# Patient Record
Sex: FEMALE | Race: WHITE | Employment: OTHER | URBAN - METROPOLITAN AREA
[De-identification: names, ages, dates, MRNs, and addresses within clinical notes are randomized per-mention and may not be internally consistent; named-entity substitution may affect disease eponyms.]

---

## 2017-01-18 PROBLEM — E78.2 MIXED HYPERLIPIDEMIA: Status: ACTIVE | Noted: 2017-01-18

## 2017-01-18 PROBLEM — M15.0 PRIMARY OSTEOARTHRITIS INVOLVING MULTIPLE JOINTS: Status: ACTIVE | Noted: 2017-01-18

## 2017-01-18 PROBLEM — M15.9 PRIMARY OSTEOARTHRITIS INVOLVING MULTIPLE JOINTS: Status: ACTIVE | Noted: 2017-01-18

## 2017-01-18 PROBLEM — S40.012A CONTUSION OF LEFT SHOULDER: Status: ACTIVE | Noted: 2017-01-18

## 2017-01-18 PROBLEM — S16.1XXA CERVICAL STRAIN: Status: ACTIVE | Noted: 2017-01-18

## 2018-03-14 ENCOUNTER — OFFICE VISIT (OUTPATIENT)
Dept: FAMILY MEDICINE CLINIC | Age: 73
End: 2018-03-14
Payer: MEDICARE

## 2018-03-14 VITALS
HEART RATE: 88 BPM | BODY MASS INDEX: 30 KG/M2 | TEMPERATURE: 98.1 F | OXYGEN SATURATION: 97 % | HEIGHT: 62 IN | WEIGHT: 163 LBS | SYSTOLIC BLOOD PRESSURE: 130 MMHG | DIASTOLIC BLOOD PRESSURE: 86 MMHG | RESPIRATION RATE: 16 BRPM

## 2018-03-14 DIAGNOSIS — J01.80 ACUTE NON-RECURRENT SINUSITIS OF OTHER SINUS: Primary | ICD-10-CM

## 2018-03-14 PROCEDURE — 99213 OFFICE O/P EST LOW 20 MIN: CPT | Performed by: PHYSICIAN ASSISTANT

## 2018-03-14 RX ORDER — TRIAMCINOLONE ACETONIDE 40 MG/ML
40 INJECTION, SUSPENSION INTRA-ARTICULAR; INTRAMUSCULAR ONCE
Status: COMPLETED | OUTPATIENT
Start: 2018-03-14 | End: 2018-03-14

## 2018-03-14 RX ADMIN — TRIAMCINOLONE ACETONIDE 40 MG: 40 INJECTION, SUSPENSION INTRA-ARTICULAR; INTRAMUSCULAR at 11:08

## 2018-03-14 NOTE — PROGRESS NOTES
Tonsillectomy; Percutaneous Transluminal Coronary Angio (2007); Dilation and curettage of uterus; cyst removal; Diagnostic Cardiac Cath Lab Procedure (2007); Coronary angioplasty (07); and  section. Social History:  reports that she quit smoking about 7 years ago. She quit after 35.00 years of use. She has never used smokeless tobacco. She reports that she does not drink alcohol or use drugs. Family History: family history includes Down Syndrome in her son; Emphysema in her mother; Heart Attack in her father; Heart Failure in her mother. The patients home medications have been reviewed. Allergies: Sulfa antibiotics; Amoxicillin; Clavulanic acid; Omeprazole; Other; Pneumococcal vaccines; and Amoxicillin-pot clavulanate    ------------------------- NURSING NOTES AND VITALS REVIEWED ---------------------------   The nursing notes within the ED encounter and vital signs as below have been reviewed by myself. /86   Pulse 88   Temp 98.1 °F (36.7 °C) (Oral)   Resp 16   Ht 5' 2\" (1.575 m)   Wt 163 lb (73.9 kg)   SpO2 97%   BMI 29.81 kg/m²   Oxygen Saturation Interpretation: Normal    The patients available past medical records and past encounters were reviewed. Physical exam:  Constitutional: Vital signs are reviewed the patient is comfortable. The patient is alert and oriented and conversant. Head: The head is atraumatic and normocephalic. Eyes: No discharge is present from the eyes. The sclera are normal.  ENT: The oropharynx demonstrates a small amount of erythema bilaterally. No postnasal drip in posterior pharynx. No uvular deviation or edema. No tonsillary asymmetry.  Floor of the mouth soft, no trismus, handling secretions. TMs bilaterally demonstrate no evidence of infection. Neck: Normal range of motion is achieved in the neck. Respiratory/chest: The chest is nontender.  Breath sounds are normal. There is no respiratory distress.   Cardiovascular:

## 2018-03-16 ENCOUNTER — TELEPHONE (OUTPATIENT)
Dept: PRIMARY CARE CLINIC | Age: 73
End: 2018-03-16

## 2018-03-16 NOTE — TELEPHONE ENCOUNTER
Spoke with pt, she relates having allergy Sx of rhinorrhea, post nasal drip, and \"I am just sick of it\". She just started back on claritin, is not taking the singulair, didn't try, and is not taking her flonase. Denies any f/c/n/v, other URI like Sx. We discussed going on the singulair, and being consistent with this and flonase she will do so, and will alert me in 5-7 days if no better. If she gets worse needs to FU.

## 2018-03-20 ENCOUNTER — OFFICE VISIT (OUTPATIENT)
Dept: PRIMARY CARE CLINIC | Age: 73
End: 2018-03-20
Payer: MEDICARE

## 2018-03-20 VITALS
SYSTOLIC BLOOD PRESSURE: 120 MMHG | DIASTOLIC BLOOD PRESSURE: 70 MMHG | OXYGEN SATURATION: 91 % | TEMPERATURE: 97.5 F | HEIGHT: 62 IN | HEART RATE: 89 BPM

## 2018-03-20 DIAGNOSIS — J32.9 CHRONIC SINUSITIS, UNSPECIFIED LOCATION: Primary | ICD-10-CM

## 2018-03-20 DIAGNOSIS — J06.9 UPPER RESPIRATORY TRACT INFECTION, UNSPECIFIED TYPE: ICD-10-CM

## 2018-03-20 LAB
INFLUENZA A ANTIGEN, POC: NEGATIVE
INFLUENZA B ANTIGEN, POC: NEGATIVE

## 2018-03-20 PROCEDURE — 1036F TOBACCO NON-USER: CPT | Performed by: PHYSICIAN ASSISTANT

## 2018-03-20 PROCEDURE — G8427 DOCREV CUR MEDS BY ELIG CLIN: HCPCS | Performed by: PHYSICIAN ASSISTANT

## 2018-03-20 PROCEDURE — 99213 OFFICE O/P EST LOW 20 MIN: CPT | Performed by: PHYSICIAN ASSISTANT

## 2018-03-20 PROCEDURE — G8417 CALC BMI ABV UP PARAM F/U: HCPCS | Performed by: PHYSICIAN ASSISTANT

## 2018-03-20 PROCEDURE — G8484 FLU IMMUNIZE NO ADMIN: HCPCS | Performed by: PHYSICIAN ASSISTANT

## 2018-03-20 PROCEDURE — G8400 PT W/DXA NO RESULTS DOC: HCPCS | Performed by: PHYSICIAN ASSISTANT

## 2018-03-20 PROCEDURE — 4040F PNEUMOC VAC/ADMIN/RCVD: CPT | Performed by: PHYSICIAN ASSISTANT

## 2018-03-20 PROCEDURE — G8598 ASA/ANTIPLAT THER USED: HCPCS | Performed by: PHYSICIAN ASSISTANT

## 2018-03-20 PROCEDURE — 87804 INFLUENZA ASSAY W/OPTIC: CPT | Performed by: PHYSICIAN ASSISTANT

## 2018-03-20 PROCEDURE — 1090F PRES/ABSN URINE INCON ASSESS: CPT | Performed by: PHYSICIAN ASSISTANT

## 2018-03-20 PROCEDURE — 3017F COLORECTAL CA SCREEN DOC REV: CPT | Performed by: PHYSICIAN ASSISTANT

## 2018-03-20 PROCEDURE — 1123F ACP DISCUSS/DSCN MKR DOCD: CPT | Performed by: PHYSICIAN ASSISTANT

## 2018-03-20 PROCEDURE — 3014F SCREEN MAMMO DOC REV: CPT | Performed by: PHYSICIAN ASSISTANT

## 2018-03-20 RX ORDER — METHYLPREDNISOLONE 4 MG/1
TABLET ORAL
Qty: 1 KIT | Refills: 0 | Status: SHIPPED | OUTPATIENT
Start: 2018-03-20 | End: 2018-03-26

## 2018-03-20 RX ORDER — DOXYCYCLINE HYCLATE 100 MG
100 TABLET ORAL 2 TIMES DAILY
Qty: 20 TABLET | Refills: 0 | Status: SHIPPED | OUTPATIENT
Start: 2018-03-20 | End: 2018-03-30

## 2018-03-20 NOTE — PROGRESS NOTES
myself)  Labs:  Results for orders placed or performed in visit on 03/20/18   POCT Influenza A/B Antigen (BD Veritor)   Result Value Ref Range    Inflenza A Ag negative     Influenza B Ag negative          Assessment / Plan     Impression(s):  1. Chronic sinusitis, unspecified location    2. Upper respiratory tract infection, unspecified type      Disposition:    Rx's were discussed with patient including indications and adverse effects, pt agrees and understands use. Recommended fluids and rest. OTC Tylenol if needed for pain/fever. Nasal saline spray. OTC Robitussin recommended. FU if Sx persist or worsen, if severe or worrisome-go to ER. Otherwise, FU for routine care. If this doesn't help or sinusitis recurs, will refer to ENT, or perhaps CT of sinuses. She understands.

## 2018-04-06 ENCOUNTER — OFFICE VISIT (OUTPATIENT)
Dept: FAMILY MEDICINE CLINIC | Age: 73
End: 2018-04-06
Payer: MEDICARE

## 2018-04-06 VITALS
HEIGHT: 62 IN | TEMPERATURE: 98 F | HEART RATE: 74 BPM | WEIGHT: 163 LBS | DIASTOLIC BLOOD PRESSURE: 96 MMHG | BODY MASS INDEX: 30 KG/M2 | RESPIRATION RATE: 18 BRPM | OXYGEN SATURATION: 95 % | SYSTOLIC BLOOD PRESSURE: 148 MMHG

## 2018-04-06 DIAGNOSIS — B37.0 ORAL THRUSH: Primary | ICD-10-CM

## 2018-04-06 DIAGNOSIS — N89.8 VAGINAL ITCHING: ICD-10-CM

## 2018-04-06 PROCEDURE — 3017F COLORECTAL CA SCREEN DOC REV: CPT | Performed by: PHYSICIAN ASSISTANT

## 2018-04-06 PROCEDURE — 99213 OFFICE O/P EST LOW 20 MIN: CPT | Performed by: PHYSICIAN ASSISTANT

## 2018-04-06 PROCEDURE — G8598 ASA/ANTIPLAT THER USED: HCPCS | Performed by: PHYSICIAN ASSISTANT

## 2018-04-06 PROCEDURE — 4040F PNEUMOC VAC/ADMIN/RCVD: CPT | Performed by: PHYSICIAN ASSISTANT

## 2018-04-06 PROCEDURE — 1123F ACP DISCUSS/DSCN MKR DOCD: CPT | Performed by: PHYSICIAN ASSISTANT

## 2018-04-06 PROCEDURE — G8400 PT W/DXA NO RESULTS DOC: HCPCS | Performed by: PHYSICIAN ASSISTANT

## 2018-04-06 PROCEDURE — G8417 CALC BMI ABV UP PARAM F/U: HCPCS | Performed by: PHYSICIAN ASSISTANT

## 2018-04-06 PROCEDURE — 1090F PRES/ABSN URINE INCON ASSESS: CPT | Performed by: PHYSICIAN ASSISTANT

## 2018-04-06 PROCEDURE — G8427 DOCREV CUR MEDS BY ELIG CLIN: HCPCS | Performed by: PHYSICIAN ASSISTANT

## 2018-04-06 PROCEDURE — 3014F SCREEN MAMMO DOC REV: CPT | Performed by: PHYSICIAN ASSISTANT

## 2018-04-06 PROCEDURE — 1036F TOBACCO NON-USER: CPT | Performed by: PHYSICIAN ASSISTANT

## 2018-04-12 ENCOUNTER — OFFICE VISIT (OUTPATIENT)
Dept: FAMILY MEDICINE CLINIC | Age: 73
End: 2018-04-12
Payer: MEDICARE

## 2018-04-12 VITALS
DIASTOLIC BLOOD PRESSURE: 79 MMHG | WEIGHT: 178 LBS | RESPIRATION RATE: 12 BRPM | HEIGHT: 62 IN | SYSTOLIC BLOOD PRESSURE: 116 MMHG | HEART RATE: 88 BPM | BODY MASS INDEX: 32.76 KG/M2 | TEMPERATURE: 98 F | OXYGEN SATURATION: 96 %

## 2018-04-12 DIAGNOSIS — J30.2 ACUTE SEASONAL ALLERGIC RHINITIS, UNSPECIFIED TRIGGER: Primary | ICD-10-CM

## 2018-04-12 PROCEDURE — 99213 OFFICE O/P EST LOW 20 MIN: CPT | Performed by: PHYSICIAN ASSISTANT

## 2018-04-12 PROCEDURE — G8427 DOCREV CUR MEDS BY ELIG CLIN: HCPCS | Performed by: PHYSICIAN ASSISTANT

## 2018-04-12 PROCEDURE — 3014F SCREEN MAMMO DOC REV: CPT | Performed by: PHYSICIAN ASSISTANT

## 2018-04-12 PROCEDURE — 4040F PNEUMOC VAC/ADMIN/RCVD: CPT | Performed by: PHYSICIAN ASSISTANT

## 2018-04-12 PROCEDURE — 1090F PRES/ABSN URINE INCON ASSESS: CPT | Performed by: PHYSICIAN ASSISTANT

## 2018-04-12 PROCEDURE — G8400 PT W/DXA NO RESULTS DOC: HCPCS | Performed by: PHYSICIAN ASSISTANT

## 2018-04-12 PROCEDURE — G8598 ASA/ANTIPLAT THER USED: HCPCS | Performed by: PHYSICIAN ASSISTANT

## 2018-04-12 PROCEDURE — G8417 CALC BMI ABV UP PARAM F/U: HCPCS | Performed by: PHYSICIAN ASSISTANT

## 2018-04-12 PROCEDURE — 1123F ACP DISCUSS/DSCN MKR DOCD: CPT | Performed by: PHYSICIAN ASSISTANT

## 2018-04-12 PROCEDURE — 1036F TOBACCO NON-USER: CPT | Performed by: PHYSICIAN ASSISTANT

## 2018-04-12 PROCEDURE — 96372 THER/PROPH/DIAG INJ SC/IM: CPT | Performed by: PHYSICIAN ASSISTANT

## 2018-04-12 PROCEDURE — 3017F COLORECTAL CA SCREEN DOC REV: CPT | Performed by: PHYSICIAN ASSISTANT

## 2018-04-12 RX ORDER — TRIAMCINOLONE ACETONIDE 40 MG/ML
40 INJECTION, SUSPENSION INTRA-ARTICULAR; INTRAMUSCULAR ONCE
Status: COMPLETED | OUTPATIENT
Start: 2018-04-12 | End: 2018-04-12

## 2018-04-12 RX ADMIN — TRIAMCINOLONE ACETONIDE 40 MG: 40 INJECTION, SUSPENSION INTRA-ARTICULAR; INTRAMUSCULAR at 13:25

## 2018-04-23 DIAGNOSIS — F41.9 ANXIETY: ICD-10-CM

## 2018-04-23 RX ORDER — ALPRAZOLAM 2 MG/1
TABLET ORAL
Qty: 180 TABLET | Refills: 1 | Status: SHIPPED | OUTPATIENT
Start: 2018-04-23 | End: 2018-05-23

## 2018-05-14 DIAGNOSIS — K21.9 GASTROESOPHAGEAL REFLUX DISEASE WITHOUT ESOPHAGITIS: ICD-10-CM

## 2018-05-14 RX ORDER — PANTOPRAZOLE SODIUM 40 MG/1
40 TABLET, DELAYED RELEASE ORAL DAILY
Qty: 90 TABLET | Refills: 1 | Status: SHIPPED | OUTPATIENT
Start: 2018-05-14 | End: 2018-11-07 | Stop reason: SDUPTHER

## 2018-05-25 ENCOUNTER — OFFICE VISIT (OUTPATIENT)
Dept: PRIMARY CARE CLINIC | Age: 73
End: 2018-05-25
Payer: MEDICARE

## 2018-05-25 VITALS
DIASTOLIC BLOOD PRESSURE: 84 MMHG | HEART RATE: 85 BPM | WEIGHT: 155 LBS | TEMPERATURE: 97 F | SYSTOLIC BLOOD PRESSURE: 134 MMHG | BODY MASS INDEX: 28.52 KG/M2 | OXYGEN SATURATION: 96 % | HEIGHT: 62 IN

## 2018-05-25 DIAGNOSIS — J01.91 ACUTE RECURRENT SINUSITIS, UNSPECIFIED LOCATION: Primary | ICD-10-CM

## 2018-05-25 PROCEDURE — G8400 PT W/DXA NO RESULTS DOC: HCPCS | Performed by: PHYSICIAN ASSISTANT

## 2018-05-25 PROCEDURE — 3017F COLORECTAL CA SCREEN DOC REV: CPT | Performed by: PHYSICIAN ASSISTANT

## 2018-05-25 PROCEDURE — 96372 THER/PROPH/DIAG INJ SC/IM: CPT | Performed by: PHYSICIAN ASSISTANT

## 2018-05-25 PROCEDURE — G8427 DOCREV CUR MEDS BY ELIG CLIN: HCPCS | Performed by: PHYSICIAN ASSISTANT

## 2018-05-25 PROCEDURE — G8417 CALC BMI ABV UP PARAM F/U: HCPCS | Performed by: PHYSICIAN ASSISTANT

## 2018-05-25 PROCEDURE — 1123F ACP DISCUSS/DSCN MKR DOCD: CPT | Performed by: PHYSICIAN ASSISTANT

## 2018-05-25 PROCEDURE — 1090F PRES/ABSN URINE INCON ASSESS: CPT | Performed by: PHYSICIAN ASSISTANT

## 2018-05-25 PROCEDURE — 4040F PNEUMOC VAC/ADMIN/RCVD: CPT | Performed by: PHYSICIAN ASSISTANT

## 2018-05-25 PROCEDURE — 99213 OFFICE O/P EST LOW 20 MIN: CPT | Performed by: PHYSICIAN ASSISTANT

## 2018-05-25 PROCEDURE — G8598 ASA/ANTIPLAT THER USED: HCPCS | Performed by: PHYSICIAN ASSISTANT

## 2018-05-25 PROCEDURE — 1036F TOBACCO NON-USER: CPT | Performed by: PHYSICIAN ASSISTANT

## 2018-05-25 RX ORDER — LEVOCETIRIZINE DIHYDROCHLORIDE 5 MG/1
5 TABLET, FILM COATED ORAL NIGHTLY
Qty: 30 TABLET | Refills: 2 | Status: SHIPPED | OUTPATIENT
Start: 2018-05-25 | End: 2018-06-29

## 2018-05-25 RX ORDER — CEFTRIAXONE SODIUM 250 MG/1
250 INJECTION, POWDER, FOR SOLUTION INTRAMUSCULAR; INTRAVENOUS ONCE
Status: COMPLETED | OUTPATIENT
Start: 2018-05-25 | End: 2018-05-25

## 2018-05-25 RX ORDER — ALPRAZOLAM 2 MG/1
TABLET ORAL
COMMUNITY
Start: 2018-05-24 | End: 2018-06-29 | Stop reason: SDUPTHER

## 2018-05-25 RX ADMIN — CEFTRIAXONE SODIUM 250 MG: 250 INJECTION, POWDER, FOR SOLUTION INTRAMUSCULAR; INTRAVENOUS at 10:55

## 2018-06-01 ENCOUNTER — HOSPITAL ENCOUNTER (OUTPATIENT)
Dept: GENERAL RADIOLOGY | Age: 73
Discharge: HOME | End: 2018-06-03
Payer: MEDICARE

## 2018-06-01 ENCOUNTER — HOSPITAL ENCOUNTER (OUTPATIENT)
Age: 73
Discharge: HOME | End: 2018-06-03
Payer: MEDICARE

## 2018-06-01 ENCOUNTER — OFFICE VISIT (OUTPATIENT)
Dept: FAMILY MEDICINE CLINIC | Age: 73
End: 2018-06-01
Payer: MEDICARE

## 2018-06-01 VITALS
OXYGEN SATURATION: 98 % | WEIGHT: 155 LBS | SYSTOLIC BLOOD PRESSURE: 170 MMHG | HEIGHT: 62 IN | TEMPERATURE: 98.5 F | HEART RATE: 71 BPM | DIASTOLIC BLOOD PRESSURE: 100 MMHG | BODY MASS INDEX: 28.52 KG/M2

## 2018-06-01 DIAGNOSIS — M79.675 GREAT TOE PAIN, LEFT: Primary | ICD-10-CM

## 2018-06-01 DIAGNOSIS — M79.675 GREAT TOE PAIN, LEFT: ICD-10-CM

## 2018-06-01 PROCEDURE — 73630 X-RAY EXAM OF FOOT: CPT

## 2018-06-01 PROCEDURE — G8598 ASA/ANTIPLAT THER USED: HCPCS | Performed by: PHYSICIAN ASSISTANT

## 2018-06-01 PROCEDURE — 1123F ACP DISCUSS/DSCN MKR DOCD: CPT | Performed by: PHYSICIAN ASSISTANT

## 2018-06-01 PROCEDURE — 99213 OFFICE O/P EST LOW 20 MIN: CPT | Performed by: PHYSICIAN ASSISTANT

## 2018-06-01 PROCEDURE — 1090F PRES/ABSN URINE INCON ASSESS: CPT | Performed by: PHYSICIAN ASSISTANT

## 2018-06-01 PROCEDURE — G8417 CALC BMI ABV UP PARAM F/U: HCPCS | Performed by: PHYSICIAN ASSISTANT

## 2018-06-01 PROCEDURE — 4040F PNEUMOC VAC/ADMIN/RCVD: CPT | Performed by: PHYSICIAN ASSISTANT

## 2018-06-01 PROCEDURE — G8400 PT W/DXA NO RESULTS DOC: HCPCS | Performed by: PHYSICIAN ASSISTANT

## 2018-06-01 PROCEDURE — G8427 DOCREV CUR MEDS BY ELIG CLIN: HCPCS | Performed by: PHYSICIAN ASSISTANT

## 2018-06-01 PROCEDURE — 96372 THER/PROPH/DIAG INJ SC/IM: CPT | Performed by: PHYSICIAN ASSISTANT

## 2018-06-01 PROCEDURE — 3017F COLORECTAL CA SCREEN DOC REV: CPT | Performed by: PHYSICIAN ASSISTANT

## 2018-06-01 PROCEDURE — 1036F TOBACCO NON-USER: CPT | Performed by: PHYSICIAN ASSISTANT

## 2018-06-01 RX ORDER — METHYLPREDNISOLONE 4 MG/1
TABLET ORAL
Qty: 1 KIT | Refills: 0 | Status: SHIPPED | OUTPATIENT
Start: 2018-06-01 | End: 2018-06-07

## 2018-06-01 RX ORDER — DEXAMETHASONE SODIUM PHOSPHATE 100 MG/10ML
10 INJECTION INTRAMUSCULAR; INTRAVENOUS ONCE
Status: COMPLETED | OUTPATIENT
Start: 2018-06-01 | End: 2018-06-01

## 2018-06-01 RX ADMIN — DEXAMETHASONE SODIUM PHOSPHATE 10 MG: 100 INJECTION INTRAMUSCULAR; INTRAVENOUS at 16:39

## 2018-06-15 ENCOUNTER — OFFICE VISIT (OUTPATIENT)
Dept: PRIMARY CARE CLINIC | Age: 73
End: 2018-06-15
Payer: MEDICARE

## 2018-06-15 DIAGNOSIS — H61.23 BILATERAL IMPACTED CERUMEN: Primary | ICD-10-CM

## 2018-06-15 PROCEDURE — G8400 PT W/DXA NO RESULTS DOC: HCPCS | Performed by: PHYSICIAN ASSISTANT

## 2018-06-15 PROCEDURE — G8417 CALC BMI ABV UP PARAM F/U: HCPCS | Performed by: PHYSICIAN ASSISTANT

## 2018-06-15 PROCEDURE — 99212 OFFICE O/P EST SF 10 MIN: CPT | Performed by: PHYSICIAN ASSISTANT

## 2018-06-15 PROCEDURE — G8598 ASA/ANTIPLAT THER USED: HCPCS | Performed by: PHYSICIAN ASSISTANT

## 2018-06-15 PROCEDURE — 1123F ACP DISCUSS/DSCN MKR DOCD: CPT | Performed by: PHYSICIAN ASSISTANT

## 2018-06-15 PROCEDURE — 3017F COLORECTAL CA SCREEN DOC REV: CPT | Performed by: PHYSICIAN ASSISTANT

## 2018-06-15 PROCEDURE — G8428 CUR MEDS NOT DOCUMENT: HCPCS | Performed by: PHYSICIAN ASSISTANT

## 2018-06-15 PROCEDURE — 4040F PNEUMOC VAC/ADMIN/RCVD: CPT | Performed by: PHYSICIAN ASSISTANT

## 2018-06-15 PROCEDURE — 1036F TOBACCO NON-USER: CPT | Performed by: PHYSICIAN ASSISTANT

## 2018-06-15 PROCEDURE — 69209 REMOVE IMPACTED EAR WAX UNI: CPT | Performed by: PHYSICIAN ASSISTANT

## 2018-06-15 PROCEDURE — 1090F PRES/ABSN URINE INCON ASSESS: CPT | Performed by: PHYSICIAN ASSISTANT

## 2018-06-24 ENCOUNTER — APPOINTMENT (OUTPATIENT)
Dept: GENERAL RADIOLOGY | Age: 73
End: 2018-06-24
Payer: MEDICARE

## 2018-06-24 ENCOUNTER — HOSPITAL ENCOUNTER (EMERGENCY)
Age: 73
Discharge: HOME | End: 2018-06-24
Payer: MEDICARE

## 2018-06-24 VITALS
OXYGEN SATURATION: 93 % | SYSTOLIC BLOOD PRESSURE: 140 MMHG | RESPIRATION RATE: 16 BRPM | DIASTOLIC BLOOD PRESSURE: 87 MMHG | TEMPERATURE: 98.3 F | HEIGHT: 62 IN | BODY MASS INDEX: 28.52 KG/M2 | WEIGHT: 155 LBS | HEART RATE: 75 BPM

## 2018-06-24 DIAGNOSIS — S80.11XA CONTUSION OF RIGHT LOWER LEG, INITIAL ENCOUNTER: Primary | ICD-10-CM

## 2018-06-24 PROCEDURE — 99284 EMERGENCY DEPT VISIT MOD MDM: CPT

## 2018-06-24 PROCEDURE — 73590 X-RAY EXAM OF LOWER LEG: CPT

## 2018-06-27 ENCOUNTER — CARE COORDINATION (OUTPATIENT)
Dept: CARE COORDINATION | Age: 73
End: 2018-06-27

## 2018-06-29 ENCOUNTER — OFFICE VISIT (OUTPATIENT)
Dept: PRIMARY CARE CLINIC | Age: 73
End: 2018-06-29
Payer: MEDICARE

## 2018-06-29 ENCOUNTER — HOSPITAL ENCOUNTER (OUTPATIENT)
Age: 73
Discharge: HOME | End: 2018-07-01
Payer: MEDICARE

## 2018-06-29 VITALS
WEIGHT: 154 LBS | TEMPERATURE: 97.5 F | OXYGEN SATURATION: 96 % | HEART RATE: 76 BPM | SYSTOLIC BLOOD PRESSURE: 120 MMHG | RESPIRATION RATE: 16 BRPM | BODY MASS INDEX: 28.34 KG/M2 | HEIGHT: 62 IN | DIASTOLIC BLOOD PRESSURE: 70 MMHG

## 2018-06-29 DIAGNOSIS — Z12.11 ENCOUNTER FOR FIT (FECAL IMMUNOCHEMICAL TEST) SCREENING: ICD-10-CM

## 2018-06-29 DIAGNOSIS — F41.9 ANXIETY: ICD-10-CM

## 2018-06-29 DIAGNOSIS — J32.9 RECURRENT SINUS INFECTIONS: ICD-10-CM

## 2018-06-29 DIAGNOSIS — I10 ESSENTIAL HYPERTENSION: ICD-10-CM

## 2018-06-29 DIAGNOSIS — E11.9 TYPE 2 DIABETES MELLITUS WITHOUT COMPLICATION, WITHOUT LONG-TERM CURRENT USE OF INSULIN (HCC): ICD-10-CM

## 2018-06-29 DIAGNOSIS — I10 ESSENTIAL HYPERTENSION: Primary | ICD-10-CM

## 2018-06-29 DIAGNOSIS — E78.2 MIXED HYPERLIPIDEMIA: ICD-10-CM

## 2018-06-29 LAB
ALBUMIN SERPL-MCNC: 4.4 G/DL (ref 3.5–5.2)
ALP BLD-CCNC: 45 U/L (ref 35–104)
ALT SERPL-CCNC: 10 U/L (ref 0–32)
ANION GAP SERPL CALCULATED.3IONS-SCNC: 15 MMOL/L (ref 7–16)
AST SERPL-CCNC: 17 U/L (ref 0–31)
BASOPHILS ABSOLUTE: 0.06 E9/L (ref 0–0.2)
BASOPHILS RELATIVE PERCENT: 0.7 % (ref 0–2)
BILIRUB SERPL-MCNC: 0.4 MG/DL (ref 0–1.2)
BUN BLDV-MCNC: 17 MG/DL (ref 8–23)
CALCIUM SERPL-MCNC: 9.4 MG/DL (ref 8.6–10.2)
CHLORIDE BLD-SCNC: 102 MMOL/L (ref 98–107)
CHOLESTEROL, TOTAL: 149 MG/DL (ref 0–199)
CO2: 26 MMOL/L (ref 22–29)
CREAT SERPL-MCNC: 0.9 MG/DL (ref 0.5–1)
CREATININE URINE: 247 MG/DL (ref 29–226)
EOSINOPHILS ABSOLUTE: 0.16 E9/L (ref 0.05–0.5)
EOSINOPHILS RELATIVE PERCENT: 1.8 % (ref 0–6)
GFR AFRICAN AMERICAN: >60
GFR NON-AFRICAN AMERICAN: >60 ML/MIN/1.73
GLUCOSE BLD-MCNC: 83 MG/DL (ref 74–109)
HBA1C MFR BLD: 5.7 %
HBA1C MFR BLD: 5.8 % (ref 4–5.6)
HCT VFR BLD CALC: 43.9 % (ref 34–48)
HDLC SERPL-MCNC: 59 MG/DL
HEMOGLOBIN: 13.5 G/DL (ref 11.5–15.5)
IMMATURE GRANULOCYTES #: 0.06 E9/L
IMMATURE GRANULOCYTES %: 0.7 % (ref 0–5)
LDL CHOLESTEROL CALCULATED: 66 MG/DL (ref 0–99)
LYMPHOCYTES ABSOLUTE: 1.64 E9/L (ref 1.5–4)
LYMPHOCYTES RELATIVE PERCENT: 18.5 % (ref 20–42)
MCH RBC QN AUTO: 29.5 PG (ref 26–35)
MCHC RBC AUTO-ENTMCNC: 30.8 % (ref 32–34.5)
MCV RBC AUTO: 96.1 FL (ref 80–99.9)
MICROALBUMIN UR-MCNC: 99.5 MG/L
MICROALBUMIN/CREAT UR-RTO: 40.3 (ref 0–30)
MONOCYTES ABSOLUTE: 0.99 E9/L (ref 0.1–0.95)
MONOCYTES RELATIVE PERCENT: 11.1 % (ref 2–12)
NEUTROPHILS ABSOLUTE: 5.97 E9/L (ref 1.8–7.3)
NEUTROPHILS RELATIVE PERCENT: 67.2 % (ref 43–80)
PDW BLD-RTO: 12.9 FL (ref 11.5–15)
PLATELET # BLD: 289 E9/L (ref 130–450)
PMV BLD AUTO: 10.7 FL (ref 7–12)
POTASSIUM SERPL-SCNC: 5 MMOL/L (ref 3.5–5)
RBC # BLD: 4.57 E12/L (ref 3.5–5.5)
SODIUM BLD-SCNC: 143 MMOL/L (ref 132–146)
TOTAL PROTEIN: 6.9 G/DL (ref 6.4–8.3)
TRIGL SERPL-MCNC: 121 MG/DL (ref 0–149)
VLDLC SERPL CALC-MCNC: 24 MG/DL
WBC # BLD: 8.9 E9/L (ref 4.5–11.5)

## 2018-06-29 PROCEDURE — 83036 HEMOGLOBIN GLYCOSYLATED A1C: CPT

## 2018-06-29 PROCEDURE — G8427 DOCREV CUR MEDS BY ELIG CLIN: HCPCS | Performed by: PHYSICIAN ASSISTANT

## 2018-06-29 PROCEDURE — 83036 HEMOGLOBIN GLYCOSYLATED A1C: CPT | Performed by: PHYSICIAN ASSISTANT

## 2018-06-29 PROCEDURE — G8598 ASA/ANTIPLAT THER USED: HCPCS | Performed by: PHYSICIAN ASSISTANT

## 2018-06-29 PROCEDURE — 3044F HG A1C LEVEL LT 7.0%: CPT | Performed by: PHYSICIAN ASSISTANT

## 2018-06-29 PROCEDURE — 82570 ASSAY OF URINE CREATININE: CPT

## 2018-06-29 PROCEDURE — 99214 OFFICE O/P EST MOD 30 MIN: CPT | Performed by: PHYSICIAN ASSISTANT

## 2018-06-29 PROCEDURE — 3017F COLORECTAL CA SCREEN DOC REV: CPT | Performed by: PHYSICIAN ASSISTANT

## 2018-06-29 PROCEDURE — 1123F ACP DISCUSS/DSCN MKR DOCD: CPT | Performed by: PHYSICIAN ASSISTANT

## 2018-06-29 PROCEDURE — 80053 COMPREHEN METABOLIC PANEL: CPT

## 2018-06-29 PROCEDURE — 82044 UR ALBUMIN SEMIQUANTITATIVE: CPT

## 2018-06-29 PROCEDURE — 1036F TOBACCO NON-USER: CPT | Performed by: PHYSICIAN ASSISTANT

## 2018-06-29 PROCEDURE — 85025 COMPLETE CBC W/AUTO DIFF WBC: CPT

## 2018-06-29 PROCEDURE — G8400 PT W/DXA NO RESULTS DOC: HCPCS | Performed by: PHYSICIAN ASSISTANT

## 2018-06-29 PROCEDURE — 1090F PRES/ABSN URINE INCON ASSESS: CPT | Performed by: PHYSICIAN ASSISTANT

## 2018-06-29 PROCEDURE — 2022F DILAT RTA XM EVC RTNOPTHY: CPT | Performed by: PHYSICIAN ASSISTANT

## 2018-06-29 PROCEDURE — 80061 LIPID PANEL: CPT

## 2018-06-29 PROCEDURE — G8417 CALC BMI ABV UP PARAM F/U: HCPCS | Performed by: PHYSICIAN ASSISTANT

## 2018-06-29 PROCEDURE — 4040F PNEUMOC VAC/ADMIN/RCVD: CPT | Performed by: PHYSICIAN ASSISTANT

## 2018-06-29 RX ORDER — OFLOXACIN 3 MG/ML
SOLUTION/ DROPS OPHTHALMIC
COMMUNITY
Start: 2018-06-26 | End: 2019-06-12

## 2018-06-29 RX ORDER — LEVOFLOXACIN 500 MG/1
500 TABLET, FILM COATED ORAL DAILY
Qty: 7 TABLET | Refills: 0 | Status: SHIPPED | OUTPATIENT
Start: 2018-06-29 | End: 2018-07-06

## 2018-06-29 RX ORDER — ALPRAZOLAM 2 MG/1
2 TABLET ORAL 3 TIMES DAILY PRN
Qty: 90 TABLET | Refills: 1 | Status: SHIPPED | OUTPATIENT
Start: 2018-06-29 | End: 2018-07-02 | Stop reason: SDUPTHER

## 2018-06-29 RX ORDER — PREDNISOLONE ACETATE 10 MG/ML
SUSPENSION/ DROPS OPHTHALMIC
COMMUNITY
Start: 2018-06-26 | End: 2019-06-12

## 2018-06-29 ASSESSMENT — PATIENT HEALTH QUESTIONNAIRE - PHQ9
SUM OF ALL RESPONSES TO PHQ9 QUESTIONS 1 & 2: 0
SUM OF ALL RESPONSES TO PHQ QUESTIONS 1-9: 0
2. FEELING DOWN, DEPRESSED OR HOPELESS: 0
1. LITTLE INTEREST OR PLEASURE IN DOING THINGS: 0

## 2018-07-02 DIAGNOSIS — F41.9 ANXIETY: ICD-10-CM

## 2018-07-02 RX ORDER — FLUTICASONE PROPIONATE 50 MCG
1 SPRAY, SUSPENSION (ML) NASAL DAILY
Qty: 1 BOTTLE | Refills: 1 | Status: SHIPPED
Start: 2018-07-02 | End: 2020-04-27

## 2018-07-02 RX ORDER — ALPRAZOLAM 2 MG/1
2 TABLET ORAL 3 TIMES DAILY PRN
Qty: 90 TABLET | Refills: 1 | Status: SHIPPED | OUTPATIENT
Start: 2018-07-02 | End: 2018-08-01

## 2018-07-19 ENCOUNTER — OFFICE VISIT (OUTPATIENT)
Dept: PRIMARY CARE CLINIC | Age: 73
End: 2018-07-19
Payer: MEDICARE

## 2018-07-19 VITALS
OXYGEN SATURATION: 96 % | BODY MASS INDEX: 27.98 KG/M2 | DIASTOLIC BLOOD PRESSURE: 80 MMHG | WEIGHT: 153 LBS | SYSTOLIC BLOOD PRESSURE: 136 MMHG | TEMPERATURE: 98.2 F | RESPIRATION RATE: 16 BRPM | HEART RATE: 76 BPM

## 2018-07-19 DIAGNOSIS — J30.9 CHRONIC ALLERGIC RHINITIS, UNSPECIFIED SEASONALITY, UNSPECIFIED TRIGGER: Primary | ICD-10-CM

## 2018-07-19 PROCEDURE — 4040F PNEUMOC VAC/ADMIN/RCVD: CPT | Performed by: PHYSICIAN ASSISTANT

## 2018-07-19 PROCEDURE — G8400 PT W/DXA NO RESULTS DOC: HCPCS | Performed by: PHYSICIAN ASSISTANT

## 2018-07-19 PROCEDURE — G8598 ASA/ANTIPLAT THER USED: HCPCS | Performed by: PHYSICIAN ASSISTANT

## 2018-07-19 PROCEDURE — 99213 OFFICE O/P EST LOW 20 MIN: CPT | Performed by: PHYSICIAN ASSISTANT

## 2018-07-19 PROCEDURE — 1101F PT FALLS ASSESS-DOCD LE1/YR: CPT | Performed by: PHYSICIAN ASSISTANT

## 2018-07-19 PROCEDURE — 1036F TOBACCO NON-USER: CPT | Performed by: PHYSICIAN ASSISTANT

## 2018-07-19 PROCEDURE — G8417 CALC BMI ABV UP PARAM F/U: HCPCS | Performed by: PHYSICIAN ASSISTANT

## 2018-07-19 PROCEDURE — 3017F COLORECTAL CA SCREEN DOC REV: CPT | Performed by: PHYSICIAN ASSISTANT

## 2018-07-19 PROCEDURE — 1090F PRES/ABSN URINE INCON ASSESS: CPT | Performed by: PHYSICIAN ASSISTANT

## 2018-07-19 PROCEDURE — G8427 DOCREV CUR MEDS BY ELIG CLIN: HCPCS | Performed by: PHYSICIAN ASSISTANT

## 2018-07-19 PROCEDURE — 1123F ACP DISCUSS/DSCN MKR DOCD: CPT | Performed by: PHYSICIAN ASSISTANT

## 2018-07-19 NOTE — PROGRESS NOTES
Chief Complaint:   Sinus Problem (check for sinus infection)      History of Present Illness   Source of history provided by:  patient. Jorge Forte is a 72 y.o. old female with a past medical history of:   Past Medical History:   Diagnosis Date    Abnormal echocardiogram     Anxiety     CAD (coronary artery disease)     normal adenisone stress test    Carotid artery narrowing     <40% bilaterally    Depression     Diabetes mellitus (HCC)     type II    Drug intolerance     Lipitor, Crestor, high doses of Simvastatin    GERD (gastroesophageal reflux disease)     Heart attack     Hyperlipidemia     Hypertension     IBS (irritable bowel syndrome)     Obesity     Primary osteoarthritis involving multiple joints 1/18/2017    SOB (shortness of breath) on exertion     UTI (urinary tract infection)       Presents today for a check on his sinuses. She is getting cataract surgery this upcoming Monday and wants to ensure she has no sinus infection. States she continues to have post nasal drip and drainage in the AM, it is clear, she states as she drinks and eats throughout the day it gets better. No f/c/n/v. No increased fatigue/malaise. No cough, no CP, SOB, wheezing,  pleurisy, abdominal pain,  neck stiffness or pain, rash, or lethargy. ROS    Unless otherwise stated in this report or unable to obtain because of the patient's clinical or mental status as evidenced by the medical record, this patients's positive and negative responses for Review of Systems, constitutional, psych, eyes, ENT, cardiovascular, respiratory, gastrointestinal, neurological, genitourinary, musculoskeletal, integument systems and systems related to the presenting problem are either stated in the preceding or were not pertinent or were negative for the symptoms and/or complaints related to the medical problem. Past Surgical History:  has a past surgical history that includes Tonsillectomy;  Percutaneous Transluminal Impression(s):  1. Chronic allergic rhinitis, unspecified seasonality, unspecified trigger      Disposition:    Pt is still having the CT of her sinuses, I truly feel this is from allergies, she has no sigsn of ifnection, and she should be OK for the cataract surgerys, should she worsen before then she needs to alert me immed. She will do so. She very elikley needs ENT referral. Will await the CT, continue the allergy meds. FU in early Jan 2019 for biannual check.

## 2018-08-02 ENCOUNTER — HOSPITAL ENCOUNTER (OUTPATIENT)
Dept: CT IMAGING | Age: 73
Discharge: HOME | End: 2018-08-04
Payer: MEDICARE

## 2018-08-02 DIAGNOSIS — J32.9 RECURRENT SINUS INFECTIONS: ICD-10-CM

## 2018-08-13 ENCOUNTER — TELEPHONE (OUTPATIENT)
Dept: PRIMARY CARE CLINIC | Age: 73
End: 2018-08-13

## 2018-08-13 NOTE — TELEPHONE ENCOUNTER
Pt returned call, is not in a hurry to see ENT. Has upcoming cataract surgery. Will call with name of who she wants to see.

## 2018-08-13 NOTE — TELEPHONE ENCOUNTER
Can we let pt know her CT did show R sided chronic sinusitis. I want her to see ENT for her symptoms and for this. Does she have a preference who she sees?

## 2018-08-24 ENCOUNTER — TELEPHONE (OUTPATIENT)
Dept: ONCOLOGY | Age: 73
End: 2018-08-24

## 2018-10-12 ENCOUNTER — OFFICE VISIT (OUTPATIENT)
Dept: PRIMARY CARE CLINIC | Age: 73
End: 2018-10-12
Payer: MEDICARE

## 2018-10-12 VITALS
HEIGHT: 62 IN | OXYGEN SATURATION: 96 % | WEIGHT: 155 LBS | HEART RATE: 83 BPM | TEMPERATURE: 97.8 F | BODY MASS INDEX: 28.52 KG/M2 | DIASTOLIC BLOOD PRESSURE: 80 MMHG | SYSTOLIC BLOOD PRESSURE: 124 MMHG

## 2018-10-12 DIAGNOSIS — S86.899A SHIN SPLINTS, INITIAL ENCOUNTER: ICD-10-CM

## 2018-10-12 DIAGNOSIS — M89.8X6 BILATERAL TIBIAL PAIN: Primary | ICD-10-CM

## 2018-10-12 DIAGNOSIS — Z12.31 SCREENING MAMMOGRAM, ENCOUNTER FOR: ICD-10-CM

## 2018-10-12 PROCEDURE — 3017F COLORECTAL CA SCREEN DOC REV: CPT | Performed by: PHYSICIAN ASSISTANT

## 2018-10-12 PROCEDURE — 4040F PNEUMOC VAC/ADMIN/RCVD: CPT | Performed by: PHYSICIAN ASSISTANT

## 2018-10-12 PROCEDURE — G8400 PT W/DXA NO RESULTS DOC: HCPCS | Performed by: PHYSICIAN ASSISTANT

## 2018-10-12 PROCEDURE — G8484 FLU IMMUNIZE NO ADMIN: HCPCS | Performed by: PHYSICIAN ASSISTANT

## 2018-10-12 PROCEDURE — 1036F TOBACCO NON-USER: CPT | Performed by: PHYSICIAN ASSISTANT

## 2018-10-12 PROCEDURE — 1101F PT FALLS ASSESS-DOCD LE1/YR: CPT | Performed by: PHYSICIAN ASSISTANT

## 2018-10-12 PROCEDURE — 99213 OFFICE O/P EST LOW 20 MIN: CPT | Performed by: PHYSICIAN ASSISTANT

## 2018-10-12 PROCEDURE — 1123F ACP DISCUSS/DSCN MKR DOCD: CPT | Performed by: PHYSICIAN ASSISTANT

## 2018-10-12 PROCEDURE — G8417 CALC BMI ABV UP PARAM F/U: HCPCS | Performed by: PHYSICIAN ASSISTANT

## 2018-10-12 PROCEDURE — G8598 ASA/ANTIPLAT THER USED: HCPCS | Performed by: PHYSICIAN ASSISTANT

## 2018-10-12 PROCEDURE — G8427 DOCREV CUR MEDS BY ELIG CLIN: HCPCS | Performed by: PHYSICIAN ASSISTANT

## 2018-10-12 PROCEDURE — 1090F PRES/ABSN URINE INCON ASSESS: CPT | Performed by: PHYSICIAN ASSISTANT

## 2018-10-15 DIAGNOSIS — M89.8X6 BILATERAL TIBIAL PAIN: ICD-10-CM

## 2018-10-15 DIAGNOSIS — S86.899A SHIN SPLINTS, INITIAL ENCOUNTER: ICD-10-CM

## 2018-10-15 RX ORDER — PITAVASTATIN CALCIUM 4.18 MG/1
TABLET, FILM COATED ORAL
Qty: 30 TABLET | Refills: 5 | Status: SHIPPED | OUTPATIENT
Start: 2018-10-15 | End: 2019-04-18 | Stop reason: SDUPTHER

## 2018-11-07 DIAGNOSIS — K21.9 GASTROESOPHAGEAL REFLUX DISEASE WITHOUT ESOPHAGITIS: ICD-10-CM

## 2018-11-07 RX ORDER — PANTOPRAZOLE SODIUM 40 MG/1
TABLET, DELAYED RELEASE ORAL
Qty: 90 TABLET | Refills: 1 | Status: SHIPPED | OUTPATIENT
Start: 2018-11-07 | End: 2019-05-08 | Stop reason: SDUPTHER

## 2018-11-20 DIAGNOSIS — F41.9 ANXIETY: ICD-10-CM

## 2018-11-20 RX ORDER — ALPRAZOLAM 2 MG/1
TABLET ORAL
Qty: 90 TABLET | Refills: 1 | Status: SHIPPED | OUTPATIENT
Start: 2018-11-20 | End: 2019-01-20 | Stop reason: SDUPTHER

## 2018-12-28 ENCOUNTER — HOSPITAL ENCOUNTER (EMERGENCY)
Age: 73
Discharge: HOME | End: 2018-12-28
Payer: MEDICARE

## 2018-12-28 VITALS
RESPIRATION RATE: 18 BRPM | HEIGHT: 62 IN | OXYGEN SATURATION: 97 % | HEART RATE: 71 BPM | TEMPERATURE: 97.7 F | BODY MASS INDEX: 28.89 KG/M2 | WEIGHT: 157 LBS | SYSTOLIC BLOOD PRESSURE: 118 MMHG | DIASTOLIC BLOOD PRESSURE: 80 MMHG

## 2018-12-28 DIAGNOSIS — J01.90 ACUTE NON-RECURRENT SINUSITIS, UNSPECIFIED LOCATION: Primary | ICD-10-CM

## 2018-12-28 PROCEDURE — 99283 EMERGENCY DEPT VISIT LOW MDM: CPT

## 2018-12-28 RX ORDER — AZITHROMYCIN 250 MG/1
TABLET, FILM COATED ORAL
Qty: 1 PACKET | Refills: 0 | Status: SHIPPED | OUTPATIENT
Start: 2018-12-28 | End: 2019-01-07

## 2019-01-10 ENCOUNTER — OFFICE VISIT (OUTPATIENT)
Dept: CARDIOLOGY CLINIC | Age: 74
End: 2019-01-10
Payer: MEDICARE

## 2019-01-10 VITALS
RESPIRATION RATE: 18 BRPM | WEIGHT: 156.8 LBS | BODY MASS INDEX: 28.85 KG/M2 | SYSTOLIC BLOOD PRESSURE: 128 MMHG | DIASTOLIC BLOOD PRESSURE: 82 MMHG | HEART RATE: 64 BPM | HEIGHT: 62 IN

## 2019-01-10 DIAGNOSIS — I25.10 CORONARY ARTERY DISEASE INVOLVING NATIVE CORONARY ARTERY OF NATIVE HEART WITHOUT ANGINA PECTORIS: Primary | ICD-10-CM

## 2019-01-10 PROCEDURE — G8427 DOCREV CUR MEDS BY ELIG CLIN: HCPCS | Performed by: INTERNAL MEDICINE

## 2019-01-10 PROCEDURE — G8417 CALC BMI ABV UP PARAM F/U: HCPCS | Performed by: INTERNAL MEDICINE

## 2019-01-10 PROCEDURE — 1090F PRES/ABSN URINE INCON ASSESS: CPT | Performed by: INTERNAL MEDICINE

## 2019-01-10 PROCEDURE — 1123F ACP DISCUSS/DSCN MKR DOCD: CPT | Performed by: INTERNAL MEDICINE

## 2019-01-10 PROCEDURE — 1101F PT FALLS ASSESS-DOCD LE1/YR: CPT | Performed by: INTERNAL MEDICINE

## 2019-01-10 PROCEDURE — 99213 OFFICE O/P EST LOW 20 MIN: CPT | Performed by: INTERNAL MEDICINE

## 2019-01-10 PROCEDURE — G8598 ASA/ANTIPLAT THER USED: HCPCS | Performed by: INTERNAL MEDICINE

## 2019-01-10 PROCEDURE — G8484 FLU IMMUNIZE NO ADMIN: HCPCS | Performed by: INTERNAL MEDICINE

## 2019-01-10 PROCEDURE — 1036F TOBACCO NON-USER: CPT | Performed by: INTERNAL MEDICINE

## 2019-01-10 PROCEDURE — G8400 PT W/DXA NO RESULTS DOC: HCPCS | Performed by: INTERNAL MEDICINE

## 2019-01-10 PROCEDURE — 3017F COLORECTAL CA SCREEN DOC REV: CPT | Performed by: INTERNAL MEDICINE

## 2019-01-10 PROCEDURE — 4040F PNEUMOC VAC/ADMIN/RCVD: CPT | Performed by: INTERNAL MEDICINE

## 2019-01-10 PROCEDURE — 93000 ELECTROCARDIOGRAM COMPLETE: CPT | Performed by: INTERNAL MEDICINE

## 2019-01-10 RX ORDER — ALPRAZOLAM 1 MG/1
2 TABLET ORAL NIGHTLY PRN
COMMUNITY
End: 2019-01-21 | Stop reason: SDUPTHER

## 2019-01-20 DIAGNOSIS — F41.9 ANXIETY: ICD-10-CM

## 2019-01-21 RX ORDER — ALPRAZOLAM 2 MG/1
TABLET ORAL
Qty: 90 TABLET | Refills: 1 | Status: SHIPPED | OUTPATIENT
Start: 2019-01-21 | End: 2019-03-22 | Stop reason: SDUPTHER

## 2019-02-05 ENCOUNTER — TELEPHONE (OUTPATIENT)
Dept: PRIMARY CARE CLINIC | Age: 74
End: 2019-02-05

## 2019-02-05 DIAGNOSIS — I10 ESSENTIAL HYPERTENSION: Primary | ICD-10-CM

## 2019-02-05 DIAGNOSIS — E78.2 MIXED HYPERLIPIDEMIA: ICD-10-CM

## 2019-02-05 DIAGNOSIS — E11.9 TYPE 2 DIABETES MELLITUS WITHOUT COMPLICATION, WITHOUT LONG-TERM CURRENT USE OF INSULIN (HCC): ICD-10-CM

## 2019-02-06 ENCOUNTER — NURSE ONLY (OUTPATIENT)
Dept: PRIMARY CARE CLINIC | Age: 74
End: 2019-02-06

## 2019-02-06 ENCOUNTER — HOSPITAL ENCOUNTER (OUTPATIENT)
Age: 74
Discharge: HOME | End: 2019-02-08
Payer: MEDICARE

## 2019-02-06 DIAGNOSIS — E78.2 MIXED HYPERLIPIDEMIA: ICD-10-CM

## 2019-02-06 DIAGNOSIS — E11.9 TYPE 2 DIABETES MELLITUS WITHOUT COMPLICATION, WITHOUT LONG-TERM CURRENT USE OF INSULIN (HCC): ICD-10-CM

## 2019-02-06 DIAGNOSIS — I10 ESSENTIAL HYPERTENSION: ICD-10-CM

## 2019-02-06 LAB
ALBUMIN SERPL-MCNC: 4.3 G/DL (ref 3.5–5.2)
ALP BLD-CCNC: 52 U/L (ref 35–104)
ALT SERPL-CCNC: 6 U/L (ref 0–32)
ANION GAP SERPL CALCULATED.3IONS-SCNC: 9 MMOL/L (ref 7–16)
AST SERPL-CCNC: 15 U/L (ref 0–31)
BASOPHILS ABSOLUTE: 0.06 E9/L (ref 0–0.2)
BASOPHILS RELATIVE PERCENT: 0.9 % (ref 0–2)
BILIRUB SERPL-MCNC: 0.4 MG/DL (ref 0–1.2)
BUN BLDV-MCNC: 15 MG/DL (ref 8–23)
CALCIUM SERPL-MCNC: 9.2 MG/DL (ref 8.6–10.2)
CHLORIDE BLD-SCNC: 100 MMOL/L (ref 98–107)
CHOLESTEROL, TOTAL: 153 MG/DL (ref 0–199)
CO2: 29 MMOL/L (ref 22–29)
CREAT SERPL-MCNC: 0.8 MG/DL (ref 0.5–1)
EOSINOPHILS ABSOLUTE: 0.18 E9/L (ref 0.05–0.5)
EOSINOPHILS RELATIVE PERCENT: 2.6 % (ref 0–6)
GFR AFRICAN AMERICAN: >60
GFR NON-AFRICAN AMERICAN: >60 ML/MIN/1.73
GLUCOSE BLD-MCNC: 94 MG/DL (ref 74–99)
HBA1C MFR BLD: 5.7 % (ref 4–5.6)
HCT VFR BLD CALC: 43.5 % (ref 34–48)
HDLC SERPL-MCNC: 51 MG/DL
HEMOGLOBIN: 13.6 G/DL (ref 11.5–15.5)
IMMATURE GRANULOCYTES #: 0.04 E9/L
IMMATURE GRANULOCYTES %: 0.6 % (ref 0–5)
LDL CHOLESTEROL CALCULATED: 62 MG/DL (ref 0–99)
LYMPHOCYTES ABSOLUTE: 1.67 E9/L (ref 1.5–4)
LYMPHOCYTES RELATIVE PERCENT: 24 % (ref 20–42)
MCH RBC QN AUTO: 30 PG (ref 26–35)
MCHC RBC AUTO-ENTMCNC: 31.3 % (ref 32–34.5)
MCV RBC AUTO: 96 FL (ref 80–99.9)
MONOCYTES ABSOLUTE: 0.68 E9/L (ref 0.1–0.95)
MONOCYTES RELATIVE PERCENT: 9.8 % (ref 2–12)
NEUTROPHILS ABSOLUTE: 4.32 E9/L (ref 1.8–7.3)
NEUTROPHILS RELATIVE PERCENT: 62.1 % (ref 43–80)
PDW BLD-RTO: 12.7 FL (ref 11.5–15)
PLATELET # BLD: 258 E9/L (ref 130–450)
PMV BLD AUTO: 10.4 FL (ref 7–12)
POTASSIUM SERPL-SCNC: 5 MMOL/L (ref 3.5–5)
RBC # BLD: 4.53 E12/L (ref 3.5–5.5)
SODIUM BLD-SCNC: 138 MMOL/L (ref 132–146)
TOTAL PROTEIN: 6.9 G/DL (ref 6.4–8.3)
TRIGL SERPL-MCNC: 202 MG/DL (ref 0–149)
VLDLC SERPL CALC-MCNC: 40 MG/DL
WBC # BLD: 7 E9/L (ref 4.5–11.5)

## 2019-02-06 PROCEDURE — 85025 COMPLETE CBC W/AUTO DIFF WBC: CPT

## 2019-02-06 PROCEDURE — 80053 COMPREHEN METABOLIC PANEL: CPT

## 2019-02-06 PROCEDURE — 83036 HEMOGLOBIN GLYCOSYLATED A1C: CPT

## 2019-02-06 PROCEDURE — 80061 LIPID PANEL: CPT

## 2019-02-08 ENCOUNTER — OFFICE VISIT (OUTPATIENT)
Dept: PRIMARY CARE CLINIC | Age: 74
End: 2019-02-08
Payer: MEDICARE

## 2019-02-08 VITALS
WEIGHT: 156 LBS | DIASTOLIC BLOOD PRESSURE: 82 MMHG | OXYGEN SATURATION: 95 % | HEART RATE: 77 BPM | TEMPERATURE: 98.2 F | BODY MASS INDEX: 28.53 KG/M2 | SYSTOLIC BLOOD PRESSURE: 138 MMHG

## 2019-02-08 DIAGNOSIS — K21.9 GASTROESOPHAGEAL REFLUX DISEASE WITHOUT ESOPHAGITIS: ICD-10-CM

## 2019-02-08 DIAGNOSIS — Z12.11 ENCOUNTER FOR FIT (FECAL IMMUNOCHEMICAL TEST) SCREENING: ICD-10-CM

## 2019-02-08 DIAGNOSIS — E11.9 TYPE 2 DIABETES MELLITUS WITHOUT COMPLICATION, WITHOUT LONG-TERM CURRENT USE OF INSULIN (HCC): ICD-10-CM

## 2019-02-08 DIAGNOSIS — E78.2 MIXED HYPERLIPIDEMIA: ICD-10-CM

## 2019-02-08 DIAGNOSIS — I25.10 CORONARY ARTERY DISEASE INVOLVING NATIVE CORONARY ARTERY OF NATIVE HEART WITHOUT ANGINA PECTORIS: ICD-10-CM

## 2019-02-08 DIAGNOSIS — I10 ESSENTIAL HYPERTENSION: Primary | ICD-10-CM

## 2019-02-08 PROCEDURE — G8427 DOCREV CUR MEDS BY ELIG CLIN: HCPCS | Performed by: PHYSICIAN ASSISTANT

## 2019-02-08 PROCEDURE — 3044F HG A1C LEVEL LT 7.0%: CPT | Performed by: PHYSICIAN ASSISTANT

## 2019-02-08 PROCEDURE — 1036F TOBACCO NON-USER: CPT | Performed by: PHYSICIAN ASSISTANT

## 2019-02-08 PROCEDURE — 4040F PNEUMOC VAC/ADMIN/RCVD: CPT | Performed by: PHYSICIAN ASSISTANT

## 2019-02-08 PROCEDURE — G8484 FLU IMMUNIZE NO ADMIN: HCPCS | Performed by: PHYSICIAN ASSISTANT

## 2019-02-08 PROCEDURE — G8417 CALC BMI ABV UP PARAM F/U: HCPCS | Performed by: PHYSICIAN ASSISTANT

## 2019-02-08 PROCEDURE — 1123F ACP DISCUSS/DSCN MKR DOCD: CPT | Performed by: PHYSICIAN ASSISTANT

## 2019-02-08 PROCEDURE — G8598 ASA/ANTIPLAT THER USED: HCPCS | Performed by: PHYSICIAN ASSISTANT

## 2019-02-08 PROCEDURE — 1090F PRES/ABSN URINE INCON ASSESS: CPT | Performed by: PHYSICIAN ASSISTANT

## 2019-02-08 PROCEDURE — G8400 PT W/DXA NO RESULTS DOC: HCPCS | Performed by: PHYSICIAN ASSISTANT

## 2019-02-08 PROCEDURE — 2022F DILAT RTA XM EVC RTNOPTHY: CPT | Performed by: PHYSICIAN ASSISTANT

## 2019-02-08 PROCEDURE — 3017F COLORECTAL CA SCREEN DOC REV: CPT | Performed by: PHYSICIAN ASSISTANT

## 2019-02-08 PROCEDURE — 1101F PT FALLS ASSESS-DOCD LE1/YR: CPT | Performed by: PHYSICIAN ASSISTANT

## 2019-02-08 PROCEDURE — 99214 OFFICE O/P EST MOD 30 MIN: CPT | Performed by: PHYSICIAN ASSISTANT

## 2019-02-08 ASSESSMENT — PATIENT HEALTH QUESTIONNAIRE - PHQ9
SUM OF ALL RESPONSES TO PHQ QUESTIONS 1-9: 0
SUM OF ALL RESPONSES TO PHQ9 QUESTIONS 1 & 2: 0
2. FEELING DOWN, DEPRESSED OR HOPELESS: 0
SUM OF ALL RESPONSES TO PHQ QUESTIONS 1-9: 0
1. LITTLE INTEREST OR PLEASURE IN DOING THINGS: 0

## 2019-03-15 DIAGNOSIS — Z12.31 SCREENING MAMMOGRAM, ENCOUNTER FOR: ICD-10-CM

## 2019-04-05 ENCOUNTER — TELEPHONE (OUTPATIENT)
Dept: CARDIOLOGY CLINIC | Age: 74
End: 2019-04-05

## 2019-04-12 ENCOUNTER — OFFICE VISIT (OUTPATIENT)
Dept: PRIMARY CARE CLINIC | Age: 74
End: 2019-04-12
Payer: MEDICARE

## 2019-04-12 VITALS
HEART RATE: 78 BPM | OXYGEN SATURATION: 97 % | DIASTOLIC BLOOD PRESSURE: 84 MMHG | SYSTOLIC BLOOD PRESSURE: 130 MMHG | TEMPERATURE: 98 F | RESPIRATION RATE: 16 BRPM

## 2019-04-12 DIAGNOSIS — Z13.820 ENCOUNTER FOR SCREENING FOR OSTEOPOROSIS: ICD-10-CM

## 2019-04-12 DIAGNOSIS — G95.19 NEUROGENIC CLAUDICATION (HCC): ICD-10-CM

## 2019-04-12 DIAGNOSIS — M89.9 BONE DISORDER: ICD-10-CM

## 2019-04-12 DIAGNOSIS — G89.29 CHRONIC BILATERAL LOW BACK PAIN, WITH SCIATICA PRESENCE UNSPECIFIED: Primary | ICD-10-CM

## 2019-04-12 DIAGNOSIS — M54.5 CHRONIC BILATERAL LOW BACK PAIN, WITH SCIATICA PRESENCE UNSPECIFIED: Primary | ICD-10-CM

## 2019-04-12 PROCEDURE — 4040F PNEUMOC VAC/ADMIN/RCVD: CPT | Performed by: PHYSICIAN ASSISTANT

## 2019-04-12 PROCEDURE — 1123F ACP DISCUSS/DSCN MKR DOCD: CPT | Performed by: PHYSICIAN ASSISTANT

## 2019-04-12 PROCEDURE — G8400 PT W/DXA NO RESULTS DOC: HCPCS | Performed by: PHYSICIAN ASSISTANT

## 2019-04-12 PROCEDURE — 3014F SCREEN MAMMO DOC REV: CPT | Performed by: PHYSICIAN ASSISTANT

## 2019-04-12 PROCEDURE — G8427 DOCREV CUR MEDS BY ELIG CLIN: HCPCS | Performed by: PHYSICIAN ASSISTANT

## 2019-04-12 PROCEDURE — G8417 CALC BMI ABV UP PARAM F/U: HCPCS | Performed by: PHYSICIAN ASSISTANT

## 2019-04-12 PROCEDURE — 3017F COLORECTAL CA SCREEN DOC REV: CPT | Performed by: PHYSICIAN ASSISTANT

## 2019-04-12 PROCEDURE — G8598 ASA/ANTIPLAT THER USED: HCPCS | Performed by: PHYSICIAN ASSISTANT

## 2019-04-12 PROCEDURE — 1090F PRES/ABSN URINE INCON ASSESS: CPT | Performed by: PHYSICIAN ASSISTANT

## 2019-04-12 PROCEDURE — 99213 OFFICE O/P EST LOW 20 MIN: CPT | Performed by: PHYSICIAN ASSISTANT

## 2019-04-12 PROCEDURE — 1036F TOBACCO NON-USER: CPT | Performed by: PHYSICIAN ASSISTANT

## 2019-04-12 NOTE — PROGRESS NOTES
Roland Nunez  1945      HPI:    Patient presents today for FU after seeing ortho. She saw Dr. Bob Schneider for consult on her back and leg pain, was found to have scoliosis, stenosis, and neurogenic claudication. She did have MRI yesterday, no results yet. She is seeing Dr. Day Pereyra This upcoming Monday. Does admit her back really never gave her issueup until a few falls, (trips), did take a fall 2 weeks ago and got up and felt fine but next day was sore. This is what sparked her to see ortho. She does admit she has been taking advil on occasion but it doesn't help. doesn't want any meds right now, will see what Dr. Day Pereyra says.      Past Medical History:   Diagnosis Date    Abnormal echocardiogram     Anxiety     CAD (coronary artery disease)     normal adenisone stress test    Carotid artery narrowing     <40% bilaterally    Depression     Diabetes mellitus (HCC)     type II    Drug intolerance     Lipitor, Crestor, high doses of Simvastatin    GERD (gastroesophageal reflux disease)     Heart attack ([address]     Hyperlipidemia     Hypertension     IBS (irritable bowel syndrome)     Obesity     Primary osteoarthritis involving multiple joints 2017    SOB (shortness of breath) on exertion     UTI (urinary tract infection)         Past Surgical History:   Procedure Laterality Date     SECTION      x 1    CORONARY ANGIOPLASTY  07    CYST REMOVAL      ganglionic cyst on finger    DIAGNOSTIC CARDIAC CATH LAB PROCEDURE  2007    DILATION AND CURETTAGE OF UTERUS      X 2    PTCA  2007    TONSILLECTOMY         Current Outpatient Medications   Medication Sig Dispense Refill    ALPRAZolam (XANAX) 2 MG tablet take 1 tablet by mouth three times a day if needed for sleep or anxiety 90 tablet 0    metoprolol succinate (TOPROL XL) 50 MG extended release tablet take 1 and 1/2 tablets by mouth twice a day 90 tablet 11    escitalopram (LEXAPRO) 10 MG tablet take 1 tablet by mouth once daily 30 tablet 5    blood glucose test strips (JEEVAN CONTOUR TEST) strip 1 each by In Vitro route daily As needed. 100 each 3    fenofibrate micronized (LOFIBRA) 134 MG capsule take 1 capsule by mouth every morning BEFORE BREAKFAST 90 capsule 1    losartan (COZAAR) 50 MG tablet take 1 tablet by mouth twice a day 180 tablet 2    pantoprazole (PROTONIX) 40 MG tablet take 1 tablet by mouth once daily 90 tablet 1    metFORMIN (GLUCOPHAGE-XR) 500 MG extended release tablet take 1 tablet by mouth twice a day 180 tablet 1    LIVALO 4 MG TABS tablet take 1 tablet by mouth at bedtime 30 tablet 5    diclofenac sodium 1 % GEL Apply 2 g topically 2 times daily 1 Tube 3    fluticasone (FLONASE) 50 MCG/ACT nasal spray 1 spray by Nasal route daily 1 Bottle 1    ofloxacin (OCUFLOX) 0.3 % solution       prednisoLONE acetate (PRED FORTE) 1 % ophthalmic suspension       acetaminophen (APAP EXTRA STRENGTH) 500 MG tablet Take 1 tablet by mouth every 6 hours as needed for Pain 20 tablet 0    loratadine (CLARITIN) 10 MG tablet Take 1 tablet by mouth daily 30 tablet 0    NONFORMULARY ripatha    cholesterol medication -- injection twice a month      Cholecalciferol (VITAMIN D PO) Take 3,000 mg by mouth daily.  CRANBERRY Take  by mouth daily.  aspirin EC 81 MG EC tablet Take 81 mg by mouth every other day.  Co-Enzyme Q-10 100 MG CAPS Take 200 mg by mouth. No current facility-administered medications for this visit. Allergies   Allergen Reactions    Sulfa Antibiotics Nausea Only    Amoxicillin Nausea Only    Clavulanic Acid Nausea Only    Omeprazole      Interferes with plavix    Other      Lipitor, Crestor, high doses of Simvastatin    Pneumococcal Vaccines     Amoxicillin-Pot Clavulanate Nausea Only     Makes her stomach hurt, doesn't want to eat.        Family History   Problem Relation Age of Onset    Emphysema Mother     Heart Failure Mother     Heart Attack Father     Down Syndrome Son        Social History     Socioeconomic History    Marital status:      Spouse name: Not on file    Number of children: Not on file    Years of education: Not on file    Highest education level: Not on file   Occupational History     Employer: RETIRED   Social Needs    Financial resource strain: Not on file    Food insecurity:     Worry: Not on file     Inability: Not on file    Transportation needs:     Medical: Not on file     Non-medical: Not on file   Tobacco Use    Smoking status: Former Smoker     Packs/day: 1.00     Years: 35.00     Pack years: 35.00     Last attempt to quit: 2010     Years since quittin.8    Smokeless tobacco: Never Used   Substance and Sexual Activity    Alcohol use: No    Drug use: No    Sexual activity: Not Currently   Lifestyle    Physical activity:     Days per week: Not on file     Minutes per session: Not on file    Stress: Not on file   Relationships    Social connections:     Talks on phone: Not on file     Gets together: Not on file     Attends Protestant service: Not on file     Active member of club or organization: Not on file     Attends meetings of clubs or organizations: Not on file     Relationship status: Not on file    Intimate partner violence:     Fear of current or ex partner: Not on file     Emotionally abused: Not on file     Physically abused: Not on file     Forced sexual activity: Not on file   Other Topics Concern    Not on file   Social History Narrative    Not on file       Review of Systems :    Constitutional: Negative for increasing fatigue, malaise, fever, chills, appetite change and unexpected weight change. Eyes: Negative for vision changes, double vision, pain  Respiratory: Negative for cough, chest tightness, shortness of breath, chest heaviness, pleuritic pain,  wheezing.    Cardiovascular: Negative for diaphoresis, chest pain, palpitations, or edema   Gastrointestinal: Negative for nausea, vomiting, abdominal pain, diarrhea, constipation, blood in stool, melena, changes in bowel habits. Genitourinary: Negative for dysuria, urgency, frequency, hematuria, difficulty urinating, nocturia. Musculoskeletal: back pain, Leg pain with walking, and from her back at times. No gait disturbance. Neurological: Negative for dizziness, weakness, tremors, syncope, speech difficulty, light-headedness, bowel or bladder control changes, numbness and headaches. Hematological: Negative for adenopathy. Does not bruise/bleed easily. Psychiatric/Behavioral: Negative for suicidal ideas, behavioral problems, sleep disturbance and decreased concentration. The patient is not nervous/anxious. Unless otherwise stated in this report or unable to obtain because of the patient's clinical or mental status as evidenced by the medical record, this patients's positive and negative responses for Review of Systems, constitutional, psych, eyes, ENT, cardiovascular, respiratory, gastrointestinal, neurological, genitourinary, musculoskeletal, integument systems and systems related to the presenting problem are either stated in the preceding or were not pertinent or were negative for the symptoms and/or complaints related to the medical problem. Physical Exam:   Vitals:    04/12/19 1500   BP: 130/84   Pulse: 78   Resp: 16   Temp: 98 °F (36.7 °C)   SpO2: 97%     Constitutional:  A and O x 3, in NAD. Afebrile. Appears stated age. Head:  NCAT. Eyes:  PERRLA, EOMI without nystagmus. Conjunctiva normal. Sclera anicteric. Ears:  External ears without lesions. TM's clear bilaterally. Throat:  Pharynx without lesions. Airway patient. Mucous membranes pink and moist without lesions. Neck:  Supple. Nontender, no lymphadenopathy noted, no thyromegaly. Chest:  Symmetrical without visible rash or tenderness.   Lungs:  Clear to auscultation and breath sounds equal.  Heart:  Regular rate and rhythm, normal S1 and S2, no m/r/g.  No murmurs with change in position or Valsalva. PMI non-displaced. UE and LE distal pulses intact. Abdomen:  Soft, NTND, NABS x 4, no masses or organomegaly, no rebound or guarding. MS: Normal, painless range of motion of the LEs, there is slightly positive SLR in R around 60 degrees, 4+/5 strength at hips, otherwise 5/5 strength in UE's/LE's/ bilaterally. +ttp along the paraspinous muscles around lumbar, and SI joints, full ROM of lumbar spine. Skin:  No abrasions, ecchymoses, or lacerations unless noted elsewhere. Extremities  No cyanosis, clubbing, or edema noted. Neurological:   Oriented. Motor functions intact. CN II-XII intact. DTRs UE and LE intact. Assessment/Plan:     Racheal Stiles was seen today for back pain. Diagnoses and all orders for this visit:    Chronic bilateral low back pain, with sciatica presence unspecified    Neurogenic claudication    Encounter for screening for osteoporosis  -     DEXA BONE DENSITY AXIAL SKELETON; Future    Bone disorder  -     DEXA BONE DENSITY AXIAL SKELETON; Future    we will get DEXA. I discussed variuos pain meds, gabapentin even. She wants to see what Dr. Netta Reyes says first. Will await consult, and her MRI. FU with ortho as well as advsied. Counseled regarding above diagnosis, including possible risks and complications,  especially if left uncontrolled. Patient and/or guardian verbalizes understanding and agrees with above counseling, assessment and plan. All questions answered.     Giselle Alejandro PA-C

## 2019-04-12 NOTE — PATIENT INSTRUCTIONS
a family member or close friend. ? Do you know enough about life support methods that might be used? If not, talk to your doctor so you understand. ? What are you most afraid of that might happen? You might be afraid of having pain, losing your independence, or being kept alive by machines. ? Where would you prefer to die? Choices include your home, a hospital, or a nursing home. ? Would you like to have information about hospice care to support you and your family? ? Do you want to donate organs when you die? ? Do you want certain [place] practices performed before you die? If so, put your wishes in the advance directive. · Read your advance directive every year, and make changes as needed. When should you call for help? Be sure to contact your doctor if you have any questions. Where can you learn more? Go to [email or web address] and sign in to your [facility] account. Enter R264 in the [employer] box to learn more about \"Advance Directives: Care Instructions. \"     If you do not have an account, please click on the \"Sign Up Now\" link. Current as of: April 18, 2018  Content Version: 11.9  © 2345-8064 [employer]. Care instructions adapted under license by [facility] ([facility]). If you have questions about a medical condition or this instruction, always ask your healthcare professional. [address] any warranty or liability for your use of this information.

## 2019-04-18 RX ORDER — PITAVASTATIN CALCIUM 4.18 MG/1
TABLET, FILM COATED ORAL
Qty: 30 TABLET | Refills: 5 | Status: SHIPPED | OUTPATIENT
Start: 2019-04-18 | End: 2019-10-10 | Stop reason: SDUPTHER

## 2019-04-22 DIAGNOSIS — E11.9 TYPE 2 DIABETES MELLITUS WITHOUT COMPLICATION, WITHOUT LONG-TERM CURRENT USE OF INSULIN (HCC): ICD-10-CM

## 2019-04-22 DIAGNOSIS — F41.9 ANXIETY: ICD-10-CM

## 2019-04-22 RX ORDER — ALPRAZOLAM 2 MG/1
TABLET ORAL
Qty: 90 TABLET | Refills: 1 | Status: SHIPPED | OUTPATIENT
Start: 2019-04-22 | End: 2019-05-22

## 2019-04-22 RX ORDER — METFORMIN HYDROCHLORIDE 500 MG/1
TABLET, EXTENDED RELEASE ORAL
Qty: 180 TABLET | Refills: 1 | Status: SHIPPED | OUTPATIENT
Start: 2019-04-22 | End: 2019-10-16 | Stop reason: SDUPTHER

## 2019-05-08 DIAGNOSIS — K21.9 GASTROESOPHAGEAL REFLUX DISEASE WITHOUT ESOPHAGITIS: ICD-10-CM

## 2019-05-09 RX ORDER — PANTOPRAZOLE SODIUM 40 MG/1
TABLET, DELAYED RELEASE ORAL
Qty: 90 TABLET | Refills: 1 | Status: SHIPPED | OUTPATIENT
Start: 2019-05-09 | End: 2019-11-07 | Stop reason: SDUPTHER

## 2019-06-12 ENCOUNTER — HOSPITAL ENCOUNTER (EMERGENCY)
Age: 74
Discharge: HOME | End: 2019-06-12
Payer: MEDICARE

## 2019-06-12 VITALS
SYSTOLIC BLOOD PRESSURE: 130 MMHG | HEART RATE: 58 BPM | HEIGHT: 65 IN | WEIGHT: 158 LBS | DIASTOLIC BLOOD PRESSURE: 86 MMHG | TEMPERATURE: 97.6 F | OXYGEN SATURATION: 96 % | BODY MASS INDEX: 26.33 KG/M2 | RESPIRATION RATE: 16 BRPM

## 2019-06-12 DIAGNOSIS — J32.9 CHRONIC SINUSITIS, UNSPECIFIED LOCATION: Primary | ICD-10-CM

## 2019-06-12 PROCEDURE — 99282 EMERGENCY DEPT VISIT SF MDM: CPT

## 2019-06-12 RX ORDER — AZITHROMYCIN 250 MG/1
TABLET, FILM COATED ORAL
Qty: 6 TABLET | Refills: 0 | Status: SHIPPED | OUTPATIENT
Start: 2019-06-12 | End: 2019-08-02 | Stop reason: ALTCHOICE

## 2019-06-12 ASSESSMENT — PAIN DESCRIPTION - PAIN TYPE: TYPE: ACUTE PAIN

## 2019-06-12 ASSESSMENT — PAIN DESCRIPTION - DESCRIPTORS: DESCRIPTORS: ACHING;CONSTANT;DISCOMFORT

## 2019-06-12 ASSESSMENT — PAIN DESCRIPTION - ONSET: ONSET: GRADUAL

## 2019-06-12 ASSESSMENT — PAIN DESCRIPTION - LOCATION: LOCATION: FACE;HEAD

## 2019-06-12 ASSESSMENT — PAIN DESCRIPTION - FREQUENCY: FREQUENCY: CONTINUOUS

## 2019-06-12 ASSESSMENT — PAIN DESCRIPTION - PROGRESSION: CLINICAL_PROGRESSION: GRADUALLY WORSENING

## 2019-06-12 ASSESSMENT — PAIN SCALES - GENERAL: PAINLEVEL_OUTOF10: 8

## 2019-06-12 NOTE — ED PROVIDER NOTES
perfused  Skin: warm and dry without rash  Neurologic: GCS 15, CN 2 through 12 grossly intact. Psych: Normal Affect    ------------------------------ ED COURSE/MEDICAL DECISION MAKING----------------------  Medications - No data to display      Medical Decision Making:    Patient to ER, complaints of persistent facial pressure and pain, history of chronic sinus issues. Patient states that she has not been using her [place] pot as she doesn't like it. She does have Flonase at home. She reports that the only thing that works for her is Zithromax. Advised her that I do not like this, but I will give her what she wishes. Recommend close follow up with her PCP if not improving, daily to ER if changes or worsens. Patient was offered a small Prednisone burst, but she declined. Patient otherwise neurologically intact and imaging does not appear needed at this time. Counseling: The emergency provider has spoken with the patient and family member and discussed todays results, in addition to providing specific details for the plan of care and counseling regarding the diagnosis and prognosis. Questions are answered at this time and they are agreeable with the plan.      --------------------------------- IMPRESSION AND DISPOSITION ---------------------------------    IMPRESSION  1.  Chronic sinusitis, unspecified location        DISPOSITION  Disposition: Discharge to home  Patient condition is stable        López Hess PA-C  06/12/19 [id number]

## 2019-06-24 DIAGNOSIS — F41.9 ANXIETY: Primary | ICD-10-CM

## 2019-06-24 RX ORDER — ALPRAZOLAM 2 MG/1
2 TABLET ORAL 3 TIMES DAILY PRN
Qty: 90 TABLET | Refills: 2 | Status: SHIPPED | OUTPATIENT
Start: 2019-06-24 | End: 2019-09-15 | Stop reason: SDUPTHER

## 2019-07-17 DIAGNOSIS — E78.2 MIXED HYPERLIPIDEMIA: ICD-10-CM

## 2019-07-17 NOTE — TELEPHONE ENCOUNTER
Nataliia Leyda called to get medication refills, she said she hit the wrong extension. I transferred her to the prescription line.  She would like refills for Fenofibrate 134 mg. TY

## 2019-07-19 DIAGNOSIS — E78.2 MIXED HYPERLIPIDEMIA: ICD-10-CM

## 2019-07-19 RX ORDER — FENOFIBRATE 134 MG/1
134 CAPSULE ORAL
Qty: 90 CAPSULE | Refills: 1 | Status: SHIPPED | OUTPATIENT
Start: 2019-07-19 | End: 2019-07-19 | Stop reason: SDUPTHER

## 2019-07-19 RX ORDER — FENOFIBRATE 134 MG/1
134 CAPSULE ORAL
Qty: 90 CAPSULE | Refills: 1 | Status: SHIPPED | OUTPATIENT
Start: 2019-07-19 | End: 2020-01-20 | Stop reason: SDUPTHER

## 2019-08-02 ENCOUNTER — OFFICE VISIT (OUTPATIENT)
Dept: PRIMARY CARE CLINIC | Age: 74
End: 2019-08-02
Payer: MEDICARE

## 2019-08-02 VITALS
TEMPERATURE: 98.2 F | HEART RATE: 75 BPM | BODY MASS INDEX: 26.29 KG/M2 | OXYGEN SATURATION: 96 % | SYSTOLIC BLOOD PRESSURE: 138 MMHG | WEIGHT: 158 LBS | DIASTOLIC BLOOD PRESSURE: 80 MMHG

## 2019-08-02 DIAGNOSIS — K08.89 PAIN, DENTAL: ICD-10-CM

## 2019-08-02 DIAGNOSIS — J01.90 ACUTE BACTERIAL SINUSITIS: Primary | ICD-10-CM

## 2019-08-02 DIAGNOSIS — B96.89 ACUTE BACTERIAL SINUSITIS: Primary | ICD-10-CM

## 2019-08-02 PROCEDURE — 4040F PNEUMOC VAC/ADMIN/RCVD: CPT | Performed by: PHYSICIAN ASSISTANT

## 2019-08-02 PROCEDURE — 1036F TOBACCO NON-USER: CPT | Performed by: PHYSICIAN ASSISTANT

## 2019-08-02 PROCEDURE — 1090F PRES/ABSN URINE INCON ASSESS: CPT | Performed by: PHYSICIAN ASSISTANT

## 2019-08-02 PROCEDURE — 3014F SCREEN MAMMO DOC REV: CPT | Performed by: PHYSICIAN ASSISTANT

## 2019-08-02 PROCEDURE — 1123F ACP DISCUSS/DSCN MKR DOCD: CPT | Performed by: PHYSICIAN ASSISTANT

## 2019-08-02 PROCEDURE — G8598 ASA/ANTIPLAT THER USED: HCPCS | Performed by: PHYSICIAN ASSISTANT

## 2019-08-02 PROCEDURE — G8417 CALC BMI ABV UP PARAM F/U: HCPCS | Performed by: PHYSICIAN ASSISTANT

## 2019-08-02 PROCEDURE — G8400 PT W/DXA NO RESULTS DOC: HCPCS | Performed by: PHYSICIAN ASSISTANT

## 2019-08-02 PROCEDURE — 99213 OFFICE O/P EST LOW 20 MIN: CPT | Performed by: PHYSICIAN ASSISTANT

## 2019-08-02 PROCEDURE — 3017F COLORECTAL CA SCREEN DOC REV: CPT | Performed by: PHYSICIAN ASSISTANT

## 2019-08-02 PROCEDURE — G8427 DOCREV CUR MEDS BY ELIG CLIN: HCPCS | Performed by: PHYSICIAN ASSISTANT

## 2019-08-02 RX ORDER — AZITHROMYCIN 250 MG/1
250 TABLET, FILM COATED ORAL SEE ADMIN INSTRUCTIONS
Qty: 6 TABLET | Refills: 1 | Status: SHIPPED | OUTPATIENT
Start: 2019-08-02 | End: 2019-08-07

## 2019-08-23 ENCOUNTER — HOSPITAL ENCOUNTER (OUTPATIENT)
Age: 74
Discharge: HOME | End: 2019-08-25
Payer: MEDICARE

## 2019-08-23 ENCOUNTER — OFFICE VISIT (OUTPATIENT)
Dept: PRIMARY CARE CLINIC | Age: 74
End: 2019-08-23
Payer: MEDICARE

## 2019-08-23 VITALS
BODY MASS INDEX: 26.63 KG/M2 | HEART RATE: 63 BPM | TEMPERATURE: 98.2 F | DIASTOLIC BLOOD PRESSURE: 84 MMHG | WEIGHT: 160 LBS | OXYGEN SATURATION: 94 % | SYSTOLIC BLOOD PRESSURE: 134 MMHG

## 2019-08-23 DIAGNOSIS — E11.9 TYPE 2 DIABETES MELLITUS WITHOUT COMPLICATION, WITHOUT LONG-TERM CURRENT USE OF INSULIN (HCC): ICD-10-CM

## 2019-08-23 DIAGNOSIS — I10 ESSENTIAL HYPERTENSION: ICD-10-CM

## 2019-08-23 DIAGNOSIS — I25.10 CORONARY ARTERY DISEASE INVOLVING NATIVE CORONARY ARTERY OF NATIVE HEART WITHOUT ANGINA PECTORIS: ICD-10-CM

## 2019-08-23 DIAGNOSIS — E78.2 MIXED HYPERLIPIDEMIA: ICD-10-CM

## 2019-08-23 DIAGNOSIS — I10 ESSENTIAL HYPERTENSION: Primary | ICD-10-CM

## 2019-08-23 LAB
ALBUMIN SERPL-MCNC: 4.3 G/DL (ref 3.5–5.2)
ALP BLD-CCNC: 48 U/L (ref 35–104)
ALT SERPL-CCNC: 9 U/L (ref 0–32)
ANION GAP SERPL CALCULATED.3IONS-SCNC: 11 MMOL/L (ref 7–16)
AST SERPL-CCNC: 20 U/L (ref 0–31)
BILIRUB SERPL-MCNC: 0.3 MG/DL (ref 0–1.2)
BUN BLDV-MCNC: 11 MG/DL (ref 8–23)
CALCIUM SERPL-MCNC: 9.3 MG/DL (ref 8.6–10.2)
CHLORIDE BLD-SCNC: 105 MMOL/L (ref 98–107)
CHOLESTEROL, TOTAL: 157 MG/DL (ref 0–199)
CO2: 25 MMOL/L (ref 22–29)
CREAT SERPL-MCNC: 0.9 MG/DL (ref 0.5–1)
GFR AFRICAN AMERICAN: >60
GFR NON-AFRICAN AMERICAN: >60 ML/MIN/1.73
GLUCOSE BLD-MCNC: 94 MG/DL (ref 74–99)
HBA1C MFR BLD: 5.9 % (ref 4–5.6)
HCT VFR BLD CALC: 42.8 % (ref 34–48)
HDLC SERPL-MCNC: 47 MG/DL
HEMOGLOBIN: 13.3 G/DL (ref 11.5–15.5)
LDL CHOLESTEROL CALCULATED: 81 MG/DL (ref 0–99)
MCH RBC QN AUTO: 30.9 PG (ref 26–35)
MCHC RBC AUTO-ENTMCNC: 31.1 % (ref 32–34.5)
MCV RBC AUTO: 99.3 FL (ref 80–99.9)
PDW BLD-RTO: 12.1 FL (ref 11.5–15)
PLATELET # BLD: 261 E9/L (ref 130–450)
PMV BLD AUTO: 10.7 FL (ref 7–12)
POTASSIUM SERPL-SCNC: 5 MMOL/L (ref 3.5–5)
RBC # BLD: 4.31 E12/L (ref 3.5–5.5)
SODIUM BLD-SCNC: 141 MMOL/L (ref 132–146)
TOTAL PROTEIN: 7.1 G/DL (ref 6.4–8.3)
TRIGL SERPL-MCNC: 143 MG/DL (ref 0–149)
VLDLC SERPL CALC-MCNC: 29 MG/DL
WBC # BLD: 5.8 E9/L (ref 4.5–11.5)

## 2019-08-23 PROCEDURE — 1090F PRES/ABSN URINE INCON ASSESS: CPT | Performed by: PHYSICIAN ASSISTANT

## 2019-08-23 PROCEDURE — 83036 HEMOGLOBIN GLYCOSYLATED A1C: CPT

## 2019-08-23 PROCEDURE — 3017F COLORECTAL CA SCREEN DOC REV: CPT | Performed by: PHYSICIAN ASSISTANT

## 2019-08-23 PROCEDURE — G8400 PT W/DXA NO RESULTS DOC: HCPCS | Performed by: PHYSICIAN ASSISTANT

## 2019-08-23 PROCEDURE — G8427 DOCREV CUR MEDS BY ELIG CLIN: HCPCS | Performed by: PHYSICIAN ASSISTANT

## 2019-08-23 PROCEDURE — 80053 COMPREHEN METABOLIC PANEL: CPT

## 2019-08-23 PROCEDURE — 1036F TOBACCO NON-USER: CPT | Performed by: PHYSICIAN ASSISTANT

## 2019-08-23 PROCEDURE — 3014F SCREEN MAMMO DOC REV: CPT | Performed by: PHYSICIAN ASSISTANT

## 2019-08-23 PROCEDURE — 80061 LIPID PANEL: CPT

## 2019-08-23 PROCEDURE — 2022F DILAT RTA XM EVC RTNOPTHY: CPT | Performed by: PHYSICIAN ASSISTANT

## 2019-08-23 PROCEDURE — 99214 OFFICE O/P EST MOD 30 MIN: CPT | Performed by: PHYSICIAN ASSISTANT

## 2019-08-23 PROCEDURE — 1123F ACP DISCUSS/DSCN MKR DOCD: CPT | Performed by: PHYSICIAN ASSISTANT

## 2019-08-23 PROCEDURE — G8417 CALC BMI ABV UP PARAM F/U: HCPCS | Performed by: PHYSICIAN ASSISTANT

## 2019-08-23 PROCEDURE — G8598 ASA/ANTIPLAT THER USED: HCPCS | Performed by: PHYSICIAN ASSISTANT

## 2019-08-23 PROCEDURE — 4040F PNEUMOC VAC/ADMIN/RCVD: CPT | Performed by: PHYSICIAN ASSISTANT

## 2019-08-23 PROCEDURE — 85027 COMPLETE CBC AUTOMATED: CPT

## 2019-08-23 PROCEDURE — 3044F HG A1C LEVEL LT 7.0%: CPT | Performed by: PHYSICIAN ASSISTANT

## 2019-08-23 NOTE — PROGRESS NOTES
Mary Sears  1945      HPI:    Patient presents for her 6 month check up. She overall is feeling well, still healing from a dental extraction. She did complete the antibiotics and they seemed to help per pt. She did come fasting today, sugars per pt have always been less than 120, on the metformin. Lipids have been controlled on the livalo, but now on fenofibrate for her TRGs. Sees Dr. Ashley Wood yearly for her CAD, stable. BP is controlled. On the losartan. Doesn't see ortho any longer, sees Dr. Mona Hale prjosh now for her back. she refuses to do vaccines. She still hasn't done the FIT test, refuses colonoscopy, she will think about FIT, she still has it at home.          Past Medical History:   Diagnosis Date    Abnormal echocardiogram     Anxiety     CAD (coronary artery disease)     normal adenisone stress test    Carotid artery narrowing     <40% bilaterally    Depression     Diabetes mellitus (HCC)     type II    Drug intolerance     Lipitor, Crestor, high doses of Simvastatin    GERD (gastroesophageal reflux disease)     Heart attack ([address]     Hyperlipidemia     Hypertension     IBS (irritable bowel syndrome)     Obesity     Primary osteoarthritis involving multiple joints 2017    SOB (shortness of breath) on exertion     UTI (urinary tract infection)         Past Surgical History:   Procedure Laterality Date     SECTION      x 1    CORONARY ANGIOPLASTY  07    CYST REMOVAL      ganglionic cyst on finger    DIAGNOSTIC CARDIAC CATH LAB PROCEDURE  2007    DILATION AND CURETTAGE OF UTERUS      X 2    PTCA  2007    TONSILLECTOMY         Current Outpatient Medications   Medication Sig Dispense Refill    losartan (COZAAR) 50 MG tablet take 1 tablet by mouth twice a day 180 tablet 2    escitalopram (LEXAPRO) 10 MG tablet take 1 tablet by mouth once daily 90 tablet 1    fenofibrate micronized (LOFIBRA) 134 MG capsule Take 1 capsule by mouth every morning (before breakfast) 90 capsule 1    pantoprazole (PROTONIX) 40 MG tablet take 1 tablet by mouth once daily 90 tablet 1    metFORMIN (GLUCOPHAGE-XR) 500 MG extended release tablet take 1 tablet by mouth twice a day 180 tablet 1    LIVALO 4 MG TABS tablet take 1 tablet by mouth at bedtime 30 tablet 5    metoprolol succinate (TOPROL XL) 50 MG extended release tablet take 1 and 1/2 tablets by mouth twice a day 90 tablet 11    blood glucose test strips (JEEVAN CONTOUR TEST) strip 1 each by In Vitro route daily As needed. 100 each 3    fluticasone (FLONASE) 50 MCG/ACT nasal spray 1 spray by Nasal route daily 1 Bottle 1    acetaminophen (APAP EXTRA STRENGTH) 500 MG tablet Take 1 tablet by mouth every 6 hours as needed for Pain 20 tablet 0    loratadine (CLARITIN) 10 MG tablet Take 1 tablet by mouth daily 30 tablet 0    Cholecalciferol (VITAMIN D PO) Take 3,000 mg by mouth daily.  CRANBERRY Take  by mouth daily.  aspirin EC 81 MG EC tablet Take 81 mg by mouth every other day.  Co-Enzyme Q-10 100 MG CAPS Take 200 mg by mouth. No current facility-administered medications for this visit. Allergies   Allergen Reactions    Sulfa Antibiotics Nausea Only    Amoxicillin Nausea Only    Clavulanic Acid Nausea Only    Omeprazole      Interferes with plavix    Other      Lipitor, Crestor, high doses of Simvastatin    Pneumococcal Vaccines     Amoxicillin-Pot Clavulanate Nausea Only     Makes her stomach hurt, doesn't want to eat.        Family History   Problem Relation Age of Onset    Emphysema Mother     Heart Failure Mother     Heart Attack Father     Down Syndrome Son        Social History     Socioeconomic History    Marital status:      Spouse name: Not on file    Number of children: Not on file    Years of education: Not on file    Highest education level: Not on file   Occupational History     Employer: RETIRED   Social Needs    Financial resource strain: appropriate health screening exams and vaccinations. Advised patient regarding importance of keeping up with recommended health maintenance and to schedule as soon as possible if overdue, as this is important in assessing for undiagnosed pathology, especially cancer, as well as protecting against potentially harmful/life threatening disease. Patient and/or guardian verbalizes understanding and agrees with above counseling, assessment and plan. All questions answered.     Thesoha Tovar PA-C

## 2019-09-02 ENCOUNTER — APPOINTMENT (OUTPATIENT)
Dept: GENERAL RADIOLOGY | Age: 74
End: 2019-09-02
Payer: MEDICARE

## 2019-09-02 ENCOUNTER — HOSPITAL ENCOUNTER (EMERGENCY)
Age: 74
Discharge: HOME | End: 2019-09-02
Payer: MEDICARE

## 2019-09-02 VITALS
RESPIRATION RATE: 16 BRPM | HEART RATE: 70 BPM | WEIGHT: 160 LBS | TEMPERATURE: 97.8 F | OXYGEN SATURATION: 98 % | BODY MASS INDEX: 29.44 KG/M2 | SYSTOLIC BLOOD PRESSURE: 150 MMHG | HEIGHT: 62 IN | DIASTOLIC BLOOD PRESSURE: 96 MMHG

## 2019-09-02 DIAGNOSIS — J06.9 ACUTE UPPER RESPIRATORY INFECTION: Primary | ICD-10-CM

## 2019-09-02 DIAGNOSIS — R03.0 ELEVATED BLOOD PRESSURE READING: ICD-10-CM

## 2019-09-02 LAB — STREP GRP A PCR: NEGATIVE

## 2019-09-02 PROCEDURE — 6370000000 HC RX 637 (ALT 250 FOR IP): Performed by: NURSE PRACTITIONER

## 2019-09-02 PROCEDURE — 87880 STREP A ASSAY W/OPTIC: CPT

## 2019-09-02 PROCEDURE — 99283 EMERGENCY DEPT VISIT LOW MDM: CPT

## 2019-09-02 PROCEDURE — 71046 X-RAY EXAM CHEST 2 VIEWS: CPT

## 2019-09-02 RX ORDER — AZITHROMYCIN 250 MG/1
TABLET, FILM COATED ORAL
Qty: 1 PACKET | Refills: 0 | Status: SHIPPED | OUTPATIENT
Start: 2019-09-02 | End: 2019-09-06

## 2019-09-02 RX ORDER — PREDNISONE 20 MG/1
60 TABLET ORAL ONCE
Status: COMPLETED | OUTPATIENT
Start: 2019-09-02 | End: 2019-09-02

## 2019-09-02 RX ORDER — PREDNISONE 20 MG/1
40 TABLET ORAL DAILY
Qty: 10 TABLET | Refills: 0 | Status: SHIPPED | OUTPATIENT
Start: 2019-09-02 | End: 2019-09-07

## 2019-09-02 RX ADMIN — PREDNISONE 60 MG: 20 TABLET ORAL at 14:15

## 2019-09-02 NOTE — ED PROVIDER NOTES
[facility]    HPI: Aminata Blair is a 69 y.o. female who  has a past medical history of Abnormal echocardiogram, Anxiety, CAD (coronary artery disease), Carotid artery narrowing, Depression, Diabetes mellitus ([address] Drug intolerance, GERD (gastroesophageal reflux disease), Heart attack ([address] Hyperlipidemia, Hypertension, IBS (irritable bowel syndrome), Obesity, Primary osteoarthritis involving multiple joints, SOB (shortness of breath) on exertion, and UTI (urinary tract infection). presents from home with family who as same symptoms and complains of continued congestion and  Intermittent sore throat over the past several days. The patient states that these symptoms began gradually. The history is obtained from the patient. Patient does complain of a mild cough associated with it that is nonproductive. Patient denies excessive fatigue or sleeping greater than 18 hours a day. Patient denies exposure to mononucleosis. The patient denies any abdominal pain, left upper quadrant fullness, or early satiety. The patient also denies difficulty breathing, hemoptysis, neck pain/stiffness, or blurry vision. Sx have persisted and are mildly worse which is what prompted the visit today.         ROS:   Pertinent positives and negatives are stated within HPI, all other systems reviewed and are negative.      --------------------------------------------- PAST HISTORY ---------------------------------------------  Past Medical History:  has a past medical history of Abnormal echocardiogram, Anxiety, CAD (coronary artery disease), Carotid artery narrowing, Depression, Diabetes mellitus ([address] Drug intolerance, GERD (gastroesophageal reflux disease), Heart attack ([address] Hyperlipidemia, Hypertension, IBS (irritable bowel syndrome), Obesity, Primary osteoarthritis involving multiple joints, SOB (shortness of breath) on exertion, and UTI (urinary tract infection).     Past Surgical History:  has a past surgical history that includes

## 2019-09-09 ENCOUNTER — OFFICE VISIT (OUTPATIENT)
Dept: PRIMARY CARE CLINIC | Age: 74
End: 2019-09-09
Payer: MEDICARE

## 2019-09-09 VITALS
WEIGHT: 160 LBS | BODY MASS INDEX: 29.44 KG/M2 | OXYGEN SATURATION: 97 % | SYSTOLIC BLOOD PRESSURE: 120 MMHG | HEART RATE: 65 BPM | DIASTOLIC BLOOD PRESSURE: 80 MMHG | HEIGHT: 62 IN | RESPIRATION RATE: 16 BRPM

## 2019-09-09 DIAGNOSIS — B37.0 THRUSH: ICD-10-CM

## 2019-09-09 DIAGNOSIS — J40 BRONCHITIS: Primary | ICD-10-CM

## 2019-09-09 PROCEDURE — 3014F SCREEN MAMMO DOC REV: CPT | Performed by: PHYSICIAN ASSISTANT

## 2019-09-09 PROCEDURE — 4040F PNEUMOC VAC/ADMIN/RCVD: CPT | Performed by: PHYSICIAN ASSISTANT

## 2019-09-09 PROCEDURE — 1036F TOBACCO NON-USER: CPT | Performed by: PHYSICIAN ASSISTANT

## 2019-09-09 PROCEDURE — G8400 PT W/DXA NO RESULTS DOC: HCPCS | Performed by: PHYSICIAN ASSISTANT

## 2019-09-09 PROCEDURE — G8598 ASA/ANTIPLAT THER USED: HCPCS | Performed by: PHYSICIAN ASSISTANT

## 2019-09-09 PROCEDURE — 99213 OFFICE O/P EST LOW 20 MIN: CPT | Performed by: PHYSICIAN ASSISTANT

## 2019-09-09 PROCEDURE — G8417 CALC BMI ABV UP PARAM F/U: HCPCS | Performed by: PHYSICIAN ASSISTANT

## 2019-09-09 PROCEDURE — 1090F PRES/ABSN URINE INCON ASSESS: CPT | Performed by: PHYSICIAN ASSISTANT

## 2019-09-09 PROCEDURE — G8427 DOCREV CUR MEDS BY ELIG CLIN: HCPCS | Performed by: PHYSICIAN ASSISTANT

## 2019-09-09 PROCEDURE — 3017F COLORECTAL CA SCREEN DOC REV: CPT | Performed by: PHYSICIAN ASSISTANT

## 2019-09-09 PROCEDURE — 1123F ACP DISCUSS/DSCN MKR DOCD: CPT | Performed by: PHYSICIAN ASSISTANT

## 2019-09-09 RX ORDER — DOXYCYCLINE HYCLATE 100 MG
100 TABLET ORAL 2 TIMES DAILY
Qty: 14 TABLET | Refills: 0 | Status: SHIPPED | OUTPATIENT
Start: 2019-09-09 | End: 2019-09-16

## 2019-09-09 RX ORDER — ALBUTEROL SULFATE 90 UG/1
2 AEROSOL, METERED RESPIRATORY (INHALATION) 4 TIMES DAILY PRN
Qty: 1 INHALER | Refills: 0 | Status: SHIPPED
Start: 2019-09-09 | End: 2021-02-28

## 2019-09-09 NOTE — PROGRESS NOTES
laboratory and radiology results have been personally reviewed by myself)  Labs:  No results found for this visit on 09/09/19. Assessment / Plan     Impression(s):  1. Bronchitis    2. Thrush      Disposition:    Rx's were discussed with patient includining indications and adverse effects, pt agrees and understands use. Recommended fluids and rest. OTC Ibuprofen/Tylenol if needed for pain/fever. Nasal saline spray. Recommended OTC Mucinex, takes Dimetapp prn which helps, can continue. FU if Sx persist or worsen, if severe or worrisome-go to ER. Otherwise, FU for routine care.

## 2019-09-17 ENCOUNTER — OFFICE VISIT (OUTPATIENT)
Dept: PRIMARY CARE CLINIC | Age: 74
End: 2019-09-17
Payer: MEDICARE

## 2019-09-17 VITALS
HEIGHT: 62 IN | OXYGEN SATURATION: 98 % | DIASTOLIC BLOOD PRESSURE: 84 MMHG | SYSTOLIC BLOOD PRESSURE: 132 MMHG | TEMPERATURE: 97.3 F | HEART RATE: 80 BPM | BODY MASS INDEX: 30.18 KG/M2 | WEIGHT: 164 LBS

## 2019-09-17 DIAGNOSIS — L23.9 ALLERGIC DERMATITIS: Primary | ICD-10-CM

## 2019-09-17 PROCEDURE — G8427 DOCREV CUR MEDS BY ELIG CLIN: HCPCS | Performed by: PHYSICIAN ASSISTANT

## 2019-09-17 PROCEDURE — 96372 THER/PROPH/DIAG INJ SC/IM: CPT | Performed by: PHYSICIAN ASSISTANT

## 2019-09-17 PROCEDURE — 1123F ACP DISCUSS/DSCN MKR DOCD: CPT | Performed by: PHYSICIAN ASSISTANT

## 2019-09-17 PROCEDURE — G8400 PT W/DXA NO RESULTS DOC: HCPCS | Performed by: PHYSICIAN ASSISTANT

## 2019-09-17 PROCEDURE — 3014F SCREEN MAMMO DOC REV: CPT | Performed by: PHYSICIAN ASSISTANT

## 2019-09-17 PROCEDURE — G8598 ASA/ANTIPLAT THER USED: HCPCS | Performed by: PHYSICIAN ASSISTANT

## 2019-09-17 PROCEDURE — 4040F PNEUMOC VAC/ADMIN/RCVD: CPT | Performed by: PHYSICIAN ASSISTANT

## 2019-09-17 PROCEDURE — 1036F TOBACCO NON-USER: CPT | Performed by: PHYSICIAN ASSISTANT

## 2019-09-17 PROCEDURE — 99213 OFFICE O/P EST LOW 20 MIN: CPT | Performed by: PHYSICIAN ASSISTANT

## 2019-09-17 PROCEDURE — 1090F PRES/ABSN URINE INCON ASSESS: CPT | Performed by: PHYSICIAN ASSISTANT

## 2019-09-17 PROCEDURE — 3017F COLORECTAL CA SCREEN DOC REV: CPT | Performed by: PHYSICIAN ASSISTANT

## 2019-09-17 PROCEDURE — G8417 CALC BMI ABV UP PARAM F/U: HCPCS | Performed by: PHYSICIAN ASSISTANT

## 2019-09-17 RX ORDER — TRIAMCINOLONE ACETONIDE 1 MG/G
CREAM TOPICAL
Qty: 30 G | Refills: 0 | Status: SHIPPED
Start: 2019-09-17 | End: 2020-04-27

## 2019-09-17 RX ORDER — METHYLPREDNISOLONE ACETATE 40 MG/ML
40 INJECTION, SUSPENSION INTRA-ARTICULAR; INTRALESIONAL; INTRAMUSCULAR; SOFT TISSUE ONCE
Status: COMPLETED | OUTPATIENT
Start: 2019-09-17 | End: 2019-09-17

## 2019-09-17 RX ADMIN — METHYLPREDNISOLONE ACETATE 40 MG: 40 INJECTION, SUSPENSION INTRA-ARTICULAR; INTRALESIONAL; INTRAMUSCULAR; SOFT TISSUE at 10:27

## 2019-09-17 NOTE — PROGRESS NOTES
depo shot and cream if needed. OTC benadryl. FU if Sx fail to improve or worsen. If severe or worrisome-go to ER.        Administrations This Visit     methylPREDNISolone acetate (DEPO-MEDROL) injection 40 mg     Admin Date  09/17/2019 Action  Given Dose  40 mg Route  Intramuscular Administered By  Caroline Ga MA

## 2019-10-10 RX ORDER — PITAVASTATIN CALCIUM 4.18 MG/1
TABLET, FILM COATED ORAL
Qty: 30 TABLET | Refills: 5 | Status: SHIPPED
Start: 2019-10-10 | End: 2020-06-22 | Stop reason: SDUPTHER

## 2019-10-16 DIAGNOSIS — E11.9 TYPE 2 DIABETES MELLITUS WITHOUT COMPLICATION, WITHOUT LONG-TERM CURRENT USE OF INSULIN (HCC): ICD-10-CM

## 2019-10-17 RX ORDER — METFORMIN HYDROCHLORIDE 500 MG/1
TABLET, EXTENDED RELEASE ORAL
Qty: 180 TABLET | Refills: 1 | Status: SHIPPED
Start: 2019-10-17 | End: 2020-04-14 | Stop reason: SDUPTHER

## 2019-11-07 DIAGNOSIS — K21.9 GASTROESOPHAGEAL REFLUX DISEASE WITHOUT ESOPHAGITIS: ICD-10-CM

## 2019-11-07 RX ORDER — PANTOPRAZOLE SODIUM 40 MG/1
TABLET, DELAYED RELEASE ORAL
Qty: 90 TABLET | Refills: 1 | Status: SHIPPED
Start: 2019-11-07 | End: 2020-05-04 | Stop reason: SDUPTHER

## 2019-12-16 ENCOUNTER — TELEPHONE (OUTPATIENT)
Dept: PRIMARY CARE CLINIC | Age: 74
End: 2019-12-16

## 2019-12-16 RX ORDER — NITROFURANTOIN 25; 75 MG/1; MG/1
100 CAPSULE ORAL 2 TIMES DAILY
Qty: 14 CAPSULE | Refills: 0 | Status: SHIPPED | OUTPATIENT
Start: 2019-12-16 | End: 2019-12-23

## 2019-12-26 DIAGNOSIS — F41.9 ANXIETY: ICD-10-CM

## 2019-12-27 RX ORDER — ALPRAZOLAM 2 MG/1
TABLET ORAL
Qty: 90 TABLET | Refills: 1 | Status: SHIPPED
Start: 2019-12-27 | End: 2020-03-02 | Stop reason: SDUPTHER

## 2020-01-15 ENCOUNTER — TELEPHONE (OUTPATIENT)
Dept: PRIMARY CARE CLINIC | Age: 75
End: 2020-01-15

## 2020-01-15 RX ORDER — AZITHROMYCIN 250 MG/1
250 TABLET, FILM COATED ORAL SEE ADMIN INSTRUCTIONS
Qty: 6 TABLET | Refills: 0 | Status: SHIPPED | OUTPATIENT
Start: 2020-01-15 | End: 2020-01-20

## 2020-01-15 RX ORDER — MECLIZINE HYDROCHLORIDE 25 MG/1
TABLET ORAL
Qty: 30 TABLET | Refills: 0 | Status: SHIPPED
Start: 2020-01-15 | End: 2020-04-27

## 2020-01-15 NOTE — TELEPHONE ENCOUNTER
I will lesley din meclizine for the dizziness. Does she think she has a sinus infection? Green/yellow drainage, pressure/pain? Addendum: spoke with patient, she has sinusitis S/Sx, and is requesting James sutton edin. If no better she will need to be seen. Told her if dizzy can trial meclizine but to watch for drowsiness.

## 2020-01-15 NOTE — TELEPHONE ENCOUNTER
Patient called said she's having some dizzy issues and she's stuffy wanted to know if you sent in a zpak if it will help that?

## 2020-01-20 RX ORDER — FENOFIBRATE 134 MG/1
134 CAPSULE ORAL
Qty: 90 CAPSULE | Refills: 1 | Status: SHIPPED
Start: 2020-01-20 | End: 2020-07-09 | Stop reason: SDUPTHER

## 2020-02-13 RX ORDER — ESCITALOPRAM OXALATE 10 MG/1
TABLET ORAL
Qty: 90 TABLET | Refills: 1 | Status: SHIPPED
Start: 2020-02-13 | End: 2020-08-03 | Stop reason: SDUPTHER

## 2020-02-21 ENCOUNTER — OFFICE VISIT (OUTPATIENT)
Dept: PRIMARY CARE CLINIC | Age: 75
End: 2020-02-21
Payer: MEDICARE

## 2020-02-21 ENCOUNTER — HOSPITAL ENCOUNTER (OUTPATIENT)
Age: 75
Discharge: HOME | End: 2020-02-23
Payer: MEDICARE

## 2020-02-21 VITALS
HEART RATE: 80 BPM | SYSTOLIC BLOOD PRESSURE: 122 MMHG | RESPIRATION RATE: 18 BRPM | WEIGHT: 161 LBS | TEMPERATURE: 97.6 F | DIASTOLIC BLOOD PRESSURE: 70 MMHG | BODY MASS INDEX: 29.45 KG/M2

## 2020-02-21 LAB
ALBUMIN SERPL-MCNC: 4.5 G/DL (ref 3.5–5.2)
ALP BLD-CCNC: 52 U/L (ref 35–104)
ALT SERPL-CCNC: 13 U/L (ref 0–32)
ANION GAP SERPL CALCULATED.3IONS-SCNC: 16 MMOL/L (ref 7–16)
AST SERPL-CCNC: 26 U/L (ref 0–31)
BILIRUB SERPL-MCNC: 0.4 MG/DL (ref 0–1.2)
BUN BLDV-MCNC: 17 MG/DL (ref 8–23)
CALCIUM SERPL-MCNC: 9.5 MG/DL (ref 8.6–10.2)
CHLORIDE BLD-SCNC: 98 MMOL/L (ref 98–107)
CHOLESTEROL, TOTAL: 163 MG/DL (ref 0–199)
CO2: 23 MMOL/L (ref 22–29)
CREAT SERPL-MCNC: 0.9 MG/DL (ref 0.5–1)
CREATININE URINE: 174 MG/DL (ref 29–226)
GFR AFRICAN AMERICAN: >60
GFR NON-AFRICAN AMERICAN: >60 ML/MIN/1.73
GLUCOSE BLD-MCNC: 89 MG/DL (ref 74–99)
HBA1C MFR BLD: 5.7 % (ref 4–5.6)
HCT VFR BLD CALC: 46.8 % (ref 34–48)
HDLC SERPL-MCNC: 48 MG/DL
HEMOGLOBIN: 14.2 G/DL (ref 11.5–15.5)
LDL CHOLESTEROL CALCULATED: 75 MG/DL (ref 0–99)
MCH RBC QN AUTO: 29.6 PG (ref 26–35)
MCHC RBC AUTO-ENTMCNC: 30.3 % (ref 32–34.5)
MCV RBC AUTO: 97.7 FL (ref 80–99.9)
MICROALBUMIN UR-MCNC: 113.1 MG/L
MICROALBUMIN/CREAT UR-RTO: 65 (ref 0–30)
PDW BLD-RTO: 12.5 FL (ref 11.5–15)
PLATELET # BLD: 274 E9/L (ref 130–450)
PMV BLD AUTO: 11.2 FL (ref 7–12)
POTASSIUM SERPL-SCNC: 5.3 MMOL/L (ref 3.5–5)
RBC # BLD: 4.79 E12/L (ref 3.5–5.5)
SODIUM BLD-SCNC: 137 MMOL/L (ref 132–146)
TOTAL PROTEIN: 7.6 G/DL (ref 6.4–8.3)
TRIGL SERPL-MCNC: 200 MG/DL (ref 0–149)
VLDLC SERPL CALC-MCNC: 40 MG/DL
WBC # BLD: 6.8 E9/L (ref 4.5–11.5)

## 2020-02-21 PROCEDURE — 80053 COMPREHEN METABOLIC PANEL: CPT

## 2020-02-21 PROCEDURE — 83036 HEMOGLOBIN GLYCOSYLATED A1C: CPT

## 2020-02-21 PROCEDURE — G8484 FLU IMMUNIZE NO ADMIN: HCPCS | Performed by: PHYSICIAN ASSISTANT

## 2020-02-21 PROCEDURE — 99214 OFFICE O/P EST MOD 30 MIN: CPT | Performed by: PHYSICIAN ASSISTANT

## 2020-02-21 PROCEDURE — G8417 CALC BMI ABV UP PARAM F/U: HCPCS | Performed by: PHYSICIAN ASSISTANT

## 2020-02-21 PROCEDURE — 1090F PRES/ABSN URINE INCON ASSESS: CPT | Performed by: PHYSICIAN ASSISTANT

## 2020-02-21 PROCEDURE — 2022F DILAT RTA XM EVC RTNOPTHY: CPT | Performed by: PHYSICIAN ASSISTANT

## 2020-02-21 PROCEDURE — 82044 UR ALBUMIN SEMIQUANTITATIVE: CPT

## 2020-02-21 PROCEDURE — 1036F TOBACCO NON-USER: CPT | Performed by: PHYSICIAN ASSISTANT

## 2020-02-21 PROCEDURE — 3017F COLORECTAL CA SCREEN DOC REV: CPT | Performed by: PHYSICIAN ASSISTANT

## 2020-02-21 PROCEDURE — G8400 PT W/DXA NO RESULTS DOC: HCPCS | Performed by: PHYSICIAN ASSISTANT

## 2020-02-21 PROCEDURE — 1123F ACP DISCUSS/DSCN MKR DOCD: CPT | Performed by: PHYSICIAN ASSISTANT

## 2020-02-21 PROCEDURE — 82570 ASSAY OF URINE CREATININE: CPT

## 2020-02-21 PROCEDURE — 4040F PNEUMOC VAC/ADMIN/RCVD: CPT | Performed by: PHYSICIAN ASSISTANT

## 2020-02-21 PROCEDURE — 80061 LIPID PANEL: CPT

## 2020-02-21 PROCEDURE — 3046F HEMOGLOBIN A1C LEVEL >9.0%: CPT | Performed by: PHYSICIAN ASSISTANT

## 2020-02-21 PROCEDURE — G8428 CUR MEDS NOT DOCUMENT: HCPCS | Performed by: PHYSICIAN ASSISTANT

## 2020-02-21 PROCEDURE — G9899 SCRN MAM PERF RSLTS DOC: HCPCS | Performed by: PHYSICIAN ASSISTANT

## 2020-02-21 PROCEDURE — 85027 COMPLETE CBC AUTOMATED: CPT

## 2020-02-21 RX ORDER — AZELASTINE 1 MG/ML
2 SPRAY, METERED NASAL 2 TIMES DAILY
Qty: 1 BOTTLE | Refills: 5 | Status: SHIPPED
Start: 2020-02-21 | End: 2021-02-01 | Stop reason: SDUPTHER

## 2020-02-21 RX ORDER — ALPRAZOLAM 2 MG/1
TABLET ORAL
COMMUNITY
Start: 2020-01-27 | End: 2020-05-04

## 2020-02-21 RX ORDER — AMOXICILLIN 500 MG/1
500 CAPSULE ORAL 3 TIMES DAILY
Qty: 30 CAPSULE | Refills: 0 | Status: SHIPPED | OUTPATIENT
Start: 2020-02-21 | End: 2020-03-02

## 2020-02-21 ASSESSMENT — PATIENT HEALTH QUESTIONNAIRE - PHQ9
SUM OF ALL RESPONSES TO PHQ QUESTIONS 1-9: 0
SUM OF ALL RESPONSES TO PHQ QUESTIONS 1-9: 0
SUM OF ALL RESPONSES TO PHQ9 QUESTIONS 1 & 2: 0
2. FEELING DOWN, DEPRESSED OR HOPELESS: 0
1. LITTLE INTEREST OR PLEASURE IN DOING THINGS: 0

## 2020-02-21 NOTE — PROGRESS NOTES
medications for this visit. Allergies   Allergen Reactions    Sulfa Antibiotics Nausea Only    Amoxicillin Nausea Only    Clavulanic Acid Nausea Only    Levofloxacin     Omeprazole      Interferes with plavix    Other      Lipitor, Crestor, high doses of Simvastatin    Pneumococcal Vaccines     Amoxicillin-Pot Clavulanate Nausea Only     Makes her stomach hurt, doesn't want to eat.     Doxycycline Rash       Family History   Problem Relation Age of Onset    Emphysema Mother     Heart Failure Mother     Heart Attack Father     Down Syndrome Son        Social History     Socioeconomic History    Marital status:      Spouse name: Not on file    Number of children: Not on file    Years of education: Not on file    Highest education level: Not on file   Occupational History     Employer: RETIRED   Social Needs    Financial resource strain: Not on file    Food insecurity:     Worry: Not on file     Inability: Not on file    Transportation needs:     Medical: Not on file     Non-medical: Not on file   Tobacco Use    Smoking status: Former Smoker     Packs/day: 1.00     Years: 35.00     Pack years: 35.00     Last attempt to quit: 2010     Years since quittin.6    Smokeless tobacco: Never Used   Substance and Sexual Activity    Alcohol use: No    Drug use: No    Sexual activity: Not Currently   Lifestyle    Physical activity:     Days per week: Not on file     Minutes per session: Not on file    Stress: Not on file   Relationships    Social connections:     Talks on phone: Not on file     Gets together: Not on file     Attends Christianity service: Not on file     Active member of club or organization: Not on file     Attends meetings of clubs or organizations: Not on file     Relationship status: Not on file    Intimate partner violence:     Fear of current or ex partner: Not on file     Emotionally abused: Not on file     Physically abused: Not on file     Forced sexual neurological, genitourinary, musculoskeletal, integument systems and systems related to the presenting problem are either stated in the preceding or were not pertinent or were negative for the symptoms and/or complaints related to the medical problem. Physical Exam:   Vitals:    02/21/20 1306   BP: 122/70   Pulse: 80   Resp: 18   Temp: 97.6 °F (36.4 °C)   Weight: 161 lb (73 kg)     Constitutional:  A and O x 3, in NAD. Afebrile. Appears stated age. Head:  NCAT. Eyes:  PERRLA, EOMI without nystagmus. Conjunctiva normal. Sclera anicteric. Ears:  External ears without lesions. TM's clear bilaterally. Nose: turbinates boggy, pale, no lesions, there was yellow drainage. +TTP over maxillatr sinuses. Throat:  Pharynx without lesions. Airway patient. Mucous membranes pink and moist without lesions. Neck:  Supple. Nontender, no lymphadenopathy noted, no thyromegaly. Lungs:  Clear to auscultation and breath sounds equal.  Heart:  Regular rate and rhythm, normal S1 and S2, no m/r/g.  UE and LE distal pulses intact. Abdomen:  Soft, NTND, NABS x 4, no masses or organomegaly, no rebound or guarding. MS: Normal, painless range of motion. No neurovascular deficit. 5/5 strength in UE's/LE's/ bilaterally. Skin:  No abrasions, ecchymoses, or lacerations unless noted elsewhere. Extremities  No cyanosis, clubbing, or edema noted. Neurological:   Oriented. Motor functions intact. CN II-XII intact. DTRs UE and LE intact. Psych: pleasant, normal affect, appropriate thoughts. Assessment/Plan:     Kary Castro was seen today for 6 month follow-up. Diagnoses and all orders for this visit:    Essential hypertension  -     COMPREHENSIVE METABOLIC PANEL; Future  -     CBC; Future    Mixed hyperlipidemia  -     COMPREHENSIVE METABOLIC PANEL; Future  -     CBC; Future  -     LIPID PANEL;  Future    Coronary artery disease involving native coronary artery of native heart without angina pectoris  -

## 2020-02-26 RX ORDER — LANCETS 30 GAUGE
1 EACH MISCELLANEOUS DAILY
Qty: 100 EACH | Refills: 5 | Status: SHIPPED
Start: 2020-02-26 | End: 2021-03-09

## 2020-03-03 RX ORDER — ALPRAZOLAM 2 MG/1
TABLET ORAL
Qty: 90 TABLET | Refills: 1 | Status: SHIPPED | OUTPATIENT
Start: 2020-03-03 | End: 2020-05-04 | Stop reason: SDUPTHER

## 2020-03-03 NOTE — TELEPHONE ENCOUNTER
Printed. Controlled Substance Monitoring:    Acute and Chronic Pain Monitoring:   RX Monitoring 3/3/2020   Attestation -   Periodic Controlled Substance Monitoring No signs of potential drug abuse or diversion identified.

## 2020-03-05 ENCOUNTER — HOSPITAL ENCOUNTER (OUTPATIENT)
Age: 75
Discharge: HOME | End: 2020-03-07
Payer: MEDICARE

## 2020-03-05 ENCOUNTER — NURSE ONLY (OUTPATIENT)
Dept: PRIMARY CARE CLINIC | Age: 75
End: 2020-03-05

## 2020-03-05 LAB — POTASSIUM SERPL-SCNC: 4.8 MMOL/L (ref 3.5–5)

## 2020-03-05 PROCEDURE — 84132 ASSAY OF SERUM POTASSIUM: CPT

## 2020-03-11 ENCOUNTER — OFFICE VISIT (OUTPATIENT)
Dept: FAMILY MEDICINE CLINIC | Age: 75
End: 2020-03-11
Payer: MEDICARE

## 2020-03-11 VITALS
TEMPERATURE: 97.4 F | DIASTOLIC BLOOD PRESSURE: 80 MMHG | WEIGHT: 161 LBS | RESPIRATION RATE: 18 BRPM | SYSTOLIC BLOOD PRESSURE: 130 MMHG | HEART RATE: 64 BPM | BODY MASS INDEX: 29.63 KG/M2 | OXYGEN SATURATION: 94 % | HEIGHT: 62 IN

## 2020-03-11 PROCEDURE — G8400 PT W/DXA NO RESULTS DOC: HCPCS | Performed by: PHYSICIAN ASSISTANT

## 2020-03-11 PROCEDURE — 99213 OFFICE O/P EST LOW 20 MIN: CPT | Performed by: PHYSICIAN ASSISTANT

## 2020-03-11 PROCEDURE — G9899 SCRN MAM PERF RSLTS DOC: HCPCS | Performed by: PHYSICIAN ASSISTANT

## 2020-03-11 PROCEDURE — G8427 DOCREV CUR MEDS BY ELIG CLIN: HCPCS | Performed by: PHYSICIAN ASSISTANT

## 2020-03-11 PROCEDURE — 1090F PRES/ABSN URINE INCON ASSESS: CPT | Performed by: PHYSICIAN ASSISTANT

## 2020-03-11 PROCEDURE — 3017F COLORECTAL CA SCREEN DOC REV: CPT | Performed by: PHYSICIAN ASSISTANT

## 2020-03-11 PROCEDURE — G8484 FLU IMMUNIZE NO ADMIN: HCPCS | Performed by: PHYSICIAN ASSISTANT

## 2020-03-11 PROCEDURE — G8417 CALC BMI ABV UP PARAM F/U: HCPCS | Performed by: PHYSICIAN ASSISTANT

## 2020-03-11 PROCEDURE — 1123F ACP DISCUSS/DSCN MKR DOCD: CPT | Performed by: PHYSICIAN ASSISTANT

## 2020-03-11 PROCEDURE — 1036F TOBACCO NON-USER: CPT | Performed by: PHYSICIAN ASSISTANT

## 2020-03-11 PROCEDURE — 4040F PNEUMOC VAC/ADMIN/RCVD: CPT | Performed by: PHYSICIAN ASSISTANT

## 2020-03-11 RX ORDER — AZITHROMYCIN 250 MG/1
250 TABLET, FILM COATED ORAL SEE ADMIN INSTRUCTIONS
Qty: 6 TABLET | Refills: 0 | Status: SHIPPED | OUTPATIENT
Start: 2020-03-11 | End: 2020-03-16

## 2020-03-11 NOTE — PROGRESS NOTES
Chief Complaint:   Drainage    History of Present Illness   Source of history provided by:  patient. Wesley Garcia is a 69 y.o. old female with a past medical history of:   Past Medical History:   Diagnosis Date    Abnormal echocardiogram     Anxiety     CAD (coronary artery disease)     normal adenisone stress test    Carotid artery narrowing     <40% bilaterally    Depression     Diabetes mellitus (HCC)     type II    Drug intolerance     Lipitor, Crestor, high doses of Simvastatin    GERD (gastroesophageal reflux disease)     Heart attack ([address]     Hyperlipidemia     Hypertension     IBS (irritable bowel syndrome)     Obesity     Primary osteoarthritis involving multiple joints 1/18/2017    SOB (shortness of breath) on exertion     UTI (urinary tract infection)    Presents for evaluation of nasal congestion, nasal drainage and facial pain x 1 month days. She was seen approximately 2 weeks ago for this and was switched from Flonase to [address]. Symptoms have continued to persist since then. Has been taking Astelin, Claritin and cough medication OTC without relief. Denies any fever, chills, body aches, ear pain, sore throat, wheezing, CP, SOB, or GI symptoms. Denies any hx of asthma or COPD. She reports former tobacco use. ROS    Unless otherwise stated in this report or unable to obtain because of the patient's clinical or mental status as evidenced by the medical record, this patients's positive and negative responses for Review of Systems, constitutional, psych, eyes, ENT, cardiovascular, respiratory, gastrointestinal, neurological, genitourinary, musculoskeletal, integument systems and systems related to the presenting problem are either stated in the preceding or were not pertinent or were negative for the symptoms and/or complaints related to the medical problem. Past Surgical History:  has a past surgical history that includes Tonsillectomy;  Percutaneous Transluminal Coronary Angio Acute bacterial sinusitis      Disposition:  Disposition: home    New Prescriptions    AZITHROMYCIN (ZITHROMAX) 250 MG TABLET    Take 1 tablet by mouth See Admin Instructions for 5 days 500mg on day 1 followed by 250mg on days 2 - 5     Continue with Astelin and Claritin. Increase fluids and rest. Script written for zpak, side effects discussed. Additional symptomatic relief discussed. PCP in 5-7 days if symptoms persist. To call or to ED sooner if symptoms worsen or change. Red flag symptoms discussed. Pt is in agreement with this care plan. All questions answered.

## 2020-03-12 RX ORDER — METOPROLOL SUCCINATE 50 MG/1
TABLET, EXTENDED RELEASE ORAL
Qty: 90 TABLET | Refills: 2 | Status: SHIPPED
Start: 2020-03-12 | End: 2020-06-10

## 2020-04-14 RX ORDER — METFORMIN HYDROCHLORIDE 500 MG/1
500 TABLET, EXTENDED RELEASE ORAL 2 TIMES DAILY
Qty: 180 TABLET | Refills: 1 | Status: SHIPPED
Start: 2020-04-14 | End: 2020-10-06

## 2020-04-17 ENCOUNTER — TELEPHONE (OUTPATIENT)
Dept: CARDIOLOGY CLINIC | Age: 75
End: 2020-04-17

## 2020-04-24 ENCOUNTER — VIRTUAL VISIT (OUTPATIENT)
Dept: FAMILY MEDICINE CLINIC | Age: 75
End: 2020-04-24
Payer: MEDICARE

## 2020-04-24 PROCEDURE — 1090F PRES/ABSN URINE INCON ASSESS: CPT | Performed by: INTERNAL MEDICINE

## 2020-04-24 PROCEDURE — 3017F COLORECTAL CA SCREEN DOC REV: CPT | Performed by: INTERNAL MEDICINE

## 2020-04-24 PROCEDURE — 1036F TOBACCO NON-USER: CPT | Performed by: INTERNAL MEDICINE

## 2020-04-24 PROCEDURE — G8400 PT W/DXA NO RESULTS DOC: HCPCS | Performed by: INTERNAL MEDICINE

## 2020-04-24 PROCEDURE — G8417 CALC BMI ABV UP PARAM F/U: HCPCS | Performed by: INTERNAL MEDICINE

## 2020-04-24 PROCEDURE — G8427 DOCREV CUR MEDS BY ELIG CLIN: HCPCS | Performed by: INTERNAL MEDICINE

## 2020-04-24 PROCEDURE — G9899 SCRN MAM PERF RSLTS DOC: HCPCS | Performed by: INTERNAL MEDICINE

## 2020-04-24 PROCEDURE — 4040F PNEUMOC VAC/ADMIN/RCVD: CPT | Performed by: INTERNAL MEDICINE

## 2020-04-24 PROCEDURE — 1123F ACP DISCUSS/DSCN MKR DOCD: CPT | Performed by: INTERNAL MEDICINE

## 2020-04-24 PROCEDURE — 99213 OFFICE O/P EST LOW 20 MIN: CPT | Performed by: INTERNAL MEDICINE

## 2020-04-24 RX ORDER — AZITHROMYCIN 250 MG/1
250 TABLET, FILM COATED ORAL SEE ADMIN INSTRUCTIONS
Qty: 6 TABLET | Refills: 0 | Status: SHIPPED | OUTPATIENT
Start: 2020-04-24 | End: 2020-04-29

## 2020-04-24 RX ORDER — ALPRAZOLAM 2 MG/1
TABLET ORAL
Qty: 90 TABLET | Refills: 1 | OUTPATIENT
Start: 2020-04-24

## 2020-04-24 RX ORDER — FEXOFENADINE HCL 180 MG/1
180 TABLET ORAL DAILY
Qty: 30 TABLET | Refills: 0 | Status: SHIPPED
Start: 2020-04-24 | End: 2021-02-08 | Stop reason: ALTCHOICE

## 2020-04-24 NOTE — PROGRESS NOTES
TABLET BY MOUTH THREE TIMES DAILY AS NEEDED FOR ANXIETY      escitalopram (LEXAPRO) 10 MG tablet take 1 tablet by mouth once daily 90 tablet 1    fenofibrate micronized (LOFIBRA) 134 MG capsule Take 1 capsule by mouth every morning (before breakfast) 90 capsule 1    pantoprazole (PROTONIX) 40 MG tablet take 1 tablet by mouth once daily 90 tablet 1    LIVALO 4 MG TABS tablet take 1 tablet by mouth at bedtime 30 tablet 5    albuterol sulfate  (90 Base) MCG/ACT inhaler Inhale 2 puffs into the lungs 4 times daily as needed for Wheezing 1 Inhaler 0    losartan (COZAAR) 50 MG tablet take 1 tablet by mouth twice a day 180 tablet 2    blood glucose test strips (JEEVAN CONTOUR TEST) strip 1 each by In Vitro route daily As needed. 100 each 3    acetaminophen (APAP EXTRA STRENGTH) 500 MG tablet Take 1 tablet by mouth every 6 hours as needed for Pain 20 tablet 0    loratadine (CLARITIN) 10 MG tablet Take 1 tablet by mouth daily 30 tablet 0    Cholecalciferol (VITAMIN D PO) Take 3,000 mg by mouth daily.  CRANBERRY Take  by mouth daily.  aspirin EC 81 MG EC tablet Take 81 mg by mouth every other day.  Co-Enzyme Q-10 100 MG CAPS Take 200 mg by mouth.  azelastine (ASTELIN) 0.1 % nasal spray 2 sprays by Nasal route 2 times daily Use in each nostril as directed (Patient not taking: Reported on 4/24/2020) 1 Bottle 5    meclizine (ANTIVERT) 25 MG tablet Take 1/2 to 1 full tab every 8 hours prn dizziness (watch for fatigue side effects) (Patient not taking: Reported on 4/24/2020) 30 tablet 0    triamcinolone (KENALOG) 0.1 % cream Apply topically 2 times daily. (Patient not taking: Reported on 4/24/2020) 30 g 0    fluticasone (FLONASE) 50 MCG/ACT nasal spray 1 spray by Nasal route daily (Patient not taking: Reported on 4/24/2020) 1 Bottle 1     No current facility-administered medications on file prior to visit.       Review of Systems  Constitutional:Negative for activity change, appetite change, chills, fatigue and fever. Respiratory: Negative for choking, chest tightness, shortness of breath and wheezing. Cardiovascular: Negative for chest pain, palpitations and leg swelling. Gastrointestinal: Negative for abdominal distention, constipation, diarrhea, nausea and vomiting. Musculoskeletal: Negative for arthralgias, back pain, gait problem and joint swelling. Neurological: Negative for dizziness, weakness,numbness and headaches. There were no vitals taken for this visit. Physical Exam   Constitutional:  Oriented to person, place, and time. Appears well-developed and well-nourished. No acute distress. Neurological:Alert and oriented to person, place, and time. Skin: No diaphoresis. Psychiatric: Normal mood and affect. Behavior is Normal.     ASSESSMENT AND PLAN:    Barry Wheeler was seen today for sinus problem and allergies. Diagnoses and all orders for this visit:    Acute sinusitis, recurrence not specified, unspecified location  -     azithromycin (ZITHROMAX) 250 MG tablet; Take 1 tablet by mouth See Admin Instructions for 5 days 500mg on day 1 followed by 250mg on days 2 - 5  -     fexofenadine (ALLEGRA) 180 MG tablet; Take 1 tablet by mouth daily          TeleMedicine Patient Consent    This visit was performed as a virtual video visit using a synchronous, two-way, audio-video telehealth technology platform. Patient identification was verified at the start of the visit, including the patient's telephone number and physical location. I discussed with the patient the nature of our telehealth visits, that:     1. Due to the nature of an audio- video modality, the only components of a physical exam that could be done are the elements supported by direct observation. 2. I would evaluate the patient and recommend diagnostics and treatments based on my assessment.   3. If it was felt that the patient should be evaluated in clinic or an emergency room setting, then they would be directed

## 2020-04-24 NOTE — PROGRESS NOTES
[place] was read the following message We want to confirm that, for purposes of billing, this is a virtual visit with your provider for which we will submit a claim for reimbursement with your insurance company. You will be responsible for any copays, coinsurance amounts or other amounts not covered by your insurance company. If you do not accept this, unfortunately we will not be able to schedule a virtual visit with the provider. Do you accept?  Beti Abad responded YES

## 2020-04-27 ENCOUNTER — VIRTUAL VISIT (OUTPATIENT)
Dept: CARDIOLOGY CLINIC | Age: 75
End: 2020-04-27
Payer: MEDICARE

## 2020-04-27 VITALS — HEIGHT: 62 IN | BODY MASS INDEX: 29.53 KG/M2 | WEIGHT: 160.5 LBS

## 2020-04-27 PROCEDURE — 99442 PR PHYS/QHP TELEPHONE EVALUATION 11-20 MIN: CPT | Performed by: INTERNAL MEDICINE

## 2020-04-27 NOTE — PROGRESS NOTES
4 MG TABS tablet take 1 tablet by mouth at bedtime 30 tablet 5    albuterol sulfate  (90 Base) MCG/ACT inhaler Inhale 2 puffs into the lungs 4 times daily as needed for Wheezing 1 Inhaler 0    losartan (COZAAR) 50 MG tablet take 1 tablet by mouth twice a day 180 tablet 2    blood glucose test strips (JEEVAN CONTOUR TEST) strip 1 each by In Vitro route daily As needed. 100 each 3    acetaminophen (APAP EXTRA STRENGTH) 500 MG tablet Take 1 tablet by mouth every 6 hours as needed for Pain 20 tablet 0    loratadine (CLARITIN) 10 MG tablet Take 1 tablet by mouth daily 30 tablet 0    Cholecalciferol (VITAMIN D PO) Take 2,000 mg by mouth daily       aspirin EC 81 MG EC tablet Take 81 mg by mouth every other day.  Co-Enzyme Q-10 100 MG CAPS Take 200 mg by mouth.  meclizine (ANTIVERT) 25 MG tablet Take 1/2 to 1 full tab every 8 hours prn dizziness (watch for fatigue side effects) (Patient not taking: Reported on 4/24/2020) 30 tablet 0    triamcinolone (KENALOG) 0.1 % cream Apply topically 2 times daily. (Patient not taking: Reported on 4/24/2020) 30 g 0    fluticasone (FLONASE) 50 MCG/ACT nasal spray 1 spray by Nasal route daily (Patient not taking: Reported on 4/24/2020) 1 Bottle 1    CRANBERRY Take  by mouth daily. No current facility-administered medications for this visit. Allergies   Allergen Reactions    Sulfa Antibiotics Nausea Only    Amoxicillin Nausea Only    Clavulanic Acid Nausea Only    Levofloxacin     Omeprazole      Interferes with plavix    Other      Lipitor, Crestor, high doses of Simvastatin    Pneumococcal Vaccines     Amoxicillin-Pot Clavulanate Nausea Only     Makes her stomach hurt, doesn't want to eat.  Doxycycline Rash       Chief Complaint:  Daija Schultz is here today for follow up and management/recomendations for CAD, HTN, hypercholesterolemia.      History of Present Illness: Daija Schultz states that She does house work, goes up the stairs & or symptoms of recurring ischemia at this time. 2. Cardiomyopathy as outlined above. Clinically euvolemic and no decompensation at this time. EF improved to normal.  3. Hypertension. She states that her blood pressure was 122/70 at her last visit with her PCP in February. 4. Hypercholesterolemia        Recommendations:  1. She is following the cholesterol with Dr. Anni Juarez. 2. Continue current cardiac medications      Thank you for allowing me to participate in your patient's care.       [id number], [address] [facility]  Interventional Cardiology

## 2020-04-29 RX ORDER — ALPRAZOLAM 2 MG/1
TABLET ORAL
Qty: 90 TABLET | Refills: 1 | OUTPATIENT
Start: 2020-04-29

## 2020-05-04 ENCOUNTER — TELEPHONE (OUTPATIENT)
Dept: PRIMARY CARE CLINIC | Age: 75
End: 2020-05-04

## 2020-05-04 RX ORDER — ALPRAZOLAM 2 MG/1
TABLET ORAL
Qty: 90 TABLET | Refills: 0 | Status: SHIPPED
Start: 2020-05-04 | End: 2020-06-02 | Stop reason: SDUPTHER

## 2020-05-04 RX ORDER — PANTOPRAZOLE SODIUM 40 MG/1
TABLET, DELAYED RELEASE ORAL
Qty: 90 TABLET | Refills: 1 | Status: SHIPPED
Start: 2020-05-04 | End: 2020-10-26

## 2020-05-12 RX ORDER — LOSARTAN POTASSIUM 50 MG/1
TABLET ORAL
Qty: 180 TABLET | Refills: 3 | Status: ON HOLD
Start: 2020-05-12 | End: 2021-03-16 | Stop reason: HOSPADM

## 2020-06-02 RX ORDER — ALPRAZOLAM 2 MG/1
TABLET ORAL
Qty: 90 TABLET | Refills: 0 | Status: SHIPPED
Start: 2020-06-02 | End: 2020-07-06

## 2020-06-11 RX ORDER — METOPROLOL SUCCINATE 50 MG/1
TABLET, EXTENDED RELEASE ORAL
Qty: 90 TABLET | Refills: 11 | Status: ON HOLD
Start: 2020-06-11 | End: 2021-03-20 | Stop reason: HOSPADM

## 2020-06-22 RX ORDER — PITAVASTATIN CALCIUM 4.18 MG/1
TABLET, FILM COATED ORAL
Qty: 30 TABLET | Refills: 5 | Status: ON HOLD
Start: 2020-06-22 | End: 2021-02-28 | Stop reason: ALTCHOICE

## 2020-07-06 RX ORDER — ALPRAZOLAM 2 MG/1
TABLET ORAL
Qty: 90 TABLET | Refills: 0 | Status: SHIPPED
Start: 2020-07-06 | End: 2020-08-04 | Stop reason: SDUPTHER

## 2020-07-09 RX ORDER — FENOFIBRATE 134 MG/1
134 CAPSULE ORAL
Qty: 90 CAPSULE | Refills: 1 | Status: SHIPPED
Start: 2020-07-09 | End: 2021-01-12 | Stop reason: SDUPTHER

## 2020-07-13 ENCOUNTER — TELEPHONE (OUTPATIENT)
Dept: ADMINISTRATIVE | Age: 75
End: 2020-07-13

## 2020-07-13 ENCOUNTER — OFFICE VISIT (OUTPATIENT)
Dept: PRIMARY CARE CLINIC | Age: 75
End: 2020-07-13
Payer: MEDICARE

## 2020-07-13 VITALS
SYSTOLIC BLOOD PRESSURE: 136 MMHG | DIASTOLIC BLOOD PRESSURE: 82 MMHG | OXYGEN SATURATION: 95 % | TEMPERATURE: 97.4 F | HEART RATE: 71 BPM

## 2020-07-13 PROCEDURE — G9899 SCRN MAM PERF RSLTS DOC: HCPCS | Performed by: PHYSICIAN ASSISTANT

## 2020-07-13 PROCEDURE — 1036F TOBACCO NON-USER: CPT | Performed by: PHYSICIAN ASSISTANT

## 2020-07-13 PROCEDURE — 1123F ACP DISCUSS/DSCN MKR DOCD: CPT | Performed by: PHYSICIAN ASSISTANT

## 2020-07-13 PROCEDURE — 96372 THER/PROPH/DIAG INJ SC/IM: CPT | Performed by: PHYSICIAN ASSISTANT

## 2020-07-13 PROCEDURE — 99213 OFFICE O/P EST LOW 20 MIN: CPT | Performed by: PHYSICIAN ASSISTANT

## 2020-07-13 PROCEDURE — G8417 CALC BMI ABV UP PARAM F/U: HCPCS | Performed by: PHYSICIAN ASSISTANT

## 2020-07-13 PROCEDURE — G8400 PT W/DXA NO RESULTS DOC: HCPCS | Performed by: PHYSICIAN ASSISTANT

## 2020-07-13 PROCEDURE — 3017F COLORECTAL CA SCREEN DOC REV: CPT | Performed by: PHYSICIAN ASSISTANT

## 2020-07-13 PROCEDURE — 1090F PRES/ABSN URINE INCON ASSESS: CPT | Performed by: PHYSICIAN ASSISTANT

## 2020-07-13 PROCEDURE — G8427 DOCREV CUR MEDS BY ELIG CLIN: HCPCS | Performed by: PHYSICIAN ASSISTANT

## 2020-07-13 PROCEDURE — 4040F PNEUMOC VAC/ADMIN/RCVD: CPT | Performed by: PHYSICIAN ASSISTANT

## 2020-07-13 RX ORDER — METHYLPREDNISOLONE ACETATE 80 MG/ML
80 INJECTION, SUSPENSION INTRA-ARTICULAR; INTRALESIONAL; INTRAMUSCULAR; SOFT TISSUE ONCE
Status: COMPLETED | OUTPATIENT
Start: 2020-07-13 | End: 2020-07-13

## 2020-07-13 RX ORDER — SULFAMETHOXAZOLE AND TRIMETHOPRIM 800; 160 MG/1; MG/1
1 TABLET ORAL 2 TIMES DAILY
Qty: 20 TABLET | Refills: 0 | Status: SHIPPED | OUTPATIENT
Start: 2020-07-13 | End: 2020-07-23

## 2020-07-13 RX ORDER — METHYLPREDNISOLONE 4 MG/1
TABLET ORAL
Qty: 1 KIT | Refills: 0 | Status: SHIPPED | OUTPATIENT
Start: 2020-07-13 | End: 2020-07-19

## 2020-07-13 RX ADMIN — METHYLPREDNISOLONE ACETATE 80 MG: 80 INJECTION, SUSPENSION INTRA-ARTICULAR; INTRALESIONAL; INTRAMUSCULAR; SOFT TISSUE at 15:53

## 2020-07-13 NOTE — TELEPHONE ENCOUNTER
Pt called in, she stated redness from the bee sting is moving up her arm pretty quickly and needs to be seen ASAP. She stated the office will need to get her in today because she refuses to go to [facility] clinic and she needs to know what time she will be coming in for the appt, so she can get her special needs son ready. Please, advise. Thank you.

## 2020-07-13 NOTE — PROGRESS NOTES
Chief Complaint:   Insect Bite      History of Present Illness   Source of history provided by:  patient. Joseph Wynne is a 70 y.o. old female who has a past medical history of:   Past Medical History:   Diagnosis Date    Abnormal echocardiogram     Anxiety     CAD (coronary artery disease)     normal adenisone stress test    Carotid artery narrowing     <40% bilaterally    Depression     Diabetes mellitus (HCC)     type II    Drug intolerance     Lipitor, Crestor, high doses of Simvastatin    GERD (gastroesophageal reflux disease)     Heart attack ([address]     Hyperlipidemia     Hypertension     IBS (irritable bowel syndrome)     Obesity     Primary osteoarthritis involving multiple joints 1/18/2017    SOB (shortness of breath) on exertion     UTI (urinary tract infection)     presents today with complaints of reaction to a bee sting, she believes was a wasp 5 days ago, notes on L shoulder, since then the area has been increasingly itchy and painful, and redness is spreading. Pt states the area is warm to touch, mildly painful and swollen, and has no noted streaking. No active drainage noted per pt. Denies any fever, chills, HA, recent illness, myalgias, vomiting, or lethargy. ROS    Unless otherwise stated in this report or unable to obtain because of the patient's clinical or mental status as evidenced by the medical record, this patients's positive and negative responses for Review of Systems, constitutional, psych, eyes, ENT, cardiovascular, respiratory, gastrointestinal, neurological, genitourinary, musculoskeletal, integument systems and systems related to the presenting problem are either stated in the preceding or were not pertinent or were negative for the symptoms and/or complaints related to the medical problem. Past Surgical History:  has a past surgical history that includes Tonsillectomy; Percutaneous Transluminal Coronary Angio (11/25/2007);  Dilation and curettage of uterus; cyst removal; Diagnostic Cardiac Cath Lab Procedure (2007); Coronary angioplasty (07); and  section. Social History:  reports that she quit smoking about 10 years ago. She has a 35.00 pack-year smoking history. She has never used smokeless tobacco. She reports that she does not drink alcohol or use drugs. Family History: family history includes Down Syndrome in her son; Emphysema in her mother; Heart Attack in her father; Heart Failure in her mother. Allergies: Sulfa antibiotics; Amoxicillin; Clavulanic acid; Levofloxacin; Omeprazole; Other; Pneumococcal vaccines; Amoxicillin-pot clavulanate; Bee venom; and Doxycycline    Physical Exam         VS:  /82   Pulse 71   Temp 97.4 °F (36.3 °C)   SpO2 95%    Oxygen Saturation Interpretation: Normal.    Constitutional:  Alert, development consistent with age. HEENT:  NC/NT. Airway patent. Eyes:  PERRL, EOMI, no discharge. Ears:  TMs without perforation, injection, or bulging. External canals clear without exudate. Mouth:  Mucous membranes moist without lesions, tongue and gums normal.  Throat:  Pharynx without injection, exudate, or tonsillar hypertrophy. Airway patient. Neck:  Supple. No lymphadenopathy. Lungs:  Clear to auscultation and breath sounds equal.  Heart:  Regular rate and rhythm. Skin:  Normal turgor and appropriately dry to touch. +puncture like bite (darker red area), with surrounding mild swelling,  Few vesicular like lesions, as well as +redness extending around 6cm around center, minimal induration. Minimal TTP over same area and slightly warm to touch. No excoriations, macules, papules, or bullae noted. no drainage noted. no lymphangitic streaking. Neurological:  Orientation age-appropriate unless noted elseware. Motor functions intact. Lab / Imaging Results   (All laboratory and radiology results have been personally reviewed by myself)  Labs:      Imaging:   All Radiology results interpreted by Radiologist unless otherwise noted. Assessment / Plan     Impression(s):  1. Bee sting reaction, accidental or unintentional, initial encounter      Disposition:    I suspect localized allergic reaction, with no secondary cellulitis. She has many drug allergies. I discussed bactrim, as she notes nausea in the past, she is OK with this. We will Tx with steroid injection, and tablets to start tomorrow if the shot didn't help relieve the itching. Bactrim start today. Monitor redness, if it worsens, FU immed or seek medical care, as well as any development of fevers, or other signs of infection seek medical care. She will do so. Keep area clean and dry, avoid scratching, use warm/cool compresses.          Administrations This Visit     methylPREDNISolone acetate (DEPO-MEDROL) injection 80 mg     Admin Date  07/13/2020 Action  Given Dose  80 mg Route  Intramuscular Administered By  Edita Troncoso MA

## 2020-08-03 RX ORDER — ESCITALOPRAM OXALATE 10 MG/1
TABLET ORAL
Qty: 90 TABLET | Refills: 1 | Status: SHIPPED
Start: 2020-08-03 | End: 2021-01-25 | Stop reason: SDUPTHER

## 2020-08-04 RX ORDER — ALPRAZOLAM 2 MG/1
TABLET ORAL
Qty: 90 TABLET | Refills: 2 | Status: SHIPPED
Start: 2020-08-04 | End: 2020-11-02 | Stop reason: SDUPTHER

## 2020-09-15 ENCOUNTER — OFFICE VISIT (OUTPATIENT)
Dept: PRIMARY CARE CLINIC | Age: 75
End: 2020-09-15
Payer: MEDICARE

## 2020-09-15 ENCOUNTER — HOSPITAL ENCOUNTER (OUTPATIENT)
Age: 75
Discharge: HOME | End: 2020-09-17
Payer: MEDICARE

## 2020-09-15 VITALS
RESPIRATION RATE: 16 BRPM | BODY MASS INDEX: 30.54 KG/M2 | TEMPERATURE: 97.6 F | SYSTOLIC BLOOD PRESSURE: 120 MMHG | WEIGHT: 167 LBS | HEART RATE: 70 BPM | OXYGEN SATURATION: 97 % | DIASTOLIC BLOOD PRESSURE: 82 MMHG

## 2020-09-15 PROBLEM — S40.012A CONTUSION OF LEFT SHOULDER: Status: RESOLVED | Noted: 2017-01-18 | Resolved: 2020-09-15

## 2020-09-15 PROBLEM — S16.1XXA CERVICAL STRAIN: Status: RESOLVED | Noted: 2017-01-18 | Resolved: 2020-09-15

## 2020-09-15 LAB
ALBUMIN SERPL-MCNC: 4.4 G/DL (ref 3.5–5.2)
ALP BLD-CCNC: 54 U/L (ref 35–104)
ALT SERPL-CCNC: 12 U/L (ref 0–32)
ANION GAP SERPL CALCULATED.3IONS-SCNC: 13 MMOL/L (ref 7–16)
AST SERPL-CCNC: 20 U/L (ref 0–31)
BILIRUB SERPL-MCNC: 0.4 MG/DL (ref 0–1.2)
BUN BLDV-MCNC: 13 MG/DL (ref 8–23)
CALCIUM SERPL-MCNC: 9.6 MG/DL (ref 8.6–10.2)
CHLORIDE BLD-SCNC: 101 MMOL/L (ref 98–107)
CHOLESTEROL, TOTAL: 152 MG/DL (ref 0–199)
CO2: 26 MMOL/L (ref 22–29)
CREAT SERPL-MCNC: 0.9 MG/DL (ref 0.5–1)
GFR AFRICAN AMERICAN: >60
GFR NON-AFRICAN AMERICAN: >60 ML/MIN/1.73
GLUCOSE BLD-MCNC: 105 MG/DL (ref 74–99)
HBA1C MFR BLD: 5.9 % (ref 4–5.6)
HCT VFR BLD CALC: 45.2 % (ref 34–48)
HDLC SERPL-MCNC: 51 MG/DL
HEMOGLOBIN: 14 G/DL (ref 11.5–15.5)
LDL CHOLESTEROL CALCULATED: 68 MG/DL (ref 0–99)
MCH RBC QN AUTO: 30.5 PG (ref 26–35)
MCHC RBC AUTO-ENTMCNC: 31 % (ref 32–34.5)
MCV RBC AUTO: 98.5 FL (ref 80–99.9)
PDW BLD-RTO: 12.5 FL (ref 11.5–15)
PLATELET # BLD: 264 E9/L (ref 130–450)
PMV BLD AUTO: 10.7 FL (ref 7–12)
POTASSIUM SERPL-SCNC: 5.2 MMOL/L (ref 3.5–5)
RBC # BLD: 4.59 E12/L (ref 3.5–5.5)
SODIUM BLD-SCNC: 140 MMOL/L (ref 132–146)
TOTAL PROTEIN: 7 G/DL (ref 6.4–8.3)
TRIGL SERPL-MCNC: 164 MG/DL (ref 0–149)
VLDLC SERPL CALC-MCNC: 33 MG/DL
WBC # BLD: 5.7 E9/L (ref 4.5–11.5)

## 2020-09-15 PROCEDURE — 3017F COLORECTAL CA SCREEN DOC REV: CPT | Performed by: PHYSICIAN ASSISTANT

## 2020-09-15 PROCEDURE — G8417 CALC BMI ABV UP PARAM F/U: HCPCS | Performed by: PHYSICIAN ASSISTANT

## 2020-09-15 PROCEDURE — 80053 COMPREHEN METABOLIC PANEL: CPT

## 2020-09-15 PROCEDURE — 4040F PNEUMOC VAC/ADMIN/RCVD: CPT | Performed by: PHYSICIAN ASSISTANT

## 2020-09-15 PROCEDURE — 1036F TOBACCO NON-USER: CPT | Performed by: PHYSICIAN ASSISTANT

## 2020-09-15 PROCEDURE — 1090F PRES/ABSN URINE INCON ASSESS: CPT | Performed by: PHYSICIAN ASSISTANT

## 2020-09-15 PROCEDURE — 2022F DILAT RTA XM EVC RTNOPTHY: CPT | Performed by: PHYSICIAN ASSISTANT

## 2020-09-15 PROCEDURE — 1123F ACP DISCUSS/DSCN MKR DOCD: CPT | Performed by: PHYSICIAN ASSISTANT

## 2020-09-15 PROCEDURE — 3044F HG A1C LEVEL LT 7.0%: CPT | Performed by: PHYSICIAN ASSISTANT

## 2020-09-15 PROCEDURE — 80061 LIPID PANEL: CPT

## 2020-09-15 PROCEDURE — 83036 HEMOGLOBIN GLYCOSYLATED A1C: CPT

## 2020-09-15 PROCEDURE — G8427 DOCREV CUR MEDS BY ELIG CLIN: HCPCS | Performed by: PHYSICIAN ASSISTANT

## 2020-09-15 PROCEDURE — 99214 OFFICE O/P EST MOD 30 MIN: CPT | Performed by: PHYSICIAN ASSISTANT

## 2020-09-15 PROCEDURE — G9899 SCRN MAM PERF RSLTS DOC: HCPCS | Performed by: PHYSICIAN ASSISTANT

## 2020-09-15 PROCEDURE — 85027 COMPLETE CBC AUTOMATED: CPT

## 2020-09-15 PROCEDURE — G8400 PT W/DXA NO RESULTS DOC: HCPCS | Performed by: PHYSICIAN ASSISTANT

## 2020-09-15 RX ORDER — FLUTICASONE PROPIONATE 50 MCG
2 SPRAY, SUSPENSION (ML) NASAL DAILY
Qty: 1 BOTTLE | Refills: 5 | Status: SHIPPED
Start: 2020-09-15 | End: 2021-02-28

## 2020-09-15 NOTE — PROGRESS NOTES
Paul Grijalva  1945  9/15/20      HPI:    Patient presents for 6 month check up. She notes overall has been feeling well, her only complaint is of her legs. Notes by end of the day her shins are very painful. She does have +back pain which is chronic, states not worsening. Denies radicular type Sx. She does see Dr. Kaykay Stahl, but states doesn't want any back surgeries. No weakness. No numbness or tingling. She notes her allergies have bene bothersome lately, is on astelin, and claritin QD. Refuses to switch claritin. Does use netipot on occasion. Notes has been checking SMBGs QD in AM 120s or less, watches somewhat what she eats, did gain some weight over the quarantine. Denies neuropathy, +microalb in urine on losartan, HTN controlled. +yearly eye exams. Saw Dr. Lien Laird 2020, for CAD FU, stable. She is due for lipids to be checked. On meds, but TRGs 200. Anxiety, depression controlled on lexapro, and xaxan prn.      Past Medical History:   Diagnosis Date    Abnormal echocardiogram     Anxiety     CAD (coronary artery disease)     normal adenisone stress test    Carotid artery narrowing     <40% bilaterally    Depression     Diabetes mellitus (HCC)     type II    Drug intolerance     Lipitor, Crestor, high doses of Simvastatin    GERD (gastroesophageal reflux disease)     Heart attack ([facility] [address]     Hyperlipidemia     Hypertension     IBS (irritable bowel syndrome)     Obesity     Primary osteoarthritis involving multiple joints 2017    SOB (shortness of breath) on exertion     UTI (urinary tract infection)         Past Surgical History:   Procedure Laterality Date     SECTION      x 1    CORONARY ANGIOPLASTY  07    CYST REMOVAL      ganglionic cyst on finger    DIAGNOSTIC CARDIAC CATH LAB PROCEDURE  2007    DILATION AND CURETTAGE OF UTERUS      X 2    PTCA  2007    TONSILLECTOMY         Current Outpatient Medications   Medication Sig Dispense Refill  fluticasone (FLONASE) 50 MCG/ACT nasal spray 2 sprays by Each Nostril route daily 1 Bottle 5    escitalopram (LEXAPRO) 10 MG tablet take 1 tablet by mouth once daily 90 tablet 1    fenofibrate micronized (LOFIBRA) 134 MG capsule Take 1 capsule by mouth every morning (before breakfast) 90 capsule 1    pitavastatin (LIVALO) 4 MG TABS tablet take 1 tablet by mouth at bedtime 30 tablet 5    metoprolol succinate (TOPROL XL) 50 MG extended release tablet TAKE ONE AND ONE-HALF TABLETS BY MOUTH TWICE DAILY 90 tablet 11    losartan (COZAAR) 50 MG tablet TAKE ONE TABLET BY MOUTH TWICE DAILY 180 tablet 3    pantoprazole (PROTONIX) 40 MG tablet take 1 tablet by mouth once daily 90 tablet 1    fexofenadine (ALLEGRA) 180 MG tablet Take 1 tablet by mouth daily (Patient not taking: Reported on 7/13/2020) 30 tablet 0    metFORMIN (GLUCOPHAGE-XR) 500 MG extended release tablet Take 1 tablet by mouth 2 times daily 180 tablet 1    Lancets MISC 1 each by Does not apply route daily 100 each 5    azelastine (ASTELIN) 0.1 % nasal spray 2 sprays by Nasal route 2 times daily Use in each nostril as directed 1 Bottle 5    albuterol sulfate  (90 Base) MCG/ACT inhaler Inhale 2 puffs into the lungs 4 times daily as needed for Wheezing 1 Inhaler 0    blood glucose test strips (JEEVAN CONTOUR TEST) strip 1 each by In Vitro route daily As needed. 100 each 3    acetaminophen (APAP EXTRA STRENGTH) 500 MG tablet Take 1 tablet by mouth every 6 hours as needed for Pain 20 tablet 0    loratadine (CLARITIN) 10 MG tablet Take 1 tablet by mouth daily 30 tablet 0    Cholecalciferol (VITAMIN D PO) Take 2,000 mg by mouth daily       aspirin EC 81 MG EC tablet Take 81 mg by mouth every other day.  Co-Enzyme Q-10 100 MG CAPS Take 200 mg by mouth. No current facility-administered medications for this visit.         Allergies   Allergen Reactions    Sulfa Antibiotics Nausea Only    Amoxicillin Nausea Only    Clavulanic Acid Nausea Only    Levofloxacin     Omeprazole      Interferes with plavix    Other      Lipitor, Crestor, high doses of Simvastatin    Pneumococcal Vaccines     Amoxicillin-Pot Clavulanate Nausea Only     Makes her stomach hurt, doesn't want to eat.     Bee Venom Rash    Doxycycline Rash       Family History   Problem Relation Age of Onset    Emphysema Mother     Heart Failure Mother     Heart Attack Father     Down Syndrome Son        Social History     Socioeconomic History    Marital status:      Spouse name: Not on file    Number of children: Not on file    Years of education: Not on file    Highest education level: Not on file   Occupational History     Employer: RETIRED   Social Needs    Financial resource strain: Not on file    Food insecurity     Worry: Not on file     Inability: Not on file    Transportation needs     Medical: Not on file     Non-medical: Not on file   Tobacco Use    Smoking status: Former Smoker     Packs/day: 1.00     Years: 35.00     Pack years: 35.00     Last attempt to quit: 6/17/2010     Years since quitting: 10.2    Smokeless tobacco: Never Used   Substance and Sexual Activity    Alcohol use: No    Drug use: No    Sexual activity: Not Currently   Lifestyle    Physical activity     Days per week: Not on file     Minutes per session: Not on file    Stress: Not on file   Relationships    Social connections     Talks on phone: Not on file     Gets together: Not on file     Attends Alevism service: Not on file     Active member of club or organization: Not on file     Attends meetings of clubs or organizations: Not on file     Relationship status: Not on file    Intimate partner violence     Fear of current or ex partner: Not on file     Emotionally abused: Not on file     Physically abused: Not on file     Forced sexual activity: Not on file   Other Topics Concern    Not on file   Social History Narrative    Not on file       Review of Systems :  Review problem are either stated in the preceding or were not pertinent or were negative for the symptoms and/or complaints related to the medical problem. Physical Exam:   Vitals:    09/15/20 1002   BP: 120/82   Pulse: 70   Resp: 16   Temp: 97.6 °F (36.4 °C)   SpO2: 97%   Weight: 167 lb (75.8 kg)     Constitutional:  A and O x 3, in NAD. Afebrile. Appears stated age. Head:  NCAT. Eyes:  PERRLA, EOMI without nystagmus. Conjunctiva normal. Sclera anicteric. Ears:  External ears without lesions. TM's clear bilaterally. Throat:  Pharynx without lesions. Airway patient. Mucous membranes pink and moist without lesions. Neck:  Supple. Nontender, no lymphadenopathy noted, no thyromegaly. Lungs:  Clear to auscultation and breath sounds equal.  Heart:  Regular rate and rhythm, normal S1 and S2, no m/r/g. No murmurs with change in position or Valsalva. PMI non-displaced. UE and LE distal pulses intact. Abdomen:  Soft, NTND, NABS x 4, no masses or organomegaly, no rebound or guarding. MS: Normal, painless range of motion. No neurovascular deficit. 5/5 strength in UE's/LE's/ bilaterally. +kyphoscoliosis noted, +pain over tibial area bilat, more proximal and mid, more so medial aspect but also lateral.   Skin:  No abrasions, ecchymoses, or lacerations unless noted elsewhere. Extremities  No cyanosis, clubbing, or edema noted. Neurological:   Oriented. Motor functions intact. CN II-XII intact. DTRs UE and LE intact. Psych: pleasant, normal affect, appropriate thoughts and insight. Assessment/Plan:     Carlee Nephbecca was seen today for 6 month follow-up. Diagnoses and all orders for this visit:    Type 2 diabetes mellitus with microalbuminuria, without long-term current use of insulin (HCC)  -     CBC; Future  -     HEMOGLOBIN A1C; Future   -watch diet better. Mixed hyperlipidemia  -     COMPREHENSIVE METABOLIC PANEL; Future  -     LIPID PANEL;  Future   -TRGs up will see what they are upcoming. discussed importance of getting these under 150. Coronary artery disease due to lipid rich plaque   -karla, sees cardiology. Essential hypertension   -controlled. Medial tibial stress syndrome, unspecified laterality, sequela   -disucssed tibiral wraps, she will get and wear, and also topical voltaren OTC call me in 2-4 weeks if not improving. Allergic rhinitis, unspecified seasonality, unspecified trigger  -     fluticasone (FLONASE) 50 MCG/ACT nasal spray; 2 sprays by Each Nostril route daily        Return in about 6 months (around 3/15/2021). Counseled regarding above diagnosis, including possible risks and complications,  especially if left uncontrolled. Counseled regarding the possible side effects, risks, benefits and alternatives to treatment; patient and/or guardian verbalizes understanding, agrees, feels comfortable with and wishes to proceed with above treatment plan. Advised patient to call with any new medication issues, and read all Rx info from pharmacy to assure aware of all possible risks and side effects of medication before taking. Reviewed age and gender appropriate health screening exams and vaccinations. Advised patient regarding importance of keeping up with recommended health maintenance and to schedule as soon as possible if overdue, as this is important in assessing for undiagnosed pathology, especially cancer, as well as protecting against potentially harmful/life threatening disease. Patient and/or guardian verbalizes understanding and agrees with above counseling, assessment and plan. All questions answered.     Eva Nieves PA-C

## 2020-10-06 RX ORDER — METFORMIN HYDROCHLORIDE 500 MG/1
TABLET, EXTENDED RELEASE ORAL
Qty: 180 TABLET | Refills: 1 | Status: SHIPPED
Start: 2020-10-06 | End: 2021-01-25 | Stop reason: SDUPTHER

## 2020-10-22 ENCOUNTER — HOSPITAL ENCOUNTER (OUTPATIENT)
Age: 75
Discharge: HOME | End: 2020-10-24
Payer: MEDICARE

## 2020-10-22 ENCOUNTER — NURSE ONLY (OUTPATIENT)
Dept: PRIMARY CARE CLINIC | Age: 75
End: 2020-10-22
Payer: MEDICARE

## 2020-10-22 LAB
ANION GAP SERPL CALCULATED.3IONS-SCNC: 16 MMOL/L (ref 7–16)
BUN BLDV-MCNC: 17 MG/DL (ref 8–23)
CALCIUM SERPL-MCNC: 9.5 MG/DL (ref 8.6–10.2)
CHLORIDE BLD-SCNC: 102 MMOL/L (ref 98–107)
CO2: 22 MMOL/L (ref 22–29)
CREAT SERPL-MCNC: 0.9 MG/DL (ref 0.5–1)
GFR AFRICAN AMERICAN: >60
GFR NON-AFRICAN AMERICAN: >60 ML/MIN/1.73
GLUCOSE BLD-MCNC: 100 MG/DL (ref 74–99)
POTASSIUM SERPL-SCNC: 4.7 MMOL/L (ref 3.5–5)
SODIUM BLD-SCNC: 140 MMOL/L (ref 132–146)

## 2020-10-22 PROCEDURE — 36415 COLL VENOUS BLD VENIPUNCTURE: CPT | Performed by: PHYSICIAN ASSISTANT

## 2020-10-22 PROCEDURE — 80048 BASIC METABOLIC PNL TOTAL CA: CPT

## 2020-10-26 RX ORDER — PANTOPRAZOLE SODIUM 40 MG/1
TABLET, DELAYED RELEASE ORAL
Qty: 90 TABLET | Refills: 1 | Status: SHIPPED
Start: 2020-10-26 | End: 2021-11-02

## 2020-10-27 ENCOUNTER — TELEPHONE (OUTPATIENT)
Dept: PRIMARY CARE CLINIC | Age: 75
End: 2020-10-27

## 2020-10-27 RX ORDER — CLARITHROMYCIN 250 MG/1
250 TABLET, FILM COATED ORAL 2 TIMES DAILY
Qty: 14 TABLET | Refills: 0 | Status: SHIPPED | OUTPATIENT
Start: 2020-10-27 | End: 2020-11-03

## 2020-10-27 NOTE — TELEPHONE ENCOUNTER
Patient has chronic sinusitis. Her upper teeth are sore, sinuses are compacted and painful. She is asking if an antibiotic could be called in. She does not want to bring her son out if at all possible to avoid it. Please advise.     [phone]

## 2020-11-02 RX ORDER — ALPRAZOLAM 2 MG/1
TABLET ORAL
Qty: 90 TABLET | Refills: 2 | Status: SHIPPED | OUTPATIENT
Start: 2020-11-02 | End: 2020-12-03

## 2020-11-20 ENCOUNTER — VIRTUAL VISIT (OUTPATIENT)
Dept: FAMILY MEDICINE CLINIC | Age: 75
End: 2020-11-20
Payer: MEDICARE

## 2020-11-20 PROCEDURE — 99441 PR PHYS/QHP TELEPHONE EVALUATION 5-10 MIN: CPT | Performed by: PHYSICIAN ASSISTANT

## 2020-11-20 RX ORDER — AZITHROMYCIN 250 MG/1
250 TABLET, FILM COATED ORAL SEE ADMIN INSTRUCTIONS
Qty: 6 TABLET | Refills: 0 | Status: SHIPPED | OUTPATIENT
Start: 2020-11-20 | End: 2020-11-25

## 2020-11-20 NOTE — PROGRESS NOTES
Esmer Christine is a 70 y.o. female evaluated via telephone on 11/20/2020. Consent:  She and/or health care decision maker is aware that that she may receive a bill for this telephone service, depending on her insurance coverage, and has provided verbal consent to proceed: Yes    Documentation:  I communicated with the patient and/or health care decision maker about the following and details of this discussion including any medical advice provided:     Babak Peterson was seen today for sinusitis. - Patient reports left nasal congestion, facial pain, ear pain and dental pain x a few days  - Denies fever, chills, sore throat, cough, CP, SOB, or loss of taste/smell  - Has a history of chronic sinusitis  - Was treated last month with biaxin- this resolved until recently  - Takes flonase and Claritin with no relief  - Overall feels well besides sinus symptoms  - Denies sick contacts    Diagnoses and all orders for this visit:    Acute bacterial sinusitis  -     azithromycin (ZITHROMAX) 250 MG tablet; Take 1 tablet by mouth See Admin Instructions for 5 days 500mg on day 1 followed by 250mg on days 2 - 5    I affirm this is a Patient Initiated Episode with an Established Patient who has not had a related appointment within my department in the past 7 days or scheduled within the next 24 hours.     Patient's location: home address in [facility]  Physician  location other address in [facility]   Other people involved in call: none    Total Time: minutes: 5-10 minutes    Note: not billable if this call serves to triage the patient into an appointment for the relevant concern    [facility]

## 2020-12-18 ENCOUNTER — TELEPHONE (OUTPATIENT)
Dept: PRIMARY CARE CLINIC | Age: 75
End: 2020-12-18

## 2020-12-18 RX ORDER — AZITHROMYCIN 250 MG/1
250 TABLET, FILM COATED ORAL SEE ADMIN INSTRUCTIONS
Qty: 6 TABLET | Refills: 1 | Status: SHIPPED
Start: 2020-12-18 | End: 2021-01-12 | Stop reason: SDUPTHER

## 2020-12-18 NOTE — TELEPHONE ENCOUNTER
Patient called with complaint of sinus infection and wants to know if you would send in something to [facility].

## 2021-01-12 DIAGNOSIS — J01.91 ACUTE RECURRENT SINUSITIS, UNSPECIFIED LOCATION: ICD-10-CM

## 2021-01-12 DIAGNOSIS — E78.2 MIXED HYPERLIPIDEMIA: ICD-10-CM

## 2021-01-12 RX ORDER — FENOFIBRATE 134 MG/1
134 CAPSULE ORAL
Qty: 90 CAPSULE | Refills: 1 | Status: SHIPPED
Start: 2021-01-12 | End: 2021-04-23

## 2021-01-12 RX ORDER — AZITHROMYCIN 250 MG/1
250 TABLET, FILM COATED ORAL SEE ADMIN INSTRUCTIONS
Qty: 6 TABLET | Refills: 0 | Status: SHIPPED
Start: 2021-01-12 | End: 2021-01-19 | Stop reason: SDUPTHER

## 2021-01-19 DIAGNOSIS — J01.91 ACUTE RECURRENT SINUSITIS, UNSPECIFIED LOCATION: ICD-10-CM

## 2021-01-19 RX ORDER — AZITHROMYCIN 250 MG/1
250 TABLET, FILM COATED ORAL SEE ADMIN INSTRUCTIONS
Qty: 6 TABLET | Refills: 0 | Status: SHIPPED | OUTPATIENT
Start: 2021-01-19 | End: 2021-01-24

## 2021-01-22 ENCOUNTER — TELEPHONE (OUTPATIENT)
Dept: PRIMARY CARE CLINIC | Age: 76
End: 2021-01-22

## 2021-01-22 NOTE — TELEPHONE ENCOUNTER
Pt requesting call to discuss questions she has regarding the Covid vaccine for Briana. Please call.

## 2021-01-25 DIAGNOSIS — F41.9 ANXIETY: Primary | ICD-10-CM

## 2021-01-25 DIAGNOSIS — E11.9 TYPE 2 DIABETES MELLITUS WITHOUT COMPLICATION, WITHOUT LONG-TERM CURRENT USE OF INSULIN (HCC): ICD-10-CM

## 2021-01-25 RX ORDER — METFORMIN HYDROCHLORIDE 500 MG/1
500 TABLET, EXTENDED RELEASE ORAL 2 TIMES DAILY WITH MEALS
Qty: 180 TABLET | Refills: 1 | Status: SHIPPED | OUTPATIENT
Start: 2021-01-25

## 2021-01-25 RX ORDER — ESCITALOPRAM OXALATE 10 MG/1
TABLET ORAL
Qty: 90 TABLET | Refills: 1 | Status: SHIPPED | OUTPATIENT
Start: 2021-01-25

## 2021-01-25 RX ORDER — ALPRAZOLAM 2 MG/1
TABLET ORAL
COMMUNITY
Start: 2021-01-02 | End: 2021-01-25 | Stop reason: SDUPTHER

## 2021-01-25 RX ORDER — ALPRAZOLAM 2 MG/1
2 TABLET ORAL 3 TIMES DAILY PRN
Qty: 90 TABLET | Refills: 1 | Status: SHIPPED | OUTPATIENT
Start: 2021-01-25 | End: 2021-02-25

## 2021-02-01 DIAGNOSIS — J30.9 ALLERGIC RHINITIS, UNSPECIFIED SEASONALITY, UNSPECIFIED TRIGGER: ICD-10-CM

## 2021-02-01 RX ORDER — AZELASTINE 1 MG/ML
2 SPRAY, METERED NASAL 2 TIMES DAILY
Qty: 1 BOTTLE | Refills: 5 | Status: SHIPPED
Start: 2021-02-01 | End: 2021-02-28

## 2021-02-08 ENCOUNTER — OFFICE VISIT (OUTPATIENT)
Dept: FAMILY MEDICINE CLINIC | Age: 76
End: 2021-02-08
Payer: MEDICARE

## 2021-02-08 VITALS
HEIGHT: 62 IN | WEIGHT: 167 LBS | RESPIRATION RATE: 18 BRPM | DIASTOLIC BLOOD PRESSURE: 80 MMHG | SYSTOLIC BLOOD PRESSURE: 138 MMHG | BODY MASS INDEX: 30.73 KG/M2 | HEART RATE: 100 BPM | TEMPERATURE: 97.9 F | OXYGEN SATURATION: 94 %

## 2021-02-08 DIAGNOSIS — R51.9 SINUS HEADACHE: ICD-10-CM

## 2021-02-08 DIAGNOSIS — R09.81 NASAL CONGESTION: ICD-10-CM

## 2021-02-08 DIAGNOSIS — J01.90 ACUTE SINUSITIS, RECURRENCE NOT SPECIFIED, UNSPECIFIED LOCATION: ICD-10-CM

## 2021-02-08 DIAGNOSIS — J01.90 ACUTE SINUSITIS, RECURRENCE NOT SPECIFIED, UNSPECIFIED LOCATION: Primary | ICD-10-CM

## 2021-02-08 LAB
Lab: NORMAL
QC PASS/FAIL: NORMAL
SARS-COV-2, POC: NORMAL

## 2021-02-08 PROCEDURE — 99214 OFFICE O/P EST MOD 30 MIN: CPT | Performed by: PHYSICIAN ASSISTANT

## 2021-02-08 PROCEDURE — 4040F PNEUMOC VAC/ADMIN/RCVD: CPT | Performed by: PHYSICIAN ASSISTANT

## 2021-02-08 PROCEDURE — G8427 DOCREV CUR MEDS BY ELIG CLIN: HCPCS | Performed by: PHYSICIAN ASSISTANT

## 2021-02-08 PROCEDURE — 3017F COLORECTAL CA SCREEN DOC REV: CPT | Performed by: PHYSICIAN ASSISTANT

## 2021-02-08 PROCEDURE — 1123F ACP DISCUSS/DSCN MKR DOCD: CPT | Performed by: PHYSICIAN ASSISTANT

## 2021-02-08 PROCEDURE — 1090F PRES/ABSN URINE INCON ASSESS: CPT | Performed by: PHYSICIAN ASSISTANT

## 2021-02-08 PROCEDURE — G8417 CALC BMI ABV UP PARAM F/U: HCPCS | Performed by: PHYSICIAN ASSISTANT

## 2021-02-08 PROCEDURE — 87426 SARSCOV CORONAVIRUS AG IA: CPT | Performed by: PHYSICIAN ASSISTANT

## 2021-02-08 PROCEDURE — 1036F TOBACCO NON-USER: CPT | Performed by: PHYSICIAN ASSISTANT

## 2021-02-08 PROCEDURE — G8484 FLU IMMUNIZE NO ADMIN: HCPCS | Performed by: PHYSICIAN ASSISTANT

## 2021-02-08 PROCEDURE — G8400 PT W/DXA NO RESULTS DOC: HCPCS | Performed by: PHYSICIAN ASSISTANT

## 2021-02-08 RX ORDER — CLARITHROMYCIN 500 MG/1
500 TABLET, COATED ORAL 2 TIMES DAILY
Qty: 20 TABLET | Refills: 0 | Status: SHIPPED
Start: 2021-02-08 | End: 2021-02-16 | Stop reason: SINTOL

## 2021-02-08 NOTE — PROGRESS NOTES
21  Toyin Orlando : 1945 Sex: female  Age 70 y.o. Subjective:  Chief Complaint   Patient presents with    Sinus Problem     pt states chronic sinus issues          HPI:   Toyin Orlando , 70 y.o. female presents to express care for evaluation of sinus congestion, headache, pressure. The patient has a history of recurrent sinus infection. The patient just got off a Z-Rafy. The patient thought that she was feeling well. The patient has now noted some increased nasal congestion rhinorrhea. The patient is not having any fever, chills. No loss of smell or taste. Patient states that she does have a handicapped son at home and worried about the possibility of Covid infection. The patient is not having any abdominal pain, nausea, vomiting, diarrhea. No shortness of breath. No hemoptysis. The patient is eating and drinking normally. She does have multiple allergies to medications. ROS:   Unless otherwise stated in this report the patient's positive and negative responses for review of systems for constitutional, eyes, ENT, cardiovascular, respiratory, gastrointestinal, neurological, , musculoskeletal, and integument systems and related systems to the presenting problem are either stated in the history of present illness or were not pertinent or were negative for the symptoms and/or complaints related to the presenting medical problem. Positives and pertinent negatives as per HPI. All others reviewed and are negative.       PMH:     Past Medical History:   Diagnosis Date    Abnormal echocardiogram     Anxiety     CAD (coronary artery disease)     normal adenisone stress test    Carotid artery narrowing     <40% bilaterally    Depression     Diabetes mellitus (HCC)     type II    Drug intolerance     Lipitor, Crestor, high doses of Simvastatin    GERD (gastroesophageal reflux disease)     Heart attack ([facility] [address]     Hyperlipidemia     Hypertension     IBS (irritable bowel syndrome)     Obesity     Primary osteoarthritis involving multiple joints 2017    SOB (shortness of breath) on exertion     UTI (urinary tract infection)        Past Surgical History:   Procedure Laterality Date     SECTION      x 1    CORONARY ANGIOPLASTY  07    CYST REMOVAL      ganglionic cyst on finger    DIAGNOSTIC CARDIAC CATH LAB PROCEDURE  2007    DILATION AND CURETTAGE OF UTERUS      X 2    PTCA  2007    TONSILLECTOMY         Family History   Problem Relation Age of Onset    Emphysema Mother     Heart Failure Mother     Heart Attack Father     Down Syndrome Son        Medications:     Current Outpatient Medications:     azelastine (ASTELIN) 0.1 % nasal spray, 2 sprays by Nasal route 2 times daily Use in each nostril as directed, Disp: 1 Bottle, Rfl: 5    ALPRAZolam (XANAX) 2 MG tablet, Take 1 tablet by mouth 3 times daily as needed for Sleep for up to 31 days. , Disp: 90 tablet, Rfl: 1    escitalopram (LEXAPRO) 10 MG tablet, take 1 tablet by mouth once daily, Disp: 90 tablet, Rfl: 1    metFORMIN (GLUCOPHAGE-XR) 500 MG extended release tablet, Take 1 tablet by mouth 2 times daily (with meals), Disp: 180 tablet, Rfl: 1    fenofibrate micronized (LOFIBRA) 134 MG capsule, Take 1 capsule by mouth every morning (before breakfast), Disp: 90 capsule, Rfl: 1    pantoprazole (PROTONIX) 40 MG tablet, TAKE ONE TABLET BY MOUTH ONCE DAILY, Disp: 90 tablet, Rfl: 1    fluticasone (FLONASE) 50 MCG/ACT nasal spray, 2 sprays by Each Nostril route daily, Disp: 1 Bottle, Rfl: 5    pitavastatin (LIVALO) 4 MG TABS tablet, take 1 tablet by mouth at bedtime, Disp: 30 tablet, Rfl: 5    metoprolol succinate (TOPROL XL) 50 MG extended release tablet, TAKE ONE AND ONE-HALF TABLETS BY MOUTH TWICE DAILY, Disp: 90 tablet, Rfl: 11    losartan (COZAAR) 50 MG tablet, TAKE ONE TABLET BY MOUTH TWICE DAILY, Disp: 180 tablet, Rfl: 3    Lancets MISC, 1 each by Does not apply route daily, Disp: 100 each, Rfl: 5    albuterol sulfate  (90 Base) MCG/ACT inhaler, Inhale 2 puffs into the lungs 4 times daily as needed for Wheezing, Disp: 1 Inhaler, Rfl: 0    blood glucose test strips (JEEVAN CONTOUR TEST) strip, 1 each by In Vitro route daily As needed. , Disp: 100 each, Rfl: 3    loratadine (CLARITIN) 10 MG tablet, Take 1 tablet by mouth daily, Disp: 30 tablet, Rfl: 0    Cholecalciferol (VITAMIN D PO), Take 2,000 mg by mouth daily , Disp: , Rfl:     aspirin EC 81 MG EC tablet, Take 81 mg by mouth every other day., Disp: , Rfl:     Co-Enzyme Q-10 100 MG CAPS, Take 200 mg by mouth., Disp: , Rfl:     Allergies: Allergies   Allergen Reactions    Sulfa Antibiotics Nausea Only    Amoxicillin Nausea Only    Clavulanic Acid Nausea Only    Levofloxacin     Omeprazole      Interferes with plavix    Other      Lipitor, Crestor, high doses of Simvastatin    Pneumococcal Vaccines     Amoxicillin-Pot Clavulanate Nausea Only     Makes her stomach hurt, doesn't want to eat.  Bee Venom Rash    Doxycycline Rash       Social History:     Social History     Tobacco Use    Smoking status: Former Smoker     Packs/day: 1.00     Years: 35.00     Pack years: 35.00     Quit date: 6/17/2010     Years since quitting: 10.6    Smokeless tobacco: Never Used   Substance Use Topics    Alcohol use: No    Drug use: No       Patient lives at home. Physical Exam:     Vitals:    02/08/21 1510   BP: 138/80   Pulse: 100   Resp: 18   Temp: 97.9 °F (36.6 °C)   SpO2: 94%   Weight: 167 lb (75.8 kg)   Height: 5' 2\" (1.575 m)       Exam:  Physical Exam  Nurse's notes and vital signs reviewed. The patient is not hypoxic. ? General: Alert, no acute distress, patient resting comfortably Patient is not toxic or lethargic. Skin: Warm, intact, no pallor noted. There is no evidence of rash at this time.   Head: Normocephalic, atraumatic  Eye: Normal conjunctiva  Ears, Nose, Throat: Right tympanic membrane clear, left tympanic membrane clear. No drainage or discharge noted. No pre- or post-auricular tenderness, erythema, or swelling noted. Nasal congestion, rhinorrhea, frontal maxillary sinus tenderness  Posterior oropharynx shows no erythema, tonsillar hypertrophy, or exudate. the uvula is midline. No trismus or drooling is noted. Moist mucous membranes. Neck: No anterior/posterior lymphadenopathy noted. No erythema, no masses, no fluctuance or induration noted. No meningeal signs. Cardiovascular: Regular Rate and Rhythm  Respiratory: No acute distress, no rhonchi, wheezing or crackles noted. No stridor or retractions are noted. Neurological: A&O x4, normal speech  Psychiatric: Cooperative         Testing:     Results for orders placed or performed in visit on 02/08/21   POCT COVID-19, Antigen   Result Value Ref Range    SARS-COV-2, POC Not-Detected Not Detected    Lot Number [id number]     QC Pass/Fail pass            Medical Decision Making:     Vital signs reviewed    Past medical history reviewed. Allergies reviewed. Medications reviewed. Patient on arrival does not appear to be in any apparent distress or discomfort. The patient has been seen and evaluated. The patient does not appear to be toxic or lethargic. The patient had rapid Covid test that was negative. PCR testing is pending at this time. The patient will be started on Biaxin. She states that she is tolerated this well in the past and has worked well for her. The patient was educated on the proper dosage of motrin and tylenol and the appropriate intervals of each. The patient is to increase fluid intake over the next several days. The patient is to use OTC decongestant as needed. The patient is to return to express care or go directly to the emergency department should any of the signs or symptoms worsen. The patient is to followup with primary care physician in 2-3 days for repeat evaluation.  The patient has no other questions or concerns at this time the patient will be discharged home. Clinical Impression:   Mehrdad Bartlett was seen today for sinus problem. Diagnoses and all orders for this visit:    Acute sinusitis, recurrence not specified, unspecified location    Nasal congestion    Sinus headache    Other orders  -     POCT COVID-19, Antigen  -     Covid-19 Ambulatory; Future  -     clarithromycin (BIAXIN) 500 MG tablet; Take 1 tablet by mouth 2 times daily for 10 days        The patient is to call for any concerns or return if any of the signs or symptoms worsen. The patient is to follow-up with PCP in the next 2-3 days for repeat evaluation repeat assessment or go directly to the emergency department.      SIGNATURE: Hugh Patel III, PA-C

## 2021-02-10 LAB
SARS-COV-2: NOT DETECTED
SOURCE: NORMAL

## 2021-02-12 ENCOUNTER — TELEPHONE (OUTPATIENT)
Dept: ADMINISTRATIVE | Age: 76
End: 2021-02-12

## 2021-02-15 ENCOUNTER — TELEPHONE (OUTPATIENT)
Dept: PRIMARY CARE CLINIC | Age: 76
End: 2021-02-15

## 2021-02-15 NOTE — TELEPHONE ENCOUNTER
If pt is dizzy and feeling awful, I recommend right now as late in day, go to the ER. Stop biaxin. It may be making her dizzy as well. If not perhaps Ron Gonzalez can see her virtually for sinuses on Tuesday.

## 2021-02-16 ENCOUNTER — OFFICE VISIT (OUTPATIENT)
Dept: PRIMARY CARE CLINIC | Age: 76
End: 2021-02-16
Payer: MEDICARE

## 2021-02-16 ENCOUNTER — TELEPHONE (OUTPATIENT)
Dept: ADMINISTRATIVE | Age: 76
End: 2021-02-16

## 2021-02-16 VITALS
OXYGEN SATURATION: 96 % | TEMPERATURE: 97.7 F | RESPIRATION RATE: 18 BRPM | SYSTOLIC BLOOD PRESSURE: 128 MMHG | HEART RATE: 75 BPM | DIASTOLIC BLOOD PRESSURE: 64 MMHG

## 2021-02-16 DIAGNOSIS — J01.90 ACUTE SINUSITIS, RECURRENCE NOT SPECIFIED, UNSPECIFIED LOCATION: ICD-10-CM

## 2021-02-16 DIAGNOSIS — R42 VERTIGO: Primary | ICD-10-CM

## 2021-02-16 DIAGNOSIS — I25.83 CORONARY ARTERY DISEASE DUE TO LIPID RICH PLAQUE: ICD-10-CM

## 2021-02-16 DIAGNOSIS — I25.10 CORONARY ARTERY DISEASE DUE TO LIPID RICH PLAQUE: ICD-10-CM

## 2021-02-16 LAB — HGB, POC: 12.9

## 2021-02-16 PROCEDURE — 1123F ACP DISCUSS/DSCN MKR DOCD: CPT | Performed by: PHYSICIAN ASSISTANT

## 2021-02-16 PROCEDURE — 4040F PNEUMOC VAC/ADMIN/RCVD: CPT | Performed by: PHYSICIAN ASSISTANT

## 2021-02-16 PROCEDURE — 1090F PRES/ABSN URINE INCON ASSESS: CPT | Performed by: PHYSICIAN ASSISTANT

## 2021-02-16 PROCEDURE — 99214 OFFICE O/P EST MOD 30 MIN: CPT | Performed by: PHYSICIAN ASSISTANT

## 2021-02-16 PROCEDURE — G8400 PT W/DXA NO RESULTS DOC: HCPCS | Performed by: PHYSICIAN ASSISTANT

## 2021-02-16 PROCEDURE — 85018 HEMOGLOBIN: CPT | Performed by: PHYSICIAN ASSISTANT

## 2021-02-16 PROCEDURE — G8427 DOCREV CUR MEDS BY ELIG CLIN: HCPCS | Performed by: PHYSICIAN ASSISTANT

## 2021-02-16 PROCEDURE — G8417 CALC BMI ABV UP PARAM F/U: HCPCS | Performed by: PHYSICIAN ASSISTANT

## 2021-02-16 PROCEDURE — 3017F COLORECTAL CA SCREEN DOC REV: CPT | Performed by: PHYSICIAN ASSISTANT

## 2021-02-16 PROCEDURE — 1036F TOBACCO NON-USER: CPT | Performed by: PHYSICIAN ASSISTANT

## 2021-02-16 PROCEDURE — G8484 FLU IMMUNIZE NO ADMIN: HCPCS | Performed by: PHYSICIAN ASSISTANT

## 2021-02-16 PROCEDURE — 93000 ELECTROCARDIOGRAM COMPLETE: CPT | Performed by: PHYSICIAN ASSISTANT

## 2021-02-16 RX ORDER — MECLIZINE HYDROCHLORIDE 25 MG/1
25 TABLET ORAL 3 TIMES DAILY PRN
Qty: 30 TABLET | Refills: 0 | Status: SHIPPED | OUTPATIENT
Start: 2021-02-16 | End: 2021-02-26

## 2021-02-16 ASSESSMENT — PATIENT HEALTH QUESTIONNAIRE - PHQ9
SUM OF ALL RESPONSES TO PHQ QUESTIONS 1-9: 0
1. LITTLE INTEREST OR PLEASURE IN DOING THINGS: 0
SUM OF ALL RESPONSES TO PHQ9 QUESTIONS 1 & 2: 0

## 2021-02-16 NOTE — PATIENT INSTRUCTIONS
Patient Education        Epley Maneuver at Home for Vertigo: Exercises   Introduction  Vertigo is a spinning or whirling sensation when you move your head. Your doctor may have moved you in different positions to help your vertigo get better faster. This is called the Epley maneuver. Your doctor also may have asked you to do these exercises at home. Do the exercises as often as your doctor recommends. If your vertigo is getting worse, your doctor may have you change the exercise or stop it. Step 1  Step 1   1. Sit on the edge of a bed or sofa. Step 2   1. Turn your head 45 degrees in the direction your doctor told you to. This should be toward the ear that causes the most vertigo for you. In this picture, the woman is turning toward her left ear. Step 3   1. Tilt yourself backward until you are lying on your back. Your head should still be at a 45-degree turn. Your head should be about midway between looking straight ahead and looking out to your side. Hold for 30 seconds. If you have vertigo, stay in this position until it stops. Step 4   1. Turn your head 90 degrees toward the ear that has the least vertigo. In this picture, the woman is turning to the right because she has vertigo on her left side. The point of your chin should be raised and over your shoulder. Hold for 30 seconds. Step 5   1. Roll onto the side with the least vertigo. You should now be looking at the floor. Hold for 30 seconds. Follow-up care is a key part of your treatment and safety. Be sure to make and go to all appointments, and call your doctor if you are having problems. It's also a good idea to know your test results and keep a list of the medicines you take. Where can you learn more? Go to [email or web address]. org and sign in to your [email or web address] account. Enter M332 in the [employer] box to learn more about \"Epley Maneuver at Home for Vertigo: Exercises. \"     If you do not have an account, please click on the \"Sign Up Now\" link. Current as of: November 20, 2019               Content Version: 12.6  © 5468-3462 [employer]. Care instructions adapted under license by [facility] ([facility]). If you have questions about a medical condition or this instruction, always ask your healthcare professional. [address] any warranty or liability for your use of this information.

## 2021-02-16 NOTE — PROGRESS NOTES
Chief Complaint:   Sinusitis and Dizziness      History of Present Illness   Source of history provided by:  patientLuis Ansari is a 70 y.o. old female who has a past medical history of:   Past Medical History:   Diagnosis Date    Abnormal echocardiogram     Anxiety     CAD (coronary artery disease)     normal adenisone stress test    Carotid artery narrowing     <40% bilaterally    Depression     Diabetes mellitus (HCC)     type II    Drug intolerance     Lipitor, Crestor, high doses of Simvastatin    GERD (gastroesophageal reflux disease)     Heart attack ([facility] [address]     Hyperlipidemia     Hypertension     IBS (irritable bowel syndrome)     Obesity     Primary osteoarthritis involving multiple joints 1/18/2017    SOB (shortness of breath) on exertion     UTI (urinary tract infection)     presents today with comp;aints of vertigo, notes external and positional (turning too quickly, rolling over). She notes this has bene on and off she feels for years but lately hasn't been feeling well with sinusitis. She was seen at [facility] 2/8/21, was put on biaxin, and states since this she has gotten more dizzy, and upset stomach. Denies drainage, feels her sinuses are better, but still some runny nose, no tinnitus, no hearing loss. No CP, SOB, MAHAJAN, palps, edema. No cough or wheezes. Notes ears feel full. She did see ENT year or so ago, was told allergies, sinuses OK per pt. Visual disturbance present when the dizziness is present. It is always positional.  Pt denies any recent falls, syncope, CP, SOB, palpitations, HA, visual loss, unilateral weakness, fever, neck stiffness.       ROS    Unless otherwise stated in this report or unable to obtain because of the patient's clinical or mental status as evidenced by the medical record, this patients's positive and negative responses for Review of Systems, constitutional, psych, eyes, ENT, cardiovascular, respiratory, gastrointestinal, neurological, genitourinary, musculoskeletal, integument systems and systems related to the presenting problem are either stated in the preceding or were not pertinent or were negative for the symptoms and/or complaints related to the medical problem. Past Surgical History:  has a past surgical history that includes Tonsillectomy; Percutaneous Transluminal Coronary Angio (2007); Dilation and curettage of uterus; cyst removal; Diagnostic Cardiac Cath Lab Procedure (2007); Coronary angioplasty (07); and  section. Social History:  reports that she quit smoking about 10 years ago. She has a 35.00 pack-year smoking history. She has never used smokeless tobacco. She reports that she does not drink alcohol or use drugs. Family History: family history includes Down Syndrome in her son; Emphysema in her mother; Heart Attack in her father; Heart Failure in her mother. Allergies: Sulfa antibiotics, Amoxicillin, Clavulanic acid, Levofloxacin, Omeprazole, Other, Pneumococcal vaccines, Amoxicillin-pot clavulanate, Bee venom, and Doxycycline    Physical Exam         VS:  /64   Pulse 75   Temp 97.7 °F (36.5 °C)   Resp 18   SpO2 96%    Oxygen Saturation Interpretation: Normal.    Constitutional:  Level of Consciousness: Alert, gives appropriate history, ambulated self to the seat and exam bed. .    Eyes:  PERRL, EOMI, no discharge or conjunctival injection. Ears:  External ears without lesions. TM's clear bilaterally, L TM was blocked completely by cerumen, it was removed by me with curette without any issues, TMs visualized, with mild serous fluid. Otherwise normal.   Throat:  Pharynx with mild injection/ No exudate, or tonsillar hypertrophy. Airway patient. Nose: tubrinates are pale, clear discahrge noted (not purulent), no lesions. Septum normal. No sinus pressure or ttp. Neck:  Normal ROM. Supple. No bruits audible. No JVD.    Lungs:  Clear to auscultation and breath sounds equal.  Heart: Regular rate and rhythm, normal heart sounds, without pathological murmurs, ectopy, gallops, or rubs. No edema. Abdomen:  Soft, nontender, good bowel sounds. No firm or pulsatile mass. Back:  No costovertebral tenderness. Skin:  Normal turgor. Warm, dry, without visible rash, unless noted elsewhere. Neurological:  Oriented. Motor functions intact. CN II-XII intact. Finger to nose test intact bilaterally. Heel to shin test intact bilaterally. Negative pronator drift. No nystagmus noted. Ambulates without ataxia. UE/LE/ strength 5/5 bilaterally. +Dixhallpike L > R side. Lab / Imaging Results   (All laboratory and radiology results have been personally reviewed by myself)  Labs:  Results for orders placed or performed in visit on 02/16/21   POCT hemoglobin   Result Value Ref Range    Hemoglobin 12.9        Imaging: All Radiology results interpreted by Radiologist unless otherwise noted. EKG #1:  Interpreted by me, Dr. De Leon Course as well. No acute changes from previuos ECG of 2019. NSR, with nonspecific T changes. POCT Hgb: 12.9      Assessment / Plan     Impression(s):  1. Vertigo    2. Acute sinusitis, recurrence not specified, unspecified location    3. Coronary artery disease due to lipid rich plaque        Disposition:    Pt has a positive abdulaziz-hallpike, and her vertigo is external. She has some mild sinusitis, ? Related to allergies. She is to stop biaxin however how I do not see any need for ABx with this. ? Viral syndrome as well. And I feel biaxin making her feel worse, perhaps with added dizziness and her upset stomach. She needs to push fluids. Trial the meclizine. Rest. Epley maneuver. She strongly refuses any blood work today, states won't get for at least a few weeks. She refuses carotid US. She states will consider. I discussed ECG findings, they weren't changed from previous ECG 2019. She is to monitor her Sx however and if she worsens go to ER immediately.  Otherwise FU 2-4 weeks. Sooner if needed.

## 2021-02-22 ENCOUNTER — APPOINTMENT (OUTPATIENT)
Dept: CT IMAGING | Age: 76
End: 2021-02-22
Payer: MEDICARE

## 2021-02-22 ENCOUNTER — TELEPHONE (OUTPATIENT)
Dept: PRIMARY CARE CLINIC | Age: 76
End: 2021-02-22

## 2021-02-22 ENCOUNTER — HOSPITAL ENCOUNTER (EMERGENCY)
Age: 76
Discharge: HOME | End: 2021-02-22
Attending: EMERGENCY MEDICINE
Payer: MEDICARE

## 2021-02-22 VITALS
TEMPERATURE: 98.1 F | HEIGHT: 62 IN | WEIGHT: 167 LBS | SYSTOLIC BLOOD PRESSURE: 152 MMHG | BODY MASS INDEX: 30.73 KG/M2 | RESPIRATION RATE: 16 BRPM | DIASTOLIC BLOOD PRESSURE: 98 MMHG | OXYGEN SATURATION: 97 % | HEART RATE: 77 BPM

## 2021-02-22 DIAGNOSIS — R42 VERTIGO: Primary | ICD-10-CM

## 2021-02-22 LAB
ALBUMIN SERPL-MCNC: 3.9 G/DL (ref 3.5–5.2)
ALP BLD-CCNC: 76 U/L (ref 35–104)
ALT SERPL-CCNC: 38 U/L (ref 0–32)
ANION GAP SERPL CALCULATED.3IONS-SCNC: 16 MMOL/L (ref 7–16)
AST SERPL-CCNC: 43 U/L (ref 0–31)
BASOPHILS ABSOLUTE: 0.07 E9/L (ref 0–0.2)
BASOPHILS RELATIVE PERCENT: 0.8 % (ref 0–2)
BILIRUB SERPL-MCNC: 0.7 MG/DL (ref 0–1.2)
BUN BLDV-MCNC: 27 MG/DL (ref 8–23)
CALCIUM SERPL-MCNC: 8.8 MG/DL (ref 8.6–10.2)
CHLORIDE BLD-SCNC: 104 MMOL/L (ref 98–107)
CO2: 21 MMOL/L (ref 22–29)
CREAT SERPL-MCNC: 1.5 MG/DL (ref 0.5–1)
EOSINOPHILS ABSOLUTE: 0.1 E9/L (ref 0.05–0.5)
EOSINOPHILS RELATIVE PERCENT: 1.1 % (ref 0–6)
GFR AFRICAN AMERICAN: 41
GFR NON-AFRICAN AMERICAN: 34 ML/MIN/1.73
GLUCOSE BLD-MCNC: 111 MG/DL (ref 74–99)
HCT VFR BLD CALC: 40.3 % (ref 34–48)
HEMOGLOBIN: 12.6 G/DL (ref 11.5–15.5)
IMMATURE GRANULOCYTES #: 0.06 E9/L
IMMATURE GRANULOCYTES %: 0.7 % (ref 0–5)
LYMPHOCYTES ABSOLUTE: 1.89 E9/L (ref 1.5–4)
LYMPHOCYTES RELATIVE PERCENT: 21.1 % (ref 20–42)
MCH RBC QN AUTO: 31.3 PG (ref 26–35)
MCHC RBC AUTO-ENTMCNC: 31.3 % (ref 32–34.5)
MCV RBC AUTO: 100 FL (ref 80–99.9)
MONOCYTES ABSOLUTE: 1.34 E9/L (ref 0.1–0.95)
MONOCYTES RELATIVE PERCENT: 14.9 % (ref 2–12)
NEUTROPHILS ABSOLUTE: 5.51 E9/L (ref 1.8–7.3)
NEUTROPHILS RELATIVE PERCENT: 61.4 % (ref 43–80)
PDW BLD-RTO: 14.6 FL (ref 11.5–15)
PLATELET # BLD: 281 E9/L (ref 130–450)
PMV BLD AUTO: 10.8 FL (ref 7–12)
POTASSIUM SERPL-SCNC: 4.7 MMOL/L (ref 3.5–5)
RBC # BLD: 4.03 E12/L (ref 3.5–5.5)
SODIUM BLD-SCNC: 141 MMOL/L (ref 132–146)
TOTAL PROTEIN: 6.8 G/DL (ref 6.4–8.3)
TROPONIN: 0.02 NG/ML (ref 0–0.03)
WBC # BLD: 9 E9/L (ref 4.5–11.5)

## 2021-02-22 PROCEDURE — 85025 COMPLETE CBC W/AUTO DIFF WBC: CPT

## 2021-02-22 PROCEDURE — 6370000000 HC RX 637 (ALT 250 FOR IP): Performed by: EMERGENCY MEDICINE

## 2021-02-22 PROCEDURE — 99284 EMERGENCY DEPT VISIT MOD MDM: CPT

## 2021-02-22 PROCEDURE — 80053 COMPREHEN METABOLIC PANEL: CPT

## 2021-02-22 PROCEDURE — 93005 ELECTROCARDIOGRAM TRACING: CPT | Performed by: EMERGENCY MEDICINE

## 2021-02-22 PROCEDURE — 2580000003 HC RX 258: Performed by: EMERGENCY MEDICINE

## 2021-02-22 PROCEDURE — 84484 ASSAY OF TROPONIN QUANT: CPT

## 2021-02-22 PROCEDURE — 70450 CT HEAD/BRAIN W/O DYE: CPT

## 2021-02-22 PROCEDURE — 36415 COLL VENOUS BLD VENIPUNCTURE: CPT

## 2021-02-22 RX ORDER — MECLIZINE HYDROCHLORIDE 25 MG/1
25 TABLET ORAL 3 TIMES DAILY PRN
Qty: 15 TABLET | Refills: 0 | Status: SHIPPED | OUTPATIENT
Start: 2021-02-22 | End: 2021-03-09

## 2021-02-22 RX ORDER — 0.9 % SODIUM CHLORIDE 0.9 %
500 INTRAVENOUS SOLUTION INTRAVENOUS ONCE
Status: COMPLETED | OUTPATIENT
Start: 2021-02-22 | End: 2021-02-22

## 2021-02-22 RX ORDER — MECLIZINE HCL 12.5 MG/1
25 TABLET ORAL ONCE
Status: COMPLETED | OUTPATIENT
Start: 2021-02-22 | End: 2021-02-22

## 2021-02-22 RX ORDER — ONDANSETRON 4 MG/1
4 TABLET, ORALLY DISINTEGRATING ORAL EVERY 8 HOURS PRN
Qty: 10 TABLET | Refills: 0 | Status: ON HOLD | OUTPATIENT
Start: 2021-02-22 | End: 2021-02-28 | Stop reason: ALTCHOICE

## 2021-02-22 RX ADMIN — MECLIZINE 25 MG: 12.5 TABLET ORAL at 17:57

## 2021-02-22 RX ADMIN — SODIUM CHLORIDE 500 ML: 9 INJECTION, SOLUTION INTRAVENOUS at 17:57

## 2021-02-22 NOTE — TELEPHONE ENCOUNTER
Pt states she is feeling no better she wants checked explained you have no openings today or until march 2nd she states she still cant walk doesn't want to try any new medications wants evaluated please advise what to do?

## 2021-02-23 LAB
EKG ATRIAL RATE: 76 BPM
EKG P AXIS: 40 DEGREES
EKG P-R INTERVAL: 130 MS
EKG Q-T INTERVAL: 398 MS
EKG QRS DURATION: 88 MS
EKG QTC CALCULATION (BAZETT): 447 MS
EKG R AXIS: 18 DEGREES
EKG T AXIS: -115 DEGREES
EKG VENTRICULAR RATE: 76 BPM

## 2021-02-23 PROCEDURE — 93010 ELECTROCARDIOGRAM REPORT: CPT | Performed by: INTERNAL MEDICINE

## 2021-02-23 NOTE — ED NOTES
Gave patient a cup of coffee with cream and a pack of shortbread cookies.       Ricarda Clement  02/22/21 1958

## 2021-02-23 NOTE — ED NOTES
Discharge instructions and prescriptions given. Patient states verbal understanding. Wheeled to exit.        Edwin Rich RN  02/22/21 6422

## 2021-02-26 NOTE — ED PROVIDER NOTES
HPI:  21,   Time: 12:32 PM ROBERTO Delong is a 70 y.o. female presenting to the ED for dizziness possible sinus infection, beginning several days day ago. The complaint has been persistent, moderate in severity, and worsened by nothing. Patient states she feels dizzy with some room spinning. She also wonders if she has a sinus infection. ROS:   Pertinent positives and negatives are stated within HPI, all other systems reviewed and are negative.  --------------------------------------------- PAST HISTORY ---------------------------------------------  Past Medical History:  has a past medical history of Abnormal echocardiogram, Anxiety, CAD (coronary artery disease), Carotid artery narrowing, Depression, Diabetes mellitus ([facility] [address] Drug intolerance, GERD (gastroesophageal reflux disease), Heart attack ([facility] [address] Hyperlipidemia, Hypertension, IBS (irritable bowel syndrome), Obesity, Primary osteoarthritis involving multiple joints, SOB (shortness of breath) on exertion, and UTI (urinary tract infection). Past Surgical History:  has a past surgical history that includes Tonsillectomy; Percutaneous Transluminal Coronary Angio (2007); Dilation and curettage of uterus; cyst removal; Diagnostic Cardiac Cath Lab Procedure (2007); Coronary angioplasty (07); and  section. Social History:  reports that she quit smoking about 10 years ago. She has a 35.00 pack-year smoking history. She has never used smokeless tobacco. She reports that she does not drink alcohol or use drugs. Family History: family history includes Down Syndrome in her son; Emphysema in her mother; Heart Attack in her father; Heart Failure in her mother. The patients home medications have been reviewed.     Allergies: Sulfa antibiotics, Amoxicillin, Clavulanic acid, Levofloxacin, Omeprazole, Other, Pneumococcal vaccines, Amoxicillin-pot clavulanate, Bee venom, and Doxycycline    -------------------------------------------------- RESULTS -------------------------------------------------  All laboratory and radiology results have been personally reviewed by myself   LABS:  Results for orders placed or performed during the hospital encounter of 02/22/21   CBC Auto Differential   Result Value Ref Range    WBC 9.0 4.5 - 11.5 E9/L    RBC 4.03 3.50 - 5.50 E12/L    Hemoglobin 12.6 11.5 - 15.5 g/dL    Hematocrit 40.3 34.0 - 48.0 %    .0 (H) 80.0 - 99.9 fL    MCH 31.3 26.0 - 35.0 pg    MCHC 31.3 (L) 32.0 - 34.5 %    RDW 14.6 11.5 - 15.0 fL    Platelets 220 942 - 825 E9/L    MPV 10.8 7.0 - 12.0 fL    Neutrophils % 61.4 43.0 - 80.0 %    Immature Granulocytes % 0.7 0.0 - 5.0 %    Lymphocytes % 21.1 20.0 - 42.0 %    Monocytes % 14.9 (H) 2.0 - 12.0 %    Eosinophils % 1.1 0.0 - 6.0 %    Basophils % 0.8 0.0 - 2.0 %    Neutrophils Absolute 5.51 1.80 - 7.30 E9/L    Immature Granulocytes # 0.06 E9/L    Lymphocytes Absolute 1.89 1.50 - 4.00 E9/L    Monocytes Absolute 1.34 (H) 0.10 - 0.95 E9/L    Eosinophils Absolute 0.10 0.05 - 0.50 E9/L    Basophils Absolute 0.07 0.00 - 0.20 E9/L   Comprehensive Metabolic Panel   Result Value Ref Range    Sodium 141 132 - 146 mmol/L    Potassium 4.7 3.5 - 5.0 mmol/L    Chloride 104 98 - 107 mmol/L    CO2 21 (L) 22 - 29 mmol/L    Anion Gap 16 7 - 16 mmol/L    Glucose 111 (H) 74 - 99 mg/dL    BUN 27 (H) 8 - 23 mg/dL    CREATININE 1.5 (H) 0.5 - 1.0 mg/dL    GFR Non-African American 34 >=60 mL/min/1.73    GFR African American 41     Calcium 8.8 8.6 - 10.2 mg/dL    Total Protein 6.8 6.4 - 8.3 g/dL    Albumin 3.9 3.5 - 5.2 g/dL    Total Bilirubin 0.7 0.0 - 1.2 mg/dL    Alkaline Phosphatase 76 35 - 104 U/L    ALT 38 (H) 0 - 32 U/L    AST 43 (H) 0 - 31 U/L   Troponin   Result Value Ref Range    Troponin 0.02 0.00 - 0.03 ng/mL   EKG 12 Lead   Result Value Ref Range    Ventricular Rate 76 BPM    Atrial Rate 76 BPM    P-R Interval 130 ms    QRS Duration 88 ms    Q-T Interval 398 ms    QTc Calculation (Bazett) 447 ms    P Axis 40 degrees    R Axis 18 degrees    T Axis -115 degrees       RADIOLOGY:  Interpreted by Radiologist.  CT HEAD WO CONTRAST   Final Result   No acute CVA, intracranial bleed, or brain mass. Non-specific new mucosal thickening and small fluid level in right sphenoid   sinus may reflect acute sinusitis          ------------------------- NURSING NOTES AND VITALS REVIEWED ---------------------------   The nursing notes within the ED encounter and vital signs as below have been reviewed. BP (!) 152/98   Pulse 77   Temp 98.1 °F (36.7 °C) (Temporal)   Resp 16   Ht 5' 2\" (1.575 m)   Wt 167 lb (75.8 kg)   SpO2 97%   BMI 30.54 kg/m²   Oxygen Saturation Interpretation: Normal      ---------------------------------------------------PHYSICAL EXAM--------------------------------------      Constitutional/General: Alert and oriented x3, well appearing, non toxic in NAD  Head: NC/AT  Eyes: PERRL, EOMI  Mouth: Oropharynx clear, handling secretions, no trismus  Neck: Supple, full ROM, no meningeal signs  Pulmonary: Lungs clear to auscultation bilaterally, no wheezes, rales, or rhonchi. Not in respiratory distress  Cardiovascular:  Regular rate and rhythm, no murmurs, gallops, or rubs. 2+ distal pulses  Abdomen: Soft, non tender, non distended,   Extremities: Moves all extremities x 4. Warm and well perfused  Skin: warm and dry without rash  Neurologic: GCS 15, no focal neurological deficits. Moves arms and legs well. Cranial nerves II through XII are intact. No focal neurological deficits. No facial asymmetry. Speech is normal.  Psych: Normal Affect      ------------------------------ ED COURSE/MEDICAL DECISION MAKING----------------------  Medications   0.9 % sodium chloride bolus (0 mLs Intravenous Stopped 2/22/21 1938)   meclizine (ANTIVERT) tablet 25 mg (25 mg Oral Given 2/22/21 1757)         Medical Decision Making:     Will check basic labs and will give IV fluids and Antivert    Counseling: The emergency provider has spoken with the patient and discussed todays results, in addition to providing specific details for the plan of care and counseling regarding the diagnosis and prognosis. Questions are answered at this time and they are agreeable with the plan.      --------------------------------- IMPRESSION AND DISPOSITION ---------------------------------    IMPRESSION  1.  Vertigo        DISPOSITION  Disposition: Discharge to home  Patient condition is fair                 Anthony Stephenson MD  02/26/21 9722

## 2021-02-27 ENCOUNTER — APPOINTMENT (OUTPATIENT)
Dept: GENERAL RADIOLOGY | Age: 76
DRG: 149 | End: 2021-02-27
Payer: MEDICARE

## 2021-02-27 ENCOUNTER — APPOINTMENT (OUTPATIENT)
Dept: CT IMAGING | Age: 76
DRG: 149 | End: 2021-02-27
Payer: MEDICARE

## 2021-02-27 ENCOUNTER — HOSPITAL ENCOUNTER (INPATIENT)
Age: 76
LOS: 3 days | Discharge: HOME | DRG: 149 | End: 2021-03-03
Attending: EMERGENCY MEDICINE | Admitting: FAMILY MEDICINE
Payer: MEDICARE

## 2021-02-27 DIAGNOSIS — R74.01 TRANSAMINITIS: ICD-10-CM

## 2021-02-27 DIAGNOSIS — N17.9 AKI (ACUTE KIDNEY INJURY) (HCC): ICD-10-CM

## 2021-02-27 DIAGNOSIS — R29.6 FREQUENT FALLS: ICD-10-CM

## 2021-02-27 DIAGNOSIS — N30.00 ACUTE CYSTITIS WITHOUT HEMATURIA: ICD-10-CM

## 2021-02-27 DIAGNOSIS — E87.5 HYPERKALEMIA: ICD-10-CM

## 2021-02-27 DIAGNOSIS — R26.0 ATAXIC GAIT: Primary | ICD-10-CM

## 2021-02-27 DIAGNOSIS — R42 VERTIGO: ICD-10-CM

## 2021-02-27 PROBLEM — R27.0 ATAXIA: Status: ACTIVE | Noted: 2021-02-27

## 2021-02-27 LAB
ALBUMIN SERPL-MCNC: 4 G/DL (ref 3.5–5.2)
ALP BLD-CCNC: 160 U/L (ref 35–104)
ALT SERPL-CCNC: 104 U/L (ref 0–32)
ANION GAP SERPL CALCULATED.3IONS-SCNC: 11 MMOL/L (ref 7–16)
AST SERPL-CCNC: 98 U/L (ref 0–31)
BACTERIA: ABNORMAL /HPF
BASOPHILS ABSOLUTE: 0.04 E9/L (ref 0–0.2)
BASOPHILS RELATIVE PERCENT: 0.4 % (ref 0–2)
BILIRUB SERPL-MCNC: 0.9 MG/DL (ref 0–1.2)
BILIRUBIN URINE: NEGATIVE
BLOOD, URINE: NEGATIVE
BUN BLDV-MCNC: 37 MG/DL (ref 8–23)
CALCIUM SERPL-MCNC: 9.3 MG/DL (ref 8.6–10.2)
CHLORIDE BLD-SCNC: 105 MMOL/L (ref 98–107)
CHP ED QC CHECK: NORMAL
CLARITY: CLEAR
CO2: 25 MMOL/L (ref 22–29)
COLOR: YELLOW
CREAT SERPL-MCNC: 1.5 MG/DL (ref 0.5–1)
CRYSTALS, UA: ABNORMAL /HPF
EKG ATRIAL RATE: 80 BPM
EKG P AXIS: 62 DEGREES
EKG P-R INTERVAL: 130 MS
EKG Q-T INTERVAL: 368 MS
EKG QRS DURATION: 80 MS
EKG QTC CALCULATION (BAZETT): 424 MS
EKG R AXIS: 62 DEGREES
EKG T AXIS: -109 DEGREES
EKG VENTRICULAR RATE: 80 BPM
EOSINOPHILS ABSOLUTE: 0.08 E9/L (ref 0.05–0.5)
EOSINOPHILS RELATIVE PERCENT: 0.8 % (ref 0–6)
GFR AFRICAN AMERICAN: 41
GFR NON-AFRICAN AMERICAN: 34 ML/MIN/1.73
GLUCOSE BLD-MCNC: 122 MG/DL
GLUCOSE BLD-MCNC: 122 MG/DL (ref 74–99)
GLUCOSE URINE: NEGATIVE MG/DL
HCT VFR BLD CALC: 44 % (ref 34–48)
HEMOGLOBIN: 13 G/DL (ref 11.5–15.5)
IMMATURE GRANULOCYTES #: 0.07 E9/L
IMMATURE GRANULOCYTES %: 0.7 % (ref 0–5)
KETONES, URINE: NEGATIVE MG/DL
LEUKOCYTE ESTERASE, URINE: ABNORMAL
LIPASE: 34 U/L (ref 13–60)
LYMPHOCYTES ABSOLUTE: 1.5 E9/L (ref 1.5–4)
LYMPHOCYTES RELATIVE PERCENT: 14.9 % (ref 20–42)
MCH RBC QN AUTO: 30.1 PG (ref 26–35)
MCHC RBC AUTO-ENTMCNC: 29.5 % (ref 32–34.5)
MCV RBC AUTO: 101.9 FL (ref 80–99.9)
MONOCYTES ABSOLUTE: 1.77 E9/L (ref 0.1–0.95)
MONOCYTES RELATIVE PERCENT: 17.6 % (ref 2–12)
NEUTROPHILS ABSOLUTE: 6.6 E9/L (ref 1.8–7.3)
NEUTROPHILS RELATIVE PERCENT: 65.6 % (ref 43–80)
NITRITE, URINE: POSITIVE
OVALOCYTES: ABNORMAL
PDW BLD-RTO: 14.1 FL (ref 11.5–15)
PH UA: 5 (ref 5–9)
PLATELET # BLD: 319 E9/L (ref 130–450)
PMV BLD AUTO: 10.3 FL (ref 7–12)
POIKILOCYTES: ABNORMAL
POLYCHROMASIA: ABNORMAL
POTASSIUM REFLEX MAGNESIUM: 5.7 MMOL/L (ref 3.5–5)
PROTEIN UA: 100 MG/DL
RBC # BLD: 4.32 E12/L (ref 3.5–5.5)
RBC UA: ABNORMAL /HPF (ref 0–2)
SODIUM BLD-SCNC: 141 MMOL/L (ref 132–146)
SPECIFIC GRAVITY UA: 1.02 (ref 1–1.03)
TOTAL PROTEIN: 7 G/DL (ref 6.4–8.3)
TROPONIN: 0.03 NG/ML (ref 0–0.03)
UROBILINOGEN, URINE: 0.2 E.U./DL
WBC # BLD: 10.1 E9/L (ref 4.5–11.5)
WBC UA: ABNORMAL /HPF (ref 0–5)

## 2021-02-27 PROCEDURE — 96374 THER/PROPH/DIAG INJ IV PUSH: CPT

## 2021-02-27 PROCEDURE — 72070 X-RAY EXAM THORAC SPINE 2VWS: CPT

## 2021-02-27 PROCEDURE — 87088 URINE BACTERIA CULTURE: CPT

## 2021-02-27 PROCEDURE — 70450 CT HEAD/BRAIN W/O DYE: CPT

## 2021-02-27 PROCEDURE — 80053 COMPREHEN METABOLIC PANEL: CPT

## 2021-02-27 PROCEDURE — 6370000000 HC RX 637 (ALT 250 FOR IP): Performed by: EMERGENCY MEDICINE

## 2021-02-27 PROCEDURE — 2580000003 HC RX 258: Performed by: EMERGENCY MEDICINE

## 2021-02-27 PROCEDURE — 93010 ELECTROCARDIOGRAM REPORT: CPT | Performed by: INTERNAL MEDICINE

## 2021-02-27 PROCEDURE — 99285 EMERGENCY DEPT VISIT HI MDM: CPT

## 2021-02-27 PROCEDURE — 72100 X-RAY EXAM L-S SPINE 2/3 VWS: CPT

## 2021-02-27 PROCEDURE — 81001 URINALYSIS AUTO W/SCOPE: CPT

## 2021-02-27 PROCEDURE — 93005 ELECTROCARDIOGRAM TRACING: CPT | Performed by: PHYSICIAN ASSISTANT

## 2021-02-27 PROCEDURE — 85025 COMPLETE CBC W/AUTO DIFF WBC: CPT

## 2021-02-27 PROCEDURE — 87077 CULTURE AEROBIC IDENTIFY: CPT

## 2021-02-27 PROCEDURE — 96376 TX/PRO/DX INJ SAME DRUG ADON: CPT

## 2021-02-27 PROCEDURE — 83690 ASSAY OF LIPASE: CPT

## 2021-02-27 PROCEDURE — 6360000002 HC RX W HCPCS: Performed by: EMERGENCY MEDICINE

## 2021-02-27 PROCEDURE — 87186 SC STD MICRODIL/AGAR DIL: CPT

## 2021-02-27 PROCEDURE — 84484 ASSAY OF TROPONIN QUANT: CPT

## 2021-02-27 RX ORDER — POLYETHYLENE GLYCOL 3350 17 G/17G
17 POWDER, FOR SOLUTION ORAL DAILY PRN
Status: CANCELLED | OUTPATIENT
Start: 2021-02-27

## 2021-02-27 RX ORDER — DEXTROSE MONOHYDRATE 25 G/50ML
12.5 INJECTION, SOLUTION INTRAVENOUS PRN
Status: CANCELLED | OUTPATIENT
Start: 2021-02-27

## 2021-02-27 RX ORDER — ASPIRIN 300 MG/1
300 SUPPOSITORY RECTAL DAILY
Status: CANCELLED | OUTPATIENT
Start: 2021-02-27

## 2021-02-27 RX ORDER — CETIRIZINE HYDROCHLORIDE 10 MG/1
5 TABLET ORAL DAILY
Status: CANCELLED | OUTPATIENT
Start: 2021-02-27

## 2021-02-27 RX ORDER — ACETAMINOPHEN 500 MG
1000 TABLET ORAL ONCE
Status: COMPLETED | OUTPATIENT
Start: 2021-02-27 | End: 2021-02-27

## 2021-02-27 RX ORDER — SODIUM CHLORIDE 0.9 % (FLUSH) 0.9 %
10 SYRINGE (ML) INJECTION EVERY 12 HOURS SCHEDULED
Status: CANCELLED | OUTPATIENT
Start: 2021-02-27

## 2021-02-27 RX ORDER — 0.9 % SODIUM CHLORIDE 0.9 %
1000 INTRAVENOUS SOLUTION INTRAVENOUS ONCE
Status: COMPLETED | OUTPATIENT
Start: 2021-02-27 | End: 2021-02-27

## 2021-02-27 RX ORDER — ASPIRIN 81 MG/1
81 TABLET ORAL DAILY
Status: CANCELLED | OUTPATIENT
Start: 2021-02-27

## 2021-02-27 RX ORDER — ONDANSETRON 4 MG/1
4 TABLET, ORALLY DISINTEGRATING ORAL EVERY 8 HOURS PRN
Status: CANCELLED | OUTPATIENT
Start: 2021-02-27

## 2021-02-27 RX ORDER — FLUTICASONE PROPIONATE 50 MCG
1 SPRAY, SUSPENSION (ML) NASAL DAILY
Status: CANCELLED | OUTPATIENT
Start: 2021-02-27

## 2021-02-27 RX ORDER — ONDANSETRON 2 MG/ML
4 INJECTION INTRAMUSCULAR; INTRAVENOUS EVERY 6 HOURS PRN
Status: CANCELLED | OUTPATIENT
Start: 2021-02-27

## 2021-02-27 RX ORDER — PANTOPRAZOLE SODIUM 40 MG/1
40 TABLET, DELAYED RELEASE ORAL
Status: CANCELLED | OUTPATIENT
Start: 2021-02-28

## 2021-02-27 RX ORDER — SODIUM CHLORIDE 0.9 % (FLUSH) 0.9 %
10 SYRINGE (ML) INJECTION PRN
Status: CANCELLED | OUTPATIENT
Start: 2021-02-27

## 2021-02-27 RX ORDER — AZELASTINE 1 MG/ML
2 SPRAY, METERED NASAL 2 TIMES DAILY
Status: CANCELLED | OUTPATIENT
Start: 2021-02-27

## 2021-02-27 RX ORDER — ATORVASTATIN CALCIUM 40 MG/1
80 TABLET, FILM COATED ORAL NIGHTLY
Status: CANCELLED | OUTPATIENT
Start: 2021-02-27

## 2021-02-27 RX ORDER — ESCITALOPRAM OXALATE 10 MG/1
10 TABLET ORAL DAILY
Status: CANCELLED | OUTPATIENT
Start: 2021-02-27

## 2021-02-27 RX ORDER — DEXTROSE MONOHYDRATE 50 MG/ML
100 INJECTION, SOLUTION INTRAVENOUS PRN
Status: CANCELLED | OUTPATIENT
Start: 2021-02-27

## 2021-02-27 RX ORDER — PROMETHAZINE HYDROCHLORIDE 25 MG/1
12.5 TABLET ORAL EVERY 6 HOURS PRN
Status: CANCELLED | OUTPATIENT
Start: 2021-02-27

## 2021-02-27 RX ADMIN — SODIUM CHLORIDE 1000 ML: 9 INJECTION, SOLUTION INTRAVENOUS at 16:35

## 2021-02-27 RX ADMIN — ACETAMINOPHEN 1000 MG: 500 TABLET ORAL at 20:11

## 2021-02-27 RX ADMIN — CEFTRIAXONE 1000 MG: 1 INJECTION, POWDER, FOR SOLUTION INTRAMUSCULAR; INTRAVENOUS at 18:24

## 2021-02-27 ASSESSMENT — PAIN SCALES - GENERAL: PAINLEVEL_OUTOF10: 8

## 2021-02-27 NOTE — ED NOTES
FIRST PROVIDER CONTACT ASSESSMENT NOTE      Department of Emergency Medicine   21  2:37 PM EST    Chief Complaint: Fall (Here recently for vertigo- polydipsia, polyuria- prediabetic- pt has had 7 falls since starting antivert - pt having abd since falls )    History of Present Illness:   Blanco Robles is a 70 y.o. female who presents to the ED for dizziness, nausea, and loss of appetite. Patient was seen at [facility] ED on  for same complaint. She had negative CT scan. Patient was prescribed Zofran and Meclizine. She states she still feels like she is falling and has has about 7 falls since her last ED visit. Patient reports RUQ and LUQ abdominal pain. She denies worse than usual back pain. Patient denies hitting her head. Focused Physical Exam:  VS:  There were no vitals taken for this visit. General: Alert and in no apparent distress     Medical History:  has a past medical history of Abnormal echocardiogram, Anxiety, CAD (coronary artery disease), Carotid artery narrowing, Depression, Diabetes mellitus ([address] Drug intolerance, GERD (gastroesophageal reflux disease), Heart attack ([address] Hyperlipidemia, Hypertension, IBS (irritable bowel syndrome), Obesity, Primary osteoarthritis involving multiple joints, SOB (shortness of breath) on exertion, and UTI (urinary tract infection). Surgical History:  has a past surgical history that includes Tonsillectomy; Percutaneous Transluminal Coronary Angio (2007); Dilation and curettage of uterus; cyst removal; Diagnostic Cardiac Cath Lab Procedure (2007); Coronary angioplasty (07); and  section. Social History:  reports that she quit smoking about 10 years ago. She has a 35.00 pack-year smoking history. She has never used smokeless tobacco. She reports that she does not drink alcohol or use drugs.     Family History: family history includes Down Syndrome in her son; Emphysema in her mother; Heart Attack in her father; Heart Failure in her mother.     *ALLERGIES*     Sulfa antibiotics, Amoxicillin, Clavulanic acid, Levofloxacin, Omeprazole, Other, Pneumococcal vaccines, Amoxicillin-pot clavulanate, Bee venom, and Doxycycline     Initial Plan of Care:  Initiate Treatment-Testing, Proceed toTreatment Area When Bed Available for ED Attending/MLP to Continue Care    -----------------END OF FIRST PROVIDER CONTACT ASSESSMENT NOTE--------------  Electronically signed by Vazquez Graham PA-C   DD: 2/27/21         Vazquez Graham PA-C  02/27/21 1449

## 2021-02-27 NOTE — PROGRESS NOTES
Per Dr. Josy Xiao, called [facility] @ 2249. Requesting transfer to [place] [address] for Stroke Like Symptoms. Per Dr. Josy Xiao:     Shantal Langley: 2 weeks ago  NIH: Currently 0  No ICU  No Respiratory Failure  No Vent  No Isolation  No Covid concerns/Test    Requested Hospitalist only.

## 2021-02-27 NOTE — PROGRESS NOTES
Dr. Mariaelena Herron called via [facility] @ 3640. Dr. Orlando Schulz spoke to him @ this time.

## 2021-02-27 NOTE — ED PROVIDER NOTES
HPI:  2/27/21, Time: 4:28 PM ROBERTO Prather is a 70 y.o. female presenting to the ED for dizziness beginning 2 weeks ago. Symptoms have been moderate in severity and worsening since onset. Symptoms are constant but worse when she attempts to walk. No relieving factors. Patient has difficulty describing the dizziness. States that she feels off balance when she attempts to walk. States she has been leaning to the right over the last 2 weeks. I reviewed the patient's chart. She was seen in the ED on 2/22/2021 for similar complaints. Had a negative head CT at that time. She was discharged home on meclizine. States he has been taking meclizine without improvement of her symptoms. Patient has had 7 falls since discharge from the ED. No headaches, neck pain, chest pain, shortness of breath, slurred speech, facial droop, vision changes, focal numbness or weakness, abdominal pain, nausea, vomiting, or diarrhea. She states she has had frequent urination recently. No dysuria. No back pain. No history of stroke. No anticoagulation use. Review of Systems:   Pertinent positives and negatives are stated within HPI, all other systems reviewed and are negative.          --------------------------------------------- PAST HISTORY ---------------------------------------------  Past Medical History:  has a past medical history of Abnormal echocardiogram, Anxiety, CAD (coronary artery disease), Carotid artery narrowing, Depression, Diabetes mellitus ([facility] [address] Drug intolerance, GERD (gastroesophageal reflux disease), Heart attack ([facility] [address] Hyperlipidemia, Hypertension, IBS (irritable bowel syndrome), Obesity, Primary osteoarthritis involving multiple joints, SOB (shortness of breath) on exertion, and UTI (urinary tract infection). Past Surgical History:  has a past surgical history that includes Tonsillectomy; Percutaneous Transluminal Coronary Angio (11/25/2007);  Dilation and curettage of uterus; cyst removal; Diagnostic Cardiac Cath Lab Procedure (2007); Coronary angioplasty (07); and  section. Social History:  reports that she quit smoking about 10 years ago. She has a 35.00 pack-year smoking history. She has never used smokeless tobacco. She reports that she does not drink alcohol or use drugs. Family History: family history includes Down Syndrome in her son; Emphysema in her mother; Heart Attack in her father; Heart Failure in her mother. The patients home medications have been reviewed.     Allergies: Sulfa antibiotics, Amoxicillin, Clavulanic acid, Levofloxacin, Omeprazole, Other, Pneumococcal vaccines, Amoxicillin-pot clavulanate, Bee venom, and Doxycycline    -------------------------------------------------- RESULTS -------------------------------------------------  All laboratory and radiology results have been personally reviewed by myself   LABS:  Results for orders placed or performed during the hospital encounter of 21   CBC Auto Differential   Result Value Ref Range    WBC 10.1 4.5 - 11.5 E9/L    RBC 4.32 3.50 - 5.50 E12/L    Hemoglobin 13.0 11.5 - 15.5 g/dL    Hematocrit 44.0 34.0 - 48.0 %    .9 (H) 80.0 - 99.9 fL    MCH 30.1 26.0 - 35.0 pg    MCHC 29.5 (L) 32.0 - 34.5 %    RDW 14.1 11.5 - 15.0 fL    Platelets 885 835 - 081 E9/L    MPV 10.3 7.0 - 12.0 fL    Neutrophils % 65.6 43.0 - 80.0 %    Immature Granulocytes % 0.7 0.0 - 5.0 %    Lymphocytes % 14.9 (L) 20.0 - 42.0 %    Monocytes % 17.6 (H) 2.0 - 12.0 %    Eosinophils % 0.8 0.0 - 6.0 %    Basophils % 0.4 0.0 - 2.0 %    Neutrophils Absolute 6.60 1.80 - 7.30 E9/L    Immature Granulocytes # 0.07 E9/L    Lymphocytes Absolute 1.50 1.50 - 4.00 E9/L    Monocytes Absolute 1.77 (H) 0.10 - 0.95 E9/L    Eosinophils Absolute 0.08 0.05 - 0.50 E9/L    Basophils Absolute 0.04 0.00 - 0.20 E9/L    Polychromasia 1+     Poikilocytes 1+     Ovalocytes 1+    Comprehensive Metabolic Panel w/ Reflex to MG   Result Value Ref Range Sodium 141 132 - 146 mmol/L    Potassium reflex Magnesium 5.7 (H) 3.5 - 5.0 mmol/L    Chloride 105 98 - 107 mmol/L    CO2 25 22 - 29 mmol/L    Anion Gap 11 7 - 16 mmol/L    Glucose 122 (H) 74 - 99 mg/dL    BUN 37 (H) 8 - 23 mg/dL    CREATININE 1.5 (H) 0.5 - 1.0 mg/dL    GFR Non-African American 34 >=60 mL/min/1.73    GFR African American 41     Calcium 9.3 8.6 - 10.2 mg/dL    Total Protein 7.0 6.4 - 8.3 g/dL    Albumin 4.0 3.5 - 5.2 g/dL    Total Bilirubin 0.9 0.0 - 1.2 mg/dL    Alkaline Phosphatase 160 (H) 35 - 104 U/L     (H) 0 - 32 U/L    AST 98 (H) 0 - 31 U/L   Troponin   Result Value Ref Range    Troponin 0.03 0.00 - 0.03 ng/mL   Urinalysis   Result Value Ref Range    Color, UA Yellow Straw/Yellow    Clarity, UA Clear Clear    Glucose, Ur Negative Negative mg/dL    Bilirubin Urine Negative Negative    Ketones, Urine Negative Negative mg/dL    Specific Gravity, UA 1.025 1.005 - 1.030    Blood, Urine Negative Negative    pH, UA 5.0 5.0 - 9.0    Protein,  (A) Negative mg/dL    Urobilinogen, Urine 0.2 <2.0 E.U./dL    Nitrite, Urine POSITIVE (A) Negative    Leukocyte Esterase, Urine MODERATE (A) Negative   Lipase   Result Value Ref Range    Lipase 34 13 - 60 U/L   Microscopic Urinalysis   Result Value Ref Range    WBC, UA 5-10 (A) 0 - 5 /HPF    RBC, UA NONE 0 - 2 /HPF    Bacteria, UA MODERATE (A) None Seen /HPF    Crystals, UA Few (A) None Seen /HPF   POCT Glucose   Result Value Ref Range    Glucose 122 mg/dL    QC OK? cmp    EKG 12 Lead   Result Value Ref Range    Ventricular Rate 80 BPM    Atrial Rate 80 BPM    P-R Interval 130 ms    QRS Duration 80 ms    Q-T Interval 368 ms    QTc Calculation (Bazett) 424 ms    P Axis 62 degrees    R Axis 62 degrees    T Axis -109 degrees       RADIOLOGY:  Interpreted by Radiologist.  XR LUMBAR SPINE (2-3 VIEWS)   Final Result   No evidence of acute lumbar spine trauma. Scoliosis with multilevel degenerative changes and grade 1 anterolisthesis L3   over L4.       XR tablet 1,000 mg (1,000 mg Oral Given 2/27/21 2011)       Medical Decision Making/ED COURSE:   Patient is a 72-year-old female presenting with dizziness and abnormal gait. In the ED, patient was hypertensive with initial blood pressure of 165/90. Otherwise hemodynamically stable and nontoxic. On exam, patient had no focal deficits; however, I personally ambulated the patient, and she was leaning to the right with a very unsteady gait. States that this is new. Patient was placed on the cardiac monitor. I interpreted findings. Rhythm - sinus. Labs and head CT obtained. CT was repeated due to multiple recent falls. Patient administered IVF. I reviewed and interpreted labs. Labs were significant for a creatinine elevation at 1.5 and hyperkalemia of 5.7. Baseline creatinine 0.9. Urinalysis appeared infected. Urine culture sent. Given a dose of Rocephin. No leukocytosis. Patient also noted to have transaminitis. She has no abdominal tenderness. No acute findings on head CT. Patient's multiple recent falls and ataxic gait may be metabolic versus stroke. Discussed with hospitalist would like patient to be transferred downtown for higher level of care due to no neurology coverage here. Discussed plan with patient who is agreeable. Patient remained hemodynamically stable throughout ED course. ED Course as of Feb 27 2203   Sat Feb 27, 2021   1522 EKG: This EKG is signed and interpreted by me. Rate: 80  Rhythm: Sinus  Interpretation: Normal sinus rhythm, normal NM interval, normal QRS, normal QT interval, nonspecific T wave abnormalities are unchanged when compared to prior EKG  Comparison: stable as compared to patient's most recent EKG        [JA]   1838 Hospitalist was consulted. Dr. Yeimy Brothers accepted the patient for transfer for higher level of care. [JA]   2008 Patient now complaining of back pain from her fall. X-rays ordered.     [JA]      ED Course User Index  [JA] Margaret Cobb MD Delfino         Counseling: The emergency provider has spoken with the patient and discussed todays results, in addition to providing specific details for the plan of care and counseling regarding the diagnosis and prognosis. Questions are answered at this time and they are agreeable with the plan.      --------------------------------- IMPRESSION AND DISPOSITION ---------------------------------    IMPRESSION  1. Ataxic gait    2. Vertigo    3. Acute cystitis without hematuria    4. KIKI (acute kidney injury) ([facility] [address]    5. Hyperkalemia    6. Frequent falls    7. Transaminitis        DISPOSITION  Disposition: Transfer to [facility] for Admission  Patient condition is stable      NOTE: This report was transcribed using voice recognition software.  Every effort was made to ensure accuracy; however, inadvertent computerized transcription errors may be present    I, Paul Schmidt MD, am the primary provider of this record       Paul Schmidt MD  02/27/21 4302

## 2021-02-27 NOTE — ED NOTES
Bed:  HALL-08  Expected date:   Expected time:   Means of arrival:   Comments:  Homero Lawrence, RN  02/27/21 9499

## 2021-02-28 PROBLEM — N28.9 ACUTE RENAL INSUFFICIENCY: Status: ACTIVE | Noted: 2021-02-28

## 2021-02-28 PROBLEM — R42 VERTIGO: Status: ACTIVE | Noted: 2021-02-28

## 2021-02-28 PROBLEM — N30.00 ACUTE CYSTITIS WITHOUT HEMATURIA: Status: ACTIVE | Noted: 2021-02-28

## 2021-02-28 LAB
ALBUMIN SERPL-MCNC: 3.7 G/DL (ref 3.5–5.2)
ALP BLD-CCNC: 135 U/L (ref 35–104)
ALT SERPL-CCNC: 79 U/L (ref 0–32)
ANION GAP SERPL CALCULATED.3IONS-SCNC: 10 MMOL/L (ref 7–16)
AST SERPL-CCNC: 65 U/L (ref 0–31)
BILIRUB SERPL-MCNC: 0.7 MG/DL (ref 0–1.2)
BUN BLDV-MCNC: 30 MG/DL (ref 8–23)
CALCIUM SERPL-MCNC: 8.7 MG/DL (ref 8.6–10.2)
CHLORIDE BLD-SCNC: 104 MMOL/L (ref 98–107)
CHOLESTEROL, TOTAL: 128 MG/DL (ref 0–199)
CHP ED QC CHECK: NORMAL
CHP ED QC CHECK: NORMAL
CO2: 25 MMOL/L (ref 22–29)
CREAT SERPL-MCNC: 1.3 MG/DL (ref 0.5–1)
GFR AFRICAN AMERICAN: 48
GFR NON-AFRICAN AMERICAN: 40 ML/MIN/1.73
GLUCOSE BLD-MCNC: 130 MG/DL (ref 74–99)
GLUCOSE BLD-MCNC: 131 MG/DL
GLUCOSE BLD-MCNC: 99 MG/DL
HBA1C MFR BLD: 6 % (ref 4–5.6)
HCT VFR BLD CALC: 40.5 % (ref 34–48)
HDLC SERPL-MCNC: 32 MG/DL
HEMOGLOBIN: 12.5 G/DL (ref 11.5–15.5)
LDL CHOLESTEROL CALCULATED: 61 MG/DL (ref 0–99)
MCH RBC QN AUTO: 30.6 PG (ref 26–35)
MCHC RBC AUTO-ENTMCNC: 30.9 % (ref 32–34.5)
MCV RBC AUTO: 99 FL (ref 80–99.9)
METER GLUCOSE: 131 MG/DL (ref 74–99)
METER GLUCOSE: 163 MG/DL (ref 74–99)
METER GLUCOSE: 99 MG/DL (ref 74–99)
PDW BLD-RTO: 14.3 FL (ref 11.5–15)
PLATELET # BLD: 294 E9/L (ref 130–450)
PMV BLD AUTO: 10.5 FL (ref 7–12)
POTASSIUM SERPL-SCNC: 5 MMOL/L (ref 3.5–5)
RBC # BLD: 4.09 E12/L (ref 3.5–5.5)
SODIUM BLD-SCNC: 139 MMOL/L (ref 132–146)
TOTAL PROTEIN: 6.3 G/DL (ref 6.4–8.3)
TRIGL SERPL-MCNC: 174 MG/DL (ref 0–149)
VLDLC SERPL CALC-MCNC: 35 MG/DL
WBC # BLD: 6.8 E9/L (ref 4.5–11.5)

## 2021-02-28 PROCEDURE — 85027 COMPLETE CBC AUTOMATED: CPT

## 2021-02-28 PROCEDURE — 6360000002 HC RX W HCPCS: Performed by: INTERNAL MEDICINE

## 2021-02-28 PROCEDURE — 6370000000 HC RX 637 (ALT 250 FOR IP): Performed by: EMERGENCY MEDICINE

## 2021-02-28 PROCEDURE — 2580000003 HC RX 258: Performed by: EMERGENCY MEDICINE

## 2021-02-28 PROCEDURE — 80053 COMPREHEN METABOLIC PANEL: CPT

## 2021-02-28 PROCEDURE — 2580000003 HC RX 258: Performed by: FAMILY MEDICINE

## 2021-02-28 PROCEDURE — 6370000000 HC RX 637 (ALT 250 FOR IP): Performed by: INTERNAL MEDICINE

## 2021-02-28 PROCEDURE — 80061 LIPID PANEL: CPT

## 2021-02-28 PROCEDURE — 83036 HEMOGLOBIN GLYCOSYLATED A1C: CPT

## 2021-02-28 PROCEDURE — 99223 1ST HOSP IP/OBS HIGH 75: CPT | Performed by: FAMILY MEDICINE

## 2021-02-28 PROCEDURE — 36415 COLL VENOUS BLD VENIPUNCTURE: CPT

## 2021-02-28 PROCEDURE — 6360000002 HC RX W HCPCS: Performed by: EMERGENCY MEDICINE

## 2021-02-28 PROCEDURE — 2060000000 HC ICU INTERMEDIATE R&B

## 2021-02-28 PROCEDURE — 82962 GLUCOSE BLOOD TEST: CPT

## 2021-02-28 RX ORDER — ACETAMINOPHEN 325 MG/1
650 TABLET ORAL EVERY 6 HOURS PRN
Status: DISCONTINUED | OUTPATIENT
Start: 2021-02-28 | End: 2021-03-03 | Stop reason: HOSPADM

## 2021-02-28 RX ORDER — NICOTINE POLACRILEX 4 MG
15 LOZENGE BUCCAL PRN
Status: DISCONTINUED | OUTPATIENT
Start: 2021-02-28 | End: 2021-03-03 | Stop reason: HOSPADM

## 2021-02-28 RX ORDER — LOSARTAN POTASSIUM 50 MG/1
100 TABLET ORAL DAILY
Status: DISCONTINUED | OUTPATIENT
Start: 2021-03-01 | End: 2021-03-03 | Stop reason: HOSPADM

## 2021-02-28 RX ORDER — ESCITALOPRAM OXALATE 10 MG/1
10 TABLET ORAL DAILY
Status: DISCONTINUED | OUTPATIENT
Start: 2021-03-01 | End: 2021-03-03 | Stop reason: HOSPADM

## 2021-02-28 RX ORDER — SODIUM CHLORIDE 0.9 % (FLUSH) 0.9 %
10 SYRINGE (ML) INJECTION PRN
Status: DISCONTINUED | OUTPATIENT
Start: 2021-02-28 | End: 2021-03-03 | Stop reason: HOSPADM

## 2021-02-28 RX ORDER — PANTOPRAZOLE SODIUM 40 MG/1
40 TABLET, DELAYED RELEASE ORAL DAILY
Status: DISCONTINUED | OUTPATIENT
Start: 2021-03-01 | End: 2021-02-28

## 2021-02-28 RX ORDER — METOPROLOL SUCCINATE 25 MG/1
50 TABLET, EXTENDED RELEASE ORAL 2 TIMES DAILY
Status: DISCONTINUED | OUTPATIENT
Start: 2021-02-28 | End: 2021-03-02

## 2021-02-28 RX ORDER — ACETAMINOPHEN 500 MG
1000 TABLET ORAL ONCE
Status: COMPLETED | OUTPATIENT
Start: 2021-02-28 | End: 2021-02-28

## 2021-02-28 RX ORDER — LOSARTAN POTASSIUM 50 MG/1
50 TABLET ORAL DAILY
Status: DISCONTINUED | OUTPATIENT
Start: 2021-02-28 | End: 2021-02-28

## 2021-02-28 RX ORDER — CHOLECALCIFEROL (VITAMIN D3) 50 MCG
2000 TABLET ORAL DAILY
Status: DISCONTINUED | OUTPATIENT
Start: 2021-03-01 | End: 2021-03-03 | Stop reason: HOSPADM

## 2021-02-28 RX ORDER — SODIUM CHLORIDE 0.9 % (FLUSH) 0.9 %
10 SYRINGE (ML) INJECTION EVERY 12 HOURS SCHEDULED
Status: DISCONTINUED | OUTPATIENT
Start: 2021-02-28 | End: 2021-03-03 | Stop reason: HOSPADM

## 2021-02-28 RX ORDER — ASPIRIN 81 MG/1
81 TABLET ORAL EVERY OTHER DAY
Status: DISCONTINUED | OUTPATIENT
Start: 2021-03-01 | End: 2021-03-03 | Stop reason: HOSPADM

## 2021-02-28 RX ORDER — ATORVASTATIN CALCIUM 20 MG/1
20 TABLET, FILM COATED ORAL DAILY
Status: DISCONTINUED | OUTPATIENT
Start: 2021-03-01 | End: 2021-03-03 | Stop reason: HOSPADM

## 2021-02-28 RX ORDER — FAMOTIDINE 20 MG/1
20 TABLET, FILM COATED ORAL DAILY
Status: DISCONTINUED | OUTPATIENT
Start: 2021-03-01 | End: 2021-03-03 | Stop reason: HOSPADM

## 2021-02-28 RX ORDER — FENOFIBRATE 54 MG/1
54 TABLET ORAL DAILY
Status: DISCONTINUED | OUTPATIENT
Start: 2021-03-01 | End: 2021-03-03 | Stop reason: HOSPADM

## 2021-02-28 RX ORDER — CLOPIDOGREL BISULFATE 75 MG/1
75 TABLET ORAL DAILY
Status: DISCONTINUED | OUTPATIENT
Start: 2021-03-01 | End: 2021-03-03 | Stop reason: HOSPADM

## 2021-02-28 RX ORDER — BISACODYL 10 MG
10 SUPPOSITORY, RECTAL RECTAL DAILY PRN
Status: DISCONTINUED | OUTPATIENT
Start: 2021-02-28 | End: 2021-03-03 | Stop reason: HOSPADM

## 2021-02-28 RX ORDER — SODIUM CHLORIDE 9 MG/ML
INJECTION, SOLUTION INTRAVENOUS CONTINUOUS
Status: DISCONTINUED | OUTPATIENT
Start: 2021-02-28 | End: 2021-03-03 | Stop reason: HOSPADM

## 2021-02-28 RX ORDER — ACETAMINOPHEN 650 MG/1
650 SUPPOSITORY RECTAL EVERY 6 HOURS PRN
Status: DISCONTINUED | OUTPATIENT
Start: 2021-02-28 | End: 2021-03-03 | Stop reason: HOSPADM

## 2021-02-28 RX ORDER — CEFUROXIME AXETIL 250 MG/1
250 TABLET ORAL EVERY 12 HOURS SCHEDULED
Status: DISCONTINUED | OUTPATIENT
Start: 2021-03-01 | End: 2021-03-03 | Stop reason: HOSPADM

## 2021-02-28 RX ORDER — MECLIZINE HCL 12.5 MG/1
25 TABLET ORAL 3 TIMES DAILY PRN
Status: DISCONTINUED | OUTPATIENT
Start: 2021-02-28 | End: 2021-03-03 | Stop reason: HOSPADM

## 2021-02-28 RX ADMIN — LOSARTAN POTASSIUM 50 MG: 50 TABLET, FILM COATED ORAL at 14:07

## 2021-02-28 RX ADMIN — ACETAMINOPHEN 1000 MG: 500 TABLET ORAL at 03:39

## 2021-02-28 RX ADMIN — SODIUM CHLORIDE, PRESERVATIVE FREE 10 ML: 5 INJECTION INTRAVENOUS at 22:43

## 2021-02-28 RX ADMIN — ENOXAPARIN SODIUM 40 MG: 40 INJECTION SUBCUTANEOUS at 19:25

## 2021-02-28 RX ADMIN — CEFTRIAXONE 1000 MG: 1 INJECTION, POWDER, FOR SOLUTION INTRAMUSCULAR; INTRAVENOUS at 19:49

## 2021-02-28 RX ADMIN — SODIUM CHLORIDE: 9 INJECTION, SOLUTION INTRAVENOUS at 22:08

## 2021-02-28 RX ADMIN — METOPROLOL SUCCINATE 50 MG: 25 TABLET, EXTENDED RELEASE ORAL at 19:25

## 2021-02-28 ASSESSMENT — PAIN SCALES - GENERAL: PAINLEVEL_OUTOF10: 10

## 2021-03-01 ENCOUNTER — APPOINTMENT (OUTPATIENT)
Dept: MRI IMAGING | Age: 76
DRG: 149 | End: 2021-03-01
Payer: MEDICARE

## 2021-03-01 ENCOUNTER — APPOINTMENT (OUTPATIENT)
Dept: ULTRASOUND IMAGING | Age: 76
DRG: 149 | End: 2021-03-01
Payer: MEDICARE

## 2021-03-01 LAB
ALBUMIN SERPL-MCNC: 3.6 G/DL (ref 3.5–5.2)
ALP BLD-CCNC: 122 U/L (ref 35–104)
ALT SERPL-CCNC: 64 U/L (ref 0–32)
ANION GAP SERPL CALCULATED.3IONS-SCNC: 9 MMOL/L (ref 7–16)
AST SERPL-CCNC: 50 U/L (ref 0–31)
BASOPHILS ABSOLUTE: 0.06 E9/L (ref 0–0.2)
BASOPHILS RELATIVE PERCENT: 0.7 % (ref 0–2)
BILIRUB SERPL-MCNC: 0.7 MG/DL (ref 0–1.2)
BILIRUBIN DIRECT: 0.4 MG/DL (ref 0–0.3)
BILIRUBIN, INDIRECT: 0.3 MG/DL (ref 0–1)
BUN BLDV-MCNC: 21 MG/DL (ref 8–23)
CALCIUM SERPL-MCNC: 8.2 MG/DL (ref 8.6–10.2)
CHLORIDE BLD-SCNC: 107 MMOL/L (ref 98–107)
CHOLESTEROL, FASTING: 127 MG/DL (ref 0–199)
CO2: 24 MMOL/L (ref 22–29)
CREAT SERPL-MCNC: 1 MG/DL (ref 0.5–1)
EOSINOPHILS ABSOLUTE: 0.2 E9/L (ref 0.05–0.5)
EOSINOPHILS RELATIVE PERCENT: 2.4 % (ref 0–6)
GFR AFRICAN AMERICAN: >60
GFR NON-AFRICAN AMERICAN: 54 ML/MIN/1.73
GLUCOSE BLD-MCNC: 129 MG/DL (ref 74–99)
HCT VFR BLD CALC: 41.2 % (ref 34–48)
HDLC SERPL-MCNC: 34 MG/DL
HEMOGLOBIN: 12.3 G/DL (ref 11.5–15.5)
IMMATURE GRANULOCYTES #: 0.04 E9/L
IMMATURE GRANULOCYTES %: 0.5 % (ref 0–5)
LDL CHOLESTEROL CALCULATED: 60 MG/DL (ref 0–99)
LYMPHOCYTES ABSOLUTE: 1.9 E9/L (ref 1.5–4)
LYMPHOCYTES RELATIVE PERCENT: 23.1 % (ref 20–42)
MCH RBC QN AUTO: 30.1 PG (ref 26–35)
MCHC RBC AUTO-ENTMCNC: 29.9 % (ref 32–34.5)
MCV RBC AUTO: 101 FL (ref 80–99.9)
METER GLUCOSE: 112 MG/DL (ref 74–99)
METER GLUCOSE: 119 MG/DL (ref 74–99)
METER GLUCOSE: 131 MG/DL (ref 74–99)
METER GLUCOSE: 157 MG/DL (ref 74–99)
MONOCYTES ABSOLUTE: 1.32 E9/L (ref 0.1–0.95)
MONOCYTES RELATIVE PERCENT: 16 % (ref 2–12)
NEUTROPHILS ABSOLUTE: 4.71 E9/L (ref 1.8–7.3)
NEUTROPHILS RELATIVE PERCENT: 57.3 % (ref 43–80)
ORGANISM: ABNORMAL
PDW BLD-RTO: 14.2 FL (ref 11.5–15)
PLATELET # BLD: 305 E9/L (ref 130–450)
PMV BLD AUTO: 10.5 FL (ref 7–12)
POTASSIUM REFLEX MAGNESIUM: 4.9 MMOL/L (ref 3.5–5)
RBC # BLD: 4.08 E12/L (ref 3.5–5.5)
SODIUM BLD-SCNC: 140 MMOL/L (ref 132–146)
TOTAL PROTEIN: 6.3 G/DL (ref 6.4–8.3)
TRIGLYCERIDE, FASTING: 163 MG/DL (ref 0–149)
URINE CULTURE, ROUTINE: ABNORMAL
VLDLC SERPL CALC-MCNC: 33 MG/DL
WBC # BLD: 8.2 E9/L (ref 4.5–11.5)

## 2021-03-01 PROCEDURE — 6370000000 HC RX 637 (ALT 250 FOR IP): Performed by: FAMILY MEDICINE

## 2021-03-01 PROCEDURE — 99232 SBSQ HOSP IP/OBS MODERATE 35: CPT | Performed by: INTERNAL MEDICINE

## 2021-03-01 PROCEDURE — 6370000000 HC RX 637 (ALT 250 FOR IP): Performed by: INTERNAL MEDICINE

## 2021-03-01 PROCEDURE — 93880 EXTRACRANIAL BILAT STUDY: CPT

## 2021-03-01 PROCEDURE — 36415 COLL VENOUS BLD VENIPUNCTURE: CPT

## 2021-03-01 PROCEDURE — 85025 COMPLETE CBC W/AUTO DIFF WBC: CPT

## 2021-03-01 PROCEDURE — 80076 HEPATIC FUNCTION PANEL: CPT

## 2021-03-01 PROCEDURE — 2060000000 HC ICU INTERMEDIATE R&B

## 2021-03-01 PROCEDURE — 97161 PT EVAL LOW COMPLEX 20 MIN: CPT

## 2021-03-01 PROCEDURE — 80061 LIPID PANEL: CPT

## 2021-03-01 PROCEDURE — 70551 MRI BRAIN STEM W/O DYE: CPT

## 2021-03-01 PROCEDURE — 80048 BASIC METABOLIC PNL TOTAL CA: CPT

## 2021-03-01 PROCEDURE — 82962 GLUCOSE BLOOD TEST: CPT

## 2021-03-01 PROCEDURE — 2580000003 HC RX 258: Performed by: FAMILY MEDICINE

## 2021-03-01 PROCEDURE — 97165 OT EVAL LOW COMPLEX 30 MIN: CPT

## 2021-03-01 RX ORDER — ALPRAZOLAM 1 MG/1
1 TABLET ORAL 3 TIMES DAILY PRN
Status: DISCONTINUED | OUTPATIENT
Start: 2021-03-01 | End: 2021-03-03 | Stop reason: HOSPADM

## 2021-03-01 RX ADMIN — ACETAMINOPHEN 650 MG: 325 TABLET ORAL at 11:57

## 2021-03-01 RX ADMIN — METOPROLOL SUCCINATE 50 MG: 25 TABLET, EXTENDED RELEASE ORAL at 09:43

## 2021-03-01 RX ADMIN — ESCITALOPRAM OXALATE 10 MG: 10 TABLET ORAL at 09:44

## 2021-03-01 RX ADMIN — CLOPIDOGREL BISULFATE 75 MG: 75 TABLET ORAL at 09:44

## 2021-03-01 RX ADMIN — METOPROLOL SUCCINATE 50 MG: 25 TABLET, EXTENDED RELEASE ORAL at 16:48

## 2021-03-01 RX ADMIN — SODIUM CHLORIDE, PRESERVATIVE FREE 10 ML: 5 INJECTION INTRAVENOUS at 20:57

## 2021-03-01 RX ADMIN — BISACODYL 5 MG: 5 TABLET, COATED ORAL at 09:43

## 2021-03-01 RX ADMIN — SODIUM CHLORIDE, PRESERVATIVE FREE 10 ML: 5 INJECTION INTRAVENOUS at 09:43

## 2021-03-01 RX ADMIN — ASPIRIN 81 MG: 81 TABLET, COATED ORAL at 09:44

## 2021-03-01 RX ADMIN — FENOFIBRATE 54 MG: 54 TABLET ORAL at 09:44

## 2021-03-01 RX ADMIN — ALPRAZOLAM 1 MG: 1 TABLET ORAL at 16:47

## 2021-03-01 RX ADMIN — CEFUROXIME AXETIL 250 MG: 250 TABLET ORAL at 20:57

## 2021-03-01 RX ADMIN — ACETAMINOPHEN 650 MG: 325 TABLET ORAL at 00:34

## 2021-03-01 RX ADMIN — LOSARTAN POTASSIUM 100 MG: 50 TABLET, FILM COATED ORAL at 09:44

## 2021-03-01 RX ADMIN — Medication 2000 UNITS: at 09:43

## 2021-03-01 RX ADMIN — FAMOTIDINE 20 MG: 20 TABLET ORAL at 09:44

## 2021-03-01 RX ADMIN — ACETAMINOPHEN 650 MG: 325 TABLET ORAL at 20:57

## 2021-03-01 RX ADMIN — CEFUROXIME AXETIL 250 MG: 250 TABLET ORAL at 09:44

## 2021-03-01 RX ADMIN — ATORVASTATIN CALCIUM 20 MG: 20 TABLET, FILM COATED ORAL at 09:43

## 2021-03-01 ASSESSMENT — PAIN DESCRIPTION - DESCRIPTORS: DESCRIPTORS: SHARP

## 2021-03-01 ASSESSMENT — PAIN DESCRIPTION - PROGRESSION: CLINICAL_PROGRESSION: GRADUALLY IMPROVING

## 2021-03-01 ASSESSMENT — PAIN - FUNCTIONAL ASSESSMENT: PAIN_FUNCTIONAL_ASSESSMENT: ACTIVITIES ARE NOT PREVENTED

## 2021-03-01 ASSESSMENT — PAIN DESCRIPTION - LOCATION
LOCATION: RIB CAGE
LOCATION: RIB CAGE

## 2021-03-01 ASSESSMENT — PAIN SCALES - GENERAL
PAINLEVEL_OUTOF10: 7
PAINLEVEL_OUTOF10: 8
PAINLEVEL_OUTOF10: 0

## 2021-03-01 ASSESSMENT — PAIN DESCRIPTION - PAIN TYPE: TYPE: ACUTE PAIN

## 2021-03-01 NOTE — PROGRESS NOTES
[x]Energy conservation techniques to improve tolerance for ADL/IADLs  [x]Functional transfer/mobility training/DME recommendations for increased independence, safety and fall prevention         [x]Patient/family education to increase safety and functional independence during daily routine          [x]Environmental modifications for safe mobility and completion of ADLs                             []Cognitive retraining to improve problem solving skills & safe participation in ADLs/IADLs     []Sensory re-education techniques to improve extremity awareness, maintain skin integrity and improve hand function                             []Visual/Perceptual retraining to improve body awareness and safety during transfers and ADLs  []Splinting/positioning needs to maintain joint/skin integrity and contracture prevention  [x]Therapeutic activity to improve functional performance during ADLs                                        [x]Therapeutic exercise to improve tolerance and functional strength for ADLs   [x]Balance retraining/tolerance tasks for facilitation of postural control with dynamic challenges during ADLs  []Neuromuscular re-education to facilitate righting/equilibrium reactions, midline orientation, scapular stability/mobility, normalize muscle tone and facilitate active functional movement                        []Delirium prevention/treatment    [x]Positioning to improve functional independence and decrease risk of skin breakdown  []Other:     Rehab Potential: Good for established goals     Patient/Family Goal: To get home. Patient and/or family were instructed on functional diagnosis, prognosis/goals and OT plan of care. Pt verbalized understanding. Upon arrival, patient supine in bed. At end of session, patient supine in bed with call light and phone within reach, all lines and tubes intact.   Pt would benefit from continued skilled OT to increase safety and independence with completion of ADL/IADL

## 2021-03-01 NOTE — PROGRESS NOTES
P Quality Flow/Interdisciplinary Rounds Progress Note        Quality Flow Rounds held on March 1, 2021    Disciplines Attending:  Bedside Nurse,  and     Chacorta Greenberg was admitted on 2/27/2021  2:42 PM    Anticipated Discharge Date:  Expected Discharge Date: 03/02/21    Disposition:    South Score:  South Scale Score: 19    Readmission Risk              Risk of Unplanned Readmission:        14           Discussed patient goal for the day, patient clinical progression, and barriers to discharge. The following Goal(s) of the Day/Commitment(s) have been identified:  MRI brain today.       Juana Miner  March 1, 2021

## 2021-03-01 NOTE — ED NOTES
MALA faxed to floor. Spoke to Haydee Mckeon who stated she received it. Pt ready.      Enriqueta Bullard RN  02/28/21 7130

## 2021-03-01 NOTE — H&P
[facility] History and Physical    Patient seen and examined by me at 9:05 PM    CHIEF COMPLAINT:    Vertigo    History of Present Illness:    72-year-old female with hyperlipidemia, DM 2, seasonal allergies, they are active right now, takes Claritin, GERD, who presented to the emergency room yesterday with dizziness when she walked around for 2 weeks, fell several times, and noticed that she was walking funny. She does have \"bow legs\" and some scoliosis, so has abnormal gait prior but this is much worse. She was leaning to the right side when she was walked in the ER yesterday. She has never had this before. She has no numbness or tingling or weakness in her arms legs hands or feet. She also denies any numbness or tingling in her face. She said yesterday in the ER she had a wax impaction removed from the left ear. Patient said it was large. She says she does have a lot of problems with wax in her ears chronically. Got meclizine so far and it has helped with dizziness. She was boarded in the ER since yesterday. She denies any hearing or vision changes with this. Denies ringing in the ears. Denies headache. In ER yesterday got ceftriaxone 1 gram IV X 1, 1 liters normal saline bolus, Tylenol 1000 mg X 1 (yesterday).     Informant(s) for H&P: Patient    REVIEW OF SYSTEMS:  A comprehensive review of systems was negative except for: what is in the HPI  No headache, no ear or eye symptoms, no pharyngitis, no rhinorrhea, no neck pain, no chest pain, no shortness of breath, no palpitations, no presyncope, no syncope, no abdominal pain, no nausea or vomiting, no diarrhea, no urinary symptoms, no vertigo, no rashes, no pruritus, no anorexia, no fevers, no chills, no sweats, no fatigue, no focal numbness, no tingling, no weakness, no hematemesis, no hematochezia, positive vertigo    PMH:  Past Medical History:   Diagnosis Date    Abnormal echocardiogram     Anxiety     CAD (coronary artery disease)     normal adenisone stress test    Carotid artery narrowing     <40% bilaterally    Depression     Diabetes mellitus ([facility] [address]     type II    Drug intolerance     Lipitor, Crestor, high doses of Simvastatin    GERD (gastroesophageal reflux disease)     Heart attack ([facility] [address]     Hyperlipidemia     Hypertension     IBS (irritable bowel syndrome)     Obesity     Primary osteoarthritis involving multiple joints 2017    SOB (shortness of breath) on exertion     UTI (urinary tract infection)        Surgical History:  Past Surgical History:   Procedure Laterality Date     SECTION      x 1    CORONARY ANGIOPLASTY  07    CYST REMOVAL      ganglionic cyst on finger    DIAGNOSTIC CARDIAC CATH LAB PROCEDURE  2007    DILATION AND CURETTAGE OF UTERUS      X 2    PTCA  2007    TONSILLECTOMY         Medications Prior to Admission:    Prior to Admission medications    Medication Sig Start Date End Date Taking? Authorizing Provider   escitalopram (LEXAPRO) 10 MG tablet take 1 tablet by mouth once daily 21  Yes Dewayne Aguilera PA-C   fenofibrate micronized (LOFIBRA) 134 MG capsule Take 1 capsule by mouth every morning (before breakfast) 21  Yes Dewayne Aguilera PA-C   pantoprazole (PROTONIX) 40 MG tablet TAKE ONE TABLET BY MOUTH ONCE DAILY 10/26/20  Yes Dewayne Aguilera PA-C   metoprolol succinate (TOPROL XL) 50 MG extended release tablet TAKE ONE AND ONE-HALF TABLETS BY MOUTH TWICE DAILY 20  Yes Dai Oliver DO   losartan (COZAAR) 50 MG tablet TAKE ONE TABLET BY MOUTH TWICE DAILY 20  Yes Dai Oliver DO   loratadine (CLARITIN) 10 MG tablet Take 1 tablet by mouth daily 16  Yes IGNACIO Cagle CNP   aspirin EC 81 MG EC tablet Take 81 mg by mouth every other day. Yes Historical Provider, MD   Co-Enzyme Q-10 100 MG CAPS Take 200 mg by mouth.    Yes Historical Provider, MD   meclizine (ANTIVERT) 25 MG tablet Take 1 tablet by mouth 3 supple and non tender without mass, no thyromegaly  Pulmonary/Chest: clear to auscultation bilaterally- no wheezes, rales or rhonchi, normal air movement, no respiratory distress  Cardiovascular: normal rate, normal S1 and S2 and no carotid bruits  Abdomen: soft, non-tender, non-distended, normal bowel sounds, no masses or organomegaly, no guarding, no rebound  Extremities: no cyanosis, no clubbing and no edema, good pedal pulses bilaterally, good cap refill of fingers/toes  Neurologic: PERRL, EOMI, no cranial nerve deficit, cranial nerves II through XII intact, upper and lower extremity strength normal, heel-to-shin and finger-to-nose normal, good  bilaterally and speech normal, mental status normal        LABS:  Recent Labs     02/27/21  1456 02/27/21  1456 02/28/21  1345 02/28/21  1409 02/28/21  1930     --  139  --   --    K 5.7*  --  5.0  --   --      --  104  --   --    CO2 25  --  25  --   --    BUN 37*  --  30*  --   --    CREATININE 1.5*  --  1.3*  --   --    GLUCOSE 122*   < > 130* 131 99   CALCIUM 9.3  --  8.7  --   --     < > = values in this interval not displayed. Recent Labs     02/27/21  1456 02/28/21  1345   WBC 10.1 6.8   RBC 4.32 4.09   HGB 13.0 12.5   HCT 44.0 40.5   .9* 99.0   MCH 30.1 30.6   MCHC 29.5* 30.9*   RDW 14.1 14.3    294   MPV 10.3 10.5       No results for input(s): POCGLU in the last 72 hours.     CMP:    Lab Results   Component Value Date     02/28/2021    K 5.0 02/28/2021    K 5.7 02/27/2021     02/28/2021    CO2 25 02/28/2021    BUN 30 02/28/2021    CREATININE 1.3 02/28/2021    GFRAA 48 02/28/2021    LABGLOM 40 02/28/2021    GLUCOSE 99 02/28/2021    PROT 6.3 02/28/2021    LABALBU 3.7 02/28/2021    CALCIUM 8.7 02/28/2021    BILITOT 0.7 02/28/2021    ALKPHOS 135 02/28/2021    AST 65 02/28/2021    ALT 79 02/28/2021     Albumin:    Lab Results   Component Value Date    LABALBU 3.7 02/28/2021     Calcium:    Lab Results   Component Value Date    CALCIUM 8.7 02/28/2021     Troponin:    Lab Results   Component Value Date    TROPONINI 0.03 02/27/2021     U/A:    Lab Results   Component Value Date    NITRITE n 01/19/2018    COLORU Yellow 02/27/2021    PROTEINU 100 02/27/2021    PHUR 5.0 02/27/2021    WBCUA 5-10 02/27/2021    RBCUA NONE 02/27/2021    RBCUA 1-3 08/30/2012    BACTERIA MODERATE 02/27/2021    CLARITYU Clear 02/27/2021    SPECGRAV 1.025 02/27/2021    LEUKOCYTESUR MODERATE 02/27/2021    UROBILINOGEN 0.2 02/27/2021    BILIRUBINUR Negative 02/27/2021    BILIRUBINUR n 01/19/2018    BLOODU Negative 02/27/2021    GLUCOSEU Negative 02/27/2021       Radiology:   XR LUMBAR SPINE (2-3 VIEWS)   Final Result   No evidence of acute lumbar spine trauma. Scoliosis with multilevel degenerative changes and grade 1 anterolisthesis L3   over L4. XR THORACIC SPINE (2 VIEWS)   Final Result   No evidence of acute thoracic spine trauma. Scoliosis with multilevel degenerative changes. Cardiomegaly. CT Head WO Contrast   Final Result   No acute intracranial abnormality. Acute sphenoid sinusitis. MRI BRAIN WO CONTRAST    (Results Pending)   US CAROTID ARTERY BILATERAL    (Results Pending)       EKG:   NSR, anterior infarct, age undetermined, T wave abnormality, consider inferolateral ischemia  No prior EKG's for review      ASSESSMENT:      Principal Problem:    Vertigo  Active Problems:    CAD (coronary artery disease)    Hypertension    Hyperlipidemia    Type 2 diabetes mellitus with microalbuminuria ([facility] [address]  Resolved Problems:    * No resolved hospital problems. *    With 2 weeks of leaning to the right -- may have had a completed cerebellar CVA; she does have several risk factors for stroke. Otherwise sx's could be due to dehydration and UTI. PLAN:    1. Vertigo --almost definitely peripheral.  She has no neurological symptoms. She also has nonfocal neurological exam.  She is able to sit up now. Meclizine helped her.   She also believes that the removal of the left sided ear wax impaction helped a lot as well. No prior history of stroke for this patient. She does still have some vertigo when she walks around still has difficulty walking. 2.  Hyperlipidemia on fenofibrate and statin. 3.  Type 2 diabetes mellitus on metformin  4. Seasonal allergies, has wintertime allergies as well, takes Claritin at home  5. Coronary artery disease status post MI, on aspirin and Plavix, stable without any chest pain, no EKG changes -- troponin 0.03  6. Depression on Lexapro  7. Coronary artery disease found in the past with less than 40% narrowing bilaterally  8. Obesity  9. GERD on Protonix  10. Uncontrolled HTN -- on Toprol XL 50 mg BID and Cozaar 50 mg daily  11. UTI -- without sepsis; could be contributing to her \"dizziness\"  15. Acute renal insufficiency -- dehydration; could also be contributing to her \"dizziness\"    Continue her aspirin and Plavix. Meclizine as needed for vertigo. MRI of the brain. We will do this because she has multiple risk factors for stroke. Carotid Dopplers. NIH scoring every 4 hours. Fasted lipid profile in the morning. Increase Cozaar to 100 mg daily from 50 mg daily. Normal saline 100 cc/hr for now. Repeat BMP in AM.  Await urine culture. Ceftin 250 mg PO BID for now (empirically). Code Status: Total support  DVT prophylaxis: Early ambulation      NOTE: This report was transcribed using voice recognition software. Every effort was made to ensure accuracy; however, inadvertent computerized transcription errors may be present.     Electronically signed by Jossie Ruiz DO on 2/28/2021 at 9:44 PM

## 2021-03-01 NOTE — CONSULTS
Otolaryngology  Consult Note    Patient's Name/Date of Birth: Ana Ramos / 1945 (89 y.o.)    Date: 2021     Chief Complaint: Dizziness    HPI: 69 y/o F who complains of recent episodes of room spinning vertigo that last minutes. These are brought on by movement but also when she is sitting. She denies tinnitus, aural pressure, or change in hearing. She has not had prior episodes of this. MRI brain was unremarkable but was done without contrast. She takes xanax 1.5 mg three times day for several years. She has also been hypertensive. Past Medical History:   Diagnosis Date    Abnormal echocardiogram     Acute renal insufficiency 2021    Anxiety     CAD (coronary artery disease)     normal adenisone stress test    Carotid artery narrowing     <40% bilaterally    Depression     Diabetes mellitus (HCC)     type II    Drug intolerance     Lipitor, Crestor, high doses of Simvastatin    GERD (gastroesophageal reflux disease)     Heart attack ([address]     Hyperlipidemia     Hypertension     IBS (irritable bowel syndrome)     Obesity     Primary osteoarthritis involving multiple joints 2017    SOB (shortness of breath) on exertion     UTI (urinary tract infection)        Past Surgical History:   Procedure Laterality Date     SECTION      x 1    CORONARY ANGIOPLASTY  07    CYST REMOVAL      ganglionic cyst on finger    DIAGNOSTIC CARDIAC CATH LAB PROCEDURE  2007    DILATION AND CURETTAGE OF UTERUS      X 2    PTCA  2007    TONSILLECTOMY         Prior to Admission medications    Medication Sig Start Date End Date Taking?  Authorizing Provider   escitalopram (LEXAPRO) 10 MG tablet take 1 tablet by mouth once daily 21  Yes Jessica Lopes PA-C   fenofibrate micronized (LOFIBRA) 134 MG capsule Take 1 capsule by mouth every morning (before breakfast) 21  Yes Aarti Yancey PA-C   pantoprazole (PROTONIX) 40 MG tablet TAKE ONE TABLET BY MOUTH ONCE  Highest education level: Not on file   Occupational History     Employer: RETIRED   Social Needs    Financial resource strain: Not on file    Food insecurity     Worry: Not on file     Inability: Not on file    Transportation needs     Medical: Not on file     Non-medical: Not on file   Tobacco Use    Smoking status: Former Smoker     Packs/day: 1.00     Years: 35.00     Pack years: 35.00     Quit date: 6/17/2010     Years since quitting: 10.7    Smokeless tobacco: Never Used   Substance and Sexual Activity    Alcohol use: No    Drug use: No    Sexual activity: Not Currently   Lifestyle    Physical activity     Days per week: Not on file     Minutes per session: Not on file    Stress: Not on file   Relationships    Social connections     Talks on phone: Not on file     Gets together: Not on file     Attends Christianity service: Not on file     Active member of club or organization: Not on file     Attends meetings of clubs or organizations: Not on file     Relationship status: Not on file    Intimate partner violence     Fear of current or ex partner: Not on file     Emotionally abused: Not on file     Physically abused: Not on file     Forced sexual activity: Not on file   Other Topics Concern    Not on file   Social History Narrative    Not on file       Review of Systems:   CONSTITUTIONAL:  negative for  fevers, chills, fatigue and weight loss   EYES:  negative for  blurred vision, eye discharge and visual disturbance   HEENT:  negative for  hearing loss, tinnitus, ear drainage, earaches, nasal congestion, epistaxis, snoring, sore mouth, sore throat, hoarseness and voice change   RESPIRATORY:  negative for  dry cough, cough with sputum, dyspnea and wheezing   CARDIOVASCULAR:  negative for  chest pain, dyspnea   GASTROINTESTINAL:  negative for dysphagia and reflux   INTEGUMENT/BREAST:  negative for rash and skin lesion(s)   HEMATOLOGIC/LYMPHATIC:  negative for easy bruising, bleeding and lymphadenopathy   ALLERGIC/IMMUNOLOGIC:  negative for recurrent infections, urticaria and angioedema   ENDOCRINE:  negative for heat intolerance, cold intolerance and weight changes   MUSCULOSKELETAL:  negative for  myalgias and arthralgias     Physical Exam:  Vitals:    03/01/21 0034 03/01/21 0243 03/01/21 0945 03/01/21 1603   BP:   (!) 181/91 (!) 164/88   Pulse:   93 88   Resp:   16 18   Temp:   97.2 °F (36.2 °C) 98 °F (36.7 °C)   TempSrc:   Oral Oral   SpO2: 90%  90%    Weight:  160 lb (72.6 kg)     Height:           CONSTITUTIONAL:  awake, alert, cooperative, no apparent distress, and appears stated age   EYES:  Lids and lashes normal, pupils equal, round and reactive to light, extra ocular muscles intact, sclera clear  EARS: external ears without lesions, no drainage  NOSE: Nares normal. Septum midline. Mucosa normal. No drainage or sinus tenderness. , no sinus tenderness  MOUTH: MMM no lesions   THROAT: normal voice  NECK:  supple, symmetrical, trachea midline  HEMATOLOGIC/LYMPHATICS:  no cervical lymphadenopathy  LUNGS:  no increased work of breathing and good air exchange  CARDIOVASCULAR:  Normal rate    ABDOMEN: Soft, non-distended   SKIN: Warm, no rashes or edema     LABS:  CBC  Recent Labs     03/01/21  0559   WBC 8.2   HGB 12.3   HCT 41.2        BMP  Recent Labs     03/01/21  0559      K 4.9      CO2 24   BUN 21   CREATININE 1.0   CALCIUM 8.2*     Liver Function  Recent Labs     02/27/21  1456 02/27/21  1456 03/01/21  0559   LIPASE 34  --   --    BILITOT 0.9   < > 0.7   BILIDIR  --   --  0.4*   AST 98*   < > 50*   *   < > 64*   ALKPHOS 160*   < > 122*   PROT 7.0   < > 6.3*   LABALBU 4.0   < > 3.6    < > = values in this interval not displayed. No results for input(s): LACTATE in the last 72 hours. No results for input(s): INR, PTT in the last 72 hours.     Invalid input(s): PT    RADIOLOGY    Xr Thoracic Spine (2 Views)    Result Date: 2/27/2021  EXAMINATION: XRAY VIEWS OF THE THORACIC SPINE 2/27/2021 7:41 pm COMPARISON: 05/28/2013 HISTORY: ORDERING SYSTEM PROVIDED HISTORY: fall, back pain TECHNOLOGIST PROVIDED HISTORY: Reason for exam:->fall, back pain FINDINGS: No acute fractures. Multilevel degenerative changes with scoliosis. Cardiomegaly. The visualized portion of the lungs are unremarkable. No evidence of acute thoracic spine trauma. Scoliosis with multilevel degenerative changes. Cardiomegaly. Xr Lumbar Spine (2-3 Views)    Result Date: 2/27/2021  EXAMINATION: THREE XRAY VIEWS OF THE LUMBAR SPINE 2/27/2021 7:41 pm COMPARISON: None. HISTORY: ORDERING SYSTEM PROVIDED HISTORY: back pain, fall TECHNOLOGIST PROVIDED HISTORY: Reason for exam:->back pain, fall FINDINGS: No fractures. Multilevel degenerative changes with grade 1 anterolisthesis L3 over L4. Scoliosis. Degenerative changes involving the SI joints. The hip joints are intact. Calcified plaque involving the abdominal aorta. No evidence of acute lumbar spine trauma. Scoliosis with multilevel degenerative changes and grade 1 anterolisthesis L3 over L4. Ct Head Wo Contrast    Result Date: 2/27/2021  EXAMINATION: CT OF THE HEAD WITHOUT CONTRAST  2/27/2021 5:19 pm TECHNIQUE: CT of the head was performed without the administration of intravenous contrast. Dose modulation, iterative reconstruction, and/or weight based adjustment of the mA/kV was utilized to reduce the radiation dose to as low as reasonably achievable. COMPARISON: February 22, 2021 HISTORY: ORDERING SYSTEM PROVIDED HISTORY: dizziness, multiple falls recently, leaning to left with ambulation. TECHNOLOGIST PROVIDED HISTORY: Reason for exam:->dizziness, multiple falls recently, leaning to left with ambulation. Has a \"code stroke\" or \"stroke alert\" been called? ->No Decision Support Exception->Emergency Medical Condition (MA) FINDINGS: BRAIN/VENTRICLES: Stable mild cortical atrophy and periventricular ischemic changes.   Stable old lacunar infarct

## 2021-03-01 NOTE — ED NOTES
In the ED for 29 hours. No bed available downtown. Spoke with the hospitalist here who agrees to admit the patient and perform MRI. Accepted for admission by Dr. Dean Lundberg.       Ned Gamboa MD  02/28/21 2424

## 2021-03-01 NOTE — PROGRESS NOTES
Physical Therapy    Facility/Department: [address] INTERMEDIATE  Initial Assessment    NAME: Jv Gusman  : 1945  MRN: [id number]    Date of Service: 3/1/2021       REQUIRES PT FOLLOW UP: Yes       Patient Diagnosis(es): The primary encounter diagnosis was Ataxic gait. Diagnoses of Vertigo, Acute cystitis without hematuria, KIKI (acute kidney injury) ([facility] [address] Hyperkalemia, Frequent falls, and Transaminitis were also pertinent to this visit. has a past medical history of Abnormal echocardiogram, Acute renal insufficiency, Anxiety, CAD (coronary artery disease), Carotid artery narrowing, Depression, Diabetes mellitus ([facility] [address] Drug intolerance, GERD (gastroesophageal reflux disease), Heart attack ([facility] [address] Hyperlipidemia, Hypertension, IBS (irritable bowel syndrome), Obesity, Primary osteoarthritis involving multiple joints, SOB (shortness of breath) on exertion, and UTI (urinary tract infection). has a past surgical history that includes Tonsillectomy; Percutaneous Transluminal Coronary Angio (2007); Dilation and curettage of uterus; cyst removal; Diagnostic Cardiac Cath Lab Procedure (2007); Coronary angioplasty (07); and  section. Evaluating Therapist: Duy Figueroa PT     Referring Provider:  Dr Shashank Mast #:  [id number]   DIAGNOSIS:  Vertigo   PRECAUTIONS: falls     Social:  Pt lives alone  in a  2  floor plan     Prior to admission pt walked with  No AD. Pt reports her  and son check in on her      Initial Evaluation  Date: 3/1/2021  Treatment      Short Term/ Long Term   Goals   Was pt agreeable to Eval/treatment?   yes      Does pt have pain?  none reported      Bed Mobility  Rolling:  SBA   Supine to sit: SBA    Sit to supine:  SBA   Scooting:  SBA    independent    Transfers Sit to stand:  SBA   Stand to sit:  SBA   Stand pivot:  SBA    independent    Ambulation     100  feet with no AD with  SBA    150  feet with no AD/AAD  with  Independent        Stair negotiation: ascended and descended NT   4-12  steps with  1  rail with  S/I    LE ROM  WFL     LE strength  4/ 5      AM- PAC RAW score   17/ 24            Pt is alert and Oriented x 3. Has some difficulty following commands and focusing on task at hand     Balance:  SBA , no LOB with gait , but fall risk due to dizziness   Endurance: decreased   Bed/Chair alarm: Yes      ASSESSMENT  Pt displays functional ability as noted in the objective portion of this evaluation. Treatment/Education:     Mobility as above. Pt did not report dizziness during eval. B LE coordination WFL . Decreased safety awareness with mobility    Pt educated on fall risk, safe and proper technique with mobility       Patient response to education:   Pt verbalized understanding Pt demonstrated skill Pt requires further education in this area   x With cues   x       Comments:  Pt left  In bed after session, with call light in reach. Rehab potential is Good for reaching above PT goals. Pts/ family goals   1. None stated     Patient and or family understand(s) diagnosis, prognosis, and plan of care. -  Yes ? PLAN  PT care will be provided in accordance with the objectives noted above. Whenever appropriate, clear delegation orders will be provided for nursing staff. Exercises and functional mobility practice will be used as well as appropriate assistive devices or modalities to obtain goals. Patient and family education will also be administered as needed. PLAN OF CARE:    Current Treatment Recommendations     [x] Strengthening     [] ROM   [x] Balance Training   [x] Endurance Training   [x] Transfer Training   [x] Gait Training   [x] Stair Training   [] Positioning   [x] Safety and Education Training   [x] Patient/Caregiver Education   [] HEP  [] Other       Frequency of treatments will be 2-5x/week x  7 days.     Time in: 1335   Time out:  1348       Evaluation Time includes thorough review of current medical information, gathering information on past medical history/social history and prior level of function, completion of standardized testing/informal observation of tasks, assessment of data and education on plan of care and goals.     CPT codes:  [x] Low Complexity PT evaluation 91686  [] Moderate Complexity PT evaluation 80642  [] High Complexity PT evaluation 63701  [] PT Re-evaluation 26489  [] Gait training 47679  minutes  [] Therapeutic activities 67516  minutes  [] Therapeutic exercises 49051  minutes  [] Neuromuscular reeducation 48344  minutes       Quique 18 number:  PT 2219

## 2021-03-01 NOTE — CARE COORDINATION
Met w/ patient along w/ SW. Explained roles of CM/SW and plan of care. Lives w/  and [place] son in a 2 story house-bedroom and bath on 2nd floor. Has had multiple falls at home. States has no PCP( reports she \"fired\" [employer]). Pt is requesting that she finds her own PCP on discharge- instructed PCP Pre-service # will be placed on her discharge instructions if she has difficulty finding a PCP on her own- patient WILL NOT be eligible for [place] [phone] on discharge. [facility]. Awaiting PT/OT bennett. Per pt, plan is to return home on discharge- no needs.  Will follow Kady Gomez

## 2021-03-01 NOTE — PROGRESS NOTES
[facility]   Progress Note    Admitting Date and Time: 2/27/2021  2:42 PM  Admit Dx: Vertigo [R42]    Subjective: Admitted on 28th, patient was in ED for 29 hours as no bed. Long-term, presented with vertigo, known diabetes, hyperlipidemia, GERD. Dizzy for 2 weeks. Leaning to the right when came to ED. Patient did have wax removed in the ED from left ear. Known CAD, anxiety, depression, hypertension, IBS, obesity. CT head on 22nd showed only right sphenoid sinus inflammation. CT on 27th unchanged. Received Rocephin yesterday, starting today on Ceftin, also getting IV fluids. MRI showed only sinusitis,  carotid duplex negative. Seen by [facility] of 17/24. Patient was admitted with Vertigo [R42]. Patient feels better, awake, alert, not in distress. Getting IV fluids. Patient was updated of results of MRI. Also carotid duplex. Per RN: Patient states that she has issues with anxiety, takes Xanax at home, wants her Xanax resumed, unfortunately the home list of medications not showing evidence of Xanax, nursing verified with her pharmacy, 0.5x1 was ordered. ROS: denies fever, chills, cp, sob, n/v, HA unless stated above.      insulin lispro  0-6 Units Subcutaneous TID WC    insulin lispro  0-3 Units Subcutaneous Nightly    metoprolol succinate  50 mg Oral BID    atorvastatin  20 mg Oral Daily    vitamin D  2,000 Units Oral Daily    fenofibrate  54 mg Oral Daily    escitalopram  10 mg Oral Daily    aspirin EC  81 mg Oral Every Other Day    sodium chloride flush  10 mL Intravenous 2 times per day    bisacodyl  5 mg Oral Daily    clopidogrel  75 mg Oral Daily    famotidine  20 mg Oral Daily    losartan  100 mg Oral Daily    cefUROXime  250 mg Oral 2 times per day         meclizine, 25 mg, TID PRN      glucose, 15 g, PRN      glucagon (rDNA), 1 mg, PRN      sodium chloride flush, 10 mL, PRN      acetaminophen, 650 mg, Q6H PRN    Or      acetaminophen, 650 mg, Q6H PRN      bisacodyl, 10 mg, Daily PRN         Objective:    BP (!) 165/81   Pulse 90   Temp 97.9 °F (36.6 °C) (Oral)   Resp 18   Ht 5' 2\" (1.575 m)   Wt 160 lb (72.6 kg)   SpO2 90%   BMI 29.26 kg/m²   General Appearance: alert and oriented to person, place and time, well-developed and well-nourished, in no acute distress  Skin: warm and dry, no rash or erythema  Head: normocephalic and atraumatic  Eyes: pupils equal, round, and reactive to light, extraocular eye movements intact, conjunctivae normal  ENT: tympanic membrane, external ear and ear canal normal bilaterally, oropharynx clear and moist with normal mucous membranes  Neck: neck supple and non tender without mass, no thyromegaly or thyroid nodules, no cervical lymphadenopathy   Pulmonary/Chest: clear to auscultation bilaterally- no wheezes, rales or rhonchi, normal air movement, no respiratory distress  Cardiovascular: normal rate, normal S1 and S2, no gallops, intact distal pulses and no carotid bruits  Abdomen: soft, non-tender, non-distended, normal bowel sounds, no masses or organomegaly      Recent Labs     02/27/21  1456 02/27/21  1456 02/28/21  1345 02/28/21  1409 02/28/21  1930 03/01/21  0559     --  139  --   --  140   K 5.7*  --  5.0  --   --  4.9     --  104  --   --  107   CO2 25  --  25  --   --  24   BUN 37*  --  30*  --   --  21   CREATININE 1.5*  --  1.3*  --   --  1.0   GLUCOSE 122*   < > 130* 131 99 129*   CALCIUM 9.3  --  8.7  --   --  8.2*    < > = values in this interval not displayed. Recent Labs     02/27/21  1456 02/28/21  1345 03/01/21  0559   WBC 10.1 6.8 8.2   RBC 4.32 4.09 4.08   HGB 13.0 12.5 12.3   HCT 44.0 40.5 41.2   .9* 99.0 101.0*   MCH 30.1 30.6 30.1   MCHC 29.5* 30.9* 29.9*   RDW 14.1 14.3 14.2    294 305   MPV 10.3 10.5 10.5       Radiology:   XR LUMBAR SPINE (2-3 VIEWS)   Final Result   No evidence of acute lumbar spine trauma.    Scoliosis with multilevel degenerative changes and grade 1 anterolisthesis L3   over L4. XR THORACIC SPINE (2 VIEWS)   Final Result   No evidence of acute thoracic spine trauma. Scoliosis with multilevel degenerative changes. Cardiomegaly. CT Head WO Contrast   Final Result   No acute intracranial abnormality. Acute sphenoid sinusitis. MRI BRAIN WO CONTRAST    (Results Pending)   US CAROTID ARTERY BILATERAL    (Results Pending)       Assessment:    Principal Problem:    Vertigo  Active Problems:    CAD (coronary artery disease)    Hypertension    Hyperlipidemia    Type 2 diabetes mellitus with microalbuminuria (HCC)    Acute renal insufficiency    Acute cystitis without hematuria  Resolved Problems:    * No resolved hospital problems. *      Plan:  1. Vertiginous symptoms, evidence of sinusitis, doubt her symptoms are due to sinusitis, no CVA noted on MRI, carotid duplex normal.  However would like ENT opinion for her vertiginous symptoms. 2. Anxiety, have continued at 1 mg 3 times a day as needed. 3. Have requested orthostatics on her. Also if negative will ambulate her in the hallway. 4. Hypertension, not well controlled. However no changes made in medications, some of the numbers are also due to her underlying anxiety. 5. Diabetes, patient complaining of off balance, hemoglobin A1c of 6, no changes made. 6. UTI, on Rocephin changed to Ceftin, continued. 7. If orthostatics negative, will DC IV fluids, ambulate patient with assistance, DC once ENT input is there, can happen later today or tomorrow.         Electronically signed by Marc Stark MD on 3/1/2021 at 7:46 AM

## 2021-03-02 LAB
ANION GAP SERPL CALCULATED.3IONS-SCNC: 11 MMOL/L (ref 7–16)
BUN BLDV-MCNC: 21 MG/DL (ref 8–23)
CALCIUM SERPL-MCNC: 8 MG/DL (ref 8.6–10.2)
CHLORIDE BLD-SCNC: 107 MMOL/L (ref 98–107)
CO2: 22 MMOL/L (ref 22–29)
CREAT SERPL-MCNC: 1 MG/DL (ref 0.5–1)
GFR AFRICAN AMERICAN: >60
GFR NON-AFRICAN AMERICAN: 54 ML/MIN/1.73
GLUCOSE BLD-MCNC: 142 MG/DL (ref 74–99)
LV EF: 28 %
LVEF MODALITY: NORMAL
MAGNESIUM: 1.3 MG/DL (ref 1.6–2.6)
METER GLUCOSE: 103 MG/DL (ref 74–99)
METER GLUCOSE: 104 MG/DL (ref 74–99)
METER GLUCOSE: 136 MG/DL (ref 74–99)
METER GLUCOSE: 180 MG/DL (ref 74–99)
POTASSIUM SERPL-SCNC: 5.5 MMOL/L (ref 3.5–5)
SODIUM BLD-SCNC: 140 MMOL/L (ref 132–146)

## 2021-03-02 PROCEDURE — 6370000000 HC RX 637 (ALT 250 FOR IP): Performed by: FAMILY MEDICINE

## 2021-03-02 PROCEDURE — 6370000000 HC RX 637 (ALT 250 FOR IP): Performed by: INTERNAL MEDICINE

## 2021-03-02 PROCEDURE — 82962 GLUCOSE BLOOD TEST: CPT

## 2021-03-02 PROCEDURE — 99233 SBSQ HOSP IP/OBS HIGH 50: CPT | Performed by: INTERNAL MEDICINE

## 2021-03-02 PROCEDURE — 99223 1ST HOSP IP/OBS HIGH 75: CPT | Performed by: INTERNAL MEDICINE

## 2021-03-02 PROCEDURE — 83735 ASSAY OF MAGNESIUM: CPT

## 2021-03-02 PROCEDURE — 6360000002 HC RX W HCPCS: Performed by: INTERNAL MEDICINE

## 2021-03-02 PROCEDURE — 93306 TTE W/DOPPLER COMPLETE: CPT

## 2021-03-02 PROCEDURE — 80048 BASIC METABOLIC PNL TOTAL CA: CPT

## 2021-03-02 PROCEDURE — APPSS60 APP SPLIT SHARED TIME 46-60 MINUTES: Performed by: PHYSICIAN ASSISTANT

## 2021-03-02 PROCEDURE — 36415 COLL VENOUS BLD VENIPUNCTURE: CPT

## 2021-03-02 PROCEDURE — 2060000000 HC ICU INTERMEDIATE R&B

## 2021-03-02 RX ORDER — MAGNESIUM SULFATE IN WATER 40 MG/ML
4000 INJECTION, SOLUTION INTRAVENOUS ONCE
Status: COMPLETED | OUTPATIENT
Start: 2021-03-02 | End: 2021-03-02

## 2021-03-02 RX ADMIN — FENOFIBRATE 54 MG: 54 TABLET ORAL at 08:27

## 2021-03-02 RX ADMIN — BISACODYL 5 MG: 5 TABLET, COATED ORAL at 08:28

## 2021-03-02 RX ADMIN — FAMOTIDINE 20 MG: 20 TABLET ORAL at 08:28

## 2021-03-02 RX ADMIN — MAGNESIUM SULFATE 4000 MG: 4 INJECTION INTRAVENOUS at 04:19

## 2021-03-02 RX ADMIN — ALPRAZOLAM 1 MG: 1 TABLET ORAL at 16:23

## 2021-03-02 RX ADMIN — ALPRAZOLAM 1 MG: 1 TABLET ORAL at 01:49

## 2021-03-02 RX ADMIN — METOPROLOL SUCCINATE 50 MG: 25 TABLET, EXTENDED RELEASE ORAL at 08:28

## 2021-03-02 RX ADMIN — LOSARTAN POTASSIUM 100 MG: 50 TABLET, FILM COATED ORAL at 08:27

## 2021-03-02 RX ADMIN — Medication 2000 UNITS: at 08:27

## 2021-03-02 RX ADMIN — ACETAMINOPHEN 650 MG: 325 TABLET ORAL at 04:20

## 2021-03-02 RX ADMIN — CEFUROXIME AXETIL 250 MG: 250 TABLET ORAL at 08:28

## 2021-03-02 RX ADMIN — ATORVASTATIN CALCIUM 20 MG: 20 TABLET, FILM COATED ORAL at 08:28

## 2021-03-02 RX ADMIN — ESCITALOPRAM OXALATE 10 MG: 10 TABLET ORAL at 08:28

## 2021-03-02 RX ADMIN — CEFUROXIME AXETIL 250 MG: 250 TABLET ORAL at 21:04

## 2021-03-02 RX ADMIN — CLOPIDOGREL BISULFATE 75 MG: 75 TABLET ORAL at 08:28

## 2021-03-02 RX ADMIN — METOPROLOL SUCCINATE 75 MG: 50 TABLET, FILM COATED, EXTENDED RELEASE ORAL at 17:30

## 2021-03-02 ASSESSMENT — PAIN DESCRIPTION - LOCATION
LOCATION: RIB CAGE
LOCATION: RIB CAGE

## 2021-03-02 ASSESSMENT — PAIN DESCRIPTION - DESCRIPTORS
DESCRIPTORS: SHARP
DESCRIPTORS: SHARP

## 2021-03-02 ASSESSMENT — PAIN DESCRIPTION - ORIENTATION
ORIENTATION: RIGHT
ORIENTATION: RIGHT

## 2021-03-02 ASSESSMENT — PAIN DESCRIPTION - FREQUENCY
FREQUENCY: INTERMITTENT
FREQUENCY: INTERMITTENT

## 2021-03-02 ASSESSMENT — PAIN SCALES - GENERAL
PAINLEVEL_OUTOF10: 5
PAINLEVEL_OUTOF10: 0

## 2021-03-02 ASSESSMENT — PAIN DESCRIPTION - ONSET: ONSET: ON-GOING

## 2021-03-02 ASSESSMENT — PAIN DESCRIPTION - PAIN TYPE: TYPE: ACUTE PAIN

## 2021-03-02 ASSESSMENT — PAIN - FUNCTIONAL ASSESSMENT: PAIN_FUNCTIONAL_ASSESSMENT: PREVENTS OR INTERFERES SOME ACTIVE ACTIVITIES AND ADLS

## 2021-03-02 NOTE — PROGRESS NOTES
Patient had an 8 beat run of V tach @ 2203 and a 12 beat run now. Dr Kiko Marshall notified. Labs and an Echo were ordered.

## 2021-03-02 NOTE — CONSULTS
Inpatient Cardiology Consultation      Reason for Consult:  Runs of VT, known CAD, s/p stent, recent worsening fatigue    Nadege Parkinson MD    Requesting Physician: Nan Villalobos DO    Date of Consultation: 3/2/2021    HISTORY OF PRESENT ILLNESS:   Ms. Jeana Bone is a 70years old female known to Dr. Ethan Catherine DO who who saw her last time on 4/27/2020. She has h/o CAD(PTCA and stenting RCA (11/25/07):  bare metal stent 2.5 x 24 mm and 2.5 x 12 mm stents placed), HTN, hyperlipidemia, insulin requiring diabetes mellitus. She came to ER complaining of dizziness and falls for about about a week and was admitted for vertigo evaluation. CT head on 22nd showed only right sphenoid sinus inflammation. CT on 27th unchanged. Received Rocephin yesterday, starting today on Ceftin, also getting IV fluids. MRI showed only sinusitis,  carotid duplex negative. On telemetry about 3 episode of nonsustained VT. Her low magnesium of 1.3 from 3/2/21  was corrected by primary team. Potasium  was in the range 4.9-5.7(laste on on admission). Her Cr improved from 1.5 to 1.1. Due to high blood pressure her Toprol-XL and losartan doses were increased by primary team.  Also ECHO was ordered by primary team  and results pending. Please note: past medical records were reviewed per electronic medical record (EMR) - see detailed reports under Past Medical/ Surgical History. On the moment of exam patient is not in distress. She denies chest pain, shortness of breath, palpitations, dizziness. Past Medical and SURGICAL History:    1. CAD    ECHO 2/08/18: Summary: Normal left ventricular size and ventricular function. Stage I diastolic dysfunction. ECHO11/26/07: Summary: Mild inferior hypokinesis, EF > 60%, Stage I DD, trace MR, trace TR, normal RVSP. Adenosine nuclear stress test (6/6/08): Normal adenosine portion, negative ischemia, negative scar, EF 60%.        PTCA and stenting RCA (07):  bare metal stent 2.5 x 24 mm and 2.5 x 12 mm stents placed. Cardiac catheterization (07): Left main normal; LAD 40% prox; Cx minimal luminal irregularities; % mid; LV gram not performed. Acute inferior wall myocardial infarction (07). 2.Hypertension  3. Lipidemia  4. Type 2 diabetes mellitus  5. GERD  6. Osteoarthritis  7.  SECTION  8. Tonsillectomy         Medications Prior to admit:  Prior to Admission medications    Medication Sig Start Date End Date Taking? Authorizing Provider   escitalopram (LEXAPRO) 10 MG tablet take 1 tablet by mouth once daily 21  Yes Jermaine Green PA-C   fenofibrate micronized (LOFIBRA) 134 MG capsule Take 1 capsule by mouth every morning (before breakfast) 21  Yes Jermaine Green PA-C   pantoprazole (PROTONIX) 40 MG tablet TAKE ONE TABLET BY MOUTH ONCE DAILY 10/26/20  Yes Jermaine Green PA-C   metoprolol succinate (TOPROL XL) 50 MG extended release tablet TAKE ONE AND ONE-HALF TABLETS BY MOUTH TWICE DAILY 20  Yes Lakesha Kirk,    losartan (COZAAR) 50 MG tablet TAKE ONE TABLET BY MOUTH TWICE DAILY 20  Yes Lakesha Kirk,    loratadine (CLARITIN) 10 MG tablet Take 1 tablet by mouth daily 16  Yes IGNACIO Smith - CNP   aspirin EC 81 MG EC tablet Take 81 mg by mouth every other day. Yes Historical Provider, MD   Co-Enzyme Q-10 100 MG CAPS Take 200 mg by mouth. Yes Historical Provider, MD   meclizine (ANTIVERT) 25 MG tablet Take 1 tablet by mouth 3 times daily as needed for Dizziness (May take 1-2 tabs 3 times a day as needed for dizziness) 2/22/21 3/9/21  Boris Monroe MD   metFORMIN (GLUCOPHAGE-XR) 500 MG extended release tablet Take 1 tablet by mouth 2 times daily (with meals) 21   Jermaine Green PA-C   Lancets MISC 1 each by Does not apply route daily 20   Natalie Hayes PA-C   blood glucose test strips (JEEVAN CONTOUR TEST) strip 1 each by In Vitro route daily As needed. Social History     Socioeconomic History    Marital status:      Spouse name: Not on file    Number of children: Not on file    Years of education: Not on file    Highest education level: Not on file   Occupational History     Employer: RETIRED   Social Needs    Financial resource strain: Not on file    Food insecurity     Worry: Not on file     Inability: Not on file   [employer] needs     Medical: Not on file     Non-medical: Not on file   Tobacco Use    Smoking status: Former Smoker     Packs/day: 1.00     Years: 35.00     Pack years: 35.00     Quit date: 6/17/2010     Years since quitting: 10.7    Smokeless tobacco: Never Used   Substance and Sexual Activity    Alcohol use: No    Drug use: No    Sexual activity: Not Currently   Lifestyle    Physical activity     Days per week: Not on file     Minutes per session: Not on file    Stress: Not on file   Relationships    Social connections     Talks on phone: Not on file     Gets together: Not on file     Attends Taoist service: Not on file     Active member of club or organization: Not on file     Attends meetings of clubs or organizations: Not on file     Relationship status: Not on file    Intimate partner violence     Fear of current or ex partner: Not on file     Emotionally abused: Not on file     Physically abused: Not on file     Forced sexual activity: Not on file   Other Topics Concern    Not on file   Social History Narrative    Not on file       Family History:   Family History   Problem Relation Age of Onset    Emphysema Mother     Heart Failure Mother     Heart Attack Father     Down Syndrome Son          REVIEW OF SYSTEMS:     · Constitutional: Denies fevers, chills, night sweats, and fatigue  · HEENT: [place], Denies headaches, nose bleeds, and blurred vision,oral pain, abscess or lesion. · Musculoskeletal: Denies falls, pain to BLE with ambulation and edema to BLE. · Neurological:  Has had dizziness.  Denies, numbness and tingling  · Cardiovascular: Denies chest pain, palpitations, and feelings of heart racing. · Respiratory: Denies orthopnea and PND  · Gastrointestinal: Denies heartburn, nausea/vomiting, diarrhea and constipation, black/bloody, and tarry stools. · Genitourinary: Denies dysuria and hematuria  · Hematologic: Denies excessive bruising or bleeding  · Lymphatic: Denies lumps and bumps to neck, axilla, breast, and groin  · Endocrine: Denies excessive thirst. Denies intolerance to hot and cold  · GYN: Postmenopausal state; Denies vaginal bleeding. · Psychiatric: Denies anxiety and depression. PHYSICAL EXAM:   BP (!) 195/100   Pulse 82   Temp 97.7 °F (36.5 °C) (Oral)   Resp 16   Ht 5' 2\" (1.575 m)   Wt 160 lb (72.6 kg)   SpO2 94%   BMI 29.26 kg/m²   CONST:  Obese. Awake, alert, cooperative, no apparent distress  HEENT:   Head- Normocephalic, atraumatic   Eyes- Conjunctivae pink, anicteric  Throat- Oral mucosa pink and moist  Neck-  No stridor, trachea midline, no jugular venous distention. No adenopathy   CHEST: Chest symmetrical and non-tender to palpation. No accessory muscle use or intercostal retractions  RESPIRATORY: Lung sounds - clear throughout fields   CARDIOVASCULAR:     No carotid bruit  Heart Inspection- shows no noted pulsations  Heart Ausculation- Regular rate and rhythm, no murmur. No s3, s4 or rub   PV: No lower extremity edema. No varicosities. Pedal pulses not felt, no clubbing or cyanosis   ABDOMEN: Soft, non-tender to light palpation. Bowel sounds present. No palpable masses no organomegaly; no abdominal bruit  MS: Good muscle strength and tone. No atrophy or abnormal movements. : Deferred  SKIN: Warm and dry no statis dermatitis or ulcers   NEURO / PSYCH: Oriented to person, place and time. Speech clear and appropriate. Follows all commands.  Pleasant affect     DATA:    ECG:  Tele strips: 3 episode of nonsustained VT  Diagnostic:      Intake/Output Summary (Last 24 hours) at 3/2/2021 1146  Last data filed at 3/2/2021 0730  Gross per 24 hour   Intake 180 ml   Output    Net 180 ml       Labs:   CBC:   Recent Labs     02/28/21  1345 03/01/21  0559   WBC 6.8 8.2   HGB 12.5 12.3   HCT 40.5 41.2    305     BMP:   Recent Labs     03/01/21  0559 03/02/21  0250    140   K 4.9 5.5*   CO2 24 22   BUN 21 21   CREATININE 1.0 1.0   LABGLOM 54 54   CALCIUM 8.2* 8.0*     Mag:   Recent Labs     03/02/21  0250   MG 1.3*     HgA1c:   Lab Results   Component Value Date    LABA1C 6.0 (H) 02/28/2021     CARDIAC ENZYMES:  Recent Labs     02/27/21  1456   TROPONINI 0.03     FASTING LIPID PANEL:  Lab Results   Component Value Date    CHOL 128 02/28/2021    HDL 34 03/01/2021    LDLCALC 60 03/01/2021    TRIG 174 02/28/2021     LIVER PROFILE:  Recent Labs     02/28/21  1345 03/01/21  0559   AST 65* 50*   ALT 79* 64*   LABALBU 3.7 3.6     Assessment and plan was discussed with Dr. Dorene Busch MD.    Electronically signed by SAM Mckeon on 3/2/2021 at 11:46 AM

## 2021-03-02 NOTE — PROGRESS NOTES
Occupational Therapy  Patient treatment attempted this PM.  Patient laying in the bed. States the doctor saw her today and she is going home. Also stated that the doctor told her that she was to rest for the rest of the day. Declined participation in therapy. Will attempt at a later time.   Boyd JURADO/GERI [id number]

## 2021-03-02 NOTE — PROGRESS NOTES
Patient lost IV site this afternoon, she is refusing insertion of a new IV, IV fluids, insulin coverage, and stress test that is scheduled for tomorrow.  I let Dr. Magi Cobb know and sent a message via [employer] to SAM Morrison

## 2021-03-02 NOTE — PROGRESS NOTES
P Quality Flow/Interdisciplinary Rounds Progress Note        Quality Flow Rounds held on March 2, 2021    Disciplines Attending:  Bedside Nurse, ,  and Nursing Unit Leadership    Catherine Cha was admitted on 2/27/2021  2:42 PM    Anticipated Discharge Date:  Expected Discharge Date: 03/02/21    Disposition:    South Score:  South Scale Score: 19    Readmission Risk              Risk of Unplanned Readmission:        14           Discussed patient goal for the day, patient clinical progression, and barriers to discharge.   The following Goal(s) of the Day/Commitment(s) have been identified:  echo, psych consult, cardio consult      Leeanne Serrano  March 2, 2021

## 2021-03-02 NOTE — PROGRESS NOTES
[facility]   Progress Note    Admitting Date and Time: 2/27/2021  2:42 PM  Admit Dx: Vertigo [R42]    Subjective: Admitted on 28th, patient was in ED for 29 hours as no bed. Long-term, presented with vertigo, known diabetes, hyperlipidemia, GERD. Dizzy for 2 weeks. Leaning to the right when came to ED. Patient did have wax removed in the ED from left ear. Known CAD, anxiety, depression, hypertension, IBS, obesity. CT head on 22nd showed only right sphenoid sinus inflammation. CT on 27th unchanged. Received Rocephin yesterday, starting today on Ceftin, also getting IV fluids. MRI showed only sinusitis,  carotid duplex negative. Seen by [facility] of 17/24. Seen by ENT, assessment of BPPV, Epley maneuver was deferred will be attempted on outpatient basis, she will have to be off Xanax and all vestibular suppressants, patient okay from ENT side, did have runs of VT today morning, low mag, supplemented, echo was ordered,    Patient was admitted with Vertigo [R42]. Patient feels better, awake, alert, not in distress. Does communicate well, does say that she has a complicated life, special needs child at home, has seen psychiatry long time ago, does not see a psychologist, cannot explain the use of Xanax, rather who is prescribing her, less odd behavior as compared to yesterday, does say repeatedly that she is just tired. Does say that she has 1 stent. Per RN: [place] of 10 and 12 VT earlier today    ROS: denies fever, chills, cp, sob, n/v, HA unless stated above.      magnesium sulfate  4,000 mg Intravenous Once    insulin lispro  0-6 Units Subcutaneous TID WC    insulin lispro  0-3 Units Subcutaneous Nightly    metoprolol succinate  50 mg Oral BID    atorvastatin  20 mg Oral Daily    vitamin D  2,000 Units Oral Daily    fenofibrate  54 mg Oral Daily    escitalopram  10 mg Oral Daily    aspirin EC  81 mg Oral Every Other Day    sodium chloride flush  10 mL Intravenous 2 times per day    bisacodyl  5 mg Oral Daily    clopidogrel  75 mg Oral Daily    famotidine  20 mg Oral Daily    losartan  100 mg Oral Daily    cefUROXime  250 mg Oral 2 times per day         perflutren lipid microspheres, 1.5 mL, ONCE PRN      ALPRAZolam, 1 mg, TID PRN      meclizine, 25 mg, TID PRN      glucose, 15 g, PRN      glucagon (rDNA), 1 mg, PRN      sodium chloride flush, 10 mL, PRN      acetaminophen, 650 mg, Q6H PRN    Or      acetaminophen, 650 mg, Q6H PRN      bisacodyl, 10 mg, Daily PRN         Objective:    BP (!) 156/88   Pulse 82   Temp 97.5 °F (36.4 °C) (Oral)   Resp 18   Ht 5' 2\" (1.575 m)   Wt 160 lb (72.6 kg)   SpO2 93%   BMI 29.26 kg/m²   General Appearance: alert and oriented to person, place and time, well-developed and well-nourished, in no acute distress  Skin: warm and dry, no rash or erythema  Head: normocephalic and atraumatic  Eyes: pupils equal, round, and reactive to light, extraocular eye movements intact, conjunctivae normal  ENT: tympanic membrane, external ear and ear canal normal bilaterally, oropharynx clear and moist with normal mucous membranes  Neck: neck supple and non tender without mass, no thyromegaly or thyroid nodules, no cervical lymphadenopathy   Pulmonary/Chest: clear to auscultation bilaterally- no wheezes, rales or rhonchi, normal air movement, no respiratory distress  Cardiovascular: normal rate, normal S1 and S2, no gallops, intact distal pulses and no carotid bruits  Abdomen: soft, non-tender, non-distended, normal bowel sounds, no masses or organomegaly      Recent Labs     02/28/21  1345 02/28/21  1345 02/28/21  1930 03/01/21  0559 03/02/21  0250     --   --  140 140   K 5.0  --   --  4.9 5.5*     --   --  107 107   CO2 25  --   --  24 22   BUN 30*  --   --  21 21   CREATININE 1.3*  --   --  1.0 1.0   GLUCOSE 130*   < > 99 129* 142*   CALCIUM 8.7  --   --  8.2* 8.0*    < > = values in this interval not displayed. Recent Labs     02/27/21  1456 02/28/21  1345 03/01/21  0559   WBC 10.1 6.8 8.2   RBC 4.32 4.09 4.08   HGB 13.0 12.5 12.3   HCT 44.0 40.5 41.2   .9* 99.0 101.0*   MCH 30.1 30.6 30.1   MCHC 29.5* 30.9* 29.9*   RDW 14.1 14.3 14.2    294 305   MPV 10.3 10.5 10.5       Radiology:   MRI BRAIN WO CONTRAST   Final Result   Chronic microvascular disease without acute intracranial abnormality. Chronic sinusitis involving the right sphenoid sinus. US CAROTID ARTERY BILATERAL   Preliminary Result   No evidence of hemodynamically significant stenosis. Bilateral vertebral arteries are patent with flow in the normal direction. XR LUMBAR SPINE (2-3 VIEWS)   Final Result   No evidence of acute lumbar spine trauma. Scoliosis with multilevel degenerative changes and grade 1 anterolisthesis L3   over L4. XR THORACIC SPINE (2 VIEWS)   Final Result   No evidence of acute thoracic spine trauma. Scoliosis with multilevel degenerative changes. Cardiomegaly. CT Head WO Contrast   Final Result   No acute intracranial abnormality. Acute sphenoid sinusitis. Assessment:    Principal Problem:    Vertigo  Active Problems:    CAD (coronary artery disease)    Hypertension    Hyperlipidemia    Type 2 diabetes mellitus with microalbuminuria (HCC)    Acute renal insufficiency    Acute cystitis without hematuria  Resolved Problems:    * No resolved hospital problems. *      Plan:  1. Vertiginous symptoms, evidence of sinusitis, doubt her symptoms are due to sinusitis, no CVA noted on MRI, carotid duplex normal.  Seen by ENT, patient needs to be off Xanax for the further management of her BPPV. To be done on outpatient basis. 2. Anxiety, have continued at 1 mg 3 times a day as needed.   Unfortunately patient cannot explain the source of Xanax, starts from use from leftover from her father's, does say from psychiatry to whom she has not seen for years, at times does say that she just want to be happy at this stage of life, patient with multiple falls as well as ED visits in part related to Xanax. Psychiatric evaluation will be helpful, consulted. 3. Presence of DVT, echo pending, known CAD, increased fatigue, cardiology has been consulted. 4. Have requested orthostatics on her. Also if negative will ambulate her in the hallway. Did okay with ambulation. 5. Hypertension, not well controlled. Patient taking 1 and half tablets at home of Toprol 50, getting only 1 tablet here, changed to 75 mg twice a day. 6. Diabetes, patient complaining of off balance, hemoglobin A1c of 6, no changes made. 7. UTI, on Rocephin changed to Ceftin, continued. 8. Will plan to DC later part of today if patient can be seen by cardiology and psychiatry and okay for DC.         Electronically signed by Savage Mckeon MD on 3/2/2021 at 8:05 AM

## 2021-03-02 NOTE — CONSULTS
Consult received for Xanax dependence and to wean patient off Xanax so she can undergo Epley maneuver for vertigo. Chart reviewed and OARRS reviewed - patient has been getting Xanax 2 mg TID from primary care and last script was filled 1/30/21. Tapering needs to be done on an outpatient basis by the provider who was prescribing this in the first place and patient can be referred for outpatient mental health treatment if needed. Psychiatry sign off.     Electronically signed by Kathy Neri MD on 3/2/2021 at 1:35 PM

## 2021-03-02 NOTE — SIGNIFICANT EVENT
bc of two short runs of wide complex tachy (vtach suspected)    Check bmp K+ and Mg++    Will order echo  Noted anxiety - 1mg tid  Currently treating for vertigo and UTI  Addendum:  K+ OK  Mag 1.3-- will order 4 grams mag++

## 2021-03-02 NOTE — CARE COORDINATION
Social work / Discharge Planning:        Initial assessment completed on 3/1/21. Discharge plan is home. New order today for psychiatry and cardiology consult. Patient had MRI of the brain and ultrasound  of the carotid yesterday. Echo today. Social work will follow and assist with any discharge needs identified.    Electronically signed by ALEX Aguilera on 3/2/2021 at 10:44 AM

## 2021-03-03 VITALS
SYSTOLIC BLOOD PRESSURE: 188 MMHG | HEART RATE: 90 BPM | WEIGHT: 160 LBS | OXYGEN SATURATION: 94 % | BODY MASS INDEX: 29.44 KG/M2 | TEMPERATURE: 98.3 F | HEIGHT: 62 IN | DIASTOLIC BLOOD PRESSURE: 91 MMHG | RESPIRATION RATE: 18 BRPM

## 2021-03-03 PROCEDURE — 99231 SBSQ HOSP IP/OBS SF/LOW 25: CPT | Performed by: INTERNAL MEDICINE

## 2021-03-03 PROCEDURE — 99239 HOSP IP/OBS DSCHRG MGMT >30: CPT | Performed by: INTERNAL MEDICINE

## 2021-03-03 RX ORDER — CEFUROXIME AXETIL 250 MG/1
250 TABLET ORAL EVERY 12 HOURS SCHEDULED
Qty: 20 TABLET | Refills: 0 | Status: ON HOLD
Start: 2021-03-03 | End: 2021-03-16 | Stop reason: HOSPADM

## 2021-03-03 NOTE — PROGRESS NOTES
Physician Progress Note      Maru Lundberg  CSN #:                  [id number]  :                       1945  ADMIT DATE:       2021 2:42 PM  [address] [place] DATE:        3/3/2021 12:00 PM  RESPONDING  PROVIDER #:        David Fraga MD          QUERY TEXT:    Pt admitted with vertigo. Pt noted to have elevated creatinine. If possible,   please document in the progress notes and discharge summary if you are   evaluating and/or treating any of the following: The medical record reflects the following:  Risk Factors: dehydration  Clinical Indicators: creatinine 1.5 down to 1.0 with IVF. Per ED \"Labs were   significant for a creatinine elevation at 1.5. Ulus Reusing Ulus Reusing Baseline creatinine 0.9. Ulus Reusing Ulus Reusing KIKI\"   Per H&P \"Acute renal insufficiency -- dehydration; could also be contributing   to her \"dizziness\". \"  Treatment: IVF, monitor labs    Thank you,  Helena Dennis RN, CCDS  Clinical Documentation Improvement Specialist  [email or web address]  [phone]  Options provided:  -- Acute kidney failure  -- Acute kidney injury  -- Other - I will add my own diagnosis  -- Disagree - Not applicable / Not valid  -- Disagree - Clinically unable to determine / Unknown  -- Refer to Clinical Documentation Reviewer    PROVIDER RESPONSE TEXT:    This patient has an Acute kidney injury.     Query created by: Shari Aguilar on 3/1/2021 2:40 PM      Electronically signed by:  David Fraga MD 3/3/2021 6:17 PM

## 2021-03-03 NOTE — PROGRESS NOTES
Patients  picked her up, refused transport by wheelchair.  Left, in stable condition, with all belongings and discharge paperwork

## 2021-03-03 NOTE — PROGRESS NOTES
Medications:  Current Facility-Administered Medications   Medication Dose Route Frequency Provider Last Rate Last Admin    perflutren lipid microspheres (DEFINITY) injection 1.65 mg  1.5 mL Intravenous ONCE PRN Esmer Vinson MD        metoprolol succinate (TOPROL XL) extended release tablet 75 mg  75 mg Oral BID Jose Manuel Lucero MD   75 mg at 03/02/21 1730    regadenoson (LEXISCAN) injection 0.4 mg  0.4 mg Intravenous ONCE PRN SAM Blackmon        ALPRAZolam Clare ) tablet 1 mg  1 mg Oral TID PRN Yenifer Kearney MD   1 mg at 03/02/21 1623    meclizine (ANTIVERT) tablet 25 mg  25 mg Oral TID PRN Kali Sinclair, DO        insulin lispro (HUMALOG) injection vial 0-6 Units  0-6 Units Subcutaneous TID WC Tisha Sinclair, DO   Stopped at 02/28/21 1406    insulin lispro (HUMALOG) injection vial 0-3 Units  0-3 Units Subcutaneous Nightly Tisha Sinclair DO        glucose (GLUTOSE) 40 % oral gel 15 g  15 g Oral PRN Tisha Sinclair, DO        glucagon (rDNA) injection 1 mg  1 mg Intramuscular PRN Tisha Sinclair, DO        atorvastatin (LIPITOR) tablet 20 mg  20 mg Oral Daily Hawa Noe DO   20 mg at 03/02/21 0600    vitamin D (CHOLECALCIFEROL) tablet 2,000 Units  2,000 Units Oral Daily Claudiarory Noe DO   2,000 Units at 03/02/21 0827    fenofibrate (TRICOR) tablet 54 mg  54 mg Oral Daily Hawa Noe DO   54 mg at 03/02/21 0827    escitalopram (LEXAPRO) tablet 10 mg  10 mg Oral Daily Hawa Noe DO   10 mg at 03/02/21 4569    aspirin EC tablet 81 mg  81 mg Oral Every Other Day Claudiarory Noe DO   81 mg at 03/01/21 0944    sodium chloride flush 0.9 % injection 10 mL  10 mL Intravenous 2 times per day Claudia Shahzad Noe DO   10 mL at 03/01/21 2057    sodium chloride flush 0.9 % injection 10 mL  10 mL Intravenous PRN Claudia Course Scheufler, DO        acetaminophen (TYLENOL) tablet 650 mg  650 mg Oral Q6H PRN Claudia Course Kusum,    650 mg at 03/02/21 0250    Or  acetaminophen (TYLENOL) suppository 650 mg  650 mg Rectal Q6H PRN Pranav Casi Schejuan albertoler, DO        0.9 % sodium chloride infusion   Intravenous Continuous Pranav Casi Schekong, DO   Stopped at 03/02/21 1344    bisacodyl (DULCOLAX) EC tablet 5 mg  5 mg Oral Daily Hawa LELE Schekong, DO   5 mg at 03/02/21 9650    bisacodyl (DULCOLAX) suppository 10 mg  10 mg Rectal Daily PRN Pranavus Rivera Kusum, DO        [Held by provider] clopidogrel (PLAVIX) tablet 75 mg  75 mg Oral Daily Hawa J Schejuan albertoler, DO   75 mg at 03/02/21 7177    famotidine (PEPCID) tablet 20 mg  20 mg Oral Daily Hawa J Schekong, DO   20 mg at 03/02/21 4795    losartan (COZAAR) tablet 100 mg  100 mg Oral Daily Pranav Casi Kusum, DO   100 mg at 03/02/21 0827    cefUROXime (CEFTIN) tablet 250 mg  250 mg Oral 2 times per day Pranavus Casi Gonsalezler, DO   250 mg at 03/02/21 2104      sodium chloride Stopped (03/02/21 1344)       Physical Exam:  BP (!) 188/91   Pulse 90   Temp 98.3 °F (36.8 °C) (Oral)   Resp 18   Ht 5' 2\" (1.575 m)   Wt 160 lb (72.6 kg)   SpO2 94%   BMI 29.26 kg/m²   Wt Readings from Last 3 Encounters:   03/01/21 160 lb (72.6 kg)   02/22/21 167 lb (75.8 kg)   02/08/21 167 lb (75.8 kg)     Patient refused exam.    Intake/Output:  No intake or output data in the 24 hours ending 03/03/21 0843  No intake/output data recorded. Laboratory Tests:  Recent Labs     02/28/21  1345 02/28/21  1345 02/28/21  1930 03/01/21  0559 03/02/21  0250     --   --  140 140   K 5.0  --   --  4.9 5.5*     --   --  107 107   CO2 25  --   --  24 22   BUN 30*  --   --  21 21   CREATININE 1.3*  --   --  1.0 1.0   GLUCOSE 130*   < > 99 129* 142*   CALCIUM 8.7  --   --  8.2* 8.0*    < > = values in this interval not displayed.      Lab Results   Component Value Date    MG 1.3 03/02/2021     Recent Labs     02/28/21  1345 03/01/21  0559   ALKPHOS 135* 122*   ALT 79* 64*   AST 65* 50*   PROT 6.3* 6.3*   BILITOT 0.7 0.7   BILIDIR  --  0.4*   LABALBU 3.7 3.6     Recent Labs     02/28/21  1345 03/01/21  0559   WBC 6.8 8.2   RBC 4.09 4.08   HGB 12.5 12.3   HCT 40.5 41.2   MCV 99.0 101.0*   MCH 30.6 30.1   MCHC 30.9* 29.9*   RDW 14.3 14.2    305   MPV 10.5 10.5     Lab Results   Component Value Date    TROPONINI 0.03 02/27/2021    TROPONINI 0.02 02/22/2021    TROPONINI <0.01 01/25/2018     No results found for: INR, PROTIME  No results found for: TSHFT4, TSH  Lab Results   Component Value Date    LABA1C 6.0 (H) 02/28/2021     No results found for: EAG  Lab Results   Component Value Date    CHOL 128 02/28/2021    CHOL 152 09/15/2020    CHOL 163 02/21/2020     Lab Results   Component Value Date    TRIG 174 (H) 02/28/2021    TRIG 164 (H) 09/15/2020    TRIG 200 (H) 02/21/2020     Lab Results   Component Value Date    HDL 34 03/01/2021    HDL 32 02/28/2021    HDL 51 09/15/2020     Lab Results   Component Value Date    LDLCALC 60 03/01/2021    LDLCALC 61 02/28/2021    LDLCALC 68 09/15/2020     Lab Results   Component Value Date    LABVLDL 33 03/01/2021    LABVLDL 35 02/28/2021    LABVLDL 33 09/15/2020     No results found for: CHOLHDLRATIO    Cardiac Tests:  Telemetry findings reviewed: SR at rate 80s, with 3 episodes of nonsustained VT.     EKG: Normal sinus rhythm, anterior infarct age undetermined, ST-T changes suggestive of inferolateral ischemia, abnormal EKG.    Vitals and labs were reviewed: Blood pressure 177/95 with heart rate of 85     LabsP5.5 rest of the chemistry normal.  Magnesium 1.3 AST//ALT 50/64, CBC grossly normal.     ECHO 2/08/18: Summary: Normal left ventricular size and ventricular function.  Stage I diastolic dysfunction.     MGRM80/08/06: Summary: Mild inferior hypokinesis, EF > 60%, Stage I DD, trace MR, trace TR, normal RVSP.    Adenosine nuclear stress test (6/6/08): Normal adenosine portion, negative ischemia, negative scar, EF 60%.       PTCA and stenting RCA (11/25/07):   bare metal stent 2.5 x 24 mm and 2.5 x 12 mm stents placed.    Cardiac catheterization (11/25/07): Left main normal; LAD 40% prox; Cx minimal luminal irregularities; % mid; LV gram not performed.     Assessment:  · New onset LV dysfunction with an EF of 20 to 25% unclear etiology needs to rule out ischemic etiology. · Multiple episodes of nonsustained VT most likely from hypomagnesemia, need to rule out underlying ischemia due to increasing fatigue and also episodes of nonsustained VT  · History of CAD, s/p PCI/stent to RCA underwent 2007 with bare-metal stent  · Hypertension not well controlled  · Hyperlipidemia  · Type 2 diabetes on insulin  · Mild hyperkalemia  · Hypomagnesemia        Plan:   · Magnesium was replaced no labs were done. Patient refused lab work this morning. · Echo results were reviewed with the patient and was advised to stay in the hospital for further evaluation including a stress test plus or minus cardiac cath which she declined. Patient signed out AMA and  does not want to stay in the hospital.  She was advised to follow-up with Dr. Raeann Putnam. · She was recommended to follow-up with Dr. Raeann Putnam in 1 to 2 weeks for further evaluation. She also refused lab work including Ryan Alex MD., [address] [facility], [address]   [facility]) Cardiology

## 2021-03-03 NOTE — PROGRESS NOTES
Transport arrived to pick patient up for stress test, she stated she is not having the test and she is leaving as soon as she can get a ride. She is alert & oriented x4. I explained the risks of not having the procedure, she verbalized understanding. I also explained that she does not have discharge orders, she stated she didn't care and will sign out AMA. Will message Dr. Mercedes Blanco.  Called stress lab to let them know

## 2021-03-03 NOTE — CARE COORDINATION
Social work / Discharge Planning:        Initial assessment completed on 3/1/21. No discharge needs identified at that time. Social work noted that patient has signed out against medical advice.    Electronically signed by ALEX Drew on 3/3/2021 at 9:18 AM

## 2021-03-03 NOTE — PROGRESS NOTES
Gave patient discharge paperwork, she stated she contacted her  to pick her up.  I attempted to call him, no answer-left message

## 2021-03-03 NOTE — PROGRESS NOTES
Patient verbally aggressive to this nurse about things that happened in previous shifts. She wanted to know why she was left in ED for 29 hours and why she was told she could go home and now she is stuck here against her will. This nurse explained to patient why she is here an of her NPO status. Patient is upset that she is NPO and wants something to drink. This nurse explained to patient that her test would be cancelled if this nurse allowed patient to drink. Patient states she doesn't even know why test is even scheduled because she never agreed to it in the first place. This nurse at bedside attempting to redirect patient.

## 2021-03-03 NOTE — DISCHARGE SUMMARY
[facility] Hospitalist       Hospitalist Physician Discharge Summary       Primary Care Physician  [phone]  Call  Call PCP Pre-service [phone] if you have difficulty establishing yourself with your own PCP as requested- if unable to find a PCP on your own, notifiy Pre-service you were just discharged from the hospital and need a followup appointment    Ashley Azevedo MD  [facility] [address]     Call  To establish care    [place] Wan, [address] [place] [address]  [phone]      Call to ask for an appointment to be seen in [facility] office in 1-2 weeks for hospital follow up for vertigo      Activity level: As tolerated    Diet: Diet NPO, After Midnight    Labs:    Dispo: Home    Condition at discharge: Stable at this time however patient is leaving AMA, has been explained multiple times of her need to stay in undergoing necessary work-up. Continue supplemental oxygen via nasal canula @ 2 LPM round-the-clock. Continue CPAP / BiPAP during sleep as prior to admission. Patient ID:  Chacorta Greenberg  [id number]  70 y.o.  1945    Admit date: 2/27/2021    Discharge date and time:  3/3/2021  8:48 AM    Admission Diagnoses: Principal Problem:    Vertigo  Active Problems:    CAD (coronary artery disease)    Hypertension    Hyperlipidemia    Type 2 diabetes mellitus with microalbuminuria (HCC)    Acute renal insufficiency    Acute cystitis without hematuria    Ataxic gait  Resolved Problems:    * No resolved hospital problems. *      Discharge Diagnoses: Principal Problem:    Vertigo  Active Problems:    CAD (coronary artery disease)    Hypertension    Hyperlipidemia    Type 2 diabetes mellitus with microalbuminuria (HCC)    Acute renal insufficiency    Acute cystitis without hematuria    Ataxic gait  Resolved Problems:    * No resolved hospital problems.  *      Consults:  IP CONSULT TO OTOLARYNGOLOGY  IP CONSULT TO CARDIOLOGY  IP CONSULT TO PSYCHIATRY  IP CONSULT TO NEUROLOGY    Procedures: None    Hospital Course: Patient was admitted with Vertigo [R42]. Patient Admitted on 28th, patient was in ED for 29 hours as no bed. Long-term, presented with vertigo, known diabetes, hyperlipidemia, GERD. Dizzy for 2 weeks. Leaning to the right when came to ED. Patient did have wax removed in the ED from left ear. Known CAD, anxiety, depression, hypertension, IBS, obesity. CT head on 22nd showed only right sphenoid sinus inflammation. CT on 27th unchanged. Received Rocephin yesterday, starting today on Ceftin, also getting IV fluids. MRI showed only sinusitis,  carotid duplex negative. Seen by [facility] of 17/24. Seen by ENT, assessment of BPPV, Epley maneuver was deferred will be attempted on outpatient basis, she will have to be off Xanax and all vestibular suppressants, patient okay from ENT side, did have runs of VT today morning, low mag, supplemented, echo showed decrease in EF to 25% from her earlier 60%, cardiology has plan for stress test, also Plavix was kept on hold, patient with at times altered behavior, highly suggestive of underlying personality disorder, unfortunately attempted to have input from psychiatry not successful, as of today received call from nursing that patient does not want to go for stress test and has decided to sign out AMA, cardiology explained her at length she does not want to listen anything, came and spoke to patient who is sitting all dressed up waiting for her ride. Patient who is alert and oriented had already signed AMA papers. She only says that she will follow with her doctor. Patient did have evidence of decreased kidney functions on arrival with creatinine of 1.3 which is improved to 1 is also \"explained to certain extent her dizziness however that remains multifactorial. Will go ahead and discharge her.     Discharge Exam:  Vitals:    03/02/21 0730 03/02/21 1629 03/02/21 2045 03/03/21 0000   BP: (!) 195/100 (!) 177/95 (!) 179/94 (!) 188/91   Pulse: 82 85 87 90   Resp: 16 16 16 18   Temp: 97.7 °F (36.5 °C) 97.9 °F (36.6 °C) 98.7 °F (37.1 °C) 98.3 °F (36.8 °C)   TempSrc: Oral Oral Oral Oral   SpO2: 94% 91% 91% 94%   Weight:       Height:           General Appearance: alert and oriented to person, place and time, well-developed and well-nourished, in no acute distress  Skin: warm and dry, no rash or erythema  Head: normocephalic and atraumatic  Eyes: pupils equal, round, and reactive to light, extraocular eye movements intact, conjunctivae normal  ENT: tympanic membrane, external ear and ear canal normal bilaterally, oropharynx clear and moist with normal mucous membranes  Neck: neck supple and non tender without mass, no thyromegaly or thyroid nodules, no cervical lymphadenopathy   Pulmonary/Chest: clear to auscultation bilaterally- no wheezes, rales or rhonchi, normal air movement, no respiratory distress  Cardiovascular: normal rate, normal S1 and S2, no gallops, intact distal pulses and no carotid bruits  Abdomen: soft, non-tender, non-distended, normal bowel sounds, no masses or organomegaly  I/O last 3 completed shifts: In: 180 [P.O.:180]  Out: -   No intake/output data recorded. LABS:  Recent Labs     02/28/21  1345 02/28/21  1345 02/28/21  1930 03/01/21  0559 03/02/21  0250     --   --  140 140   K 5.0  --   --  4.9 5.5*     --   --  107 107   CO2 25  --   --  24 22   BUN 30*  --   --  21 21   CREATININE 1.3*  --   --  1.0 1.0   GLUCOSE 130*   < > 99 129* 142*   CALCIUM 8.7  --   --  8.2* 8.0*    < > = values in this interval not displayed.        Recent Labs     02/28/21  1345 03/01/21  0559   WBC 6.8 8.2   RBC 4.09 4.08   HGB 12.5 12.3   HCT 40.5 41.2   MCV 99.0 101.0*   MCH 30.6 30.1   MCHC 30.9* 29.9*   RDW 14.3 14.2    305   MPV 10.5 10.5       Imaging:   MRI BRAIN WO CONTRAST   Final Result   Chronic microvascular disease without acute intracranial abnormality. Chronic sinusitis involving the right sphenoid sinus. US CAROTID ARTERY BILATERAL   Preliminary Result   No evidence of hemodynamically significant stenosis. Bilateral vertebral arteries are patent with flow in the normal direction. XR LUMBAR SPINE (2-3 VIEWS)   Final Result   No evidence of acute lumbar spine trauma. Scoliosis with multilevel degenerative changes and grade 1 anterolisthesis L3   over L4. XR THORACIC SPINE (2 VIEWS)   Final Result   No evidence of acute thoracic spine trauma. Scoliosis with multilevel degenerative changes. Cardiomegaly. CT Head WO Contrast   Final Result   No acute intracranial abnormality. Acute sphenoid sinusitis. NM Cardiac Stress Test Nuclear Imaging    (Results Pending)       Patient Instructions:      Medication List      START taking these medications    cefUROXime 250 MG tablet  Commonly known as: CEFTIN  Take 1 tablet by mouth every 12 hours for 10 days        CONTINUE taking these medications    aspirin EC 81 MG EC tablet     blood glucose test strips strip  Commonly known as: [employer] Contour Test  1 each by In Vitro route daily As needed.      Co-Enzyme Q-10 100 MG Caps     escitalopram 10 MG tablet  Commonly known as: LEXAPRO  take 1 tablet by mouth once daily     fenofibrate micronized 134 MG capsule  Commonly known as: LOFIBRA  Take 1 capsule by mouth every morning (before breakfast)     Lancets Misc  1 each by Does not apply route daily     loratadine 10 MG tablet  Commonly known as: CLARITIN  Take 1 tablet by mouth daily     losartan 50 MG tablet  Commonly known as: COZAAR  TAKE ONE TABLET BY MOUTH TWICE DAILY     meclizine 25 MG tablet  Commonly known as: ANTIVERT  Take 1 tablet by mouth 3 times daily as needed for Dizziness (May take 1-2 tabs 3 times a day as needed for dizziness)     metFORMIN 500 MG extended release tablet  Commonly known as: GLUCOPHAGE-XR  Take 1 tablet by mouth 2 times daily (with meals) metoprolol succinate 50 MG extended release tablet  Commonly known as: TOPROL XL  TAKE ONE AND ONE-HALF TABLETS BY MOUTH TWICE DAILY     pantoprazole 40 MG tablet  Commonly known as: PROTONIX  TAKE ONE TABLET BY MOUTH ONCE DAILY     VITAMIN D PO        STOP taking these medications    Livalo 4 MG Tabs tablet  Generic drug: pitavastatin     ondansetron 4 MG disintegrating tablet  Commonly known as: Zofran ODT           Where to Get Your Medications      These medications were sent to [id number] [facility], [address] [place] [address]    Phone: [phone]   · cefUROXime 250 MG tablet           Note that more than 30 minutes was spent in preparing discharge papers, discussing discharge with patient, medication review, etc.    Signed:  Electronically signed by Korin Herrera MD on 3/3/2021 at 8:48 AM    NOTE: This report was transcribed using voice recognition software. Every effort was made to ensure accuracy; however, inadvertent computerized transcription errors may be present.

## 2021-03-03 NOTE — PROGRESS NOTES
Patient signed AMA, Dr. Giles Fierro at the bedside talking with her about the risks of leaving without the proper labs and testing for her heart, as well as the benefits to having these tests completed. The patient stated she does not want anything done and she will follow up with her cardiologist when she leaves the hospital.  Monitor removed. Spoke with Dr. Beto Lambert about everything, he will put in d/c orders now.  Will attempt to give her paperwork prior to her leaving

## 2021-03-03 NOTE — PROGRESS NOTES
Spoke with  Shonda Ellison, stated he did not know she was refusing everything she refused this morning.  He said \"against his better judgement, just so we don't have to put up with this anymore\" that he will come and get her shortly

## 2021-03-04 ENCOUNTER — TELEPHONE (OUTPATIENT)
Dept: PRIMARY CARE CLINIC | Age: 76
End: 2021-03-04

## 2021-03-08 ENCOUNTER — TELEPHONE (OUTPATIENT)
Dept: CARDIOLOGY CLINIC | Age: 76
End: 2021-03-08

## 2021-03-08 NOTE — TELEPHONE ENCOUNTER
Called and left a message for pt requesting a call back to schedule her follow up appointment. Will call back tomorrow.

## 2021-03-09 ENCOUNTER — OFFICE VISIT (OUTPATIENT)
Dept: PRIMARY CARE CLINIC | Age: 76
End: 2021-03-09
Payer: MEDICARE

## 2021-03-09 ENCOUNTER — APPOINTMENT (OUTPATIENT)
Dept: GENERAL RADIOLOGY | Age: 76
DRG: 286 | End: 2021-03-09
Payer: MEDICARE

## 2021-03-09 ENCOUNTER — TELEPHONE (OUTPATIENT)
Dept: PRIMARY CARE CLINIC | Age: 76
End: 2021-03-09

## 2021-03-09 ENCOUNTER — HOSPITAL ENCOUNTER (INPATIENT)
Age: 76
LOS: 11 days | Discharge: HOME | DRG: 286 | End: 2021-03-20
Attending: EMERGENCY MEDICINE | Admitting: INTERNAL MEDICINE
Payer: MEDICARE

## 2021-03-09 ENCOUNTER — APPOINTMENT (OUTPATIENT)
Dept: CT IMAGING | Age: 76
DRG: 286 | End: 2021-03-09
Payer: MEDICARE

## 2021-03-09 VITALS
DIASTOLIC BLOOD PRESSURE: 90 MMHG | HEART RATE: 105 BPM | OXYGEN SATURATION: 93 % | SYSTOLIC BLOOD PRESSURE: 140 MMHG | TEMPERATURE: 97.8 F

## 2021-03-09 DIAGNOSIS — R42 VERTIGO: ICD-10-CM

## 2021-03-09 DIAGNOSIS — R41.82 ALTERED MENTAL STATUS, UNSPECIFIED ALTERED MENTAL STATUS TYPE: ICD-10-CM

## 2021-03-09 DIAGNOSIS — I50.9 ACUTE CONGESTIVE HEART FAILURE, UNSPECIFIED HEART FAILURE TYPE (HCC): Primary | ICD-10-CM

## 2021-03-09 DIAGNOSIS — I50.9 CONGESTIVE HEART FAILURE, UNSPECIFIED HF CHRONICITY, UNSPECIFIED HEART FAILURE TYPE (HCC): Primary | ICD-10-CM

## 2021-03-09 DIAGNOSIS — Z87.440 HISTORY OF UTI: ICD-10-CM

## 2021-03-09 DIAGNOSIS — Z91.199 NONCOMPLIANCE: ICD-10-CM

## 2021-03-09 DIAGNOSIS — R60.9 EDEMA, UNSPECIFIED TYPE: ICD-10-CM

## 2021-03-09 DIAGNOSIS — R74.8 ELEVATED LIVER ENZYMES: ICD-10-CM

## 2021-03-09 DIAGNOSIS — R06.02 SHORTNESS OF BREATH: ICD-10-CM

## 2021-03-09 DIAGNOSIS — N28.9 ABNORMAL KIDNEY FUNCTION: ICD-10-CM

## 2021-03-09 PROBLEM — I50.41 ACUTE COMBINED SYSTOLIC AND DIASTOLIC CONGESTIVE HEART FAILURE (HCC): Status: ACTIVE | Noted: 2021-03-09

## 2021-03-09 PROBLEM — I50.23 ACUTE ON CHRONIC SYSTOLIC HEART FAILURE (HCC): Status: ACTIVE | Noted: 2021-03-09

## 2021-03-09 LAB
ALBUMIN SERPL-MCNC: 3.4 G/DL (ref 3.5–5.2)
ALP BLD-CCNC: 194 U/L (ref 35–104)
ALT SERPL-CCNC: 70 U/L (ref 0–32)
ANION GAP SERPL CALCULATED.3IONS-SCNC: 10 MMOL/L (ref 7–16)
ANISOCYTOSIS: ABNORMAL
APTT: 21.7 SEC (ref 24.5–35.1)
AST SERPL-CCNC: 48 U/L (ref 0–31)
ATYPICAL LYMPHOCYTE RELATIVE PERCENT: 1.7 % (ref 0–4)
B.E.: 1.5 MMOL/L (ref -3–3)
BACTERIA: ABNORMAL /HPF
BASOPHILS ABSOLUTE: 0 E9/L (ref 0–0.2)
BASOPHILS RELATIVE PERCENT: 0.2 % (ref 0–2)
BILIRUB SERPL-MCNC: 1.1 MG/DL (ref 0–1.2)
BILIRUBIN URINE: NEGATIVE
BLOOD, URINE: NEGATIVE
BUN BLDV-MCNC: 19 MG/DL (ref 8–23)
BURR CELLS: ABNORMAL
CALCIUM SERPL-MCNC: 8.9 MG/DL (ref 8.6–10.2)
CHLORIDE BLD-SCNC: 105 MMOL/L (ref 98–107)
CK MB: 1.7 NG/ML (ref 0–4.3)
CLARITY: CLEAR
CO2: 27 MMOL/L (ref 22–29)
COHB: 0.9 % (ref 0–1.5)
COLOR: YELLOW
CREAT SERPL-MCNC: 0.9 MG/DL (ref 0.5–1)
CRITICAL: ABNORMAL
CRYSTALS, UA: ABNORMAL /HPF
D DIMER: 2372 NG/ML DDU
DATE ANALYZED: ABNORMAL
DATE OF COLLECTION: ABNORMAL
EOSINOPHILS ABSOLUTE: 0 E9/L (ref 0.05–0.5)
EOSINOPHILS RELATIVE PERCENT: 0.3 % (ref 0–6)
GFR AFRICAN AMERICAN: >60
GFR NON-AFRICAN AMERICAN: >60 ML/MIN/1.73
GLUCOSE BLD-MCNC: 143 MG/DL (ref 74–99)
GLUCOSE URINE: NEGATIVE MG/DL
HBA1C MFR BLD: 6.5 % (ref 4–5.6)
HCO3: 26.3 MMOL/L (ref 22–26)
HCT VFR BLD CALC: 41.5 % (ref 34–48)
HEMOGLOBIN: 12.5 G/DL (ref 11.5–15.5)
HHB: 3.4 % (ref 0–5)
INR BLD: 1.5
KETONES, URINE: NEGATIVE MG/DL
LAB: ABNORMAL
LACTIC ACID: 2.1 MMOL/L (ref 0.5–2.2)
LEUKOCYTE ESTERASE, URINE: NEGATIVE
LYMPHOCYTES ABSOLUTE: 0.61 E9/L (ref 1.5–4)
LYMPHOCYTES RELATIVE PERCENT: 3.5 % (ref 20–42)
Lab: ABNORMAL
MAGNESIUM: 1.1 MG/DL (ref 1.6–2.6)
MCH RBC QN AUTO: 29.9 PG (ref 26–35)
MCHC RBC AUTO-ENTMCNC: 30.1 % (ref 32–34.5)
MCV RBC AUTO: 99.3 FL (ref 80–99.9)
METAMYELOCYTES RELATIVE PERCENT: 0.9 % (ref 0–1)
METHB: 0.4 % (ref 0–1.5)
MODE: ABNORMAL
MONOCYTES ABSOLUTE: 1.57 E9/L (ref 0.1–0.95)
MONOCYTES RELATIVE PERCENT: 13 % (ref 2–12)
NEUTROPHILS ABSOLUTE: 9.92 E9/L (ref 1.8–7.3)
NEUTROPHILS RELATIVE PERCENT: 80.9 % (ref 43–80)
NITRITE, URINE: NEGATIVE
O2 CONTENT: 17.4 ML/DL
O2 SATURATION: 96.6 % (ref 92–98.5)
O2HB: 95.3 % (ref 94–97)
OPERATOR ID: 467
OVALOCYTES: ABNORMAL
PATIENT TEMP: 37 C
PCO2: 42.2 MMHG (ref 35–45)
PDW BLD-RTO: 14.6 FL (ref 11.5–15)
PH BLOOD GAS: 7.41 (ref 7.35–7.45)
PH UA: 5 (ref 5–9)
PLATELET # BLD: 265 E9/L (ref 130–450)
PMV BLD AUTO: 10.6 FL (ref 7–12)
PO2: 85.9 MMHG (ref 75–100)
POIKILOCYTES: ABNORMAL
POLYCHROMASIA: ABNORMAL
POTASSIUM REFLEX MAGNESIUM: 4.8 MMOL/L (ref 3.5–5)
PRO-BNP: ABNORMAL PG/ML (ref 0–125)
PROTEIN UA: 100 MG/DL
PROTHROMBIN TIME: 16.5 SEC (ref 9.3–12.4)
RBC # BLD: 4.18 E12/L (ref 3.5–5.5)
RBC UA: ABNORMAL /HPF (ref 0–2)
SODIUM BLD-SCNC: 142 MMOL/L (ref 132–146)
SOURCE, BLOOD GAS: ABNORMAL
SPECIFIC GRAVITY UA: >=1.03 (ref 1–1.03)
T4 FREE: 1.39 NG/DL (ref 0.93–1.7)
TEAR DROP CELLS: ABNORMAL
THB: 12.9 G/DL (ref 11.5–16.5)
TIME ANALYZED: 2227
TOTAL CK: 97 U/L (ref 20–180)
TOTAL PROTEIN: 6.5 G/DL (ref 6.4–8.3)
TROPONIN: 0.03 NG/ML (ref 0–0.03)
TROPONIN: 0.03 NG/ML (ref 0–0.03)
TSH SERPL DL<=0.05 MIU/L-ACNC: 2.07 UIU/ML (ref 0.27–4.2)
UROBILINOGEN, URINE: 0.2 E.U./DL
WBC # BLD: 12.1 E9/L (ref 4.5–11.5)
WBC UA: ABNORMAL /HPF (ref 0–5)

## 2021-03-09 PROCEDURE — 85025 COMPLETE CBC W/AUTO DIFF WBC: CPT

## 2021-03-09 PROCEDURE — 6370000000 HC RX 637 (ALT 250 FOR IP): Performed by: FAMILY MEDICINE

## 2021-03-09 PROCEDURE — 99284 EMERGENCY DEPT VISIT MOD MDM: CPT

## 2021-03-09 PROCEDURE — 81001 URINALYSIS AUTO W/SCOPE: CPT

## 2021-03-09 PROCEDURE — 82805 BLOOD GASES W/O2 SATURATION: CPT

## 2021-03-09 PROCEDURE — 4040F PNEUMOC VAC/ADMIN/RCVD: CPT | Performed by: PHYSICIAN ASSISTANT

## 2021-03-09 PROCEDURE — 6370000000 HC RX 637 (ALT 250 FOR IP): Performed by: EMERGENCY MEDICINE

## 2021-03-09 PROCEDURE — 82550 ASSAY OF CK (CPK): CPT

## 2021-03-09 PROCEDURE — 6360000002 HC RX W HCPCS: Performed by: FAMILY MEDICINE

## 2021-03-09 PROCEDURE — 85610 PROTHROMBIN TIME: CPT

## 2021-03-09 PROCEDURE — G8400 PT W/DXA NO RESULTS DOC: HCPCS | Performed by: PHYSICIAN ASSISTANT

## 2021-03-09 PROCEDURE — 83880 ASSAY OF NATRIURETIC PEPTIDE: CPT

## 2021-03-09 PROCEDURE — 1090F PRES/ABSN URINE INCON ASSESS: CPT | Performed by: PHYSICIAN ASSISTANT

## 2021-03-09 PROCEDURE — 85378 FIBRIN DEGRADE SEMIQUANT: CPT

## 2021-03-09 PROCEDURE — 83036 HEMOGLOBIN GLYCOSYLATED A1C: CPT

## 2021-03-09 PROCEDURE — 1036F TOBACCO NON-USER: CPT | Performed by: PHYSICIAN ASSISTANT

## 2021-03-09 PROCEDURE — 2060000000 HC ICU INTERMEDIATE R&B

## 2021-03-09 PROCEDURE — 70450 CT HEAD/BRAIN W/O DYE: CPT

## 2021-03-09 PROCEDURE — 93005 ELECTROCARDIOGRAM TRACING: CPT | Performed by: EMERGENCY MEDICINE

## 2021-03-09 PROCEDURE — 1111F DSCHRG MED/CURRENT MED MERGE: CPT | Performed by: PHYSICIAN ASSISTANT

## 2021-03-09 PROCEDURE — 3017F COLORECTAL CA SCREEN DOC REV: CPT | Performed by: PHYSICIAN ASSISTANT

## 2021-03-09 PROCEDURE — 83605 ASSAY OF LACTIC ACID: CPT

## 2021-03-09 PROCEDURE — 80053 COMPREHEN METABOLIC PANEL: CPT

## 2021-03-09 PROCEDURE — 71045 X-RAY EXAM CHEST 1 VIEW: CPT

## 2021-03-09 PROCEDURE — 2580000003 HC RX 258: Performed by: FAMILY MEDICINE

## 2021-03-09 PROCEDURE — G8484 FLU IMMUNIZE NO ADMIN: HCPCS | Performed by: PHYSICIAN ASSISTANT

## 2021-03-09 PROCEDURE — G8417 CALC BMI ABV UP PARAM F/U: HCPCS | Performed by: PHYSICIAN ASSISTANT

## 2021-03-09 PROCEDURE — 1123F ACP DISCUSS/DSCN MKR DOCD: CPT | Performed by: PHYSICIAN ASSISTANT

## 2021-03-09 PROCEDURE — 85730 THROMBOPLASTIN TIME PARTIAL: CPT

## 2021-03-09 PROCEDURE — 6360000002 HC RX W HCPCS: Performed by: EMERGENCY MEDICINE

## 2021-03-09 PROCEDURE — 96374 THER/PROPH/DIAG INJ IV PUSH: CPT

## 2021-03-09 PROCEDURE — G8428 CUR MEDS NOT DOCUMENT: HCPCS | Performed by: PHYSICIAN ASSISTANT

## 2021-03-09 PROCEDURE — 99213 OFFICE O/P EST LOW 20 MIN: CPT | Performed by: PHYSICIAN ASSISTANT

## 2021-03-09 PROCEDURE — 84443 ASSAY THYROID STIM HORMONE: CPT

## 2021-03-09 PROCEDURE — 84439 ASSAY OF FREE THYROXINE: CPT

## 2021-03-09 PROCEDURE — 83735 ASSAY OF MAGNESIUM: CPT

## 2021-03-09 PROCEDURE — 82553 CREATINE MB FRACTION: CPT

## 2021-03-09 PROCEDURE — 84484 ASSAY OF TROPONIN QUANT: CPT

## 2021-03-09 RX ORDER — LOSARTAN POTASSIUM 50 MG/1
50 TABLET ORAL DAILY
Status: DISCONTINUED | OUTPATIENT
Start: 2021-03-09 | End: 2021-03-10

## 2021-03-09 RX ORDER — FLUTICASONE PROPIONATE 50 MCG
2 SPRAY, SUSPENSION (ML) NASAL DAILY
COMMUNITY

## 2021-03-09 RX ORDER — PROMETHAZINE HYDROCHLORIDE 25 MG/1
12.5 TABLET ORAL EVERY 6 HOURS PRN
Status: DISCONTINUED | OUTPATIENT
Start: 2021-03-09 | End: 2021-03-20 | Stop reason: HOSPADM

## 2021-03-09 RX ORDER — ESCITALOPRAM OXALATE 10 MG/1
10 TABLET ORAL DAILY
Status: DISCONTINUED | OUTPATIENT
Start: 2021-03-09 | End: 2021-03-20 | Stop reason: HOSPADM

## 2021-03-09 RX ORDER — FUROSEMIDE 10 MG/ML
20 INJECTION INTRAMUSCULAR; INTRAVENOUS ONCE
Status: COMPLETED | OUTPATIENT
Start: 2021-03-09 | End: 2021-03-09

## 2021-03-09 RX ORDER — CETIRIZINE HYDROCHLORIDE 10 MG/1
5 TABLET ORAL DAILY
Refills: 0 | Status: DISCONTINUED | OUTPATIENT
Start: 2021-03-09 | End: 2021-03-20 | Stop reason: HOSPADM

## 2021-03-09 RX ORDER — ALPRAZOLAM 2 MG/1
2 TABLET ORAL 2 TIMES DAILY PRN
COMMUNITY

## 2021-03-09 RX ORDER — ACETAMINOPHEN 160 MG
2000 TABLET,DISINTEGRATING ORAL DAILY
COMMUNITY

## 2021-03-09 RX ORDER — ATORVASTATIN CALCIUM 40 MG/1
80 TABLET, FILM COATED ORAL NIGHTLY
Status: DISCONTINUED | OUTPATIENT
Start: 2021-03-09 | End: 2021-03-20 | Stop reason: HOSPADM

## 2021-03-09 RX ORDER — PANTOPRAZOLE SODIUM 40 MG/1
40 TABLET, DELAYED RELEASE ORAL DAILY
Status: DISCONTINUED | OUTPATIENT
Start: 2021-03-09 | End: 2021-03-20 | Stop reason: HOSPADM

## 2021-03-09 RX ORDER — ONDANSETRON 2 MG/ML
4 INJECTION INTRAMUSCULAR; INTRAVENOUS EVERY 6 HOURS PRN
Status: DISCONTINUED | OUTPATIENT
Start: 2021-03-09 | End: 2021-03-20 | Stop reason: HOSPADM

## 2021-03-09 RX ORDER — AZELASTINE 1 MG/ML
2 SPRAY, METERED NASAL 2 TIMES DAILY
COMMUNITY

## 2021-03-09 RX ORDER — SODIUM CHLORIDE 0.9 % (FLUSH) 0.9 %
10 SYRINGE (ML) INJECTION EVERY 12 HOURS SCHEDULED
Status: DISCONTINUED | OUTPATIENT
Start: 2021-03-09 | End: 2021-03-20 | Stop reason: HOSPADM

## 2021-03-09 RX ORDER — NICOTINE POLACRILEX 4 MG
15 LOZENGE BUCCAL PRN
Status: DISCONTINUED | OUTPATIENT
Start: 2021-03-09 | End: 2021-03-20 | Stop reason: HOSPADM

## 2021-03-09 RX ORDER — SODIUM CHLORIDE 0.9 % (FLUSH) 0.9 %
10 SYRINGE (ML) INJECTION PRN
Status: DISCONTINUED | OUTPATIENT
Start: 2021-03-09 | End: 2021-03-10 | Stop reason: SDUPTHER

## 2021-03-09 RX ORDER — ACETAMINOPHEN 650 MG/1
650 SUPPOSITORY RECTAL EVERY 6 HOURS PRN
Status: DISCONTINUED | OUTPATIENT
Start: 2021-03-09 | End: 2021-03-20 | Stop reason: HOSPADM

## 2021-03-09 RX ORDER — NITROGLYCERIN 0.4 MG/1
0.4 TABLET SUBLINGUAL EVERY 5 MIN PRN
Status: DISCONTINUED | OUTPATIENT
Start: 2021-03-09 | End: 2021-03-20 | Stop reason: HOSPADM

## 2021-03-09 RX ORDER — ASPIRIN 81 MG/1
81 TABLET ORAL DAILY
Status: DISCONTINUED | OUTPATIENT
Start: 2021-03-10 | End: 2021-03-20 | Stop reason: HOSPADM

## 2021-03-09 RX ORDER — POLYETHYLENE GLYCOL 3350 17 G/17G
17 POWDER, FOR SOLUTION ORAL DAILY PRN
Status: DISCONTINUED | OUTPATIENT
Start: 2021-03-09 | End: 2021-03-20 | Stop reason: HOSPADM

## 2021-03-09 RX ORDER — FUROSEMIDE 10 MG/ML
20 INJECTION INTRAMUSCULAR; INTRAVENOUS 2 TIMES DAILY
Status: DISCONTINUED | OUTPATIENT
Start: 2021-03-10 | End: 2021-03-12

## 2021-03-09 RX ORDER — MECLIZINE HCL 12.5 MG/1
25 TABLET ORAL 3 TIMES DAILY PRN
Status: DISCONTINUED | OUTPATIENT
Start: 2021-03-09 | End: 2021-03-20 | Stop reason: HOSPADM

## 2021-03-09 RX ORDER — ACETAMINOPHEN 325 MG/1
650 TABLET ORAL EVERY 6 HOURS PRN
Status: DISCONTINUED | OUTPATIENT
Start: 2021-03-09 | End: 2021-03-20 | Stop reason: HOSPADM

## 2021-03-09 RX ORDER — METOPROLOL SUCCINATE 25 MG/1
50 TABLET, EXTENDED RELEASE ORAL DAILY
Status: DISCONTINUED | OUTPATIENT
Start: 2021-03-10 | End: 2021-03-10

## 2021-03-09 RX ORDER — DEXTROSE MONOHYDRATE 25 G/50ML
12.5 INJECTION, SOLUTION INTRAVENOUS PRN
Status: DISCONTINUED | OUTPATIENT
Start: 2021-03-09 | End: 2021-03-20 | Stop reason: HOSPADM

## 2021-03-09 RX ORDER — DEXTROSE MONOHYDRATE 50 MG/ML
100 INJECTION, SOLUTION INTRAVENOUS PRN
Status: DISCONTINUED | OUTPATIENT
Start: 2021-03-09 | End: 2021-03-20

## 2021-03-09 RX ORDER — CHOLECALCIFEROL (VITAMIN D3) 50 MCG
2000 TABLET ORAL DAILY
Status: DISCONTINUED | OUTPATIENT
Start: 2021-03-09 | End: 2021-03-20 | Stop reason: HOSPADM

## 2021-03-09 RX ADMIN — NITROGLYCERIN 1 INCH: 20 OINTMENT TOPICAL at 15:12

## 2021-03-09 RX ADMIN — PANTOPRAZOLE SODIUM 40 MG: 40 TABLET, DELAYED RELEASE ORAL at 22:24

## 2021-03-09 RX ADMIN — FUROSEMIDE 20 MG: 10 INJECTION, SOLUTION INTRAMUSCULAR; INTRAVENOUS at 17:16

## 2021-03-09 RX ADMIN — ENOXAPARIN SODIUM 40 MG: 40 INJECTION SUBCUTANEOUS at 22:25

## 2021-03-09 RX ADMIN — LOSARTAN POTASSIUM 50 MG: 50 TABLET, FILM COATED ORAL at 22:50

## 2021-03-09 RX ADMIN — CETIRIZINE HYDROCHLORIDE 5 MG: 10 TABLET, FILM COATED ORAL at 22:50

## 2021-03-09 RX ADMIN — ESCITALOPRAM 10 MG: 10 TABLET, FILM COATED ORAL at 22:49

## 2021-03-09 RX ADMIN — Medication 10 ML: at 22:50

## 2021-03-09 NOTE — TELEPHONE ENCOUNTER
Patient called at 1:30pm regarding her 1:30 appointment. She is going to be a few minutes late, however she stated that she feels that she needs to see Pamella Henao, but may need an ambulance called.

## 2021-03-09 NOTE — ED NOTES
Bed: 36  Expected date: 3/9/21  Expected time:   Means of arrival: [place] [employer]  Comments:  [place] [employer]     [place]  03/09/21 7804

## 2021-03-09 NOTE — ED PROVIDER NOTES
medications have been reviewed. Allergies: Sulfa antibiotics, Amoxicillin, Clavulanic acid, Levofloxacin, Omeprazole, Other, Pneumococcal vaccines, Amoxicillin-pot clavulanate, Bee venom, and Doxycycline        ---------------------------------------------------PHYSICAL EXAM--------------------------------------    Constitutional/General: Alert and oriented x3, well appearing, non toxic in NAD  Head: Normocephalic and atraumatic  Eyes: PERRL, EOMI, conjunctive normal, sclera non icteric  Mouth: Oropharynx clear, handling secretions, no trismus, no asymmetry of the posterior oropharynx or uvular edema  Neck: Supple, full ROM, non tender to palpation in the midline, no stridor, no crepitus, no meningeal signs  Respiratory: Lungs clear to auscultation bilaterally, no wheezes, rales, or rhonchi. Not in respiratory distress  Cardiovascular:  Regular rate. Regular rhythm. No murmurs, gallops, or rubs. 2+ distal pulses  Chest: No chest wall tenderness  GI:  Abdomen Soft, Non tender, Non distended. +BS. No organomegaly, no palpable masses,  No rebound, guarding, or rigidity. Musculoskeletal: Moves all extremities x 4. Warm and well perfused, no clubbing, cyanosis, or edema. Capillary refill <3 seconds  Integument: skin warm and dry. No rashes. Lymphatic: no lymphadenopathy noted  Neurologic: GCS 15, no focal deficits, symmetric strength 5/5 in the upper and lower extremities bilaterally  Psychiatric: colorufl Affect    -------------------------------------------------- RESULTS -------------------------------------------------  I have personally reviewed all laboratory and imaging results for this patient. Results are listed below.      LABS:  Results for orders placed or performed during the hospital encounter of 03/09/21   CBC Auto Differential   Result Value Ref Range    WBC 12.1 (H) 4.5 - 11.5 E9/L    RBC 4.18 3.50 - 5.50 E12/L    Hemoglobin 12.5 11.5 - 15.5 g/dL    Hematocrit 41.5 34.0 - 48.0 %    MCV 99.3 80.0 - 99.9 fL    MCH 29.9 26.0 - 35.0 pg    MCHC 30.1 (L) 32.0 - 34.5 %    RDW 14.6 11.5 - 15.0 fL    Platelets 766 807 - 701 E9/L    MPV 10.6 7.0 - 12.0 fL    Neutrophils % 80.9 (H) 43.0 - 80.0 %    Lymphocytes % 3.5 (L) 20.0 - 42.0 %    Monocytes % 13.0 (H) 2.0 - 12.0 %    Eosinophils % 0.3 0.0 - 6.0 %    Basophils % 0.2 0.0 - 2.0 %    Neutrophils Absolute 9.92 (H) 1.80 - 7.30 E9/L    Lymphocytes Absolute 0.61 (L) 1.50 - 4.00 E9/L    Monocytes Absolute 1.57 (H) 0.10 - 0.95 E9/L    Eosinophils Absolute 0.00 (L) 0.05 - 0.50 E9/L    Basophils Absolute 0.00 0.00 - 0.20 E9/L    Atypical Lymphocytes Relative 1.7 0.0 - 4.0 %    Metamyelocytes Relative 0.9 0.0 - 1.0 %    Anisocytosis 1+     Polychromasia 2+     Poikilocytes 1+     Haven Cells 1+     Ovalocytes 1+     Tear Drop Cells 1+    Comprehensive Metabolic Panel w/ Reflex to MG   Result Value Ref Range    Sodium 142 132 - 146 mmol/L    Potassium reflex Magnesium 4.8 3.5 - 5.0 mmol/L    Chloride 105 98 - 107 mmol/L    CO2 27 22 - 29 mmol/L    Anion Gap 10 7 - 16 mmol/L    Glucose 143 (H) 74 - 99 mg/dL    BUN 19 8 - 23 mg/dL    CREATININE 0.9 0.5 - 1.0 mg/dL    GFR Non-African American >60 >=60 mL/min/1.73    GFR African American >60     Calcium 8.9 8.6 - 10.2 mg/dL    Total Protein 6.5 6.4 - 8.3 g/dL    Albumin 3.4 (L) 3.5 - 5.2 g/dL    Total Bilirubin 1.1 0.0 - 1.2 mg/dL    Alkaline Phosphatase 194 (H) 35 - 104 U/L    ALT 70 (H) 0 - 32 U/L    AST 48 (H) 0 - 31 U/L   Troponin   Result Value Ref Range    Troponin 0.03 0.00 - 0.03 ng/mL   Protime-INR   Result Value Ref Range    Protime 16.5 (H) 9.3 - 12.4 sec    INR 1.5    APTT   Result Value Ref Range    aPTT 21.7 (L) 24.5 - 35.1 sec   Lactic Acid, Plasma   Result Value Ref Range    Lactic Acid 2.1 0.5 - 2.2 mmol/L   Urinalysis, reflex to microscopic   Result Value Ref Range    Color, UA Yellow Straw/Yellow    Clarity, UA Clear Clear    Glucose, Ur Negative Negative mg/dL    Bilirubin Urine Negative Negative    Ketones, Urine Negative Negative mg/dL    Specific Gravity, UA >=1.030 1.005 - 1.030    Blood, Urine Negative Negative    pH, UA 5.0 5.0 - 9.0    Protein,  (A) Negative mg/dL    Urobilinogen, Urine 0.2 <2.0 E.U./dL    Nitrite, Urine Negative Negative    Leukocyte Esterase, Urine Negative Negative   Microscopic Urinalysis   Result Value Ref Range    WBC, UA NONE 0 - 5 /HPF    RBC, UA NONE 0 - 2 /HPF    Bacteria, UA NONE SEEN None Seen /HPF    Crystals, UA Moderate (A) None Seen /HPF   Brain Natriuretic Peptide   Result Value Ref Range    Pro-BNP 33,238 (H) 0 - 125 pg/mL   Lipid Panel   Result Value Ref Range    Cholesterol, Total 108 0 - 199 mg/dL    Triglycerides 99 0 - 149 mg/dL    HDL 33 >40 mg/dL    LDL Calculated 55 0 - 99 mg/dL    VLDL Cholesterol Calculated 20 mg/dL   Hemoglobin A1C   Result Value Ref Range    Hemoglobin A1C 6.5 (H) 4.0 - 5.6 %   Magnesium   Result Value Ref Range    Magnesium 1.1 (LL) 1.6 - 2.6 mg/dL   TSH without Reflex   Result Value Ref Range    TSH 2.070 0.270 - 4.200 uIU/mL   T4, free   Result Value Ref Range    T4 Free 1.39 0.93 - 1.70 ng/dL   Troponin   Result Value Ref Range    Troponin 0.03 0.00 - 0.03 ng/mL   D-dimer, quantitative   Result Value Ref Range    D-Dimer, Quant 2372 ng/mL DDU   CK   Result Value Ref Range    Total CK 97 20 - 180 U/L   CK-MB   Result Value Ref Range    CK-MB 1.7 0.0 - 4.3 ng/mL   Blood Gas, Arterial   Result Value Ref Range    Date Analyzed 20210309     Time Analyzed 2227     Source: Blood Arterial     pH, Blood Gas 7.413 7.350 - 7.450    PCO2 42.2 35.0 - 45.0 mmHg    PO2 85.9 75.0 - 100.0 mmHg    HCO3 26.3 (H) 22.0 - 26.0 mmol/L    B.E. 1.5 -3.0 - 3.0 mmol/L    O2 Sat 96.6 92.0 - 98.5 %    O2Hb 95.3 94.0 - 97.0 %    COHb 0.9 0.0 - 1.5 %    MetHb 0.4 0.0 - 1.5 %    O2 Content 17.4 mL/dL    HHb 3.4 0.0 - 5.0 %    tHb (est) 12.9 11.5 - 16.5 g/dL    Mode NC-4  L     Date Of Collection      Time Collected      Pt Temp 37.0 C     ID [id number]     Lab [id number] Critical(s) Notified .  No Critical Values    Basic Metabolic Panel   Result Value Ref Range    Sodium 137 132 - 146 mmol/L    Potassium 4.1 3.5 - 5.0 mmol/L    Chloride 101 98 - 107 mmol/L    CO2 30 (H) 22 - 29 mmol/L    Anion Gap 6 (L) 7 - 16 mmol/L    Glucose 138 (H) 74 - 99 mg/dL    BUN 14 8 - 23 mg/dL    CREATININE 0.8 0.5 - 1.0 mg/dL    GFR Non-African American >60 >=60 mL/min/1.73    GFR African American >60     Calcium 8.1 (L) 8.6 - 10.2 mg/dL   CBC auto differential   Result Value Ref Range    WBC 12.2 (H) 4.5 - 11.5 E9/L    RBC 3.91 3.50 - 5.50 E12/L    Hemoglobin 11.7 11.5 - 15.5 g/dL    Hematocrit 38.3 34.0 - 48.0 %    MCV 98.0 80.0 - 99.9 fL    MCH 29.9 26.0 - 35.0 pg    MCHC 30.5 (L) 32.0 - 34.5 %    RDW 14.6 11.5 - 15.0 fL    Platelets 698 890 - 170 E9/L    MPV 10.6 7.0 - 12.0 fL    Neutrophils % 83.5 (H) 43.0 - 80.0 %    Lymphocytes % 6.1 (L) 20.0 - 42.0 %    Monocytes % 7.0 2.0 - 12.0 %    Eosinophils % 1.7 0.0 - 6.0 %    Basophils % 0.2 0.0 - 2.0 %    Neutrophils Absolute 10.25 (H) 1.80 - 7.30 E9/L    Lymphocytes Absolute 0.73 (L) 1.50 - 4.00 E9/L    Monocytes Absolute 0.85 0.10 - 0.95 E9/L    Eosinophils Absolute 0.21 0.05 - 0.50 E9/L    Basophils Absolute 0.00 0.00 - 0.20 E9/L    Promyelocytes Percent 0.9 0 - 0 %    Blasts Relative 0.9 0 - 0 %    Anisocytosis 1+     Polychromasia 1+     Poikilocytes 1+     Ovalocytes 1+    Hepatic Function Panel   Result Value Ref Range    Total Protein 5.9 (L) 6.4 - 8.3 g/dL    Albumin 3.3 (L) 3.5 - 5.2 g/dL    Alkaline Phosphatase 163 (H) 35 - 104 U/L    ALT 56 (H) 0 - 32 U/L    AST 36 (H) 0 - 31 U/L    Total Bilirubin 1.2 0.0 - 1.2 mg/dL    Bilirubin, Direct 0.7 (H) 0.0 - 0.3 mg/dL    Bilirubin, Indirect 0.5 0.0 - 1.0 mg/dL   Troponin   Result Value Ref Range    Troponin 0.03 0.00 - 0.03 ng/mL   Magnesium   Result Value Ref Range    Magnesium 1.4 (L) 1.6 - 2.6 mg/dL   Procalcitonin   Result Value Ref Range    Procalcitonin 0.10 (H) 0.00 - 0.08 ng/mL   POCT Glucose   Result Value Ref Range    Meter Glucose 136 (H) 74 - 99 mg/dL   POCT Glucose   Result Value Ref Range    Meter Glucose 104 (H) 74 - 99 mg/dL   POCT Glucose   Result Value Ref Range    Meter Glucose 210 (H) 74 - 99 mg/dL   POCT Glucose   Result Value Ref Range    Meter Glucose 169 (H) 74 - 99 mg/dL   POCT Glucose   Result Value Ref Range    Meter Glucose 121 (H) 74 - 99 mg/dL   EKG 12 Lead   Result Value Ref Range    Ventricular Rate 111 BPM    Atrial Rate 111 BPM    P-R Interval 124 ms    QRS Duration 80 ms    Q-T Interval 322 ms    QTc Calculation (Bazett) 437 ms    P Axis 46 degrees    R Axis 33 degrees    T Axis 91 degrees   EKG 12 Lead   Result Value Ref Range    Ventricular Rate 143 BPM    Atrial Rate 143 BPM    P-R Interval 128 ms    QRS Duration 84 ms    Q-T Interval 272 ms    QTc Calculation (Bazett) 419 ms    P Axis 33 degrees    R Axis 30 degrees    T Axis 80 degrees       RADIOLOGY:  Interpreted by Radiologist.  US DUP LOWER EXTREMITIES BILATERAL VENOUS   Final Result   Within the visualized vessels there is no evidence for deep venous   thrombosis               CTA CHEST W CONTRAST   Final Result   Right lower lobe segmental pulmonary embolus. Left lower lobe subsegmental   pulmonary embolus. There is nonspecific airspace disease in the right lower lobe. There is a peripheral masslike density in the left lower lobe, in the   distribution of the subsegmental embolus. Well a mass is not excluded, this   more likely relates to small pulmonary infarct. Suggest short-term follow-up   to confirm resolution. Bilateral small to moderate pleural effusions. Findings were sent to Radiology Results [id number] at 5:18 a.m. on   03/10/2021 to be communicated to a licensed caregiver. CT Head WO Contrast   Final Result   No acute intracranial abnormality. XR CHEST PORTABLE   Final Result   CHF. Superimposed pneumonia cannot be excluded.              EKG:  This EKG is signed and interpreted by the EP. Time: 1541  Rate: 110  Rhythm: Sinus  Interpretation: non-specific EKG  Comparison: None      ------------------------- NURSING NOTES AND VITALS REVIEWED ---------------------------   The nursing notes within the ED encounter and vital signs as below have been reviewed by myself. BP (!) 167/87   Pulse 103   Temp 97.4 °F (36.3 °C) (Temporal)   Resp 18   Ht 5' 1\" (1.549 m)   Wt 163 lb 2 oz (74 kg)   SpO2 96%   BMI 30.82 kg/m²   Oxygen Saturation Interpretation: Normal    The patients available past medical records and past encounters were reviewed.         ------------------------------ ED COURSE/MEDICAL DECISION MAKING----------------------  Medications   vitamin D (CHOLECALCIFEROL) tablet 2,000 Units (2,000 Units Oral Given 3/10/21 0835)   escitalopram (LEXAPRO) tablet 10 mg (10 mg Oral Given 3/10/21 0836)   cetirizine (ZYRTEC) tablet 5 mg (5 mg Oral Given 3/10/21 0836)   meclizine (ANTIVERT) tablet 25 mg (has no administration in time range)   pantoprazole (PROTONIX) tablet 40 mg (40 mg Oral Given 3/10/21 0835)   insulin lispro (HUMALOG) injection vial 0-10 Units (0 Units Subcutaneous Not Given 3/11/21 0540)   glucose (GLUTOSE) 40 % oral gel 15 g (has no administration in time range)   dextrose 50 % IV solution (has no administration in time range)   glucagon (rDNA) injection 1 mg (has no administration in time range)   dextrose 5 % solution (has no administration in time range)   sodium chloride flush 0.9 % injection 10 mL (10 mLs Intravenous Given 3/10/21 2011)   promethazine (PHENERGAN) tablet 12.5 mg (has no administration in time range)     Or   ondansetron (ZOFRAN) injection 4 mg (has no administration in time range)   polyethylene glycol (GLYCOLAX) packet 17 g (has no administration in time range)   acetaminophen (TYLENOL) tablet 650 mg (650 mg Oral Given 3/10/21 1818)     Or   acetaminophen (TYLENOL) suppository 650 mg ( Rectal See Alternative 3/10/21 1937)   nitroGLYCERIN (NITROSTAT) SL tablet 0.4 mg (has no administration in time range)   furosemide (LASIX) injection 20 mg (20 mg Intravenous Given 3/10/21 1806)   atorvastatin (LIPITOR) tablet 80 mg (80 mg Oral Not Given 3/10/21 2011)   aspirin EC tablet 81 mg (81 mg Oral Given 3/10/21 0836)   influenza quadrivalent split vaccine (FLUZONE;FLUARIX;FLULAVAL;AFLURIA) injection 0.5 mL (has no administration in time range)   sodium chloride flush 0.9 % injection 10 mL (has no administration in time range)   enoxaparin (LOVENOX) injection 70 mg (70 mg Subcutaneous Given 3/10/21 2011)   metoprolol succinate (TOPROL XL) extended release tablet 50 mg (50 mg Oral Given 3/10/21 0836)   sacubitril-valsartan (ENTRESTO) 24-26 MG per tablet 1 tablet (has no administration in time range)   spironolactone (ALDACTONE) tablet 25 mg (25 mg Oral Given 3/10/21 1840)   nitroglycerin (NITRO-BID) 2 % ointment 1 inch (1 inch Topical Given 3/9/21 1512)   furosemide (LASIX) injection 20 mg (20 mg Intravenous Given 3/9/21 1716)   magnesium sulfate 2000 mg in 50 mL IVPB premix (0 mg Intravenous Stopped 3/10/21 0306)   labetalol (NORMODYNE;TRANDATE) injection 5 mg (5 mg Intravenous Given 3/10/21 0409)   iopamidol (ISOVUE-370) 76 % injection 70 mL (70 mLs Intravenous Given 3/10/21 0447)   magnesium sulfate 4000 mg in 100 mL IVPB premix (0 mg Intravenous Stopped 3/10/21 1821)         ED COURSE:       Medical Decision Making:    Results noted, chf with htn, ntg given, pt chart reviewed, h x vt, left ama before w/u, will admit for further care      This patient's ED course included: a personal history and physicial examination    This patient has remained hemodynamically stable during their ED course. Re-Evaluations:             Re-evaluation.   Patients symptoms show no change    Re-examination  3/9/21   2:42 PM EST          Vital Signs:   Vitals:    03/10/21 0500 03/10/21 0551 03/10/21 1100 03/10/21 2000   BP: (!) 177/98  (!) 157/97 (!) 167/87   Pulse: 102  106 103   Resp: 18  15 18   Temp: 97.8 °F (36.6 °C)  97.8 °F (36.6 °C) 97.4 °F (36.3 °C)   TempSrc: Temporal  Temporal Temporal   SpO2:   95% 96%   Weight:  163 lb 2 oz (74 kg)     Height:         Card/Pulm:  Rhythm: normal rate. Heart Sounds: no murmurs, gallops, or rubs. clear to auscultation, no wheezes or rales and unlabored breathing. Capillary Refill: normal.  Radial Pulse:  equal.  Skin:  Warm. Consultations:             sound    Critical Care:         Counseling: The emergency provider has spoken with the patient and discussed todays results, in addition to providing specific details for the plan of care and counseling regarding the diagnosis and prognosis. Questions are answered at this time and they are agreeable with the plan.       --------------------------------- IMPRESSION AND DISPOSITION ---------------------------------    IMPRESSION  1. Congestive heart failure, unspecified HF chronicity, unspecified [facility]    2. Noncompliance        DISPOSITION  Disposition: Admit to telemetry  Patient condition is stable    NOTE: This report was transcribed using voice recognition software.  Every effort was made to ensure accuracy; however, inadvertent computerized transcription errors may be present        Etienne Ortiz MD  03/11/21 7959

## 2021-03-09 NOTE — PROGRESS NOTES
Teresita Sullivan  1945  3/9/21      HPI:  Patient presents for FU of hospitalization at [facility] for vertigo, where pt left AMA. She was noted to have +Abnormal ECHO with EF of 20% with subsequent ordering of stress test which pt left before completed as well as MRI of head, carotid US, both of which were unremarkable for cause of vertigo. She was noted to have +UTI, and was treated in hospital and sent home on ceftin, pt isn't sure she took this med. She denies any urinary Sx, but +fatigue, still has vertigo, mental status changes also noted. She is tearful today, crying no one helps take careof her. She is \"always now in bed\", where her son Diego Hankins) and her  will help with her and her son who has Amy Orellana. She is visibly short of breath and pt admits to this. To note pt did have labs at hospital which revealed abnormal kidney functions, dehydration, as well elevated LFTs. She denies any changes in her bowels, no abdominal pain. She since from hospital has developed worsening SOB, her vitals not normal today, she did call on the way to the practice and said she may need to go to hospital.   She has noted +anxiety d/o, and ? New mental disorder, vs ? Dementia, vs ? UTI psychosis. She tells me she isn't taking the xanax TID now, only prn, still on lexapro. She is able to recall day time, place, and seems acutely oriented. She is not combative today, pleasant in office, tearful for how \"sick I am\". To note: I received a message on my person cell phone while pt was in hospital last time stating she was no happy I did not visit the hospital, and then told me we had to \"part ways\". I did in meantime, send her a discharge letter, and this visit is part of her 30 day window.       Past Medical History:   Diagnosis Date    Abnormal echocardiogram     Acute renal insufficiency 2/28/2021    Anxiety     CAD (coronary artery disease)     normal adenisone stress test    Carotid artery narrowing     <40% bilaterally    Depression     Diabetes mellitus ([facility]     type II    Drug intolerance     Lipitor, Crestor, high doses of Simvastatin    GERD (gastroesophageal reflux disease)     Heart attack ([facility]     Hyperlipidemia     Hypertension     IBS (irritable bowel syndrome)     Obesity     Primary osteoarthritis involving multiple joints 2017    SOB (shortness of breath) on exertion     UTI (urinary tract infection)         Past Surgical History:   Procedure Laterality Date     SECTION      x 1    CORONARY ANGIOPLASTY  07    CYST REMOVAL      ganglionic cyst on finger    DIAGNOSTIC CARDIAC CATH LAB PROCEDURE  2007    DILATION AND CURETTAGE OF UTERUS      X 2    PTCA  2007    TONSILLECTOMY         Current Outpatient Medications   Medication Sig Dispense Refill    cefUROXime (CEFTIN) 250 MG tablet Take 1 tablet by mouth every 12 hours for 10 days 20 tablet 0    meclizine (ANTIVERT) 25 MG tablet Take 1 tablet by mouth 3 times daily as needed for Dizziness (May take 1-2 tabs 3 times a day as needed for dizziness) 15 tablet 0    escitalopram (LEXAPRO) 10 MG tablet take 1 tablet by mouth once daily 90 tablet 1    metFORMIN (GLUCOPHAGE-XR) 500 MG extended release tablet Take 1 tablet by mouth 2 times daily (with meals) 180 tablet 1    fenofibrate micronized (LOFIBRA) 134 MG capsule Take 1 capsule by mouth every morning (before breakfast) 90 capsule 1    pantoprazole (PROTONIX) 40 MG tablet TAKE ONE TABLET BY MOUTH ONCE DAILY 90 tablet 1    metoprolol succinate (TOPROL XL) 50 MG extended release tablet TAKE ONE AND ONE-HALF TABLETS BY MOUTH TWICE DAILY 90 tablet 11    losartan (COZAAR) 50 MG tablet TAKE ONE TABLET BY MOUTH TWICE DAILY 180 tablet 3    Lancets MISC 1 each by Does not apply route daily 100 each 5    blood glucose test strips (JEEVAN CONTOUR TEST) strip 1 each by In Vitro route daily As needed.  100 each 3    loratadine (CLARITIN) 10 MG tablet Take 1 tablet by mouth daily 30 tablet 0    Cholecalciferol (VITAMIN D PO) Take 2,000 mg by mouth daily       aspirin EC 81 MG EC tablet Take 81 mg by mouth every other day.  Co-Enzyme Q-10 100 MG CAPS Take 200 mg by mouth. No current facility-administered medications for this visit. Allergies   Allergen Reactions    Sulfa Antibiotics Nausea Only    Amoxicillin Nausea Only    Clavulanic Acid Nausea Only    Levofloxacin     Omeprazole      Interferes with plavix    Other      Lipitor, Crestor, high doses of Simvastatin    Pneumococcal Vaccines     Amoxicillin-Pot Clavulanate Nausea Only     Makes her stomach hurt, doesn't want to eat.     Bee Venom Rash    Doxycycline Rash       Family History   Problem Relation Age of Onset    Emphysema Mother     Heart Failure Mother     Heart Attack Father     Down Syndrome Son        Social History     Socioeconomic History    Marital status:      Spouse name: Not on file    Number of children: Not on file    Years of education: Not on file    Highest education level: Not on file   Occupational History     Employer: RETIRED   Social Needs    Financial resource strain: Not on file    Food insecurity     Worry: Not on file     Inability: Not on file    Transportation needs     Medical: Not on file     Non-medical: Not on file   Tobacco Use    Smoking status: Former Smoker     Packs/day: 1.00     Years: 35.00     Pack years: 35.00     Quit date: 6/17/2010     Years since quitting: 10.7    Smokeless tobacco: Never Used   Substance and Sexual Activity    Alcohol use: No    Drug use: No    Sexual activity: Not Currently   Lifestyle    Physical activity     Days per week: Not on file     Minutes per session: Not on file    Stress: Not on file   Relationships    Social connections     Talks on phone: Not on file     Gets together: Not on file     Attends Mu-ism service: Not on file     Active member of club or organization: Not on file Attends meetings of clubs or organizations: Not on file     Relationship status: Not on file    Intimate partner violence     Fear of current or ex partner: Not on file     Emotionally abused: Not on file     Physically abused: Not on file     Forced sexual activity: Not on file   Other Topics Concern    Not on file   Social History Narrative    Not on file       Review of Systems :    Constitutional: Negative for fever, chills, appetite change and unexpected weight change. HENT: Negative  Eyes: Negative for vision changes, double vision, pain  Respiratory: Negative for cough, chest tightness, pleuritic pain,  wheezing. Cardiovascular: Negative for diaphoresis, chest pain, palpitations  Gastrointestinal: Negative for nausea, vomiting, abdominal pain, diarrhea, constipation, blood in stool, melena, changes in bowel habits, abdominal distention, anal bleeding and rectal pain. Endocrine: Negative for polydipsia, polyphagia and polyuria. No heat or cold intolerance. Genitourinary: Negative for dysuria, urgency, frequency, hematuria, difficulty urinating, nocturia. Musculoskeletal: Negative for new Sx today. No falls recently. Skin: Negative for rash, lesions, hair or nail changes. Neurological: Negative for dizziness, weakness, tremors, syncope, speech difficulty, light-headedness, bowel or bladder control changes, numbness and headaches. Hematological: Negative for adenopathy. Does not bruise/bleed easily. Psychiatric/Behavioral: Negative for suicidal ideas, sleep disturbance and decreased concentration. +anxiety, behavioral changes at times.      Unless otherwise stated in this report or unable to obtain because of the patient's clinical or mental status as evidenced by the medical record, this patients's positive and negative responses for Review of Systems, constitutional, psych, eyes, ENT, cardiovascular, respiratory, gastrointestinal, neurological, genitourinary, musculoskeletal, integument systems and systems related to the presenting problem are either stated in the preceding or were not pertinent or were negative for the symptoms and/or complaints related to the medical problem. Physical Exam:   Vitals:    03/09/21 1337   BP: (!) 140/90   Pulse: 105   Temp: 97.8 °F (36.6 °C)   SpO2: 93%     Constitutional:  A and O x 3, pleasant today, conversing well besides her SOB, pale in color, fatigued appearing. Afebrile. Head:  NCAT. Lungs:  Lungs diminished at bases, good air flow. Heart:  Regular rate and rhythm, normal S1 and S2, no m/r/g, ttachycardia, with +1+ pitting edema in LEs. Abdomen:  Soft, NTND, NABS x 4, no masses or organomegaly, no rebound or guarding. MS: in wheelchair. Skin:  No abrasions, ecchymoses, or lacerations unless noted elsewhere. Extremities  No cyanosis, clubbing, or edema noted. Neurological:   Oriented. Psych: tearful, she is conversing today, cooperative with exam, and is listening to recommendations without issues, remembers much hospital visit from [facility] and explains it. She states \"I couldn't stay there no one was taking care of me\". \"I didn't want all those tests\". MME briefly performed, she knew date, time, place, person, I did not do her recall, drawing, she was very SOB on exam, and didn't want to exert her more at this time. Assessment/Plan:     Richad Castleman was seen today for follow-up from hospital.    Diagnoses and all orders for this visit:    Acute congestive heart failure, unspecified heart failure type ([address]    Altered mental status, unspecified altered mental status type    Shortness of breath    Edema, unspecified type    Vertigo    Elevated liver enzymes    Abnormal kidney function    History of UTI    pt with complex/multiple symptoms and history/current issues going on. I am not sure if her current mental status changes, leaving AMA has to do with acute exacerbation of her mental disorder (anxiety, ?  Personality disorder) or if this is due to ? UTI. I explained to the patient sternly, but kindly that she needs to go to the hospital [facility], and she wants ambulance and I highly agree. She needs to listen and be compliant with the treatment/tests, but also have some of sound mind with her. (her son Reyna Obrien perhaps). She needs a psych evaluation, but also more critically at this time, cardiac and full work up. I see CHF on exam today. I do recommend psychiatrist tiago while at hospital, this was told to EMTs to alert ER about and they assured me they would, but also we will try and wean xanax. I will await her hospital stay. But again she will need to see a different PCP in future, and she tells me she understands this. She is going to try and find someone who goes into the hospital, and not have to see hospitalists. Her son who was on the [employer]IP[employer], did send me letter about how she has been acting even prior to the vertigo, about her mental changes, for which I didn't know about at her home. He is also her power of  and this was filed today in the chart. Counseled again strongly regarding above diagnoses, including possible risks and complications,  especially if left uncontrolled. Patient and/or guardian verbalizes understanding and agrees with above counseling, assessment and plan. Pt left via ambulance. All questions answered.     Neeraj Pollard PA-C

## 2021-03-09 NOTE — Clinical Note
Patient Class: Inpatient [101]   REQUIRED: Diagnosis: Acute on chronic systolic heart failure ([facility] [428.23. ICD-9-CM]   Estimated Length of Stay: Estimated stay of more than 2 midnights   Telemetry Bed Required?: Yes

## 2021-03-10 ENCOUNTER — APPOINTMENT (OUTPATIENT)
Dept: ULTRASOUND IMAGING | Age: 76
DRG: 286 | End: 2021-03-10
Payer: MEDICARE

## 2021-03-10 ENCOUNTER — APPOINTMENT (OUTPATIENT)
Dept: CT IMAGING | Age: 76
DRG: 286 | End: 2021-03-10
Payer: MEDICARE

## 2021-03-10 ENCOUNTER — TELEPHONE (OUTPATIENT)
Dept: PRIMARY CARE CLINIC | Age: 76
End: 2021-03-10

## 2021-03-10 PROBLEM — R74.8 ABNORMAL LIVER ENZYMES: Status: ACTIVE | Noted: 2021-03-10

## 2021-03-10 PROBLEM — I16.0 HYPERTENSIVE URGENCY: Status: ACTIVE | Noted: 2021-03-10

## 2021-03-10 PROBLEM — J96.01 ACUTE RESPIRATORY FAILURE WITH HYPOXIA (HCC): Status: ACTIVE | Noted: 2021-03-10

## 2021-03-10 PROBLEM — E83.42 HYPOMAGNESEMIA: Status: ACTIVE | Noted: 2021-03-10

## 2021-03-10 PROBLEM — I26.99 BILATERAL PULMONARY EMBOLISM (HCC): Status: ACTIVE | Noted: 2021-03-10

## 2021-03-10 PROBLEM — I38 VALVULAR HEART DISEASE: Status: ACTIVE | Noted: 2021-03-10

## 2021-03-10 PROBLEM — G47.30 SLEEP APNEA: Status: ACTIVE | Noted: 2021-03-10
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
## 2021-03-10 LAB
ALBUMIN SERPL-MCNC: 3.3 G/DL (ref 3.5–5.2)
ALP BLD-CCNC: 163 U/L (ref 35–104)
ALT SERPL-CCNC: 56 U/L (ref 0–32)
ANION GAP SERPL CALCULATED.3IONS-SCNC: 6 MMOL/L (ref 7–16)
ANISOCYTOSIS: ABNORMAL
AST SERPL-CCNC: 36 U/L (ref 0–31)
BASOPHILS ABSOLUTE: 0 E9/L (ref 0–0.2)
BASOPHILS RELATIVE PERCENT: 0.2 % (ref 0–2)
BILIRUB SERPL-MCNC: 1.2 MG/DL (ref 0–1.2)
BILIRUBIN DIRECT: 0.7 MG/DL (ref 0–0.3)
BILIRUBIN, INDIRECT: 0.5 MG/DL (ref 0–1)
BLASTS RELATIVE PERCENT: 0.9 % (ref 0–0)
BUN BLDV-MCNC: 14 MG/DL (ref 8–23)
CALCIUM SERPL-MCNC: 8.1 MG/DL (ref 8.6–10.2)
CHLORIDE BLD-SCNC: 101 MMOL/L (ref 98–107)
CHOLESTEROL, TOTAL: 108 MG/DL (ref 0–199)
CO2: 30 MMOL/L (ref 22–29)
CREAT SERPL-MCNC: 0.8 MG/DL (ref 0.5–1)
EKG ATRIAL RATE: 111 BPM
EKG ATRIAL RATE: 143 BPM
EKG P AXIS: 33 DEGREES
EKG P AXIS: 46 DEGREES
EKG P-R INTERVAL: 124 MS
EKG P-R INTERVAL: 128 MS
EKG Q-T INTERVAL: 272 MS
EKG Q-T INTERVAL: 322 MS
EKG QRS DURATION: 80 MS
EKG QRS DURATION: 84 MS
EKG QTC CALCULATION (BAZETT): 419 MS
EKG QTC CALCULATION (BAZETT): 437 MS
EKG R AXIS: 30 DEGREES
EKG R AXIS: 33 DEGREES
EKG T AXIS: 80 DEGREES
EKG T AXIS: 91 DEGREES
EKG VENTRICULAR RATE: 111 BPM
EKG VENTRICULAR RATE: 143 BPM
EOSINOPHILS ABSOLUTE: 0.21 E9/L (ref 0.05–0.5)
EOSINOPHILS RELATIVE PERCENT: 1.7 % (ref 0–6)
GFR AFRICAN AMERICAN: >60
GFR NON-AFRICAN AMERICAN: >60 ML/MIN/1.73
GLUCOSE BLD-MCNC: 138 MG/DL (ref 74–99)
HCT VFR BLD CALC: 38.3 % (ref 34–48)
HDLC SERPL-MCNC: 33 MG/DL
HEMOGLOBIN: 11.7 G/DL (ref 11.5–15.5)
LDL CHOLESTEROL CALCULATED: 55 MG/DL (ref 0–99)
LYMPHOCYTES ABSOLUTE: 0.73 E9/L (ref 1.5–4)
LYMPHOCYTES RELATIVE PERCENT: 6.1 % (ref 20–42)
MAGNESIUM: 1.4 MG/DL (ref 1.6–2.6)
MCH RBC QN AUTO: 29.9 PG (ref 26–35)
MCHC RBC AUTO-ENTMCNC: 30.5 % (ref 32–34.5)
MCV RBC AUTO: 98 FL (ref 80–99.9)
METER GLUCOSE: 104 MG/DL (ref 74–99)
METER GLUCOSE: 136 MG/DL (ref 74–99)
METER GLUCOSE: 169 MG/DL (ref 74–99)
METER GLUCOSE: 210 MG/DL (ref 74–99)
MONOCYTES ABSOLUTE: 0.85 E9/L (ref 0.1–0.95)
MONOCYTES RELATIVE PERCENT: 7 % (ref 2–12)
NEUTROPHILS ABSOLUTE: 10.25 E9/L (ref 1.8–7.3)
NEUTROPHILS RELATIVE PERCENT: 83.5 % (ref 43–80)
OVALOCYTES: ABNORMAL
PDW BLD-RTO: 14.6 FL (ref 11.5–15)
PLATELET # BLD: 233 E9/L (ref 130–450)
PMV BLD AUTO: 10.6 FL (ref 7–12)
POIKILOCYTES: ABNORMAL
POLYCHROMASIA: ABNORMAL
POTASSIUM SERPL-SCNC: 4.1 MMOL/L (ref 3.5–5)
PROCALCITONIN: 0.1 NG/ML (ref 0–0.08)
PROMYELOCYTES PERCENT: 0.9 % (ref 0–0)
RBC # BLD: 3.91 E12/L (ref 3.5–5.5)
SODIUM BLD-SCNC: 137 MMOL/L (ref 132–146)
TOTAL PROTEIN: 5.9 G/DL (ref 6.4–8.3)
TRIGL SERPL-MCNC: 99 MG/DL (ref 0–149)
TROPONIN: 0.03 NG/ML (ref 0–0.03)
VLDLC SERPL CALC-MCNC: 20 MG/DL
WBC # BLD: 12.2 E9/L (ref 4.5–11.5)

## 2021-03-10 PROCEDURE — 93970 EXTREMITY STUDY: CPT

## 2021-03-10 PROCEDURE — 2700000000 HC OXYGEN THERAPY PER DAY

## 2021-03-10 PROCEDURE — 93005 ELECTROCARDIOGRAM TRACING: CPT | Performed by: FAMILY MEDICINE

## 2021-03-10 PROCEDURE — 6370000000 HC RX 637 (ALT 250 FOR IP): Performed by: INTERNAL MEDICINE

## 2021-03-10 PROCEDURE — 84484 ASSAY OF TROPONIN QUANT: CPT

## 2021-03-10 PROCEDURE — 2060000000 HC ICU INTERMEDIATE R&B

## 2021-03-10 PROCEDURE — 99233 SBSQ HOSP IP/OBS HIGH 50: CPT | Performed by: INTERNAL MEDICINE

## 2021-03-10 PROCEDURE — 84145 PROCALCITONIN (PCT): CPT

## 2021-03-10 PROCEDURE — 85025 COMPLETE CBC W/AUTO DIFF WBC: CPT

## 2021-03-10 PROCEDURE — APPSS60 APP SPLIT SHARED TIME 46-60 MINUTES: Performed by: NURSE PRACTITIONER

## 2021-03-10 PROCEDURE — 2500000003 HC RX 250 WO HCPCS: Performed by: FAMILY MEDICINE

## 2021-03-10 PROCEDURE — 6360000004 HC RX CONTRAST MEDICATION: Performed by: RADIOLOGY

## 2021-03-10 PROCEDURE — 82962 GLUCOSE BLOOD TEST: CPT

## 2021-03-10 PROCEDURE — 94640 AIRWAY INHALATION TREATMENT: CPT

## 2021-03-10 PROCEDURE — 71275 CT ANGIOGRAPHY CHEST: CPT

## 2021-03-10 PROCEDURE — 6360000002 HC RX W HCPCS: Performed by: FAMILY MEDICINE

## 2021-03-10 PROCEDURE — 36415 COLL VENOUS BLD VENIPUNCTURE: CPT

## 2021-03-10 PROCEDURE — 6370000000 HC RX 637 (ALT 250 FOR IP): Performed by: FAMILY MEDICINE

## 2021-03-10 PROCEDURE — 6360000002 HC RX W HCPCS: Performed by: NURSE PRACTITIONER

## 2021-03-10 PROCEDURE — 80076 HEPATIC FUNCTION PANEL: CPT

## 2021-03-10 PROCEDURE — 99223 1ST HOSP IP/OBS HIGH 75: CPT | Performed by: INTERNAL MEDICINE

## 2021-03-10 PROCEDURE — 83735 ASSAY OF MAGNESIUM: CPT

## 2021-03-10 PROCEDURE — 93010 ELECTROCARDIOGRAM REPORT: CPT | Performed by: INTERNAL MEDICINE

## 2021-03-10 PROCEDURE — 80048 BASIC METABOLIC PNL TOTAL CA: CPT

## 2021-03-10 PROCEDURE — 80061 LIPID PANEL: CPT

## 2021-03-10 PROCEDURE — 2580000003 HC RX 258: Performed by: FAMILY MEDICINE

## 2021-03-10 PROCEDURE — 93970 EXTREMITY STUDY: CPT | Performed by: RADIOLOGY

## 2021-03-10 RX ORDER — LABETALOL HYDROCHLORIDE 5 MG/ML
5 INJECTION, SOLUTION INTRAVENOUS ONCE
Status: COMPLETED | OUTPATIENT
Start: 2021-03-10 | End: 2021-03-10

## 2021-03-10 RX ORDER — MAGNESIUM SULFATE IN WATER 40 MG/ML
4000 INJECTION, SOLUTION INTRAVENOUS ONCE
Status: COMPLETED | OUTPATIENT
Start: 2021-03-10 | End: 2021-03-10

## 2021-03-10 RX ORDER — SODIUM CHLORIDE 0.9 % (FLUSH) 0.9 %
10 SYRINGE (ML) INJECTION PRN
Status: COMPLETED | OUTPATIENT
Start: 2021-03-10 | End: 2021-03-16

## 2021-03-10 RX ORDER — METOPROLOL SUCCINATE 50 MG/1
50 TABLET, EXTENDED RELEASE ORAL DAILY
Status: DISCONTINUED | OUTPATIENT
Start: 2021-03-10 | End: 2021-03-12

## 2021-03-10 RX ORDER — SPIRONOLACTONE 25 MG/1
25 TABLET ORAL DAILY
Status: DISCONTINUED | OUTPATIENT
Start: 2021-03-10 | End: 2021-03-20 | Stop reason: HOSPADM

## 2021-03-10 RX ORDER — MAGNESIUM SULFATE IN WATER 40 MG/ML
2000 INJECTION, SOLUTION INTRAVENOUS ONCE
Status: COMPLETED | OUTPATIENT
Start: 2021-03-10 | End: 2021-03-10

## 2021-03-10 RX ADMIN — LOSARTAN POTASSIUM 50 MG: 50 TABLET, FILM COATED ORAL at 08:36

## 2021-03-10 RX ADMIN — LABETALOL HYDROCHLORIDE 5 MG: 5 INJECTION INTRAVENOUS at 04:09

## 2021-03-10 RX ADMIN — INSULIN LISPRO 2 UNITS: 100 INJECTION, SOLUTION INTRAVENOUS; SUBCUTANEOUS at 20:04

## 2021-03-10 RX ADMIN — INSULIN LISPRO 4 UNITS: 100 INJECTION, SOLUTION INTRAVENOUS; SUBCUTANEOUS at 17:00

## 2021-03-10 RX ADMIN — Medication 10 ML: at 20:11

## 2021-03-10 RX ADMIN — ACETAMINOPHEN 650 MG: 325 TABLET ORAL at 05:08

## 2021-03-10 RX ADMIN — MAGNESIUM SULFATE HEPTAHYDRATE 2000 MG: 40 INJECTION, SOLUTION INTRAVENOUS at 01:19

## 2021-03-10 RX ADMIN — Medication 2000 UNITS: at 08:35

## 2021-03-10 RX ADMIN — MAGNESIUM SULFATE HEPTAHYDRATE 4000 MG: 40 INJECTION, SOLUTION INTRAVENOUS at 14:00

## 2021-03-10 RX ADMIN — METOPROLOL SUCCINATE 50 MG: 25 TABLET, EXTENDED RELEASE ORAL at 08:36

## 2021-03-10 RX ADMIN — ESCITALOPRAM 10 MG: 10 TABLET, FILM COATED ORAL at 08:36

## 2021-03-10 RX ADMIN — CETIRIZINE HYDROCHLORIDE 5 MG: 10 TABLET, FILM COATED ORAL at 08:36

## 2021-03-10 RX ADMIN — ASPIRIN 81 MG: 81 TABLET, COATED ORAL at 08:36

## 2021-03-10 RX ADMIN — FUROSEMIDE 20 MG: 10 INJECTION, SOLUTION INTRAMUSCULAR; INTRAVENOUS at 03:20

## 2021-03-10 RX ADMIN — FUROSEMIDE 20 MG: 10 INJECTION, SOLUTION INTRAMUSCULAR; INTRAVENOUS at 18:06

## 2021-03-10 RX ADMIN — FUROSEMIDE 20 MG: 10 INJECTION, SOLUTION INTRAMUSCULAR; INTRAVENOUS at 08:36

## 2021-03-10 RX ADMIN — IOPAMIDOL 70 ML: 755 INJECTION, SOLUTION INTRAVENOUS at 04:47

## 2021-03-10 RX ADMIN — PANTOPRAZOLE SODIUM 40 MG: 40 TABLET, DELAYED RELEASE ORAL at 08:35

## 2021-03-10 RX ADMIN — ENOXAPARIN SODIUM 70 MG: 80 INJECTION SUBCUTANEOUS at 20:11

## 2021-03-10 RX ADMIN — SPIRONOLACTONE 25 MG: 25 TABLET ORAL at 18:40

## 2021-03-10 RX ADMIN — ENOXAPARIN SODIUM 70 MG: 80 INJECTION SUBCUTANEOUS at 08:36

## 2021-03-10 RX ADMIN — Medication 10 ML: at 08:36

## 2021-03-10 NOTE — CONSULTS
[facility]  Department of Internal Medicine  Division of Pulmonary, Critical Care and Sleep Medicine  Consult Note  Naeem Rodriges MD, [facility]    Patient: Jv Gusman  MRN: [id number]  : 1945    Encounter Time: 12:01 PM     Date of Admission: 3/9/2021  2:22 PM    Primary Care Physician: Sharri Kelly PA-C    Reason for Consultation: Pulmonary embolism, bilateral pleural effusions     HISTORY OF PRESENT ILLNESS : Jv Gusman 70 y.o. female was seen in consultation regarding the above chief compliant. Jv Gusman is a 72-year-old female with a documented past medical history of HFrEF (25 to 30%, G2DD), CAD/MI s/p PCI to RCA (), HTN, HLD, IDDM2, JAXSON, IBS, anxiety disorder. Patient was admitted to [place] [facility] [address] ED 2021-3/3/2021 with complaints of vertigo. She was noted to have cerumen impaction at that time and had irrigation procedure. Patient also complained of shortness of breath at that time, echocardiogram was obtained during that visit and was significant for a decline in ejection fraction from 60% in 2018 to 20%. Cardiology was consulted and had plan for ischemic work-up with stress test, however the patient left AMA. She now presents to ED [facility] with worsening shortness of breath and altered mental status. Patient reports after discharge from [facility] [address], she had decreased mobility due to continued vertigo and states she has been in bed throughout most of the day. She denies any personal or family history of VTE, states she is up-to-date on all age-appropriate cancer screenings. Patient does state that she is a former smoker, however quit about 20 years ago. She does endorse worsening bilateral lower extremity edema, orthopnea, dyspnea on exertion. She is not chronically on diuretics as an outpatient, but states she has been compliant with her ARB and beta-blocker.     On arrival to ED, patient was notably tachycardic with heart rate up to the 140s, hypertensive with blood pressure 186/100. Labs significant for elevated white count of 12.2. Troponin slightly elevated 0.03 x3, proBNP acutely elevated at 33,000, D-dimer also elevated at 2372. CT of the chest was obtained demonstrating right lower lobe segmental pulmonary embolism as well as left lower lobe segmental pulmonary embolism, bilateral pleural effusions as well as a masslike density in the left lower lobe likely infarct. EKG with sinus tachycardia. On the floor, the patient was started on therapeutic Lovenox and is being diuresed with Lasix. Pulmonology consulted for evaluation of pulmonary embolism and pleural effusion. PAST MEDICAL HISTORY:  has a past medical history of Abnormal echocardiogram, Acute combined systolic and diastolic congestive heart failure ([address] Acute renal insufficiency, Anxiety, CAD (coronary artery disease), Carotid artery narrowing, Depression, Diabetes mellitus ([address] Drug intolerance, GERD (gastroesophageal reflux disease), Heart attack ([address] Hyperlipidemia, Hypertension, IBS (irritable bowel syndrome), Obesity, Primary osteoarthritis involving multiple joints, Sleep apnea, SOB (shortness of breath) on exertion, and UTI (urinary tract infection). SURGICAL HISTORY:  has a past surgical history that includes Tonsillectomy; Percutaneous Transluminal Coronary Angio (2007); Dilation and curettage of uterus; cyst removal; Diagnostic Cardiac Cath Lab Procedure (2007); Coronary angioplasty (07); and  section. SOCIAL HISTORY:  reports that she quit smoking about 10 years ago. She has a 35.00 pack-year smoking history. She has never used smokeless tobacco. She reports that she does not drink alcohol or use drugs. FAMILY  HISTORY: family history includes Down Syndrome in her son; Emphysema in her mother; Heart Attack in her father; Heart Failure in her mother.      MEDICATIONS:    Prior to Admission medications    Medication Sig Start Date End Date Taking? Authorizing Provider   ALPRAZolam Michelle Bliss) 2 MG tablet Take 2 mg by mouth 3 times daily as needed. Yes Historical Provider, MD   Cholecalciferol (VITAMIN D3) 50 MCG (2000 UT) CAPS Take 2,000 Units by mouth daily   Yes Historical Provider, MD   azelastine (ASTELIN) 0.1 % nasal spray 2 sprays by Nasal route 2 times daily as directed   Yes Historical Provider, MD   fluticasone (FLONASE) 50 MCG/ACT nasal spray 2 sprays by Each Nostril route daily   Yes Historical Provider, MD   cefUROXime (CEFTIN) 250 MG tablet Take 1 tablet by mouth every 12 hours for 10 days 3/3/21 3/13/21 Yes Karey Alvarado MD   escitalopram (LEXAPRO) 10 MG tablet take 1 tablet by mouth once daily 1/25/21  Yes Lor Lares PA-C   metFORMIN (GLUCOPHAGE-XR) 500 MG extended release tablet Take 1 tablet by mouth 2 times daily (with meals) 1/25/21  Yes Lor Lares PA-C   fenofibrate micronized (LOFIBRA) 134 MG capsule Take 1 capsule by mouth every morning (before breakfast) 1/12/21  Yes Lor Lares PA-C   pantoprazole (PROTONIX) 40 MG tablet TAKE ONE TABLET BY MOUTH ONCE DAILY 10/26/20  Yes Lor Lares PA-C   metoprolol succinate (TOPROL XL) 50 MG extended release tablet TAKE ONE AND ONE-HALF TABLETS BY MOUTH TWICE DAILY 6/11/20  Yes Leslie Collins DO   losartan (COZAAR) 50 MG tablet TAKE ONE TABLET BY MOUTH TWICE DAILY 5/12/20  Yes Leslie Collins DO   loratadine (CLARITIN) 10 MG tablet Take 1 tablet by mouth daily 11/16/16  Yes IGNACIO Perry CNP   aspirin EC 81 MG EC tablet Take 81 mg by mouth every other day.    Yes Historical Provider, MD   Co-Enzyme Q-10 100 MG CAPS Take 200 mg by mouth daily    Yes Historical Provider, MD       ALLERGIES: Sulfa antibiotics, Amoxicillin, Clavulanic acid, Levofloxacin, Omeprazole, Other, Pneumococcal vaccines, Amoxicillin-pot clavulanate, Bee venom, and Doxycycline       REVIEW OF SYSTEMS:  Otherwise negative if not reported or listed below  Constitutional:  No unanticipated weight loss. No change in sleep pattern or activity. No fevers, chills or rigors. Eyes:    No visual changes or diplopia. No scleral icterus. ENT:    No Headaches, hearing loss or vertigo. No mouth sores or sore throat. Cardiovascular:  + chest discomfort, palpitations. Respiratory:   No cough, No wheezing      No sputum production. No hemoptysis, pleuritic pain. Gastrointestinal:  No abdominal pain, appetite loss, blood in stools. No hematemesis  Genitourinary:  No dysuria, trouble voiding or hematuria. No nocturia. Musculoskeletal:   No weakness or joint complaints. Integumentary: No rashes or pruritis. Neurological:  No headache, numbness or tingling. Psychiatric:   No effect on mood, memory, mentation, or behavior. No anxiety or depression. Endocrine:   No excessive thirst, fluid intake, or urination. No tremor. Hematologic: No abnormal bruising or bleeding. Lymphatic:  No swollen lymph nodes. Immunologic:  No hives or hx of anaphaxsis. OBJECTIVE:     PHYSICAL EXAM:   VITALS:   Vitals:    03/10/21 0445 03/10/21 0500 03/10/21 0551 03/10/21 1100   BP:  (!) 177/98  (!) 157/97   Pulse: 107 102  106   Resp:  18  15   Temp:  97.8 °F (36.6 °C)  97.8 °F (36.6 °C)   TempSrc:  Temporal  Temporal   SpO2:    95%   Weight:   163 lb 2 oz (74 kg)    Height:            Intake/Output Summary (Last 24 hours) at 3/10/2021 1201  Last data filed at 3/10/2021 1042  Gross per 24 hour   Intake 0 ml   Output 900 ml   Net -900 ml        CONSTITUTIONAL:   A&O x 3, NAD  SKIN:     No rash, No suspicious lesions or skin discoloration  HEENT:     EOMI, MMM, No thrush  NECK:    No bruits, No JVP apprechiated  CV:      Sinus,  No murmur, No rubs, No gallops  PULMONARY:   Couse BS, bilateral crackles noted  ABDOMEN:     Soft, non-tender. BS normal. No R/R/G  EXT:    No deformities .   No clubbing.       + lower extremity edema, No venous stasis  PULSE:   Appears equal and palpable.   PSYCHIATRIC:  Seems appropriate, No acute psycosis  MS:    No fractures, No gross weakness  NEUROLOGIC:   The clinical assessment is non-focal     DATA: IMAGING & TESTING:     LABORATORY TESTS:    CBC with Differential:    Lab Results   Component Value Date    WBC 12.2 03/10/2021    RBC 3.91 03/10/2021    HGB 11.7 03/10/2021    HCT 38.3 03/10/2021     03/10/2021    MCV 98.0 03/10/2021    MCH 29.9 03/10/2021    MCHC 30.5 03/10/2021    RDW 14.6 03/10/2021    SEGSPCT 62 08/30/2012    BLASTSPCT 0.9 03/10/2021    METASPCT 0.9 03/09/2021    LYMPHOPCT 6.1 03/10/2021    PROMYELOPCT 0.9 03/10/2021    MONOPCT 7.0 03/10/2021    BASOPCT 0.2 03/10/2021    MONOSABS 0.85 03/10/2021    LYMPHSABS 0.73 03/10/2021    EOSABS 0.21 03/10/2021    BASOSABS 0.00 03/10/2021     BMP:    Lab Results   Component Value Date     03/10/2021    K 4.1 03/10/2021    K 4.8 03/09/2021     03/10/2021    CO2 30 03/10/2021    BUN 14 03/10/2021    LABALBU 3.3 03/10/2021    CREATININE 0.8 03/10/2021    CALCIUM 8.1 03/10/2021    GFRAA >60 03/10/2021    LABGLOM >60 03/10/2021    GLUCOSE 138 03/10/2021        PRO-BNP:   Lab Results   Component Value Date    PROBNP 33,238 (H) 03/09/2021    PROBNP 518 (H) 02/09/2018      ABGs:   Lab Results   Component Value Date    PH 7.413 03/09/2021    PO2 85.9 03/09/2021    PCO2 42.2 03/09/2021     Hemoglobin A1C: No components found for: HGBA1C    IMAGING:  Imaging tests were completed and reviewed and discussed radiology and care team involved and reveals   Echo Complete    Result Date: 3/2/2021  Transthoracic Echocardiography Report (TTE)  Demographics   Patient Name    Radhika Hart Gender            Female   Medical Record  [id number]     Room Number       2911  Number   Account #       [de-identified]    Procedure Date    03/02/2021   Corporate ID                 Ordering          Mell Galarza MD Physician   Accession       [id number]   Referring  Number                       Physician   Date of Birth   1945   Sonographer       Caio Snider   Age             70 year(s)   Interpreting      [address] [place] [address]                               Physician         Physician Cardiology                                                 Ej Wade MD                                Any Other  Procedure Type of Study   TTE procedure:Echo Complete W/Doppler & Color Flow. Procedure Date Date: 03/02/2021 Start: 06:57 AM Study Location: Portable Technical Quality: Adequate visualization Indications:Ventricular tachycardia. Patient Status: Routine Height: 62 inches Weight: 160 pounds BSA: 1.74 m^2 BMI: 29.26 kg/m^2 HR: 80 bpm BP: 156/88 mmHg  Findings   Left Ventricle  Mildly dilated left ventricle. Ejection fraction is visually estimated at 25-30%. Overall ejection fraction severely decreased . No regional wall motion abnormalities seen. Normal left ventricle wall thickness. Stage II diastolic dysfunction. Right Ventricle  Normal right ventricular size. Right ventricle global systolic function is moderately reduced . TAPSE 12  mm. Left Atrium  The left atrium is moderately dilated. Right Atrium  Mildly enlarged right atrium size. Mitral Valve  Mild mitral annular calcification. No evidence of mitral valve stenosis. Increased E point septal separation noted,suggesting decreased LV cardiac  output. Moderate mitral regurgitation with centrally directed jet. Tricuspid Valve  The tricuspid valve appears structurally normal.  Mild to moderate tricuspid regurgitation. RVSP is 55 mmHg. Pulmonary hypertension is moderate . Aortic Valve  The aortic valve appears mildly sclerotic. The aortic valve is trileaflet. No hemodynamically significant aortic stenosis is present. Moderate aortic regurgitation is noted.    Pulmonic Valve  Pulmonic valve is structurally normal.  Physiologic and/or trace pulmonic regurgitation present. No evidence of pulmonic valve stenosis. Pericardial Effusion  Trace pericardial effusion. Pleural Effusion  No evidence of pleural effusion. Aorta  Normal aortic root and ascending aorta. Miscellaneous  Dilated Inferior Vena Cava. Inferior Vena Cava, normal respiratory variation. Conclusions   Summary  Mildly dilated left ventricle. Ejection fraction is visually estimated at 25-30%. Overall ejection fraction severely decreased . No regional wall motion abnormalities seen. Normal left ventricle wall thickness. Stage II diastolic dysfunction. The left atrium is moderately dilated. Normal right ventricular size. Right ventricle global systolic function is moderately reduced . TAPSE 12  mm  Moderate mitral regurgitation with centrally directed jet. No hemodynamically significant aortic stenosis is present. Moderate aortic regurgitation is noted. Mild to moderate tricuspid regurgitation. RVSP is 55 mmHg. Pulmonary hypertension is moderate . Trace pericardial effusion. No previous echo for comparison.    Signature   ----------------------------------------------------------------  Electronically signed by Denys Jose MD(Interpreting  physician) on 03/02/2021 08:15 PM  ----------------------------------------------------------------  M-Mode/2D Measurements & Calculations   LV Diastolic    LV Systolic Dimension: 4.8   AV Cusp Separation: 1.7 cmLA  Dimension: 5.5  cm                           Dimension: 4.6 cmAO Root  cm              LV Volume Diastolic: 796.6   Dimension: 2.9 cm  LV FS:12.7 %    ml  LV PW           LV Volume Systolic: 793.7 ml  Diastolic: 0.8  LV EDV/LV EDV Index: 150.4  cm              ml/86 ml/m^2LV ESV/LV ESV    RV Diastolic Dimension: 3.4  LV PW Systolic: Index: 584.6 YX/28HB/ m^2    cm  0.9 cm          EF Calculated: 30 %  Septum          LV Mass Index: 92 l/min*m^2  LA/Aorta: 3.34  Diastolic: 0.8  LV Length: 7.9 cm            Ascending Aorta: 3.5 cm  cm                                           LA volume/Index: 80 ml  Septum          LVOT: 2 cm                   /89.57ZS/Q^5  Systolic: 1 cm                               RA Area: 23 cm^2  CO: 3.87 l/min  CI: 2.22                                     IVC Expiration: 2.4 cm  l/m*m^2  LV Mass: 159.96  g  Doppler Measurements & Calculations   MV Peak E-Wave: 0.63 m/s  AV Peak Velocity:     LVOT Peak Velocity: 0.91  MV Peak A-Wave: 0.58 m/s  1.33 m/s              m/s  MV E/A Ratio: 1.08        AV Peak Gradient:     LVOT Mean Velocity: 0.55  MV Peak Gradient: 2.1     7.02 mmHg             m/s  mmHg                      AV Mean Velocity:     LVOT Peak Gradient: 3.3  MV Mean Gradient: 1.1     0.84 m/s              mmHgLVOT Mean Gradient:  mmHg                      AV Mean Gradient: 3.3 1.5 mmHg  MV Mean Velocity: 0.49    mmHg                  Estimated RVSP: 54.6 mmHg  m/s                       AV VTI: 21.3 cm       Estimated RAP:8 mmHg  MV Deceleration Time: 102 AV Area  msec                      (Continuity):2.27  MV P1/2t: 62.6 msec       cm^2                  TR Velocity:3.41 m/s  MVA by PHT:3.51 cm^2      AV Deceleration Time: TR Gradient:46.59 mmHg  MV Area (continuity): 2.7 1233.9 msec           PV Peak Velocity: 0.59 m/s  cm^2                      LVOT VTI: 15.4 cm     PV Peak Gradient: 1.37  MV E' Septal Velocity:                          mmHg  0.03 m/s                  Estimated PASP: 54.59 PV Mean Velocity: 0.4 m/s  MV E' Lateral Velocity: 4 mmHg                  PV Mean Gradient: 0.7 mmHg  m/s  MR Velocity: 6.34 m/s                           DC ED Velocity: 1.24 m/s  MV DAYAMI PISA: 0.17 cm^2  MR VTI: 215.1 cm  Alias Velocity: 0.27  m/sPISA Radius: 0.8 cm   PISA area: 4.02 cm^2MR  flow rate: 109.75 ml/sMR  volume:36.57 ml  [email or web address] [id number]    Xr Thoracic Spine (2 Views)    Result Date: 2/27/2021  EXAMINATION: XRAY VIEWS OF THE THORACIC SPINE 2/27/2021 7:41 pm COMPARISON: 05/28/2013 HISTORY: ORDERING SYSTEM PROVIDED HISTORY: fall, back pain TECHNOLOGIST PROVIDED HISTORY: Reason for exam:->fall, back pain FINDINGS: No acute fractures. Multilevel degenerative changes with scoliosis. Cardiomegaly. The visualized portion of the lungs are unremarkable. No evidence of acute thoracic spine trauma. Scoliosis with multilevel degenerative changes. Cardiomegaly. Xr Lumbar Spine (2-3 Views)    Result Date: 2/27/2021  EXAMINATION: THREE XRAY VIEWS OF THE LUMBAR SPINE 2/27/2021 7:41 pm COMPARISON: None. HISTORY: ORDERING SYSTEM PROVIDED HISTORY: back pain, fall TECHNOLOGIST PROVIDED HISTORY: Reason for exam:->back pain, fall FINDINGS: No fractures. Multilevel degenerative changes with grade 1 anterolisthesis L3 over L4. Scoliosis. Degenerative changes involving the SI joints. The hip joints are intact. Calcified plaque involving the abdominal aorta. No evidence of acute lumbar spine trauma. Scoliosis with multilevel degenerative changes and grade 1 anterolisthesis L3 over L4. Ct Head Wo Contrast    Result Date: 3/9/2021  EXAMINATION: CT OF THE HEAD WITHOUT CONTRAST  3/9/2021 6:18 pm TECHNIQUE: CT of the head was performed without the administration of intravenous contrast. Dose modulation, iterative reconstruction, and/or weight based adjustment of the mA/kV was utilized to reduce the radiation dose to as low as reasonably achievable. COMPARISON: February 27, 2021 HISTORY: ORDERING SYSTEM PROVIDED HISTORY: [facility] TECHNOLOGIST PROVIDED HISTORY: Reason for exam:->ams Has a \"code stroke\" or \"stroke alert\" been called? ->No Decision Support Exception->Emergency Medical Condition (MA) What reading provider will be dictating this exam?->CRC FINDINGS: BRAIN/VENTRICLES: There is no acute intracranial hemorrhage, mass effect or midline shift. No abnormal extra-axial fluid collection.   The gray-white differentiation is maintained without evidence of an acute infarct. There is no evidence of hydrocephalus. ORBITS: The visualized portion of the orbits demonstrate no acute abnormality. SINUSES: Mucosal thickening associated with sphenoid sinus. Mastoid air cells are clear. SOFT TISSUES/SKULL:  No acute abnormality of the visualized skull or soft tissues. No acute intracranial abnormality. Ct Head Wo Contrast    Result Date: 2/27/2021  EXAMINATION: CT OF THE HEAD WITHOUT CONTRAST  2/27/2021 5:19 pm TECHNIQUE: CT of the head was performed without the administration of intravenous contrast. Dose modulation, iterative reconstruction, and/or weight based adjustment of the mA/kV was utilized to reduce the radiation dose to as low as reasonably achievable. COMPARISON: February 22, 2021 HISTORY: ORDERING SYSTEM PROVIDED HISTORY: dizziness, multiple falls recently, leaning to left with ambulation. TECHNOLOGIST PROVIDED HISTORY: Reason for exam:->dizziness, multiple falls recently, leaning to left with ambulation. Has a \"code stroke\" or \"stroke alert\" been called? ->No Decision Support Exception->Emergency Medical Condition (MA) FINDINGS: BRAIN/VENTRICLES: Stable mild cortical atrophy and periventricular ischemic changes. Stable old lacunar infarct right basal ganglia. There is no acute intracranial hemorrhage, mass effect or midline shift. No abnormal extra-axial fluid collection. The gray-white differentiation is maintained without evidence of an acute infarct. There is no evidence of hydrocephalus. ORBITS: The visualized portion of the orbits demonstrate no acute abnormality. SINUSES: The visualized mastoid air cells demonstrate no acute abnormality. Air-fluid level in the right sphenoid sinus. SOFT TISSUES/SKULL:  No acute abnormality of the visualized skull or soft tissues. No acute intracranial abnormality. Acute sphenoid sinusitis.     Ct Head Wo Contrast    Result Date: 2/22/2021  EXAMINATION: CT OF THE HEAD WITHOUT CONTRAST  2/22/2021 6:40 pm TECHNIQUE: CT of the head was performed without the administration of intravenous contrast. Dose modulation, iterative reconstruction, and/or weight based adjustment of the mA/kV was utilized to reduce the radiation dose to as low as reasonably achievable. COMPARISON: 11/23/2013 HISTORY: ORDERING SYSTEM PROVIDED HISTORY: vertigo TECHNOLOGIST PROVIDED HISTORY: Reason for exam:->vertigo Has a \"code stroke\" or \"stroke alert\" been called? ->No Decision Support Exception->Emergency Medical Condition (MA) Dizziness. Recent history of sinusitis. FINDINGS: Small benign osteoma stable in medial mid left ethmoid sinus. New slight mucosal thickening right sphenoid sinus with small fluid level. Slight mucosal thickening alveolar recess of right maxillary sinus. Clear mastoid air cells and middle ear cavities. Bone windows demonstrate no acute fracture. There is ventricular and sulcal prominence compatible with age-appropriate global cerebral cortical atrophy, slightly more pronounced than 2013. The brain contains no mass, mass effect, hemorrhage, or acute infarct. There is no extra-axial intracranial bleed or brain bleed. No evidence of midline shift. No acute CVA, intracranial bleed, or brain mass. Non-specific new mucosal thickening and small fluid level in right sphenoid sinus may reflect acute sinusitis    Xr Chest Portable    Result Date: 3/9/2021  EXAMINATION: ONE XRAY VIEW OF THE CHEST 3/9/2021 2:52 pm COMPARISON: September 2, 2019 HISTORY: ORDERING SYSTEM PROVIDED HISTORY: sob TECHNOLOGIST PROVIDED HISTORY: Reason for exam:->sob What reading provider will be dictating this exam?->CRC FINDINGS: The heart is enlarged. Bibasilar opacities worse on the left. No pneumothorax. Mild bilateral pleural effusion noted. CHF. Superimposed pneumonia cannot be excluded.      Cta Chest W Contrast    Result Date: 3/10/2021  EXAMINATION: CTA OF THE CHEST 3/10/2021 4:42 am TECHNIQUE: CTA of the chest was performed after the administration of intravenous contrast.  Multiplanar reformatted images are provided for review. MIP images are provided for review. Dose modulation, iterative reconstruction, and/or weight based adjustment of the mA/kV was utilized to reduce the radiation dose to as low as reasonably achievable. COMPARISON: None. HISTORY: ORDERING SYSTEM PROVIDED HISTORY: PE TECHNOLOGIST PROVIDED HISTORY: Reason for exam:->PE What reading provider will be dictating this exam?->CRC FINDINGS: Pulmonary Arteries: There is a subsegmental pulmonary embolus in the right lower lobe seen on images 174-186. There is a segmental pulmonary embolus in the right lower lobe best seen on coronal series 9, image 97. Mediastinum: There are coronary artery calcifications. There is also calcified plaque throughout thoracic aorta. There is no abnormal mediastinal or hilar mass seen. Lungs/pleura: There are bilateral small to moderate pleural effusions. There is infiltrate and/or atelectasis in the left lower lobe. There is some dependent atelectasis in the right lower lobe. There is a peripheral masslike density in the right lower lobe at the peripheral lung base measuring 2.1 x 1.5 cm. This is in the distribution of the small subsegmental embolus and may relate to a small pulmonary infarct. Upper Abdomen: Limited images of the upper abdomen are unremarkable. Soft Tissues/Bones: No acute bone or soft tissue abnormality. Right lower lobe segmental pulmonary embolus. Left lower lobe subsegmental pulmonary embolus. There is nonspecific airspace disease in the right lower lobe. There is a peripheral masslike density in the left lower lobe, in the distribution of the subsegmental embolus. Well a mass is not excluded, this more likely relates to small pulmonary infarct. Suggest short-term follow-up to confirm resolution. Bilateral small to moderate pleural effusions.  Findings were sent to Radiology Results Communication Center at 5:18 a.m. on 03/10/2021 to be communicated to a licensed caregiver. Assessment:   1. Low-Risk Pulmonary Embolism, likely provoked in setting of decreased mobility and CHF  2. Acute Decompensated HFrEF (25%, G2DD)  3. Bilateral Pleural Effusions, likely cardiogenic  4. Acute Hypoxic Respiratory Failure  5. CAD/MI, s/p PCI with bare metal stent to RCA in 2007  6. Hypertension  7. Hyperlipidemia  8. Insulin-Dependent Diabetes Mellitus Type 2  9. H/o Tobacco Abuse  10. Anxiety Disorder        Plan:   1. Continue therapeutic Lovenox 48hrs  2. Will transition to Eliquis 10mg BID x 7 days, followed by 5mg BID for total of 3 months  3. Agree with continued diuresis  4. Monitor electrolytes with diuresis, replace prn  5. Ischemic cardiac workup per cardiology        Maverick Rushing MD  3/10/2021  4:01 PM      [address]   Department of Internal Medicine  Internal Medicine Residency / [address]    Attending Physician Statement    Filomena Graf case was discussed, including pertinent history and examination findings with the multidisciplinary team during bedside rounds. I have seen and examined the patient and the key elements of the encounter have been performed by myself. I have read and reviewed the documentation. If needed or disputed the following findings, counseling and treatment options which have been corrected and communicated back to the patient, family if applicable and contributing physicians. I agree with the assessment, plan and orders as noted by the resident. The major issue is her HF and not these small clots (if any) in the distal circulation  Lovenox for 48 then Eliquis for 3 months.    Karl Morin DO , [facility], Juany Wilkinson  Professor of Medicine  Pulmonary, [address] [facility]  Internal Medicine Academic Faculty

## 2021-03-10 NOTE — PROGRESS NOTES
Hospitalist Progress Note      PCP: Sujata Pepe PA-C    Date of Admission: 3/9/2021  Days in the hospital: 1    Hospital Course:   70 y.o. female who presented to [facility] with past medical history of CHF, hyperlipidemia, coronary artery disease status post RCA stents in 2007, IBS, obesity, hypertension, hyperlipidemia, diabetes, sleep apnea and unsteady gait. Patient was at [facility] on 2/28/2021, she left A on 3/3/2021 because \"I did not like the care there\". She was admitted for vertigo, KIKI, dizziness and sphenoid sinusitis on CT scan. She had wax removed from her left ear. She was given Rocephin and ENT saw her for benign positional vertigo. While in the hospital, she had runs of V. tach, she had low magnesium that was replaced. Echo obtained during that admission showed that her EF had dropped from 60% 3 years ago to 25%. Patient was seen by cardiologist and stress test was planned however patient left before completion of work-up. She had some changes in mental status/behavioral dyscontrol for which they wanted her to see psychiatry, this did not happen as well. She was discharged on Ceftin, she does not know if she was taking it.     Patient came back to the ER today with altered mental status described as mostly confusion, this is constant, moderate intensity, associated with shortness of breath. She was noted to have acute CHF. She also had CT of the chest done which showed bilateral segmental PE and also left sided pulmonary infarction. Subjective  Patient seen and examined at bedside. Feeling slightly better, denies any headache, dizziness, chest pain. She is afebrile. Awaiting cardiology and pulmonary evaluation. Remains on anticoagulation. Exam:    BP (!) 177/98   Pulse 102   Temp 97.8 °F (36.6 °C) (Temporal)   Resp 18   Ht 5' 1\" (1.549 m)   Wt 163 lb 2 oz (74 kg)   SpO2 94%   BMI 30.82 kg/m²     HEENT: No pallor, no icterus.   Respiratory:  CTA, good air entry. Cardiovascular: RRR, no murmur. Abdomen: Soft, non-tender, BS noted. Musculoskeletal: No joint pains or joint swelling noted. Neurologic: awake, alert and following commands       Assessment/Plan:  Active Hospital Problems    Diagnosis Date Noted    Hypomagnesemia [E83.42] 03/10/2021    Valvular heart disease [I38] 03/10/2021    Abnormal liver enzymes [R74.8] 03/10/2021    Hypertensive urgency [I16.0] 03/10/2021    Sleep apnea [G47.30] 03/10/2021    Fall [W19. XXXA] 03/10/2021    Acute respiratory failure with hypoxia (HCC) [J96.01] 03/10/2021    Bilateral pulmonary embolism (HCC) [I26.99] 03/10/2021    Acute on chronic systolic heart failure (HCC) [I50.23] 03/09/2021    Acute combined systolic and diastolic congestive heart failure ([facility] [address] [I50.41] 03/09/2021    Mixed hyperlipidemia [E78.2] 01/18/2017    [facility] [E11.9] 04/25/2016    Hyperlipidemia [E78.5]     CAD (coronary artery disease) [I25.10]     Anxiety [F41.9]      · Diabetes mellitus    Plan:  · Continue with diuresis, follow-up with cardiology  · Continue anticoagulation, pulmonary consulted, noted to have pulmonary infarction, hemodynamically stable  · Replace magnesium  · Continue to monitor renal function closely  · Noted to have new onset diabetes mellitus, currently on sliding scale coverage, will need to be placed on oral hypoglycemics upon discharge  · Continue to monitor neurological status closely      Labs:   Recent Labs     03/09/21  1458 03/10/21  0215   WBC 12.1* 12.2*   HGB 12.5 11.7   HCT 41.5 38.3    233     Recent Labs     03/09/21  1458 03/10/21  0215    137   K 4.8 4.1    101   CO2 27 30*   BUN 19 14   CREATININE 0.9 0.8   CALCIUM 8.9 8.1*     Recent Labs     03/09/21  1458 03/10/21  0215   AST 48* 36*   ALT 70* 56*   BILIDIR  --  0.7*   BILITOT 1.1 1.2   ALKPHOS 194* 163*     Recent Labs     03/09/21  1458   INR 1.5     Recent Labs     03/09/21  1458 03/09/21  2146

## 2021-03-10 NOTE — SIGNIFICANT EVENT
Patient remains tachycardic on the floor, D-dimer ordered in the ER was elevated. CTA of the chest showed bilateral pulmonary embolism with pleural effusions. Will start therapeutic Lovenox and consult pulmonology.

## 2021-03-10 NOTE — ED NOTES
sbar faxed, floor notified, spoke with shelby, room marked as dirty. Patient [place] at [phone], sbar filled out by Evgeny Ochoa. Updated vitals by this RN. Will place in transport when room is marked clean.       Radha Amado RN  03/09/21 2249

## 2021-03-10 NOTE — PROGRESS NOTES
Occupational Therapy  OT consult received. Chart reviewed. Will hold evaluation secondary to patient off unit. Will re-attempt OT evaluation as schedule permits when patient is appropriate.  Ross Scanlon, OTR/L #[id number]

## 2021-03-10 NOTE — ED NOTES
Bed: 06  Expected date:   Expected time:   Means of arrival:   Comments:  Luis Fernando Hendrix RN  03/09/21 1541

## 2021-03-10 NOTE — PROGRESS NOTES
Nutrition Education      Provided educational handouts and sample meal plans on heart healthy/diabetic diet. Left at bedside. Pt at procedure at this time. · Written educational materials provided. · Contact name and number provided. · Refer to Patient Education activity for more details.     Electronically signed by Daniel Barnes RD, LD on 3/10/21 at 2:21 PM EST    Contact: [phone]

## 2021-03-10 NOTE — CARE COORDINATION
Met with pt to discuss discharge planning/transition of care. Pt states she lives with her son(down mel) and her other son lives down the block and helps as needed. Pt states her bedroom is upstairs and bathrooms are on both floor. When asked about getting walking pt states she doesn't get up much and sits around a lot. Pt states she has a cane at home, but doesn't use it. PCP is SAM Morris. Cardiology and pulmonary on board. Called son and left message to verify this information. Called spouse, he verified that they do not live together, and that pt does not walk around much, and that she actually crawls along the floor to get where she needs to go. We discussed PCP, due to prior notes say that pt fired Roz Morris, spouse states that Roz Morris sent a discharge 30 day notice as of March 3, 2021. Await therapy evaluations. Spouse would like to see her go to [facility], if pt agrees, he knows she needs to get stronger. Will await recommendations and then will speak with pt. Raghu JOHNSON

## 2021-03-10 NOTE — PROGRESS NOTES
Notified Dr. Phillip Moyer of pt's heart rate sustaining in the 140s    Sorayanasmi Maurice  3:42 AM

## 2021-03-10 NOTE — H&P
Hospital Medicine History & Physical      PCP: Ran Santos PA-C    Date of Admission: 3/9/2021    Date of Service: Pt seen/examined on 2921 and Admitted to Inpatient with expected LOS greater than two midnights due to medical therapy. Chief Complaint: Shortness of breath      History Of Present Illness:      70 y.o. female who presented to [facility] with past medical history of CHF, hyperlipidemia, coronary artery disease status post RCA stents in 2007, IBS, obesity, hypertension, hyperlipidemia, diabetes, sleep apnea and unsteady gait. Patient was at [facility] on 2/28/2021, she left [facility] on 3/3/2021 because \"I did not like the care there\". She was admitted for vertigo, KIKI, dizziness and sphenoid sinusitis on CT scan. She had wax removed from her left ear. She was given Rocephin and ENT saw her for benign positional vertigo. While in the hospital, she had runs of V. tach, she had low magnesium that was replaced. Echo obtained during that admission showed that her EF had dropped from 60% 3 years ago to 25%. Patient was seen by cardiologist and stress test was planned however patient left before completion of work-up. She had some changes in mental status/behavioral dyscontrol for which they wanted her to see psychiatry, this did not happen as well. She was discharged on Ceftin, she does not know if she was taking it. Patient came back to the ER today with altered mental status described as mostly confusion, this is constant, moderate intensity, associated with shortness of breath. Patient answers \"I do not know\" to many questions. She does not know why she is in the hospital and she does not think she is sick. She is visibly dyspneic and states that this is normal for her. She reports falling 2 weeks ago and complains of pain in the left upper abdomen which is sharp and worse with movement. She has some leg swelling. No cough or fever. Denies any chest pain.     Vital signs notable for heart years ago. She has a 35.00 pack-year smoking history. She has never used smokeless tobacco.  ETOH:   reports no history of alcohol use. Family History:     Reviewed in detail Positive as follows:        Problem Relation Age of Onset    Emphysema Mother     Heart Failure Mother     Heart Attack Father     Down Syndrome Son        REVIEW OF SYSTEMS:   Pertinent positives as noted in the HPI. All other systems reviewed and negative. PHYSICAL EXAM:    BP (!) 182/99   Pulse 113   Temp 98.2 °F (36.8 °C) (Temporal)   Resp 15   Ht 5' 1\" (1.549 m)   Wt 160 lb (72.6 kg)   SpO2 94%   BMI 30.23 kg/m²     General appearance: Elderly female, ill-appearing and weak, in moderate respiratory distress, appears stated age and cooperative. Afebrile. HEENT:  Normal cephalic, atraumatic without obvious deformity. Pupils equal, round, and reactive to light. Extra ocular muscles intact. Conjunctivae/corneas clear. Neck: Supple, with full range of motion. No jugular venous distention. Trachea midline. Respiratory: Increased work of breathing with conversational dyspnea and pursed lip breathing. Decreased air movement otherwise clear to auscultation, bilaterally without Rales/Wheezes/Rhonchi. Cardiovascular: Tachycardic, regular rate and rhythm with normal S1/S2 without murmurs, rubs or gallops. Abdomen: Soft, non-tender, non-distended with normal bowel sounds. Musculoskeletal:  No clubbing/cyanosis, 1+ edema bilaterally. Limited range of motion without deformity. Skin: Skin color, texture, turgor normal.  No rashes or lesions. Neurologic: Extremely lethargic, cranial nerves: II-XII intact, grossly non-focal.  Global weakness.   Psychiatric: Extremely lethargic, arousable, oriented, thought content appropriate, impaired insight  Capillary Refill: Brisk,< 3 seconds   Peripheral Pulses: +2 palpable, equal bilaterally       Labs:     Recent Labs     03/09/21  1458   WBC 12.1*   HGB 12.5   HCT 41.5    Tachycardia, evaluate for PE. Check thyroid function. 5.  Hypertensive urgency due to CHF, give nitroglycerin, control blood pressure, diurese. 6.  Hypomagnesemia, replace and monitor. 7.  Valvular heart disease, new since 2018. Cardiology consult. 8.  Pulmonary hypertension  9. Altered mental status, CT scan of the head is negative, follow ABGs. Monitor neuro status. MRI to evaluate for stroke as needed. 10.  Type 2 diabetes controlled, sliding scale coverage, monitor blood sugar  11. Hyperlipidemia, statin  12. Coronary artery disease status post stents in 2007  13. Anxiety  14. Fall  15. Obesity, dietary and lifestyle modification. DVT Prophylaxis: Lovenox  Diet: Diet NPO, After Midnight  Code Status: Full Code    PT/OT Eval Status: As needed    Dispo -inpatient/telemetry       Du Allen MD    Thank you Yazmin Thorne PA-C for the opportunity to be involved in this patient's care.

## 2021-03-10 NOTE — TELEPHONE ENCOUNTER
Good morning Dr. Dewey Conner,   Currently we share this mutual pt who is currently admitted to [facility]. I a not sure if you or your colleague will be seeing her in the hospital, but she has acute CHF, with no prior history. She also developed elevated LFTs, and now PEs. She presented to me with vertigo a few weeks ago. I just am concerned and I am not sure if you would feel the same, but that she may have underlying Myocarditis right now? I am not certain if she had covid or not as well, but with the PEs, now her liver. I am not sure if this is something the hospitalist/cards would want to rule out in her. Thank you so very much for all your time! Kadeem Michele saw her yesterday in the hospital.  I got your message. I called Daniel Strickland who is a nurse practitioner with the cardiologist that is covering her now (Dr. Philip Grover). She will ask the primary to check her for Covid. If that is positive then I think that it is a good idea to do a cardiac MRI to look for myocarditis. Thank you.

## 2021-03-10 NOTE — CONSULTS
Inpatient Cardiology Consultation      Reason for Consult:  Acute CHF    Consulting Physician: Dr Ashley Tesfaye    Requesting Physician:  Dr Lisa Traylor    Date of Consultation: 3/10/2021    HISTORY OF PRESENT ILLNESS: 69 yo  female known to Dr Ashley Tesfaye. Seen by Dr Seferino Morales 3/2/2021 for NSVT (hypomagnesia)--> Toprol and Cozaar initiated. PMH: HTN, HLD, CAD s/p BMS to RCA, GERD, T2DM insulin requiring, intolerance to statins, and ex smoker. Seen by Dr Seferino Morales 3/2/2021 for NSVT ( hypomagnesia/Mag 1.3)--> Toprol and Cozaar initiated. TTE 3/2/2021 (EF 25-30%. Stage II DD, TAPSE 12 mm, moderately dilated LA, mildly enlarged RA, moderate MR, mild to moderate TR, RVSP 55 mmHg, moderate AI)--> TTE results were reviewed with the patient and was advised to stay in the hospital for further evaluation including a stress test plus or minus cardiac cath which she declined. Patient signed out AMA and  does not want to stay in the hospital    Seen By Primary care 3/9/2021 post hospital--> She needs a psych evaluation, but also more critically at this time, cardiac and full work up. I see CHF on exam today. I do recommend psychiatrist tiago while at hospital, this was told to EMTs to alert ER about and they assured me they would, but also we will try and wean xanax. I will await her hospital stay. But again she will need to see a different PCP in future, and she tells me she understands this    [facility] 3/9/2021 at [phone] with confusion, possibly not taking medications as prescribed. She is a poor historian. BP upon arrival 186/100  Sinus tachycardia. Na 142, K+ 4.8-->4.1, Bun/Cr 19/0.9, magnesium 1.1-->1.4, p-BNP 46219, troponin 0.03 x 3, CPK 97, CKMB 1.7, Albumin 3.3, AlT 70-->56, AST 48-->36, pro-calcitonin 0.10, WBC 12.2, Hgb 11.7, Plt 233, D-dimer 2372, INR 1.5, UA negative. Received 2 gms Magnesium, Lasix 20 mg IV, and Labetalol 5 mg IV in ED    CTA Chest W 3/9/2021: Right lower lobe segmental pulmonary embolus.  Left lower lobe subsegmental pulmonary embolus--> Placed Lovenox 70 mg Sub-q BID        Please note: past medical records were reviewed per electronic medical record (EMR) - see detailed reports under Past Medical/ Surgical History. Past Medical History:    1. Ex smoker quit in 2010 40 pack years  2. HTN  3. HLD  4. Myalgias with Lipitor, Crestor, High dose Zocor  5. T2DM insulin requiring with neuropathy  6. BMI  30.8 on 3/10/2021  7. GERD  8. Anxiety  9. JAXSON non complaint with C-pap  10. 11/25/20217 Inferior STEMI  11. 11/25/2007 Dr Mitchell Cowan: PTCA/BMS x 2 ( 2.5 x 12 mm and 2.5 x 12 mm) to RCA. LM no disease. LAD long area of 40 % stenosis in its proximal segment. LCx minimal luminal irregularities. RCA totally occluded at mid portion. 12. 11/26/2007 TTE Mild inferior hypokinesis with well preserved EF. No VHD. 13. 8/2009 Carotid US Bilateral luminal irregularities  14. 8/2010 Exercise MPS; Bony Protocol 8:10 minutes. 10.1 METS. 108% MPHR. No CP. EF 67%. NWM. Non ischemic  15. Seen by Dr Richard Robbins 3/2/2021 for NSVT (hypomagnesia)--> Toprol and Cozaar initiated. 16. TTE 3/2/2021 (EF 25-30%. Stage II DD, TAPSE 12 mm, moderately dilated LA, mildly enlarged RA, moderate MR, mild to moderate TR, RVSP 55 mmHg, moderate AI)--> TTE results were reviewed with the patient and was advised to stay in the hospital for further evaluation including a stress test plus or minus cardiac cath which she declined. Patient signed out AMA and  does not want to stay in the hospital        Medications Prior to admit:  Prior to Admission medications    Medication Sig Start Date End Date Taking? Authorizing Provider   ALPRAZolam [facility]) 2 MG tablet Take 2 mg by mouth 3 times daily as needed.     Yes Historical Provider, MD   Cholecalciferol (VITAMIN D3) 50 MCG (2000 UT) CAPS Take 2,000 Units by mouth daily   Yes Historical Provider, MD   azelastine (ASTELIN) 0.1 % nasal spray 2 sprays by Nasal route 2 times daily as directed   Yes Historical Provider, MD   fluticasone (FLONASE) 50 MCG/ACT nasal spray 2 sprays by Each Nostril route daily   Yes Historical Provider, MD   cefUROXime (CEFTIN) 250 MG tablet Take 1 tablet by mouth every 12 hours for 10 days 3/3/21 3/13/21 Yes Felicitas Trivedi MD   escitalopram (LEXAPRO) 10 MG tablet take 1 tablet by mouth once daily 1/25/21  Yes Sujata Pepe PA-C   metFORMIN (GLUCOPHAGE-XR) 500 MG extended release tablet Take 1 tablet by mouth 2 times daily (with meals) 1/25/21  Yes Sujata Pepe PA-C   fenofibrate micronized (LOFIBRA) 134 MG capsule Take 1 capsule by mouth every morning (before breakfast) 1/12/21  Yes Sujata Pepe PA-C   pantoprazole (PROTONIX) 40 MG tablet TAKE ONE TABLET BY MOUTH ONCE DAILY 10/26/20  Yes Sujata Pepe PA-C   metoprolol succinate (TOPROL XL) 50 MG extended release tablet TAKE ONE AND ONE-HALF TABLETS BY MOUTH TWICE DAILY 6/11/20  Yes [place] Fraction, DO   losartan (COZAAR) 50 MG tablet TAKE ONE TABLET BY MOUTH TWICE DAILY 5/12/20  Yes [place] Fraction, DO   loratadine (CLARITIN) 10 MG tablet Take 1 tablet by mouth daily 11/16/16  Yes IGNACIO Kramer CNP   aspirin EC 81 MG EC tablet Take 81 mg by mouth every other day.    Yes Historical Provider, MD   Co-Enzyme Q-10 100 MG CAPS Take 200 mg by mouth daily    Yes Historical Provider, MD       Current Medications:    Current Facility-Administered Medications: influenza quadrivalent split vaccine (FLUZONE;FLUARIX;FLULAVAL;AFLURIA) injection 0.5 mL, 0.5 mL, Intramuscular, Prior to discharge  sodium chloride flush 0.9 % injection 10 mL, 10 mL, Intravenous, PRN  enoxaparin (LOVENOX) injection 70 mg, 1 mg/kg, Subcutaneous, BID  metoprolol succinate (TOPROL XL) extended release tablet 50 mg, 50 mg, Oral, Daily  vitamin D (CHOLECALCIFEROL) tablet 2,000 Units, 2,000 Units, Oral, Daily  escitalopram (LEXAPRO) tablet 10 mg, 10 mg, Oral, Daily  cetirizine (ZYRTEC) tablet 5 mg, 5 mg, Oral, Daily  losartan (COZAAR) tablet 50 mg, 50 mg, Oral, Daily  meclizine (ANTIVERT) tablet 25 mg, 25 mg, Oral, TID PRN  pantoprazole (PROTONIX) tablet 40 mg, 40 mg, Oral, Daily  insulin lispro (HUMALOG) injection vial 0-10 Units, 0-10 Units, Subcutaneous, 4x Daily AC & HS  glucose (GLUTOSE) 40 % oral gel 15 g, 15 g, Oral, PRN  dextrose 50 % IV solution, 12.5 g, Intravenous, PRN  glucagon (rDNA) injection 1 mg, 1 mg, Intramuscular, PRN  dextrose 5 % solution, 100 mL/hr, Intravenous, PRN  sodium chloride flush 0.9 % injection 10 mL, 10 mL, Intravenous, 2 times per day  promethazine (PHENERGAN) tablet 12.5 mg, 12.5 mg, Oral, Q6H PRN **OR** ondansetron (ZOFRAN) injection 4 mg, 4 mg, Intravenous, Q6H PRN  polyethylene glycol (GLYCOLAX) packet 17 g, 17 g, Oral, Daily PRN  acetaminophen (TYLENOL) tablet 650 mg, 650 mg, Oral, Q6H PRN **OR** acetaminophen (TYLENOL) suppository 650 mg, 650 mg, Rectal, Q6H PRN  nitroGLYCERIN (NITROSTAT) SL tablet 0.4 mg, 0.4 mg, Sublingual, Q5 Min PRN  furosemide (LASIX) injection 20 mg, 20 mg, Intravenous, BID  atorvastatin (LIPITOR) tablet 80 mg, 80 mg, Oral, Nightly  aspirin EC tablet 81 mg, 81 mg, Oral, Daily    Allergies:  Sulfa antibiotics, Amoxicillin, Clavulanic acid, Levofloxacin, Omeprazole, Other, Pneumococcal vaccines, Amoxicillin-pot clavulanate, Bee venom, and Doxycycline    Social History:    40 pack years quit in 5340  Denies ETOH/illicit Drugs  Activity: Lives with 27 yo developmentally disabled son. Still  but  does not live with them. Uses cane for ambulation. Lives in 2 story home, bedrooms on second floor  Code Status: Full Code      Family History: Non contributory secondary to age      REVIEW OF SYSTEMS:     · Constitutional: Denies fatigue, fevers, chills or night sweats  · Eyes: Denies visual changes or drainage  · ENT: Denies headaches or hearing loss. No mouth sores or sore throat. No epistaxis   · Cardiovascular: Denies chest pain, pressure or palpitations.  No lower extremity swelling. · Respiratory: Denies MAHAJAN, cough, orthopnea or PND. No hemoptysis   · Gastrointestinal: Denies hematemesis or anorexia. No hematochezia or melena    · Genitourinary: Denies urgency, dysuria or hematuria. · Musculoskeletal: Denies gait disturbance, weakness or joint complaints  · Integumentary: Denies rash, hives or pruritis   · Neurological: + room spins. + confused. Denies dizziness, headaches or seizures. No numbness or tingling  · Psychiatric: Denies anxiety or depression. · Endocrine: Denies temperature intolerance. No recent weight change. .  · Hematologic/Lymphatic: Denies abnormal bruising or bleeding. No swollen lymph nodes    PHYSICAL EXAM:   BP (!) 177/98   Pulse 102   Temp 97.8 °F (36.6 °C) (Temporal)   Resp 18   Ht 5' 1\" (1.549 m)   Wt 163 lb 2 oz (74 kg)   SpO2 94%   BMI 30.82 kg/m²   CONST:  Well developed,  female who appears of stated age. Awake, alert and cooperative. Appears tachypneic during conversation   HEENT:   Head- Normocephalic, atraumatic   Eyes- Conjunctivae pink, anicteric  Throat- Oral mucosa pink and moist  Neck-  No stridor, trachea midline, no jugular venous distention. No carotid bruit. CHEST: Chest symmetrical and non-tender to palpation. No accessory muscle use or intercostal retractions  RESPIRATORY: Lung sounds - diminished L>R on 5 liters NC O2  CARDIOVASCULAR:     Heart Ausculation- Tachycardic rate and regular rhythm, no murmur. No s3, s4 or rub   PV: Trace BLE edema. No varicosities. Pedal pulses palpable, no clubbing or cyanosis   ABDOMEN: Soft, obese, non-tender to light palpation. Bowel sounds present. No palpable masses; no abdominal bruit  MS: Good muscle strength and tone. No atrophy or abnormal movements. : Deferred  SKIN: Warm and dry no statis dermatitis or ulcers   NEURO / PSYCH: Oriented to person, place and time. Speech clear and at times inappropriate. Follows all commands. Pleasant affect.      DATA:    ECG as per  Foreign's interpretation  Tele strips: 's    Diagnostic:    CXR 3/9/2021: CHF.  Superimposed pneumonia cannot be excluded    Ct Head 3/9/2021: No acute intracranial abnormality    CTA Chest 3/10/2021: Right lower lobe segmental pulmonary embolus.  Left lower lobe subsegmental pulmonary embolus. There is nonspecific airspace disease in the right lower lobe. There is a peripheral masslike density in the left lower lobe, in the   distribution of the subsegmental embolus.  Well a mass is not excluded, this   more likely relates to small pulmonary infarct.  Suggest short-term follow-up   to confirm resolution. Bilateral small to moderate pleural effusions. BLE Dopplers 3/10/2021:  Within the visualized vessels there is no evidence for deep venous thrombosis      Intake/Output Summary (Last 24 hours) at 3/10/2021 0944  Last data filed at 3/10/2021 0438  Gross per 24 hour   Intake --   Output 900 ml   Net -900 ml       Labs:   CBC:   Recent Labs     03/09/21  1458 03/10/21  0215   WBC 12.1* 12.2*   HGB 12.5 11.7   HCT 41.5 38.3    233     BMP:   Recent Labs     03/09/21 1458 03/10/21  0215    137   K 4.8 4.1   CO2 27 30*   BUN 19 14   CREATININE 0.9 0.8   LABGLOM >60 >60   CALCIUM 8.9 8.1*     Mag:   Recent Labs     03/09/21  2141 03/10/21  0556   MG 1.1* 1.4*     TSH:   Recent Labs     03/09/21 2141   TSH 2.070     HgA1c:   Lab Results   Component Value Date    LABA1C 6.5 (H) 03/09/2021     proBNP:   Recent Labs     03/09/21  1458   PROBNP 33,238*     PT/INR:   Recent Labs     03/09/21 1458   PROTIME 16.5*   INR 1.5     APTT:  Recent Labs     03/09/21 1458   APTT 21.7*     CARDIAC ENZYMES:  Recent Labs     03/09/21  1458 03/09/21  2141 03/10/21  0215   CKTOTAL  --  97  --    CKMB  --  1.7  --    TROPONINI 0.03 0.03 0.03     FASTING LIPID PANEL:  Lab Results   Component Value Date    CHOL 108 03/10/2021    HDL 33 03/10/2021    LDLCALC 55 03/10/2021    TRIG 99 03/10/2021     LIVER PROFILE:  Recent Labs     03/09/21  1458 03/10/21  0215   AST 48* 36*   ALT 70* 56*   LABALBU 3.4* 3.3*   A&P per Dr Yris Estevez  Electronically signed by Mai Little. MANJULA Bledsoe on 3/10/2021 at 9:44 AM      I have personally seen and evaluated the patient. I personally obtained the history and performed the physical exam.  I personally reviewed all of the above labs, history, review of systems, and data. All of the assessments and recommendations are from me. All of the above cardiac medical decisions are from me. Please see my additional contributions to the history, physical exam, assessment, and recommendations below. History of chief complaint:  She is a difficult historian. She was she states that she still is able to give an accurate history now but is a poor historian. Reportedly she recently had a cerumen impaction removed and still feels dizzy. She has been found to have bilateral pulmonary emboli. Cardiology is consulted for possible CHF. She was recently seen by Dr. Raulito Freed for nonsustained V. tach and found to have a new cardiomyopathy. She deferred a cardiac catheterization and left AMA. Review of systems:     Heart: as above   Lungs: as above   Eyes: denies changes in vision or discharge. Ears: denies changes in hearing or pain. Nose: denies epistaxis or masses   Throat: denies sore throat or trouble swallowing. Neuro: denies numbness, tingling, tremors. Skin: denies rashes or itching. : denies hematuria, dysuria   GI: denies vomiting, diarrhea   Psych: denies mood changed, anxiety, depression. Physical exam:  BP (!) 157/97   Pulse 106   Temp 97.8 °F (36.6 °C) (Temporal)   Resp 15   Ht 5' 1\" (1.549 m)   Wt 163 lb 2 oz (74 kg)   SpO2 95%   BMI 30.82 kg/m²   Constitutional: Poor historian but she is A&O x3, communicates well, no acute distress. Eyes: extraocular muscles intact, PERRL. Normal lids & conjunctiva. No icterus. ENT: clear, no bleeding. No external masses.   Lips normal formation. Neck: supple, full ROM, + JVD, no bruits, no lymphadenopathy. No masses. trachea midline. Heart: Distant to auscultation. Regular rate & rhythm, normal S1 & S2, I/VI (normal physiologic) systolic murmur, S4 gallop. No heave. Lungs: Poor air movement. Bilateral wheezing and rales. No accessory muscles. Abd: soft, non-tender. Normal bowel sounds. Neuro: Full ROM X 4, EOMI, no tremors. EXT: No bilateral lower extremity edema  Skin: warm, dry, intact. Good turgor. Psych: A&O x 3, normal behavior, not anxious. Patient seen and examined. Chart, labs & data reviewed. A:  1. Newly diagnosed cardiomyopathy. She is clinically hypervolemic at this time. 2. Recent nonsustained VT. 3. Coronary artery disease. 4. Hypomagnesemia  5. Hypertension  6. She now has bilateral pulmonary emboli. 7. Altered mental status. Improving. Rec:  1. IV diuresis  2. Replace magnesium  3. Follow lites and creatinine  4. Titrate cardiomyopathy medications. Change losartan to Entresto. 5. Add Aldactone. 6. I discussed with her again cardiac catheterization and possible PCI. She states that she may reconsider this once the pulmonary emboli have been treated. 7. She did agree to a defibrillator vest on discharge. Electronically signed by Nadia Armenta DO on 3/10/2021 at 6:07 PM    Note: This report was completed using computerized voice recognition software. Every effort has been made to ensure accuracy, however; and invert and computerized transcription errors may be present.

## 2021-03-11 ENCOUNTER — TELEPHONE (OUTPATIENT)
Dept: PRIMARY CARE CLINIC | Age: 76
End: 2021-03-11

## 2021-03-11 ENCOUNTER — TELEPHONE (OUTPATIENT)
Dept: CARDIOLOGY CLINIC | Age: 76
End: 2021-03-11

## 2021-03-11 LAB
ANION GAP SERPL CALCULATED.3IONS-SCNC: 11 MMOL/L (ref 7–16)
BUN BLDV-MCNC: 12 MG/DL (ref 8–23)
CALCIUM SERPL-MCNC: 8.6 MG/DL (ref 8.6–10.2)
CHLORIDE BLD-SCNC: 97 MMOL/L (ref 98–107)
CO2: 24 MMOL/L (ref 22–29)
CREAT SERPL-MCNC: 0.7 MG/DL (ref 0.5–1)
GFR AFRICAN AMERICAN: >60
GFR NON-AFRICAN AMERICAN: >60 ML/MIN/1.73
GLUCOSE BLD-MCNC: 110 MG/DL (ref 74–99)
MAGNESIUM: 2.3 MG/DL (ref 1.6–2.6)
METER GLUCOSE: 113 MG/DL (ref 74–99)
METER GLUCOSE: 114 MG/DL (ref 74–99)
METER GLUCOSE: 121 MG/DL (ref 74–99)
METER GLUCOSE: 205 MG/DL (ref 74–99)
METER GLUCOSE: 99 MG/DL (ref 74–99)
POTASSIUM REFLEX MAGNESIUM: 6.6 MMOL/L (ref 3.5–5)
POTASSIUM SERPL-SCNC: 4.1 MMOL/L (ref 3.5–5)
POTASSIUM SERPL-SCNC: 6.6 MMOL/L (ref 3.5–5)
SARS-COV-2, NAAT: NOT DETECTED
SODIUM BLD-SCNC: 132 MMOL/L (ref 132–146)

## 2021-03-11 PROCEDURE — 2060000000 HC ICU INTERMEDIATE R&B

## 2021-03-11 PROCEDURE — 36415 COLL VENOUS BLD VENIPUNCTURE: CPT

## 2021-03-11 PROCEDURE — 97166 OT EVAL MOD COMPLEX 45 MIN: CPT

## 2021-03-11 PROCEDURE — 97161 PT EVAL LOW COMPLEX 20 MIN: CPT

## 2021-03-11 PROCEDURE — 6360000002 HC RX W HCPCS: Performed by: FAMILY MEDICINE

## 2021-03-11 PROCEDURE — 99233 SBSQ HOSP IP/OBS HIGH 50: CPT | Performed by: INTERNAL MEDICINE

## 2021-03-11 PROCEDURE — 97530 THERAPEUTIC ACTIVITIES: CPT

## 2021-03-11 PROCEDURE — 6360000002 HC RX W HCPCS: Performed by: INTERNAL MEDICINE

## 2021-03-11 PROCEDURE — 84132 ASSAY OF SERUM POTASSIUM: CPT

## 2021-03-11 PROCEDURE — 87635 SARS-COV-2 COVID-19 AMP PRB: CPT

## 2021-03-11 PROCEDURE — 83735 ASSAY OF MAGNESIUM: CPT

## 2021-03-11 PROCEDURE — 6370000000 HC RX 637 (ALT 250 FOR IP): Performed by: INTERNAL MEDICINE

## 2021-03-11 PROCEDURE — 2580000003 HC RX 258: Performed by: FAMILY MEDICINE

## 2021-03-11 PROCEDURE — 80048 BASIC METABOLIC PNL TOTAL CA: CPT

## 2021-03-11 PROCEDURE — 6370000000 HC RX 637 (ALT 250 FOR IP): Performed by: FAMILY MEDICINE

## 2021-03-11 PROCEDURE — 82962 GLUCOSE BLOOD TEST: CPT

## 2021-03-11 PROCEDURE — 2700000000 HC OXYGEN THERAPY PER DAY

## 2021-03-11 PROCEDURE — 97535 SELF CARE MNGMENT TRAINING: CPT

## 2021-03-11 RX ORDER — SODIUM POLYSTYRENE SULFONATE 15 G/60ML
15 SUSPENSION ORAL; RECTAL ONCE
Status: COMPLETED | OUTPATIENT
Start: 2021-03-11 | End: 2021-03-11

## 2021-03-11 RX ADMIN — METOPROLOL SUCCINATE 50 MG: 25 TABLET, EXTENDED RELEASE ORAL at 08:59

## 2021-03-11 RX ADMIN — SPIRONOLACTONE 25 MG: 25 TABLET ORAL at 08:59

## 2021-03-11 RX ADMIN — Medication 10 ML: at 09:03

## 2021-03-11 RX ADMIN — INSULIN LISPRO 4 UNITS: 100 INJECTION, SOLUTION INTRAVENOUS; SUBCUTANEOUS at 12:37

## 2021-03-11 RX ADMIN — Medication 10 ML: at 20:58

## 2021-03-11 RX ADMIN — SODIUM POLYSTYRENE SULFONATE 15 G: 15 SUSPENSION ORAL; RECTAL at 12:36

## 2021-03-11 RX ADMIN — ENOXAPARIN SODIUM 70 MG: 80 INJECTION SUBCUTANEOUS at 08:59

## 2021-03-11 RX ADMIN — INSULIN LISPRO 4 UNITS: 100 INJECTION, SOLUTION INTRAVENOUS; SUBCUTANEOUS at 16:00

## 2021-03-11 RX ADMIN — FUROSEMIDE 20 MG: 10 INJECTION, SOLUTION INTRAMUSCULAR; INTRAVENOUS at 09:02

## 2021-03-11 RX ADMIN — ENOXAPARIN SODIUM 70 MG: 80 INJECTION SUBCUTANEOUS at 20:52

## 2021-03-11 RX ADMIN — PANTOPRAZOLE SODIUM 40 MG: 40 TABLET, DELAYED RELEASE ORAL at 08:59

## 2021-03-11 RX ADMIN — FUROSEMIDE 20 MG: 10 INJECTION, SOLUTION INTRAMUSCULAR; INTRAVENOUS at 18:26

## 2021-03-11 RX ADMIN — Medication 2000 UNITS: at 08:59

## 2021-03-11 RX ADMIN — SACUBITRIL AND VALSARTAN 1 TABLET: 24; 26 TABLET, FILM COATED ORAL at 20:51

## 2021-03-11 RX ADMIN — CETIRIZINE HYDROCHLORIDE 5 MG: 10 TABLET, FILM COATED ORAL at 09:01

## 2021-03-11 RX ADMIN — SACUBITRIL AND VALSARTAN 1 TABLET: 24; 26 TABLET, FILM COATED ORAL at 09:01

## 2021-03-11 RX ADMIN — ASPIRIN 81 MG: 81 TABLET, COATED ORAL at 09:01

## 2021-03-11 RX ADMIN — ESCITALOPRAM 10 MG: 10 TABLET, FILM COATED ORAL at 09:01

## 2021-03-11 NOTE — CARE COORDINATION
Stat initial therapy evals called to Raina Cedeño at  via . Notified  Sheree with [employer] r/t ordering wearable cardiac defibrillator. Pulmonology following for bilateral PEs, plan is for Lovenox till 3/12 then, will transition to Elquis. Cardiology following, iv lasix 20 mg BID, discussed cardiac cath however, pt declined at this time. Anticipate discharge within the next 24 hrs.

## 2021-03-11 NOTE — CARE COORDINATION
Left detailed VM for son to please be at the [facility] for pt's lifevest application, to be applied today at 1600.

## 2021-03-11 NOTE — PROGRESS NOTES
[facility]   Department of Pharmacy   Pharmacist Transition of Care Services         Patient Demographics  Name:  Toyin Orlando   Medical Record Number:  [id number]  Gender:  female   Age:  70 y.o. YOB: 1945    Readmission Risk: 15 %       Pharmacist Review and Summary of Medications     Date of last reviewed/update: 3/11/21    Category Comments   New Medication Started   lovenox 70mg subcut BID (to begin eliquis taper)  lipitor 80mg PO nightly   entresto 24-26mg PO BID   Change in Outpatient Medication      Discontinued/On hold Outpatient Medication  Metformin on hold  ceftin (unsure if she took this post last discharge for UTI)   Other PO magnesium? Pharmacist Patient Education:    Date  Person Educated Content of Education             Documentation of Pharmacist Interventions and Follow-up Plan:     The following Pharmacist Transition of Care Services were completed:   [x]  Reviewed and summarized medication changes  [x]  Entire home medication list was reviewed for accuracy  []  Home medication list was updated or corrected    [x]  Reviewed discharge medication reconciliation  []  Discharge medication list was updated or corrected    []  Added information to AVS   []  Patient education was provided on new medications  []  Patient education was provided on medication changes  []  Reviewed the AVS with patient    Additional Interventions:  []  Inpatient prescriber was contacted and the following pharmacy recommendations        were accepted: **     [] Other interventions: **        Pharmacist: Wm Kim PharmD, [facility]  Date:  3/11/2021 10:38 AM

## 2021-03-11 NOTE — PROGRESS NOTES
Physical Therapy  Physical Therapy Initial Assessment   Name: Papito Echevarria  : 1945  MRN: [id number]    Referring Provider:  Darin Palomares MD    Date of Service: 3/11/2021    Evaluating PT:  Suman Garcia, PT, DPT [id number]    Room #:  3387/1358-A  Diagnosis:  Acute on chronic systolic heart failure  PMHx/PSHx:  CAD, HLD, heart attack, IBS, depression, DM, anxiety, carotid artery narrowing, GERD, HTN, OS, acute renal insufficiency, CHF, sleep apnea  Precautions:  Falls, O2 via NC  Equipment Needs:  Front [employer]    SUBJECTIVE:    Pt lives with special needs son in a 2 story home with 2 stairs to enter and 0 rail. Bed is on second floor and bath is on second floor. 13 steps with 1 rail to second floor. Pt ambulated with no AD independently PTA. Pt reports son assists with housework. She states she drives to grocery store for curbside pickup or has groceries delivered. Overall, pt states she is mostly sedentary at home. OBJECTIVE:   Initial Evaluation  Date: 3/11/2021 Treatment Short Term/ Long Term   Goals   AM-PAC 6 Clicks 99/94     Was pt agreeable to Eval/treatment? yes     Does pt have pain? No c/o pain     Bed Mobility  Rolling: SBA  Supine to sit: SBA  Sit to supine: SBA  Scooting: SBA  Rolling: Independent  Supine to sit: Independent  Sit to supine: Independent  Scooting: Independent   Transfers Sit to stand: SBA  Stand to sit: SBA  Stand pivot: SBA front [employer]  Sit to stand: Independent  Stand to sit: Independent  Stand pivot: Eddie front [employer]   Ambulation    150 feet with front [employer] SBA  300 feet with front [employer] Eddie   Stair negotiation: ascended and descended  7 steps with 1 rail CGA  13 steps with 1 rail Eddie   ROM BUE:  Per OT note  BLE:  WNL     Strength BUE:  Per OT note  BLE:  WNL     Balance Sitting EOB:  Independent  Dynamic Standing:  SBA front [employer]  Sitting EOB:  Independent  Dynamic Standing:  Eddie front [employer]     Pt is A & O x 4. Pt is easily distracted and rambles on during questioning.   She required frequent cueing to redirect to task. Sensation:  Pt denies numbness and tingling to extremities  Edema:  unremarkable    Vitals:  SPO2 monitored throughout session:    Pt on 4L NC resting 99%  Weaned to RA and maintaining 93% at rest  Ambulating RA 87%  Ambulating 2L 98%    Patient education  Pt educated on role of PT intervention. Pt educated on safety in room with utilization of call light for assistance with mobility. Pt educated on importance of maximizing OOB time by transferring to bedside chair for meals and ambulating to bathroom/transferring to bedside commode with assistance from nursing and therapy staff to increase functional activity tolerance and overall functional independence. Thorough education regarding proper utilization of front [employer] for safety with ambulation around the home. Patient response to education:   Pt verbalized understanding Pt demonstrated skill Pt requires further education in this area   yes yes yes     ASSESSMENT:    Comments:  RN cleared pt for activity prior to session. Pt received supine in bed and agreeable to PT intervention with OT collaboration at this time. Pt performed all functional mobility as noted above. Pt very impulsive and unsafe with mobility often rushing and becoming easily distracted. Pt required frequent cueing to maintain attention to task. Vitals monitored closely throughout as noted above. Pt ambulated around unit and negotiated steps. Pt was near completion of full flight of steps but stated she could not complete them and requested to turn around after 7 steps were completed. Pt returned to supine at end of session and left with all needs met and call light in reach. Pt requires continued skilled PT intervention for the purposes of maximizing functional mobility and independence by addressing deficits described above.      Treatment:  Patient practiced and was instructed in the following treatment:     Therapeutic Activities Completed:  o Functional mobility as noted above:   - Bed mobility: SBA all aspects. Min VC for efficient sequencing to decrease difficulty of task for pt. - Transfer training: SBA for safety. Cues for proper hand placement and sequencing when completing stand pivot transfers with [facility].    - Ambulation: 150 feet x 2 reps. Standing rest breaks as needed. Max VC for proper use of [facility] for safety. Pt with forward flexed posture and had tendency to allow [facility] to be too far anteriorly to her requiring max VC to correct. - Stair Negotiation:  7 steps with 1 rail NR patterning CGA. o Skilled repositioning in supine with HOB elevated for comfort.  o Pt education as noted above. Pt's/ family goals   1. Return home. Pt wants HH PT. Patient and or family understand(s) diagnosis, prognosis, and plan of care. yes    PLAN OF CARE:    Current Treatment Recommendations     [x] Strengthening     [x] ROM   [x] Balance Training   [x] Endurance Training   [x] Transfer Training   [x] Gait Training   [x] Stair Training   [x] Positioning   [x] Safety and Education Training   [x] Patient/Caregiver Education   [] HEP  [] Other     PT care will be provided in accordance with the objectives noted above. The above treatment recommendations will be utilized to address deficits described above in order to restore pt's prior level of function and/or achieve modified functional independence with adaptive strategies. Frequency of treatments: 2-5x/week x 1-2 weeks. Time in  0955  Time out  1030    Total Treatment Time  10 minutes     Evaluation Time includes thorough review of current medical information, gathering information on past medical history/social history and prior level of function, completion of standardized testing/informal observation of tasks, assessment of data and education on plan of care and goals.     CPT codes:  [x] Low Complexity PT evaluation 51956  [] Moderate Complexity PT evaluation [id number]  [] High Complexity PT evaluation [id number]  [] PT Re-evaluation [id number]  [] Gait training 12586 0 minutes  [] Manual therapy 35288 0 minutes  [x] Therapeutic activities 31509 10 minutes  [] Therapeutic exercises 71107 0 minutes  [] Neuromuscular reeducation 85791 0 minutes     Duncan Orantes, PT, DPT  [id number]

## 2021-03-11 NOTE — PROGRESS NOTES
reviewed. Remains on 5 L oxygen. Got a call from patient's primary care physician that they were concerned for possible Covid infection. Exam:    BP (!) 147/87   Pulse 115   Temp 97.6 °F (36.4 °C) (Temporal)   Resp 18   Ht 5' 1\" (1.549 m)   Wt 158 lb 3.2 oz (71.8 kg)   SpO2 98%   BMI 29.89 kg/m²     HEENT: No pallor, no icterus. Respiratory:  CTA, good air entry. Cardiovascular: RRR, no murmur. Abdomen: Soft, non-tender, BS noted. Musculoskeletal: No joint pains or joint swelling noted. Neurologic: awake, alert and following commands       Assessment/Plan:  Active Hospital Problems    Diagnosis Date Noted    Hypomagnesemia [E83.42] 03/10/2021    Valvular heart disease [I38] 03/10/2021    Abnormal liver enzymes [R74.8] 03/10/2021    Hypertensive urgency [I16.0] 03/10/2021    Sleep apnea [G47.30] 03/10/2021    Fall [W19. XXXA] 03/10/2021    Acute respiratory failure with hypoxia (HCC) [J96.01] 03/10/2021    Bilateral pulmonary embolism (HCC) [I26.99] 03/10/2021    Congestive heart failure (HCC) [I50.9]     Acute on chronic systolic heart failure (HCC) [I50.23] 03/09/2021    Acute combined systolic and diastolic congestive heart failure ([address] [I50.41] 03/09/2021    Mixed hyperlipidemia [E78.2] 01/18/2017    Type 2 diabetes mellitus (HCC) [E11.9] 04/25/2016    Hyperlipidemia [E78.5]     CAD (coronary artery disease) [I25.10]     Anxiety [F41.9]      · New onset diabetes mellitus  · Hyperkalemia    Plan:  · Continue IV Lasix, follow-up with cardiology, started on Entresto, Aldactone down  · Patient will need defibrillator vest upon discharge  · Rule out Covid infection, will send PCR  · Will give Kayexalate, hyperkalemia most likely secondary to her being started on Aldactone and Entresto, will repeat potassium level later today.   · She will need cardiac cath for further evaluation  · Continue anticoagulation, hemodynamically stable, pulmonary following  · Continue with ADA diet and sliding scale coverage, will need to be on oral hypoglycemics upon discharge, most likely Metformin      Labs:   Recent Labs     03/09/21  1458 03/10/21  0215   WBC 12.1* 12.2*   HGB 12.5 11.7   HCT 41.5 38.3    233     Recent Labs     03/09/21  1458 03/10/21  0215 03/11/21  0745    137 132   K 4.8 4.1 6.6*  6.6*    101 97*   CO2 27 30* 24   BUN 19 14 12   CREATININE 0.9 0.8 0.7   CALCIUM 8.9 8.1* 8.6     Recent Labs     03/09/21  1458 03/10/21  0215   AST 48* 36*   ALT 70* 56*   BILIDIR  --  0.7*   BILITOT 1.1 1.2   ALKPHOS 194* 163*     Recent Labs     03/09/21  1458   INR 1.5     Recent Labs     03/09/21  1458 03/09/21  2141 03/10/21  0215   CKTOTAL  --  97  --    TROPONINI 0.03 0.03 0.03       Medications:  Reviewed    Infusion Medications    dextrose       Scheduled Medications    influenza virus vaccine  0.5 mL Intramuscular Prior to discharge    enoxaparin  1 mg/kg Subcutaneous BID    metoprolol succinate  50 mg Oral Daily    sacubitril-valsartan  1 tablet Oral BID    spironolactone  25 mg Oral Daily    vitamin D  2,000 Units Oral Daily    escitalopram  10 mg Oral Daily    cetirizine  5 mg Oral Daily    pantoprazole  40 mg Oral Daily    insulin lispro  0-10 Units Subcutaneous 4x Daily AC & HS    sodium chloride flush  10 mL Intravenous 2 times per day    furosemide  20 mg Intravenous BID    atorvastatin  80 mg Oral Nightly    aspirin  81 mg Oral Daily     PRN Meds: sodium chloride flush, meclizine, glucose, dextrose, glucagon (rDNA), dextrose, promethazine **OR** ondansetron, polyethylene glycol, acetaminophen **OR** acetaminophen, nitroGLYCERIN      Intake/Output Summary (Last 24 hours) at 3/11/2021 1403  Last data filed at 3/11/2021 1345  Gross per 24 hour   Intake 600 ml   Output 1900 ml   Net -1300 ml     Body mass index is 29.89 kg/m².       · Diet  DIET CARDIAC; Low Sodium (2 GM)    · Code Status  Full Code       Electronically signed by Olita Olszewski, MD on 3/11/2021 at 2:03 PM  [employer]   Please contact me through [employer]    NOTE: This report was transcribed using voice recognition software. Every effort was made to ensure accuracy; however, inadvertent computerized transcription errors may be present.

## 2021-03-11 NOTE — PROGRESS NOTES
Fair+     Upper Extremities:   Hand Dominance: Right [x]  Left []      ROM and Strength Coordination Short Term Goals=LTG   Right UE Active ROM:  WFL     Strength: 4-/5     Strength: 4-/5 Fine/gross Motor Coordination: mild impairment      Left UE Active ROM:  WFL     Strength: 4-/5     Strength: 4-/5 Fine/gross Motor Coordination: mild impairment        Hearing: WFL  Sensation:  No c/o numbness or tingling  Tone:  WFL  Edema: none observed B UE                            Comments: Nursing approved OT session. Upon arrival, patient semi-supine in bed and agreeable to session. OT evaluation performed with education provided regarding the purpose and benefits of OT session, along with mobility and I/ADL completion. Overall, patient presents with impaired safety awareness/ insight, decreased activity tolerance, impaired balance, and weakness limiting ability to complete ADLs, along with functional mobility/transfers. Patient would benefit from continued skilled OT to increase safety and functional performance with ADLs/ functional mobility to maximize functional outcomes in order for patient to return to [facility]. Patient easily distracted throughout session with continuous cues required to attend to task. At end of session, patient semi-supine in bed with call light and phone within reach, along with all lines and tubes intact. Alarm on. Nursing notified. Treatment: OT treatment provided this date includes:    ADL training-  Instruction/training on safety and adapted techniques for completion of ADLs: Patient completed toileting using standard commode with SBA for rear wilfredo-hygiene while seated. CGA to complete hand hygiene while standing at sink with verbal/tactile cues for sequencing and attention to task.   Skilled monitoring of vitals:  Skilled monitoring of the patient's response throughout treatment. Patient on 2 L O2.  SpO2: 87-98% during functional activity/ADLs with rest breaks as needed and cues for pursed lip breathing. · Eval Complexity: Evaluation Complexity: Mod Complexity  ? History: Expanded review of medical records and additional review of physical, cognitive, or psychosocial history related to current functional performance  ? Exam: 5+ performance deficits  ? Assistance/Modification: mod/max assistance or modifications required to perform tasks. May have comorbidities that affect occupational performance.       Assessment of current deficits   Functional mobility [x]  ADLs [x] Strength [x]  Cognition [x]  Functional transfers  [x] IADLs [x] Safety Awareness [x]  Endurance/Activity tolerance [x]  Fine Motor Coordination [x] Balance [x] Vision/perception [] Sensation []   Gross Motor Coordination [] ROM [] Delirium []                  Motor Control []    Plan of Care:  OT 1-3x/week for 1-2 weeks PRN   [x] ADL retraining/modified techniques, along with assistive device recommendations to maximize patient safety and performance with self-care while maintaining precautions   [x]Balance retraining/tolerance tasks for facilitation of postural control with dynamic challenges during ADLs and functional activities   [x] Delirium Prevention/treatment to maximize functional outcomes   [x] Cognitive Re-Training/ executive function skills for safe participation in ADLs/IADLs, along with functional activities   [x] Energy conservation techniques/Strategies to improve activity tolerance      [x] Environmental modifications for safe mobility and completion of ADLs    [x] Functional mobility training/DME recommendations for increased performance, safety and fall prevention  while maintaining precautions     [x] Functional transfer/mobility training/DME recommendations for increased performance, safety and fall prevention while maintaining precautions           []Neuromuscular re-education: facilitation of righting/equilibrium reactions, midline orientation, scapular stability/mobility,

## 2021-03-11 NOTE — PROGRESS NOTES
Date    WBC 12.2 03/10/2021    RBC 3.91 03/10/2021    HGB 11.7 03/10/2021    HCT 38.3 03/10/2021     03/10/2021    MCV 98.0 03/10/2021    MCH 29.9 03/10/2021    MCHC 30.5 03/10/2021    RDW 14.6 03/10/2021    SEGSPCT 62 08/30/2012    BLASTSPCT 0.9 03/10/2021    METASPCT 0.9 03/09/2021    LYMPHOPCT 6.1 03/10/2021    PROMYELOPCT 0.9 03/10/2021    MONOPCT 7.0 03/10/2021    BASOPCT 0.2 03/10/2021    MONOSABS 0.85 03/10/2021    LYMPHSABS 0.73 03/10/2021    EOSABS 0.21 03/10/2021    BASOSABS 0.00 03/10/2021     BMP:    Lab Results   Component Value Date     03/10/2021    K 4.1 03/10/2021    K 4.8 03/09/2021     03/10/2021    CO2 30 03/10/2021    BUN 14 03/10/2021    LABALBU 3.3 03/10/2021    CREATININE 0.8 03/10/2021    CALCIUM 8.1 03/10/2021    GFRAA >60 03/10/2021    LABGLOM >60 03/10/2021    GLUCOSE 138 03/10/2021        PRO-BNP:   Lab Results   Component Value Date    PROBNP 33,238 (H) 03/09/2021    PROBNP 518 (H) 02/09/2018      ABGs:   Lab Results   Component Value Date    PH 7.413 03/09/2021    PO2 85.9 03/09/2021    PCO2 42.2 03/09/2021     Hemoglobin A1C: No components found for: HGBA1C    IMAGING:  Imaging tests were completed and reviewed and discussed radiology and care team involved and reveals   Echo Complete    Result Date: 3/2/2021  Transthoracic Echocardiography Report (TTE)  Demographics   Patient Name    Radhika Hart Gender            Female   Medical Record  [id number]     Room Number       4305  Number   Account #       [de-identified]    Procedure Date    03/02/2021   Corporate ID                 Ordering          Mell Galarza MD                               Physician   Accession       [id number]   Referring  Number                       Physician   Date of Birth   1945   Sonographer       Elizabeth Orozco   Age             70 year(s)   Interpreting      [address] [place] [address]                               Physician         Physician Cardiology Elliot Harry MD                                Any Other  Procedure Type of Study   TTE procedure:Echo Complete W/Doppler & Color Flow. Procedure Date Date: 03/02/2021 Start: 06:57 AM Study Location: Portable Technical Quality: Adequate visualization Indications:Ventricular tachycardia. Patient Status: Routine Height: 62 inches Weight: 160 pounds BSA: 1.74 m^2 BMI: 29.26 kg/m^2 HR: 80 bpm BP: 156/88 mmHg  Findings   Left Ventricle  Mildly dilated left ventricle. Ejection fraction is visually estimated at 25-30%. Overall ejection fraction severely decreased . No regional wall motion abnormalities seen. Normal left ventricle wall thickness. Stage II diastolic dysfunction. Right Ventricle  Normal right ventricular size. Right ventricle global systolic function is moderately reduced . TAPSE 12  mm. Left Atrium  The left atrium is moderately dilated. Right Atrium  Mildly enlarged right atrium size. Mitral Valve  Mild mitral annular calcification. No evidence of mitral valve stenosis. Increased E point septal separation noted,suggesting decreased LV cardiac  output. Moderate mitral regurgitation with centrally directed jet. Tricuspid Valve  The tricuspid valve appears structurally normal.  Mild to moderate tricuspid regurgitation. RVSP is 55 mmHg. Pulmonary hypertension is moderate . Aortic Valve  The aortic valve appears mildly sclerotic. The aortic valve is trileaflet. No hemodynamically significant aortic stenosis is present. Moderate aortic regurgitation is noted. Pulmonic Valve  Pulmonic valve is structurally normal.  Physiologic and/or trace pulmonic regurgitation present. No evidence of pulmonic valve stenosis. Pericardial Effusion  Trace pericardial effusion. Pleural Effusion  No evidence of pleural effusion. Aorta  Normal aortic root and ascending aorta. Miscellaneous  Dilated Inferior Vena Cava. Inferior Vena Cava, normal respiratory variation.    Conclusions Summary  Mildly dilated left ventricle. Ejection fraction is visually estimated at 25-30%. Overall ejection fraction severely decreased . No regional wall motion abnormalities seen. Normal left ventricle wall thickness. Stage II diastolic dysfunction. The left atrium is moderately dilated. Normal right ventricular size. Right ventricle global systolic function is moderately reduced . TAPSE 12  mm  Moderate mitral regurgitation with centrally directed jet. No hemodynamically significant aortic stenosis is present. Moderate aortic regurgitation is noted. Mild to moderate tricuspid regurgitation. RVSP is 55 mmHg. Pulmonary hypertension is moderate . Trace pericardial effusion. No previous echo for comparison.    Signature   ----------------------------------------------------------------  Electronically signed by Homero Avila MD(Interpreting  physician) on 03/02/2021 08:15 PM  ----------------------------------------------------------------  M-Mode/2D Measurements & Calculations   LV Diastolic    LV Systolic Dimension: 4.8   AV Cusp Separation: 1.7 cmLA  Dimension: 5.5  cm                           Dimension: 4.6 cmAO Root  cm              LV Volume Diastolic: 513.1   Dimension: 2.9 cm  LV FS:12.7 %    ml  LV PW           LV Volume Systolic: 496.4 ml  Diastolic: 0.8  LV EDV/LV EDV Index: 150.4  cm              ml/86 ml/m^2LV ESV/LV ESV    RV Diastolic Dimension: 3.4  LV PW Systolic: Index: 651.4 TW/99ZI/ m^2    cm  0.9 cm          EF Calculated: 30 %  Septum          LV Mass Index: 92 l/min*m^2  LA/Aorta: 3.15  Diastolic: 0.8  LV Length: 7.9 cm            Ascending Aorta: 3.5 cm  cm                                           LA volume/Index: 80 ml  Septum          LVOT: 2 cm                   /36.00KG/L^7  Systolic: 1 cm                               RA Area: 23 cm^2  CO: 3.87 l/min  CI: 2.22                                     IVC Expiration: 2.4 cm  l/m*m^2  LV Mass: 159.96  g  Doppler Measurements & Calculations   MV Peak E-Wave: 0.63 m/s  AV Peak Velocity:     LVOT Peak Velocity: 0.91  MV Peak A-Wave: 0.58 m/s  1.33 m/s              m/s  MV E/A Ratio: 1.08        AV Peak Gradient:     LVOT Mean Velocity: 0.55  MV Peak Gradient: 2.1     7.02 mmHg             m/s  mmHg                      AV Mean Velocity:     LVOT Peak Gradient: 3.3  MV Mean Gradient: 1.1     0.84 m/s              mmHgLVOT Mean Gradient:  mmHg                      AV Mean Gradient: 3.3 1.5 mmHg  MV Mean Velocity: 0.49    mmHg                  Estimated RVSP: 54.6 mmHg  m/s                       AV VTI: 21.3 cm       Estimated RAP:8 mmHg  MV Deceleration Time: 102 AV Area  msec                      (Continuity):2.27  MV P1/2t: 62.6 msec       cm^2                  TR Velocity:3.41 m/s  MVA by PHT:3.51 cm^2      AV Deceleration Time: TR Gradient:46.59 mmHg  MV Area (continuity): 2.7 1233.9 msec           PV Peak Velocity: 0.59 m/s  cm^2                      LVOT VTI: 15.4 cm     PV Peak Gradient: 1.37  MV E' Septal Velocity:                          mmHg  0.03 m/s                  Estimated PASP: 54.59 PV Mean Velocity: 0.4 m/s  MV E' Lateral Velocity: 4 mmHg                  PV Mean Gradient: 0.7 mmHg  m/s  MR Velocity: 6.34 m/s                           NC ED Velocity: 1.24 m/s  MV DAYAMI PISA: 0.17 cm^2  MR VTI: 215.1 cm  Alias Velocity: 0.27  m/sPISA Radius: 0.8 cm   PISA area: 4.02 cm^2MR  flow rate: 109.75 ml/sMR  volume:36.57 ml  [email or web address] [id number]    Xr Thoracic Spine (2 Views)    Result Date: 2/27/2021  EXAMINATION: XRAY VIEWS OF THE THORACIC SPINE 2/27/2021 7:41 pm COMPARISON: 05/28/2013 HISTORY: ORDERING SYSTEM PROVIDED HISTORY: fall, back pain TECHNOLOGIST PROVIDED HISTORY: Reason for exam:->fall, back pain FINDINGS: No acute fractures. Multilevel degenerative changes with scoliosis. Cardiomegaly.   The visualized portion of the lungs are unremarkable. No evidence of acute thoracic spine trauma. Scoliosis with multilevel degenerative changes. Cardiomegaly. Xr Lumbar Spine (2-3 Views)    Result Date: 2/27/2021  EXAMINATION: THREE XRAY VIEWS OF THE LUMBAR SPINE 2/27/2021 7:41 pm COMPARISON: None. HISTORY: ORDERING SYSTEM PROVIDED HISTORY: back pain, fall TECHNOLOGIST PROVIDED HISTORY: Reason for exam:->back pain, fall FINDINGS: No fractures. Multilevel degenerative changes with grade 1 anterolisthesis L3 over L4. Scoliosis. Degenerative changes involving the SI joints. The hip joints are intact. Calcified plaque involving the abdominal aorta. No evidence of acute lumbar spine trauma. Scoliosis with multilevel degenerative changes and grade 1 anterolisthesis L3 over L4. Ct Head Wo Contrast    Result Date: 3/9/2021  EXAMINATION: CT OF THE HEAD WITHOUT CONTRAST  3/9/2021 6:18 pm TECHNIQUE: CT of the head was performed without the administration of intravenous contrast. Dose modulation, iterative reconstruction, and/or weight based adjustment of the mA/kV was utilized to reduce the radiation dose to as low as reasonably achievable. COMPARISON: February 27, 2021 HISTORY: ORDERING SYSTEM PROVIDED HISTORY: [facility] TECHNOLOGIST PROVIDED HISTORY: Reason for exam:->ams Has a \"code stroke\" or \"stroke alert\" been called? ->No Decision Support Exception->Emergency Medical Condition (MA) What reading provider will be dictating this exam?->CRC FINDINGS: BRAIN/VENTRICLES: There is no acute intracranial hemorrhage, mass effect or midline shift. No abnormal extra-axial fluid collection. The gray-white differentiation is maintained without evidence of an acute infarct. There is no evidence of hydrocephalus. ORBITS: The visualized portion of the orbits demonstrate no acute abnormality. SINUSES: Mucosal thickening associated with sphenoid sinus. Mastoid air cells are clear.  SOFT TISSUES/SKULL:  No acute abnormality of the visualized skull or soft tissues. No acute intracranial abnormality. Ct Head Wo Contrast    Result Date: 2/27/2021  EXAMINATION: CT OF THE HEAD WITHOUT CONTRAST  2/27/2021 5:19 pm TECHNIQUE: CT of the head was performed without the administration of intravenous contrast. Dose modulation, iterative reconstruction, and/or weight based adjustment of the mA/kV was utilized to reduce the radiation dose to as low as reasonably achievable. COMPARISON: February 22, 2021 HISTORY: ORDERING SYSTEM PROVIDED HISTORY: dizziness, multiple falls recently, leaning to left with ambulation. TECHNOLOGIST PROVIDED HISTORY: Reason for exam:->dizziness, multiple falls recently, leaning to left with ambulation. Has a \"code stroke\" or \"stroke alert\" been called? ->No Decision Support Exception->Emergency Medical Condition (MA) FINDINGS: BRAIN/VENTRICLES: Stable mild cortical atrophy and periventricular ischemic changes. Stable old lacunar infarct right basal ganglia. There is no acute intracranial hemorrhage, mass effect or midline shift. No abnormal extra-axial fluid collection. The gray-white differentiation is maintained without evidence of an acute infarct. There is no evidence of hydrocephalus. ORBITS: The visualized portion of the orbits demonstrate no acute abnormality. SINUSES: The visualized mastoid air cells demonstrate no acute abnormality. Air-fluid level in the right sphenoid sinus. SOFT TISSUES/SKULL:  No acute abnormality of the visualized skull or soft tissues. No acute intracranial abnormality. Acute sphenoid sinusitis. Ct Head Wo Contrast    Result Date: 2/22/2021  EXAMINATION: CT OF THE HEAD WITHOUT CONTRAST  2/22/2021 6:40 pm TECHNIQUE: CT of the head was performed without the administration of intravenous contrast. Dose modulation, iterative reconstruction, and/or weight based adjustment of the mA/kV was utilized to reduce the radiation dose to as low as reasonably achievable.  COMPARISON: 11/23/2013 HISTORY: ORDERING SYSTEM PROVIDED HISTORY: [facility] TECHNOLOGIST PROVIDED HISTORY: Reason for exam:->vertigo Has a \"code stroke\" or \"stroke alert\" been called? ->No Decision Support Exception->Emergency Medical Condition (MA) Dizziness. Recent history of sinusitis. FINDINGS: Small benign osteoma stable in medial mid left ethmoid sinus. New slight mucosal thickening right sphenoid sinus with small fluid level. Slight mucosal thickening alveolar recess of right maxillary sinus. Clear mastoid air cells and middle ear cavities. Bone windows demonstrate no acute fracture. There is ventricular and sulcal prominence compatible with age-appropriate global cerebral cortical atrophy, slightly more pronounced than 2013. The brain contains no mass, mass effect, hemorrhage, or acute infarct. There is no extra-axial intracranial bleed or brain bleed. No evidence of midline shift. No acute CVA, intracranial bleed, or brain mass. Non-specific new mucosal thickening and small fluid level in right sphenoid sinus may reflect acute sinusitis    Xr Chest Portable    Result Date: 3/9/2021  EXAMINATION: ONE XRAY VIEW OF THE CHEST 3/9/2021 2:52 pm COMPARISON: September 2, 2019 HISTORY: ORDERING SYSTEM PROVIDED HISTORY: sob TECHNOLOGIST PROVIDED HISTORY: Reason for exam:->sob What reading provider will be dictating this exam?->CRC FINDINGS: The heart is enlarged. Bibasilar opacities worse on the left. No pneumothorax. Mild bilateral pleural effusion noted. CHF. Superimposed pneumonia cannot be excluded. Cta Chest W Contrast    Result Date: 3/10/2021  EXAMINATION: CTA OF THE CHEST 3/10/2021 4:42 am TECHNIQUE: CTA of the chest was performed after the administration of intravenous contrast.  Multiplanar reformatted images are provided for review. MIP images are provided for review.  Dose modulation, iterative reconstruction, and/or weight based adjustment of the mA/kV was utilized to reduce the radiation dose to as low as reasonably achievable. COMPARISON: None. HISTORY: ORDERING SYSTEM PROVIDED HISTORY: PE TECHNOLOGIST PROVIDED HISTORY: Reason for exam:->PE What reading provider will be dictating this exam?->CRC FINDINGS: Pulmonary Arteries: There is a subsegmental pulmonary embolus in the right lower lobe seen on images 174-186. There is a segmental pulmonary embolus in the right lower lobe best seen on coronal series 9, image 97. Mediastinum: There are coronary artery calcifications. There is also calcified plaque throughout thoracic aorta. There is no abnormal mediastinal or hilar mass seen. Lungs/pleura: There are bilateral small to moderate pleural effusions. There is infiltrate and/or atelectasis in the left lower lobe. There is some dependent atelectasis in the right lower lobe. There is a peripheral masslike density in the right lower lobe at the peripheral lung base measuring 2.1 x 1.5 cm. This is in the distribution of the small subsegmental embolus and may relate to a small pulmonary infarct. Upper Abdomen: Limited images of the upper abdomen are unremarkable. Soft Tissues/Bones: No acute bone or soft tissue abnormality. Right lower lobe segmental pulmonary embolus. Left lower lobe subsegmental pulmonary embolus. There is nonspecific airspace disease in the right lower lobe. There is a peripheral masslike density in the left lower lobe, in the distribution of the subsegmental embolus. Well a mass is not excluded, this more likely relates to small pulmonary infarct. Suggest short-term follow-up to confirm resolution. Bilateral small to moderate pleural effusions. Findings were sent to Radiology Results [address] at 5:18 a.m. on 03/10/2021 to be communicated to a licensed caregiver. Assessment:   1. Intermediate-Low Risk Pulmonary Embolism, likely provoked in setting of decreased mobility and CHF  2. Acute Decompensated HFrEF (25%, G2DD)  3. Bilateral Pleural Effusions, likely cardiogenic  4.  Acute Hypoxic Respiratory Failure  5. CAD/MI, s/p PCI with bare metal stent to RCA in 2007  6. Hypertension  7. Hyperlipidemia  8. Insulin-Dependent Diabetes Mellitus Type 2  9. H/o Tobacco Abuse  10. Anxiety Disorder      Plan:   1. Last dose of Lovenox tonight  2. Start Eliquis 10mg BID x 7 days in AM, followed by 5mg BID for total of 3 months  3. Agree with continued diuresis  4. Monitor electrolytes with diuresis, replace prn  5. Ischemic workup and CHF medication titration per cardiology      Claudia Rabia Santa MD  3/11/2021  8:38 AM    [address]   Department of Internal Medicine  Internal Medicine Residency / [address]    Attending Physician Statement    Filomena Graf case was discussed, including pertinent history and examination findings with the multidisciplinary team during bedside rounds. I have seen and examined the patient and the key elements of the encounter have been performed by myself. I have read and reviewed the documentation. If needed or disputed the following findings, counseling and treatment options which have been corrected and communicated back to the patient, family if applicable and contributing physicians. I agree with the assessment, plan and orders as noted by the resident.       Katharina Gallegos DO , Jordin Soto  Professor of Medicine  Pulmonary, [facility]  Internal Medicine Academic Faculty

## 2021-03-11 NOTE — TELEPHONE ENCOUNTER
[address] [place] [address] called to ask if Roz Alexandra would follow home care orders. It was advised to them, that yes she will until 4/3/21 when patients' 30 days is up due to dismissal from our practice. Provider notified.

## 2021-03-11 NOTE — CARE COORDINATION
Received call from son, discussed discharge planning, per son, pt can be over the top with her needs. He states that pt does ambulate at home, but does have attention seeking behaviors. Discussed life vest, son is willing to learn how to use it, suggested that both spouse and son come and learn, due to son busy with his family and not always around to come to home. Hillary Ware, updated Moe-Zoll that family(at least one of them, need to be present) needs to learn prior to discharge. CHF RN inquired about pt getting set up with pill packs, called [address] who will set pt up with pill pack/acudose. Referral to [id number]. The Plan for Transition of Care is related to the following treatment goals: The Patient and/or patient representative was provided with a choice of provider and agrees   with the discharge plan. [x] Yes [] No    Freedom of choice list was provided with basic dialogue that supports the patient's individualized plan of care/goals, treatment preferences and shares the quality data associated with the providers.  [x] Yes [] No

## 2021-03-12 LAB
ALBUMIN SERPL-MCNC: 2.9 G/DL (ref 3.5–5.2)
ALP BLD-CCNC: 124 U/L (ref 35–104)
ALT SERPL-CCNC: 54 U/L (ref 0–32)
ANION GAP SERPL CALCULATED.3IONS-SCNC: 12 MMOL/L (ref 7–16)
ANION GAP SERPL CALCULATED.3IONS-SCNC: 7 MMOL/L (ref 7–16)
AST SERPL-CCNC: 54 U/L (ref 0–31)
BILIRUB SERPL-MCNC: 1.1 MG/DL (ref 0–1.2)
BUN BLDV-MCNC: 14 MG/DL (ref 8–23)
BUN BLDV-MCNC: 16 MG/DL (ref 8–23)
CALCIUM SERPL-MCNC: 7.8 MG/DL (ref 8.6–10.2)
CALCIUM SERPL-MCNC: 8.2 MG/DL (ref 8.6–10.2)
CHLORIDE BLD-SCNC: 97 MMOL/L (ref 98–107)
CHLORIDE BLD-SCNC: 97 MMOL/L (ref 98–107)
CO2: 30 MMOL/L (ref 22–29)
CO2: 35 MMOL/L (ref 22–29)
CREAT SERPL-MCNC: 0.7 MG/DL (ref 0.5–1)
CREAT SERPL-MCNC: 0.8 MG/DL (ref 0.5–1)
GFR AFRICAN AMERICAN: >60
GFR AFRICAN AMERICAN: >60
GFR NON-AFRICAN AMERICAN: >60 ML/MIN/1.73
GFR NON-AFRICAN AMERICAN: >60 ML/MIN/1.73
GLUCOSE BLD-MCNC: 103 MG/DL (ref 74–99)
GLUCOSE BLD-MCNC: 108 MG/DL (ref 74–99)
HCT VFR BLD CALC: 39.3 % (ref 34–48)
HEMOGLOBIN: 11.8 G/DL (ref 11.5–15.5)
MAGNESIUM: 1.5 MG/DL (ref 1.6–2.6)
MCH RBC QN AUTO: 29.9 PG (ref 26–35)
MCHC RBC AUTO-ENTMCNC: 30 % (ref 32–34.5)
MCV RBC AUTO: 99.5 FL (ref 80–99.9)
METER GLUCOSE: 107 MG/DL (ref 74–99)
METER GLUCOSE: 166 MG/DL (ref 74–99)
METER GLUCOSE: 190 MG/DL (ref 74–99)
METER GLUCOSE: 96 MG/DL (ref 74–99)
PDW BLD-RTO: 14.2 FL (ref 11.5–15)
PLATELET # BLD: 239 E9/L (ref 130–450)
PMV BLD AUTO: 10.9 FL (ref 7–12)
POTASSIUM REFLEX MAGNESIUM: 4 MMOL/L (ref 3.5–5)
POTASSIUM REFLEX MAGNESIUM: 4.3 MMOL/L (ref 3.5–5)
POTASSIUM SERPL-SCNC: 4 MMOL/L (ref 3.5–5)
PRO-BNP: ABNORMAL PG/ML (ref 0–125)
RBC # BLD: 3.95 E12/L (ref 3.5–5.5)
SODIUM BLD-SCNC: 139 MMOL/L (ref 132–146)
SODIUM BLD-SCNC: 139 MMOL/L (ref 132–146)
TOTAL PROTEIN: 6.4 G/DL (ref 6.4–8.3)
WBC # BLD: 11.7 E9/L (ref 4.5–11.5)

## 2021-03-12 PROCEDURE — 6360000002 HC RX W HCPCS: Performed by: CLINICAL NURSE SPECIALIST

## 2021-03-12 PROCEDURE — 6370000000 HC RX 637 (ALT 250 FOR IP): Performed by: INTERNAL MEDICINE

## 2021-03-12 PROCEDURE — 36415 COLL VENOUS BLD VENIPUNCTURE: CPT

## 2021-03-12 PROCEDURE — 6360000002 HC RX W HCPCS: Performed by: FAMILY MEDICINE

## 2021-03-12 PROCEDURE — 83735 ASSAY OF MAGNESIUM: CPT

## 2021-03-12 PROCEDURE — 97530 THERAPEUTIC ACTIVITIES: CPT

## 2021-03-12 PROCEDURE — 2700000000 HC OXYGEN THERAPY PER DAY

## 2021-03-12 PROCEDURE — 83880 ASSAY OF NATRIURETIC PEPTIDE: CPT

## 2021-03-12 PROCEDURE — 2060000000 HC ICU INTERMEDIATE R&B

## 2021-03-12 PROCEDURE — 80053 COMPREHEN METABOLIC PANEL: CPT

## 2021-03-12 PROCEDURE — 80048 BASIC METABOLIC PNL TOTAL CA: CPT

## 2021-03-12 PROCEDURE — 6370000000 HC RX 637 (ALT 250 FOR IP): Performed by: FAMILY MEDICINE

## 2021-03-12 PROCEDURE — 6370000000 HC RX 637 (ALT 250 FOR IP): Performed by: CLINICAL NURSE SPECIALIST

## 2021-03-12 PROCEDURE — 2580000003 HC RX 258: Performed by: FAMILY MEDICINE

## 2021-03-12 PROCEDURE — 85027 COMPLETE CBC AUTOMATED: CPT

## 2021-03-12 PROCEDURE — 99233 SBSQ HOSP IP/OBS HIGH 50: CPT | Performed by: INTERNAL MEDICINE

## 2021-03-12 PROCEDURE — APPSS60 APP SPLIT SHARED TIME 46-60 MINUTES: Performed by: CLINICAL NURSE SPECIALIST

## 2021-03-12 PROCEDURE — 82962 GLUCOSE BLOOD TEST: CPT

## 2021-03-12 PROCEDURE — 97535 SELF CARE MNGMENT TRAINING: CPT

## 2021-03-12 RX ORDER — ALPRAZOLAM 1 MG/1
2 TABLET ORAL 3 TIMES DAILY PRN
Status: DISCONTINUED | OUTPATIENT
Start: 2021-03-12 | End: 2021-03-20 | Stop reason: HOSPADM

## 2021-03-12 RX ORDER — METOPROLOL SUCCINATE 50 MG/1
50 TABLET, EXTENDED RELEASE ORAL 2 TIMES DAILY
Status: DISCONTINUED | OUTPATIENT
Start: 2021-03-12 | End: 2021-03-16

## 2021-03-12 RX ORDER — FUROSEMIDE 20 MG/1
20 TABLET ORAL 2 TIMES DAILY
Status: DISCONTINUED | OUTPATIENT
Start: 2021-03-13 | End: 2021-03-16

## 2021-03-12 RX ORDER — MAGNESIUM SULFATE IN WATER 40 MG/ML
2000 INJECTION, SOLUTION INTRAVENOUS ONCE
Status: COMPLETED | OUTPATIENT
Start: 2021-03-12 | End: 2021-03-12

## 2021-03-12 RX ADMIN — PANTOPRAZOLE SODIUM 40 MG: 40 TABLET, DELAYED RELEASE ORAL at 09:01

## 2021-03-12 RX ADMIN — Medication 2000 UNITS: at 09:03

## 2021-03-12 RX ADMIN — MAGNESIUM SULFATE HEPTAHYDRATE 2000 MG: 40 INJECTION, SOLUTION INTRAVENOUS at 16:41

## 2021-03-12 RX ADMIN — SPIRONOLACTONE 25 MG: 25 TABLET ORAL at 09:02

## 2021-03-12 RX ADMIN — APIXABAN 10 MG: 5 TABLET, FILM COATED ORAL at 08:56

## 2021-03-12 RX ADMIN — SACUBITRIL AND VALSARTAN 1 TABLET: 24; 26 TABLET, FILM COATED ORAL at 09:02

## 2021-03-12 RX ADMIN — FUROSEMIDE 20 MG: 10 INJECTION, SOLUTION INTRAMUSCULAR; INTRAVENOUS at 09:55

## 2021-03-12 RX ADMIN — METOPROLOL SUCCINATE 50 MG: 25 TABLET, EXTENDED RELEASE ORAL at 08:59

## 2021-03-12 RX ADMIN — INSULIN LISPRO 2 UNITS: 100 INJECTION, SOLUTION INTRAVENOUS; SUBCUTANEOUS at 13:07

## 2021-03-12 RX ADMIN — ACETAMINOPHEN 650 MG: 325 TABLET ORAL at 00:33

## 2021-03-12 RX ADMIN — ALPRAZOLAM 2 MG: 1 TABLET ORAL at 16:47

## 2021-03-12 RX ADMIN — Medication 10 ML: at 09:55

## 2021-03-12 RX ADMIN — APIXABAN 10 MG: 5 TABLET, FILM COATED ORAL at 21:12

## 2021-03-12 RX ADMIN — SACUBITRIL AND VALSARTAN 1 TABLET: 24; 26 TABLET, FILM COATED ORAL at 21:11

## 2021-03-12 RX ADMIN — INSULIN LISPRO 2 UNITS: 100 INJECTION, SOLUTION INTRAVENOUS; SUBCUTANEOUS at 22:09

## 2021-03-12 RX ADMIN — ESCITALOPRAM 10 MG: 10 TABLET, FILM COATED ORAL at 08:59

## 2021-03-12 RX ADMIN — Medication 10 ML: at 23:12

## 2021-03-12 RX ADMIN — ASPIRIN 81 MG: 81 TABLET, COATED ORAL at 08:57

## 2021-03-12 RX ADMIN — CETIRIZINE HYDROCHLORIDE 5 MG: 10 TABLET, FILM COATED ORAL at 08:58

## 2021-03-12 RX ADMIN — METOPROLOL SUCCINATE 50 MG: 50 TABLET, EXTENDED RELEASE ORAL at 21:12

## 2021-03-12 ASSESSMENT — PAIN SCALES - GENERAL
PAINLEVEL_OUTOF10: 0

## 2021-03-12 NOTE — CARE COORDINATION
Anticipate discharging on Eliquis, Eliquis coupon in soft chart, home with [facility]. Will need hhc orders. Life vest in room. Bennie JOHNSON

## 2021-03-12 NOTE — PROGRESS NOTES
Starting the walking pulse ox and she was off the o2 for a few minutes and stayed at 94%, walked to the door on room air, 94%. o2 off at this time, will re-check      Patient de-sat to 81%. Patient is on 2L at this time.

## 2021-03-12 NOTE — PROGRESS NOTES
[facility]  Department of Internal Medicine  Division of Pulmonary, Critical Care and Sleep Medicine  Progress Note  Drew Parra DO, Marco Antonio Vinson MD, [facility]    Patient: Issac Blakely  MRN: [id number]  : 1945    Encounter Time: 5:58 PM     Date of Admission: 3/9/2021  2:22 PM    Primary Care Physician: Roz Morris PA-C    Reason for Consultation: Pulmonary embolism, bilateral pleural effusions     SUBJECTIVE:    - 3 liters  Still on oxygen 2 liters      OBJECTIVE:     PHYSICAL EXAM:   VITALS:   Vitals:    21 1022 21 1615 21 1645 21 1646   BP:  (!) 148/76     Pulse: 114 107     Resp:  24     Temp:  96.9 °F (36.1 °C)     TempSrc:  Temporal     SpO2:  97% (!) 87% 94%   Weight:       Height:            Intake/Output Summary (Last 24 hours) at 3/12/2021 1758  Last data filed at 3/12/2021 1452  Gross per 24 hour   Intake 600 ml   Output 700 ml   Net -100 ml        CONSTITUTIONAL:   A&O x 3, NAD  SKIN:     No rash, No suspicious lesions or skin discoloration  HEENT:     EOMI, MMM, No thrush  NECK:    No bruits, No JVP appreciated  CV:      Sinus,  No murmur, No rubs, No gallops  PULMONARY:   Couse BS, bilateral crackles noted  ABDOMEN:     Soft, non-tender. BS normal. No R/R/G  EXT:    No deformities . No clubbing.       + lower extremity edema, No venous stasis  PULSE:   Appears equal and palpable.   PSYCHIATRIC:  Seems appropriate, No acute psycosis  MS:    No fractures, No gross weakness  NEUROLOGIC:   The clinical assessment is non-focal     DATA: IMAGING & TESTING:     LABORATORY TESTS:    CBC with Differential:    Lab Results   Component Value Date    WBC 11.7 2021    RBC 3.95 2021    HGB 11.8 2021    HCT 39.3 2021     2021    MCV 99.5 2021    MCH 29.9 2021    MCHC 30.0 2021    RDW 14.2 2021    SEGSPCT 62 2012    BLASTSPCT 0.9 03/10/2021    METASPCT 0.9 03/09/2021    LYMPHOPCT 6.1 03/10/2021    PROMYELOPCT 0.9 03/10/2021    MONOPCT 7.0 03/10/2021    BASOPCT 0.2 03/10/2021    MONOSABS 0.85 03/10/2021    LYMPHSABS 0.73 03/10/2021    EOSABS 0.21 03/10/2021    BASOSABS 0.00 03/10/2021     BMP:    Lab Results   Component Value Date     03/12/2021    K 4.0 03/12/2021    K 4.0 03/12/2021    CL 97 03/12/2021    CO2 35 03/12/2021    BUN 14 03/12/2021    LABALBU 3.3 03/10/2021    CREATININE 0.7 03/12/2021    CALCIUM 7.8 03/12/2021    GFRAA >60 03/12/2021    LABGLOM >60 03/12/2021    GLUCOSE 103 03/12/2021        PRO-BNP:   Lab Results   Component Value Date    PROBNP 13,167 (H) 03/12/2021    PROBNP 33,238 (H) 03/09/2021      ABGs:   Lab Results   Component Value Date    PH 7.413 03/09/2021    PO2 85.9 03/09/2021    PCO2 42.2 03/09/2021     Hemoglobin A1C: No components found for: HGBA1C    IMAGING:  Imaging tests were completed and reviewed and discussed radiology and care team involved and reveals   Echo Complete    Result Date: 3/2/2021  Transthoracic Echocardiography Report (TTE)  Demographics   Patient Name    Yeison Branch Gender            Female   Medical Record  [id number]     Room Number       9739  Number   Account #       [de-identified]    Procedure Date    03/02/2021   Corporate ID                 Ordering          Franko Izaguirre MD                               Physician   Accession       [id number]   Referring  Number                       Physician   Date of Birth   1945   Sonographer       Pippa Mendoza   Age             70 year(s)   Interpreting      [address] [place] [address]                               Physician         Physician Cardiology                                                 Mark Nelson MD                                Any Other  Procedure Type of Study   TTE procedure:Echo Complete W/Doppler & Color Flow.   Procedure Date Date: 03/02/2021 Start: 06:57 AM Study Location: Portable Technical Quality: Adequate visualization Indications:Ventricular tachycardia. Patient Status: Routine Height: 62 inches Weight: 160 pounds BSA: 1.74 m^2 BMI: 29.26 kg/m^2 HR: 80 bpm BP: 156/88 mmHg  Findings   Left Ventricle  Mildly dilated left ventricle. Ejection fraction is visually estimated at 25-30%. Overall ejection fraction severely decreased . No regional wall motion abnormalities seen. Normal left ventricle wall thickness. Stage II diastolic dysfunction. Right Ventricle  Normal right ventricular size. Right ventricle global systolic function is moderately reduced . TAPSE 12  mm. Left Atrium  The left atrium is moderately dilated. Right Atrium  Mildly enlarged right atrium size. Mitral Valve  Mild mitral annular calcification. No evidence of mitral valve stenosis. Increased E point septal separation noted,suggesting decreased LV cardiac  output. Moderate mitral regurgitation with centrally directed jet. Tricuspid Valve  The tricuspid valve appears structurally normal.  Mild to moderate tricuspid regurgitation. RVSP is 55 mmHg. Pulmonary hypertension is moderate . Aortic Valve  The aortic valve appears mildly sclerotic. The aortic valve is trileaflet. No hemodynamically significant aortic stenosis is present. Moderate aortic regurgitation is noted. Pulmonic Valve  Pulmonic valve is structurally normal.  Physiologic and/or trace pulmonic regurgitation present. No evidence of pulmonic valve stenosis. Pericardial Effusion  Trace pericardial effusion. Pleural Effusion  No evidence of pleural effusion. Aorta  Normal aortic root and ascending aorta. Miscellaneous  Dilated Inferior Vena Cava. Inferior Vena Cava, normal respiratory variation. Conclusions   Summary  Mildly dilated left ventricle. Ejection fraction is visually estimated at 25-30%. Overall ejection fraction severely decreased . No regional wall motion abnormalities seen. Normal left ventricle wall thickness. Stage II diastolic dysfunction.   The left atrium is moderately dilated. Normal right ventricular size. Right ventricle global systolic function is moderately reduced . TAPSE 12  mm  Moderate mitral regurgitation with centrally directed jet. No hemodynamically significant aortic stenosis is present. Moderate aortic regurgitation is noted. Mild to moderate tricuspid regurgitation. RVSP is 55 mmHg. Pulmonary hypertension is moderate . Trace pericardial effusion. No previous echo for comparison.    Signature   ----------------------------------------------------------------  Electronically signed by Rosie Hebert MD(Interpreting  physician) on 03/02/2021 08:15 PM  ----------------------------------------------------------------  M-Mode/2D Measurements & Calculations   LV Diastolic    LV Systolic Dimension: 4.8   AV Cusp Separation: 1.7 cmLA  Dimension: 5.5  cm                           Dimension: 4.6 cmAO Root  cm              LV Volume Diastolic: 587.6   Dimension: 2.9 cm  LV FS:12.7 %    ml  LV PW           LV Volume Systolic: 514.0 ml  Diastolic: 0.8  LV EDV/LV EDV Index: 150.4  cm              ml/86 ml/m^2LV ESV/LV ESV    RV Diastolic Dimension: 3.4  LV PW Systolic: Index: 439.6 WM/94ZY/ m^2    cm  0.9 cm          EF Calculated: 30 %  Septum          LV Mass Index: 92 l/min*m^2  LA/Aorta: 2.23  Diastolic: 0.8  LV Length: 7.9 cm            Ascending Aorta: 3.5 cm  cm                                           LA volume/Index: 80 ml  Septum          LVOT: 2 cm                   /43.69ZJ/T^9  Systolic: 1 cm                               RA Area: 23 cm^2  CO: 3.87 l/min  CI: 2.22                                     IVC Expiration: 2.4 cm  l/m*m^2  LV Mass: 159.96  g  Doppler Measurements & Calculations   MV Peak E-Wave: 0.63 m/s  AV Peak Velocity:     LVOT Peak Velocity: 0.91  MV Peak A-Wave: 0.58 m/s  1.33 m/s              m/s  MV E/A Ratio: 1.08        AV Peak Gradient:     LVOT Mean Velocity: 0.55  MV Peak Gradient: 2.1     7.02 mmHg             m/s mmHg                      AV Mean Velocity:     LVOT Peak Gradient: 3.3  MV Mean Gradient: 1.1     0.84 m/s              mmHgLVOT Mean Gradient:  mmHg                      AV Mean Gradient: 3.3 1.5 mmHg  MV Mean Velocity: 0.49    mmHg                  Estimated RVSP: 54.6 mmHg  m/s                       AV VTI: 21.3 cm       Estimated RAP:8 mmHg  MV Deceleration Time: 102 AV Area  msec                      (Continuity):2.27  MV P1/2t: 62.6 msec       cm^2                  TR Velocity:3.41 m/s  MVA by PHT:3.51 cm^2      AV Deceleration Time: TR Gradient:46.59 mmHg  MV Area (continuity): 2.7 1233.9 msec           PV Peak Velocity: 0.59 m/s  cm^2                      LVOT VTI: 15.4 cm     PV Peak Gradient: 1.37  MV E' Septal Velocity:                          mmHg  0.03 m/s                  Estimated PASP: 54.59 PV Mean Velocity: 0.4 m/s  MV E' Lateral Velocity: 4 mmHg                  PV Mean Gradient: 0.7 mmHg  m/s  MR Velocity: 6.34 m/s                           SD ED Velocity: 1.24 m/s  MV DAYAMI PISA: 0.17 cm^2  MR VTI: 215.1 cm  Alias Velocity: 0.27  m/sPISA Radius: 0.8 cm   PISA area: 4.02 cm^2MR  flow rate: 109.75 ml/sMR  volume:36.57 ml  [email or web address] [id number]    Xr Thoracic Spine (2 Views)    Result Date: 2/27/2021  EXAMINATION: XRAY VIEWS OF THE THORACIC SPINE 2/27/2021 7:41 pm COMPARISON: 05/28/2013 HISTORY: ORDERING SYSTEM PROVIDED HISTORY: fall, back pain TECHNOLOGIST PROVIDED HISTORY: Reason for exam:->fall, back pain FINDINGS: No acute fractures. Multilevel degenerative changes with scoliosis. Cardiomegaly. The visualized portion of the lungs are unremarkable. No evidence of acute thoracic spine trauma. Scoliosis with multilevel degenerative changes. Cardiomegaly.     Xr Lumbar Spine (2-3 Views)    Result Date: 2/27/2021  EXAMINATION: THREE XRAY VIEWS OF THE LUMBAR SPINE 2/27/2021 7:41 pm COMPARISON: None. HISTORY: ORDERING SYSTEM PROVIDED HISTORY: back pain, fall TECHNOLOGIST PROVIDED HISTORY: Reason for exam:->back pain, fall FINDINGS: No fractures. Multilevel degenerative changes with grade 1 anterolisthesis L3 over L4. Scoliosis. Degenerative changes involving the SI joints. The hip joints are intact. Calcified plaque involving the abdominal aorta. No evidence of acute lumbar spine trauma. Scoliosis with multilevel degenerative changes and grade 1 anterolisthesis L3 over L4. Ct Head Wo Contrast    Result Date: 3/9/2021  EXAMINATION: CT OF THE HEAD WITHOUT CONTRAST  3/9/2021 6:18 pm TECHNIQUE: CT of the head was performed without the administration of intravenous contrast. Dose modulation, iterative reconstruction, and/or weight based adjustment of the mA/kV was utilized to reduce the radiation dose to as low as reasonably achievable. COMPARISON: February 27, 2021 HISTORY: ORDERING SYSTEM PROVIDED HISTORY: [facility] TECHNOLOGIST PROVIDED HISTORY: Reason for exam:->ams Has a \"code stroke\" or \"stroke alert\" been called? ->No Decision Support Exception->Emergency Medical Condition (MA) What reading provider will be dictating this exam?->CRC FINDINGS: BRAIN/VENTRICLES: There is no acute intracranial hemorrhage, mass effect or midline shift. No abnormal extra-axial fluid collection. The gray-white differentiation is maintained without evidence of an acute infarct. There is no evidence of hydrocephalus. ORBITS: The visualized portion of the orbits demonstrate no acute abnormality. SINUSES: Mucosal thickening associated with sphenoid sinus. Mastoid air cells are clear. SOFT TISSUES/SKULL:  No acute abnormality of the visualized skull or soft tissues. No acute intracranial abnormality.      Ct Head Wo Contrast    Result Date: 2/27/2021  EXAMINATION: CT OF THE HEAD WITHOUT CONTRAST  2/27/2021 5:19 pm TECHNIQUE: CT of the head was performed without the administration of intravenous contrast. Dose modulation, iterative reconstruction, and/or weight based adjustment of the mA/kV was utilized to reduce the radiation dose to as low as reasonably achievable. COMPARISON: February 22, 2021 HISTORY: ORDERING SYSTEM PROVIDED HISTORY: dizziness, multiple falls recently, leaning to left with ambulation. TECHNOLOGIST PROVIDED HISTORY: Reason for exam:->dizziness, multiple falls recently, leaning to left with ambulation. Has a \"code stroke\" or \"stroke alert\" been called? ->No Decision Support Exception->Emergency Medical Condition (MA) FINDINGS: BRAIN/VENTRICLES: Stable mild cortical atrophy and periventricular ischemic changes. Stable old lacunar infarct right basal ganglia. There is no acute intracranial hemorrhage, mass effect or midline shift. No abnormal extra-axial fluid collection. The gray-white differentiation is maintained without evidence of an acute infarct. There is no evidence of hydrocephalus. ORBITS: The visualized portion of the orbits demonstrate no acute abnormality. SINUSES: The visualized mastoid air cells demonstrate no acute abnormality. Air-fluid level in the right sphenoid sinus. SOFT TISSUES/SKULL:  No acute abnormality of the visualized skull or soft tissues. No acute intracranial abnormality. Acute sphenoid sinusitis. Ct Head Wo Contrast    Result Date: 2/22/2021  EXAMINATION: CT OF THE HEAD WITHOUT CONTRAST  2/22/2021 6:40 pm TECHNIQUE: CT of the head was performed without the administration of intravenous contrast. Dose modulation, iterative reconstruction, and/or weight based adjustment of the mA/kV was utilized to reduce the radiation dose to as low as reasonably achievable. COMPARISON: 11/23/2013 HISTORY: ORDERING SYSTEM PROVIDED HISTORY: [facility] TECHNOLOGIST PROVIDED HISTORY: Reason for exam:->vertigo Has a \"code stroke\" or \"stroke alert\" been called? ->No Decision Support Exception->Emergency Medical Condition (MA) Dizziness. Recent history of sinusitis.  FINDINGS: Small benign on images 174-186. There is a segmental pulmonary embolus in the right lower lobe best seen on coronal series 9, image 97. Mediastinum: There are coronary artery calcifications. There is also calcified plaque throughout thoracic aorta. There is no abnormal mediastinal or hilar mass seen. Lungs/pleura: There are bilateral small to moderate pleural effusions. There is infiltrate and/or atelectasis in the left lower lobe. There is some dependent atelectasis in the right lower lobe. There is a peripheral masslike density in the right lower lobe at the peripheral lung base measuring 2.1 x 1.5 cm. This is in the distribution of the small subsegmental embolus and may relate to a small pulmonary infarct. Upper Abdomen: Limited images of the upper abdomen are unremarkable. Soft Tissues/Bones: No acute bone or soft tissue abnormality. Right lower lobe segmental pulmonary embolus. Left lower lobe subsegmental pulmonary embolus. There is nonspecific airspace disease in the right lower lobe. There is a peripheral masslike density in the left lower lobe, in the distribution of the subsegmental embolus. Well a mass is not excluded, this more likely relates to small pulmonary infarct. Suggest short-term follow-up to confirm resolution. Bilateral small to moderate pleural effusions. Findings were sent to Radiology Results [address] at 5:18 a.m. on 03/10/2021 to be communicated to a licensed caregiver. Assessment:   1. Intermediate-Low Risk Pulmonary Embolism, likely provoked in setting of decreased mobility and CHF  2. Acute Decompensated HFrEF (25%, G2DD)  3. Bilateral Pleural Effusions, likely cardiogenic  4. Acute Hypoxic Respiratory Failure  5. CAD/MI, s/p PCI with bare metal stent to RCA in 2007  6. Hypertension  7. Hyperlipidemia  8. Insulin-Dependent Diabetes Mellitus Type 2  9. H/o Tobacco Abuse  10. Anxiety Disorder      Plan:     1.  Continue with Eliquis 10mg BID x 7 days in AM, followed by 5mg BID for total of 3 months  2. Agree with continued diuresis change to po  3. Monitor electrolytes with diuresis, replace prn  4. Ischemic workup and CHF medication titration per cardiology  5.  Wean off oxygen       Yazmin Lyn DO  3/12/2021  5:58 PM

## 2021-03-12 NOTE — PROGRESS NOTES
filed at 3/12/2021 1452  Gross per 24 hour   Intake 600 ml   Output 700 ml   Net -100 ml   I/O incomplete  Documented negative 2975 mls     Weight:   Wt Readings from Last 3 Encounters:   03/11/21 158 lb 3.2 oz (71.8 kg)   03/01/21 160 lb (72.6 kg)   02/22/21 167 lb (75.8 kg)     Current Inpatient Medications:   apixaban  10 mg Oral BID    influenza virus vaccine  0.5 mL Intramuscular Prior to discharge    metoprolol succinate  50 mg Oral Daily    sacubitril-valsartan  1 tablet Oral BID    spironolactone  25 mg Oral Daily    vitamin D  2,000 Units Oral Daily    escitalopram  10 mg Oral Daily    cetirizine  5 mg Oral Daily    pantoprazole  40 mg Oral Daily    insulin lispro  0-10 Units Subcutaneous 4x Daily AC & HS    sodium chloride flush  10 mL Intravenous 2 times per day    furosemide  20 mg Intravenous BID    atorvastatin  80 mg Oral Nightly    aspirin  81 mg Oral Daily       IV Infusions (if any):   dextrose         DIAGNOSTIC/ LABORATORY DATA:  Labs:   CBC:   Recent Labs     03/10/21  0215 03/12/21  0658   WBC 12.2* 11.7*   HGB 11.7 11.8   HCT 38.3 39.3    239     BMP:   Recent Labs     03/11/21  0745 03/11/21  0745 03/12/21  0658     --  139   K 6.6*  6.6*   < > 4.0  4.0   CO2 24  --  35*   BUN 12  --  14   CREATININE 0.7  --  0.7   LABGLOM >60  --  >60   CALCIUM 8.6  --  7.8*    < > = values in this interval not displayed. Mag:   Recent Labs     03/11/21  0745 03/12/21  0658   MG 2.3 1.5*     Phos: No results for input(s): PHOS in the last 72 hours. TSH:   Recent Labs     03/09/21  2141   TSH 2.070     HgA1c:   Lab Results   Component Value Date    LABA1C 6.5 (H) 03/09/2021     No results found for: EAG    BNP: No results for input(s): BNP in the last 72 hours. PT/INR: No results for input(s): PROTIME, INR in the last 72 hours. APTT:No results for input(s): APTT in the last 72 hours.   CARDIAC ENZYMES:  Recent Labs     03/09/21  2141 03/10/21  0215   CKTOTAL 97  --    CKMB 1.7  --    TROPONINI 0.03 0.03     FASTING LIPID PANEL:  Lab Results   Component Value Date    CHOL 108 03/10/2021    HDL 33 03/10/2021    LDLCALC 55 03/10/2021    TRIG 99 03/10/2021     LIVER PROFILE:  Recent Labs     03/10/21  0215   AST 36*   ALT 56*   LABALBU 3.3*       CTA chest 3/10/2021:  Impression:     Right lower lobe segmental pulmonary embolus.  Left lower lobe subsegmental   pulmonary embolus. There is nonspecific airspace disease in the right lower lobe. There is a peripheral masslike density in the left lower lobe, in the   distribution of the subsegmental embolus.  Well a mass is not excluded, this   more likely relates to small pulmonary infarct.  Suggest short-term follow-up   to confirm resolution. Bilateral small to moderate pleural effusions. CXR 3/09/2021:  FINDINGS:   The heart is enlarged.  Bibasilar opacities worse on the left.  No   pneumothorax.  Mild bilateral pleural effusion noted. Telemetry: SR, low 100s    Echo 3/02/201 (Dr. Asher [place]):   Mildly dilated left ventricle. LVDD 5.5  LVMI 92 l/min/m2   Ejection fraction is visually estimated at 25-30%. Overall ejection fraction severely decreased . No regional wall motion abnormalities seen. Normal left ventricle wall thickness. Stage II diastolic dysfunction. The left atrium is moderately dilated. Normal right ventricular size. Right ventricle global systolic function is moderately reduced . TAPSE 12  mm   Moderate mitral regurgitation with centrally directed jet. No hemodynamically significant aortic stenosis is present. Moderate aortic regurgitation is noted. Mild to moderate tricuspid regurgitation. RVSP is 55 mmHg. Pulmonary hypertension is moderate . Trace pericardial effusion. No previous echo for comparison. ASSESSMENT:   1. Acute HFrEF  · I/O incomplete with documented weight loss of 5 lbs and subjective improvement in dyspnea  · BMP today suggestive of contraction alkalosis   2.  Newly diagnosed cardiomyopathy (? Ischemic) with moderately reduced RV function and evidence of pulmonary hypertension with mild to moderate TR  3. Moderate MR, AI  4. Coronary artery disease s/p BMS to RCA 2007  5. Recent NSVT  6. Bilateral pulmonary emboli: on Eliquis  7. Hypertension  8. Diabetes mellitus with neuropathy   9. Sinus tachycardia  10. Elevated LFTs: trending down, likely due to #1  11. Hypomagnesemia   12. Anxiety       PLAN:  1. Change to oral Lasix starting tomorrow  2. Supplement magnesium  3. Titrate Toprol XL   4. Continue Aspirin, Lipitor, Entresto and Aldactone  5. Monitor BMP, I/Os, weights  6. She continues to decline cardiac catheterization, and is now unsure if she wants to wear the Life Vest at discharge. Lengthy discussion was held regarding indications for both. She is overwhelmed and will consider her options further after going home. She is agreeable to meet with the [facility] today to review the Life Vest.   7. Will plan to add SGLT2 inhibitor (for HFrEF) as outpatient   8. Will ask home health care to draw labs prior to her scheduled office visit on March 22 to allow further titration of GDMT. 9.  Will sign off. May call if needed. 10. Follow-up with Dr. Gean Kussmaul 3 to 4 weeks after hospital discharge.         Electronically signed by IGNACIO Coates on 3/12/2021 at 3:03 PM

## 2021-03-12 NOTE — CARE COORDINATION
Spoke with RN, will need an ambulatory pulse ox. Referral to [facility] for probable home O2. Eliquis and Entresto coupons in soft chart. [place] [facility] following for hhc orders. Terri JOHNSON

## 2021-03-12 NOTE — PROGRESS NOTES
OT BEDSIDE TREATMENT NOTE      Date:3/12/2021  Patient Name: Lester Ling  MRN: [id number]  : 1945  Room: [address] [place] [address]     Evaluating OT: YARELY Jewell, OTR/L #[id number]     Referring physician: Yariel Huynh MD  AM-PAC Daily Activity Raw Score: 17/24  Recommended Adaptive Equipment: Patient may benefit from a shower chair      Diagnosis: Acute on chronic systolic heart failure (HCC) [I50.23]  Acute combined systolic and diastolic congestive heart failure ([address] [I50.41]  Reason for Admission: Presented to hospital with confusion, per imaging patient with B PE with pleural effusions  Pertinent Medical History/Surgery: abnormal echocardiogram, anxiety, CAD, DM type II, GERD, heart attack, HLD, HTN, IBS, obesity, primary OA, sleep apnea, UTI, coronary angioplasty     -Patient with recent hospitalization 21-3/3/21 at [facility] due to vertigo. Patient left AMA  Precautions:  Falls, tele, supplemental O2, impaired cognition, alarm     Home Living: Pt lives with son (who has Down syndrome) in a two story house with 2 steps to enter without railing. Bedroom and bathroom are located on the secon floor. Patient has half bathroom on the first floor. Laundry is located in the basement (patient reports she does not negotiate steps to basement). Bathroom setup: Tub/shower, standard commode   Equipment owned: cane  Prior Level of Function:   Patient is a questionable historian and inconsistent with responses. Per patient, I with ADLs ,  assist with IADLs. Patient reports son assists with home management tasks including cleaning and laundry. Patient was using cane for functional mobility. Per chart, patient was observed crawling around home with patient inconsistent in response regarding how she completed mobility at home. Patient also unclear how much assistance required with home management.  Patient reports groceries are delivered to home then stated she drives to store  Driving: Unable to determine, reported yes and no                                   Pain Level: No acute pain reported  Cognition: A&O: unable to assess, patient continuously distracted and would not respond to orientation questions when asked. Communication: Impaired, patient with disorganized thought process, easily distracted requiring continuous cues to attend to task  Follows 1 step directions with increased processing time and cues              Memory:  fair               Sequencing:  fair               Problem solving:  poor              Judgement/safety:  poor                Functional Assessment:    Initial Eval Status  Date: 3/11/21 Treatment Status  Date: 3/12/21 Short Term Goals=LTG  Treatment frequency: PRN 1-3 x/week   Feeding Set-up/Stand by Assist   independent  Modified [place]    Grooming Minimal Assist  Completed hand hygiene while standing at sink  Supervision seated; pt educated with regards to safety and ECT's  Modified [place]     UB Dressing Minimal Assist  n/t Modified [place]    LB Dressing SBA  Doffed slippers SBA to wan/doff slippers  Modified [place]    Bathing Minimal Assist  N/t; pt educated with regards to DME, bathing AE, and ECT's  Modified [place]    Toileting Moderate Assist  Completed toileting using standard commode with SBA for wilfredo-hygiene while seated, patient with obrien catheter  n/t Modified [place]    Bed Mobility  Supine to sit: SBA  Sit to supine: SBA Supine>sit independent   Sit>supine n/t  Supine to sit:Mod I   Sit to supine: Mod I   Functional Transfers Sit to stand: CGA  Stand to sit: CGA  Toilet Transfer: CGA  Verbal/tactile cues for hand placement Sit>stand n/t Sit to stand: Mod I  Stand to sit: Mod I  Stand pivot:  Mod I  Using fww   Functional Mobility CGA  Completed functional mobility in room/ hallway/bathroom using fww, verbal/tacitle cues for safety, navigation, positioning, and hand placement, L inattention observed n/t  Mod I using fww  Functional household distance Balance Sitting:     Static:SBA    Dynamic:SBA  Standing: CGA/SBA Sitting:   Static: independent   Dynamic: supervision   Standing:n/t      Activity Tolerance Fair Good  Good   Visual/  Perceptual Glasses/corrective lenses: reading     -Patient demonstrating poor attention to L during functional mobility           Safety       Poor                   Fair+         Comments: Upon arrival pt supine in bed laying sideline, agreeable to therapy session. Pt educated with regards to bed mobility, static/dynamic sitting balance, LE dressing, DME, energy conservation techniques, bathroom safety. At end of session pt seated EOB with nursing present,  all lines and tubes intact, call light within reach. · Pt has made good  progress towards set goals.    · Continue with current plan of care      Treatment Time In:1450            Treatment Time Out: 3269                Treatment Charges: Mins Units   Ther Ex  [id number]     Manual Therapy [id number]     Thera Activities 65719 30  2   ADL/Home Mgt [id number] 10 1   Neuro Re-ed [id number]     Group Therapy      Orthotic manage/training  [id number]     Non-Billable Time     Total Timed Treatment [address] [place]/L [id number]

## 2021-03-12 NOTE — PROGRESS NOTES
Hospitalist Progress Note      PCP: Jermaine Green PA-C    Date of Admission: 3/9/2021  Days in the hospital: 3    Hospital Course:   70 y. o. female who presented to [facility] with past medical history of CHF, hyperlipidemia, coronary artery disease status post RCA stents in 2007, IBS, obesity, hypertension, hyperlipidemia, diabetes, sleep apnea and unsteady gait.  Patient was at [facility] on 2/28/2021, she left A on 3/3/2021 because \"I did not like the care there\".  She was admitted for vertigo, KIKI, dizziness and sphenoid sinusitis on CT scan.  She had wax removed from her left ear.  She was given Rocephin and ENT saw her for benign positional vertigo.  While in the hospital, she had runs of V. tach, she had low magnesium that was replaced.  Echo obtained during that admission showed that her EF had dropped from 60% 3 years ago to 25%.  Patient was seen by cardiologist and stress test was planned however patient left before completion of work-up.  She had some changes in mental status/behavioral dyscontrol for which they wanted her to see psychiatry, this did not happen as well.  She was discharged on Ceftin, she does not know if she was taking it.     Patient came back to the ER today with altered mental status described as mostly confusion, this is constant, moderate intensity, associated with shortness of breath.  She was noted to have acute CHF.  She also had CT of the chest done which showed bilateral segmental PE and also left sided pulmonary infarction. Patient was seen by pulmonary and recommended continue with anticoagulation. Cardiology also followed the patient and currently patient is being diuresed and was placed on IV Lasix. Cardiology has recommended cardiac catheterization which patient is contemplating. Patient's medications are being adjusted. Subjective  Patient seen and examined at bedside. Denies any chest pain or palpitations, remains on IV Lasix.   Chart reviewed, awaiting cardiology recommendations. Currently on Eliquis. Refusing cardiac cath. Exam:    /81   Pulse 114   Temp 97.3 °F (36.3 °C) (Temporal)   Resp 20   Ht 5' 1\" (1.549 m)   Wt 158 lb 3.2 oz (71.8 kg)   SpO2 97%   BMI 29.89 kg/m²     HEENT: No pallor, no icterus. Respiratory:  CTA, good air entry. Cardiovascular: RRR, no murmur. Abdomen: Soft, non-tender, BS noted. Musculoskeletal: No joint pains or joint swelling noted. Neurologic: awake, alert and following commands       Assessment/Plan:  Active Hospital Problems    Diagnosis Date Noted    Hypomagnesemia [E83.42] 03/10/2021    Valvular heart disease [I38] 03/10/2021    Abnormal liver enzymes [R74.8] 03/10/2021    Hypertensive urgency [I16.0] 03/10/2021    Sleep apnea [G47.30] 03/10/2021    Fall [W19. XXXA] 03/10/2021    Acute respiratory failure with hypoxia (HCC) [J96.01] 03/10/2021    Bilateral pulmonary embolism (HCC) [I26.99] 03/10/2021    Congestive heart failure (HCC) [I50.9]     Acute on chronic systolic heart failure (HCC) [I50.23] 03/09/2021    Acute combined systolic and diastolic congestive heart failure ([address] [I50.41] 03/09/2021    Mixed hyperlipidemia [E78.2] 01/18/2017    Type 2 diabetes mellitus (HCC) [E11.9] 04/25/2016    Hyperlipidemia [E78.5]     CAD (coronary artery disease) [I25.10]     Anxiety [F41.9]      · New onset diabetes mellitus  · Hyperkalemia     Plan:  ? Remains on IV Lasix, cardiology following, started on Entresto, Aldactone down  ? Patient will need LifeVest upon discharge  ? Hyperkalemia resolved  ? She will need cardiac cath for further evaluation, patient still uncertain  ? Continue anticoagulation, transitioned to Eliquis today  ? Continue with ADA diet and sliding scale coverage, will need to be on oral hypoglycemics upon discharge, most likely Metformin  ?  DC home when okay with cardiology    Labs:   Recent Labs     03/09/21  1458 03/10/21  0215 03/12/21  0658   WBC 12.1* 12.2* 11.7*   HGB 12.5 11.7 11.8   HCT 41.5 38.3 39.3    233 239     Recent Labs     03/10/21  0215 03/11/21  0745 03/11/21  1736 03/12/21  0658    132  --  139   K 4.1 6.6*  6.6* 4.1 4.0  4.0    97*  --  97*   CO2 30* 24  --  35*   BUN 14 12  --  14   CREATININE 0.8 0.7  --  0.7   CALCIUM 8.1* 8.6  --  7.8*     Recent Labs     03/09/21  1458 03/10/21  0215   AST 48* 36*   ALT 70* 56*   BILIDIR  --  0.7*   BILITOT 1.1 1.2   ALKPHOS 194* 163*     Recent Labs     03/09/21  1458   INR 1.5     Recent Labs     03/09/21  1458 03/09/21  2141 03/10/21  0215   CKTOTAL  --  97  --    TROPONINI 0.03 0.03 0.03       Medications:  Reviewed    Infusion Medications    dextrose       Scheduled Medications    apixaban  10 mg Oral BID    influenza virus vaccine  0.5 mL Intramuscular Prior to discharge    metoprolol succinate  50 mg Oral Daily    sacubitril-valsartan  1 tablet Oral BID    spironolactone  25 mg Oral Daily    vitamin D  2,000 Units Oral Daily    escitalopram  10 mg Oral Daily    cetirizine  5 mg Oral Daily    pantoprazole  40 mg Oral Daily    insulin lispro  0-10 Units Subcutaneous 4x Daily AC & HS    sodium chloride flush  10 mL Intravenous 2 times per day    furosemide  20 mg Intravenous BID    atorvastatin  80 mg Oral Nightly    aspirin  81 mg Oral Daily     PRN Meds: sodium chloride flush, meclizine, glucose, dextrose, glucagon (rDNA), dextrose, promethazine **OR** ondansetron, polyethylene glycol, acetaminophen **OR** acetaminophen, nitroGLYCERIN      Intake/Output Summary (Last 24 hours) at 3/12/2021 1319  Last data filed at 3/12/2021 1034  Gross per 24 hour   Intake 360 ml   Output 975 ml   Net -615 ml     Body mass index is 29.89 kg/m².       · Diet  DIET CARDIAC; Low Sodium (2 GM)    · Code Status  Full Code       Electronically signed by Olita Olszewski, MD on 3/12/2021 at 1:19 PM  [employer]   Please contact me through perfect [employer]    NOTE: This report was transcribed using voice recognition software. Every effort was made to ensure accuracy; however, inadvertent computerized transcription errors may be present.

## 2021-03-13 LAB
ANION GAP SERPL CALCULATED.3IONS-SCNC: 9 MMOL/L (ref 7–16)
BUN BLDV-MCNC: 16 MG/DL (ref 8–23)
CALCIUM SERPL-MCNC: 8.7 MG/DL (ref 8.6–10.2)
CHLORIDE BLD-SCNC: 94 MMOL/L (ref 98–107)
CO2: 32 MMOL/L (ref 22–29)
CREAT SERPL-MCNC: 0.7 MG/DL (ref 0.5–1)
GFR AFRICAN AMERICAN: >60
GFR NON-AFRICAN AMERICAN: >60 ML/MIN/1.73
GLUCOSE BLD-MCNC: 139 MG/DL (ref 74–99)
HCT VFR BLD CALC: 41.2 % (ref 34–48)
HEMOGLOBIN: 12.3 G/DL (ref 11.5–15.5)
MAGNESIUM: 1.6 MG/DL (ref 1.6–2.6)
MCH RBC QN AUTO: 29.6 PG (ref 26–35)
MCHC RBC AUTO-ENTMCNC: 29.9 % (ref 32–34.5)
MCV RBC AUTO: 99 FL (ref 80–99.9)
METER GLUCOSE: 103 MG/DL (ref 74–99)
METER GLUCOSE: 118 MG/DL (ref 74–99)
METER GLUCOSE: 129 MG/DL (ref 74–99)
METER GLUCOSE: 147 MG/DL (ref 74–99)
PDW BLD-RTO: 14.3 FL (ref 11.5–15)
PLATELET # BLD: 306 E9/L (ref 130–450)
PMV BLD AUTO: 11 FL (ref 7–12)
POTASSIUM REFLEX MAGNESIUM: 4.2 MMOL/L (ref 3.5–5)
POTASSIUM SERPL-SCNC: 4.2 MMOL/L (ref 3.5–5)
RBC # BLD: 4.16 E12/L (ref 3.5–5.5)
SODIUM BLD-SCNC: 135 MMOL/L (ref 132–146)
WBC # BLD: 11.3 E9/L (ref 4.5–11.5)

## 2021-03-13 PROCEDURE — 83735 ASSAY OF MAGNESIUM: CPT

## 2021-03-13 PROCEDURE — 6370000000 HC RX 637 (ALT 250 FOR IP): Performed by: CLINICAL NURSE SPECIALIST

## 2021-03-13 PROCEDURE — 2580000003 HC RX 258: Performed by: FAMILY MEDICINE

## 2021-03-13 PROCEDURE — 2700000000 HC OXYGEN THERAPY PER DAY

## 2021-03-13 PROCEDURE — 6370000000 HC RX 637 (ALT 250 FOR IP): Performed by: INTERNAL MEDICINE

## 2021-03-13 PROCEDURE — 85027 COMPLETE CBC AUTOMATED: CPT

## 2021-03-13 PROCEDURE — 36415 COLL VENOUS BLD VENIPUNCTURE: CPT

## 2021-03-13 PROCEDURE — 80048 BASIC METABOLIC PNL TOTAL CA: CPT

## 2021-03-13 PROCEDURE — 82962 GLUCOSE BLOOD TEST: CPT

## 2021-03-13 PROCEDURE — 2060000000 HC ICU INTERMEDIATE R&B

## 2021-03-13 PROCEDURE — 6370000000 HC RX 637 (ALT 250 FOR IP): Performed by: FAMILY MEDICINE

## 2021-03-13 PROCEDURE — 99233 SBSQ HOSP IP/OBS HIGH 50: CPT | Performed by: INTERNAL MEDICINE

## 2021-03-13 RX ADMIN — ESCITALOPRAM 10 MG: 10 TABLET, FILM COATED ORAL at 09:17

## 2021-03-13 RX ADMIN — CETIRIZINE HYDROCHLORIDE 5 MG: 10 TABLET, FILM COATED ORAL at 09:17

## 2021-03-13 RX ADMIN — SPIRONOLACTONE 25 MG: 25 TABLET ORAL at 09:18

## 2021-03-13 RX ADMIN — ASPIRIN 81 MG: 81 TABLET, COATED ORAL at 09:18

## 2021-03-13 RX ADMIN — Medication 10 ML: at 09:18

## 2021-03-13 RX ADMIN — APIXABAN 10 MG: 5 TABLET, FILM COATED ORAL at 09:17

## 2021-03-13 RX ADMIN — PANTOPRAZOLE SODIUM 40 MG: 40 TABLET, DELAYED RELEASE ORAL at 09:18

## 2021-03-13 RX ADMIN — SACUBITRIL AND VALSARTAN 1 TABLET: 24; 26 TABLET, FILM COATED ORAL at 09:26

## 2021-03-13 RX ADMIN — SACUBITRIL AND VALSARTAN 1 TABLET: 24; 26 TABLET, FILM COATED ORAL at 20:05

## 2021-03-13 RX ADMIN — FUROSEMIDE 20 MG: 20 TABLET ORAL at 09:18

## 2021-03-13 RX ADMIN — METOPROLOL SUCCINATE 50 MG: 50 TABLET, EXTENDED RELEASE ORAL at 20:05

## 2021-03-13 RX ADMIN — Medication 10 ML: at 20:08

## 2021-03-13 RX ADMIN — ALPRAZOLAM 2 MG: 1 TABLET ORAL at 16:36

## 2021-03-13 RX ADMIN — FUROSEMIDE 20 MG: 20 TABLET ORAL at 18:08

## 2021-03-13 RX ADMIN — METOPROLOL SUCCINATE 50 MG: 50 TABLET, EXTENDED RELEASE ORAL at 09:17

## 2021-03-13 RX ADMIN — APIXABAN 10 MG: 5 TABLET, FILM COATED ORAL at 20:04

## 2021-03-13 RX ADMIN — Medication 2000 UNITS: at 09:17

## 2021-03-13 ASSESSMENT — PAIN SCALES - GENERAL
PAINLEVEL_OUTOF10: 0

## 2021-03-13 NOTE — PROGRESS NOTES
Hospitalist Progress Note      Synopsis: Patient admitted on 3/9/2021    Patient was admitted from home this time. She was admitted to [facility] prior with a history of heart failure hyperlipidemia and heart disease and irritable bowel syndrome and obesity. She has runs of ventricular tachycardia. Also hypomagnesemia. Echo had significantly declined ejection fraction visible. This all happened at [facility].  She had multiple levels of care given and needed a psych consult as well because of failure to understand her care and be compliant. She discharged on Ceftin. She was seen in the ER here on 3/9/2021 with confusion and shortness of breath. Found to be in acute heart failure and bilateral segmental pulmonary embolisms and left-sided pulmonary infarction. She has been on anticoagulation. She is  come templating a cardiac catheterization which has been recommended  Subjective  Son in her room. She is trying to eat her meal.  Does not have any overt issues    Exam:  BP (!) 143/85   Pulse 111   Temp 97.8 °F (36.6 °C) (Temporal)   Resp 20   Ht 5' 1\" (1.549 m)   Wt 158 lb 3.2 oz (71.8 kg)   SpO2 93%   BMI 29.89 kg/m²   General appearance: No apparent distress, appears stated age and cooperative. HEENT: Pupils equal, round, and reactive to light. Conjunctivae/corneas clear. Neck: Supple. No jugular venous distention. Trachea midline. Respiratory: Decreased   cardiovascular: Regular rate and rhythm with normal S1/S2 without murmurs, rubs or gallops. Abdomen: Soft, non-tender, non-distended with normal bowel sounds.   Musculoskeletal: No clubbing, cyanosis   Trace edema  Neurologic: awake, alert and following commands     Medications:  Reviewed    Infusion Medications    dextrose       Scheduled Medications    metoprolol succinate  50 mg Oral BID    furosemide  20 mg Oral BID    apixaban  10 mg Oral BID    influenza virus vaccine  0.5 mL Intramuscular Prior to discharge    sacubitril-valsartan  1 tablet Oral BID    spironolactone  25 mg Oral Daily    vitamin D  2,000 Units Oral Daily    escitalopram  10 mg Oral Daily    cetirizine  5 mg Oral Daily    pantoprazole  40 mg Oral Daily    insulin lispro  0-10 Units Subcutaneous 4x Daily AC & HS    sodium chloride flush  10 mL Intravenous 2 times per day    atorvastatin  80 mg Oral Nightly    aspirin  81 mg Oral Daily     PRN Meds: ALPRAZolam, sodium chloride flush, meclizine, glucose, dextrose, glucagon (rDNA), dextrose, promethazine **OR** ondansetron, polyethylene glycol, acetaminophen **OR** acetaminophen, nitroGLYCERIN    I/O    Intake/Output Summary (Last 24 hours) at 3/13/2021 1331  Last data filed at 3/12/2021 1452  Gross per 24 hour   Intake --   Output 400 ml   Net -400 ml       Labs:   Recent Labs     03/12/21  0658 03/13/21  0729   WBC 11.7* 11.3   HGB 11.8 12.3   HCT 39.3 41.2    306       Recent Labs     03/12/21  0658 03/12/21  1646 03/13/21  0729    139 135   K 4.0  4.0 4.3 4.2  4.2   CL 97* 97* 94*   CO2 35* 30* 32*   BUN 14 16 16   CREATININE 0.7 0.8 0.7   CALCIUM 7.8* 8.2* 8.7       Recent Labs     03/12/21  1646   PROT 6.4   ALKPHOS 124*   ALT 54*   AST 54*   BILITOT 1.1       No results for input(s): INR in the last 72 hours. No results for input(s): Joshua Reyes in the last 72 hours. Chronic labs:  Lab Results   Component Value Date    CHOL 108 03/10/2021    TRIG 99 03/10/2021    HDL 33 03/10/2021    LDLCALC 55 03/10/2021    TSH 2.070 03/09/2021    INR 1.5 03/09/2021    LABA1C 6.5 (H) 03/09/2021       Microbiology:  Pending  No results for input(s): BC in the last 72 hours. No results for input(s): ORG in the last 72 hours. No results for input(s): Radha Martha in the last 72 hours. No results for input(s): STREPNEUMAGU in the last 72 hours. No results for input(s): LP1UAG in the last 72 hours. No results for input(s): ASO in the last 72 hours.   No results for input(s): CULTRESP in the last 72 hours. No results for input(s): PROCAL in the last 72 hours. Radiology:  Xr Thoracic Spine (2 Views)    Result Date: 2/27/2021  No evidence of acute thoracic spine trauma. Scoliosis with multilevel degenerative changes. Cardiomegaly. Xr Lumbar Spine (2-3 Views)    Result Date: 2/27/2021  No evidence of acute lumbar spine trauma. Scoliosis with multilevel degenerative changes and grade 1 anterolisthesis L3 over L4. Ct Head Wo Contrast    Result Date: 3/9/2021  No acute intracranial abnormality. Ct Head Wo Contrast    Result Date: 2/27/2021  No acute intracranial abnormality. Acute sphenoid sinusitis. Ct Head Wo Contrast    Result Date: 2/22/2021  No acute CVA, intracranial bleed, or brain mass. Non-specific new mucosal thickening and small fluid level in right sphenoid sinus may reflect acute sinusitis    Xr Chest Portable    Result Date: 3/9/2021  CHF. Superimposed pneumonia cannot be excluded. Cta Chest W Contrast    Result Date: 3/10/2021  Right lower lobe segmental pulmonary embolus. Left lower lobe subsegmental pulmonary embolus. There is nonspecific airspace disease in the right lower lobe. There is a peripheral masslike density in the left lower lobe, in the distribution of the subsegmental embolus. Well a mass is not excluded, this more likely relates to small pulmonary infarct. Suggest short-term follow-up to confirm resolution. Bilateral small to moderate pleural effusions. Findings were sent to Radiology Results [address] at 5:18 a.m. on 03/10/2021 to be communicated to a licensed caregiver. Mri Brain Wo Contrast    Result Date: 3/1/2021  Chronic microvascular disease without acute intracranial abnormality. Chronic sinusitis involving the right sphenoid sinus. Us Carotid Artery Bilateral    Result Date: 3/3/2021  No evidence of hemodynamically significant stenosis. Bilateral vertebral arteries are patent with flow in the normal direction.      Us Dup Lower Extremities Bilateral Venous    Result Date: 3/10/2021  Within the visualized vessels there is no evidence for deep venous thrombosis   ASSESSMENT:    Active Problems:    CAD (coronary artery disease)    Hyperlipidemia    Anxiety    Type 2 diabetes mellitus (HCC)    Mixed hyperlipidemia    Acute on chronic systolic heart failure (HCC)    Acute combined systolic and diastolic congestive heart failure (HCC)    Hypomagnesemia    Valvular heart disease    Abnormal liver enzymes    Hypertensive urgency    Sleep apnea    Fall    Acute respiratory failure with hypoxia (HCC)    Bilateral pulmonary embolism (HCC)    Congestive heart failure ([address]  Resolved Problems:    * No resolved hospital problems. *       PLAN:  1 maintaining on Eliquis loading dose and then to be maintenance dose  2. On nasal O2  3. Cardiology following for diuresis and hopefully heart catheterization  4. Monitor labs and electrolytes. Potassium is better today  5. O2 needs are better        Diet: DIET CARDIAC; Low Sodium (2 GM)  Code Status: Full Code    DVT Prophylaxis: On Eliquis  Recommended disposition at discharge:   Notsure yet    +++++++++++++++++++++++++++++++++++++++++++++++++  Tracy Geller MD   [facility].  +++++++++++++++++++++++++++++++++++++++++++++++++  NOTE: This report was transcribed using voice recognition software. Every effort was made to ensure accuracy; however, inadvertent computerized transcription errors may be present.

## 2021-03-13 NOTE — PROGRESS NOTES
[facility]  Department of Internal Medicine  Division of Pulmonary, Critical Care and Sleep Medicine  Progress Note  Jonatan Andres, Christen Bunch MD, [facility]    Patient: Ana Ramos  MRN: [id number]  : 1945    Encounter Time: 2:37 PM     Date of Admission: 3/9/2021  2:22 PM    Primary Care Physician: Jessica Lopes PA-C    Reason for Consultation: Pulmonary embolism, bilateral pleural effusions     SUBJECTIVE:    - 3 liters  Still on oxygen 2 liters      OBJECTIVE:     PHYSICAL EXAM:   VITALS:   Vitals:    21 1645 21 1646 21 0900   BP:   (!) 144/92 (!) 143/85   Pulse:   119 111   Resp:   20 20   Temp:   97 °F (36.1 °C) 97.8 °F (36.6 °C)   TempSrc:   Temporal Temporal   SpO2: (!) 87% 94% 93% 93%   Weight:       Height:            Intake/Output Summary (Last 24 hours) at 3/13/2021 1437  Last data filed at 3/12/2021 1452  Gross per 24 hour   Intake --   Output 400 ml   Net -400 ml        CONSTITUTIONAL:   A&O x 3, NAD  SKIN:     No rash, No suspicious lesions or skin discoloration  HEENT:     EOMI, MMM, No thrush  NECK:    No bruits, No JVP appreciated  CV:      Sinus,  No murmur, No rubs, No gallops  PULMONARY:   Couse BS, bilateral crackles noted  ABDOMEN:     Soft, non-tender. BS normal. No R/R/G  EXT:    No deformities . No clubbing.       + lower extremity edema, No venous stasis  PULSE:   Appears equal and palpable.   PSYCHIATRIC:  Seems appropriate, No acute psycosis  MS:    No fractures, No gross weakness  NEUROLOGIC:   The clinical assessment is non-focal     DATA: IMAGING & TESTING:     LABORATORY TESTS:    CBC with Differential:    Lab Results   Component Value Date    WBC 11.3 2021    RBC 4.16 2021    HGB 12.3 2021    HCT 41.2 2021     2021    MCV 99.0 2021    MCH 29.6 2021    MCHC 29.9 2021    RDW 14.3 2021    SEGSPCT 62 2012 BLASTSPCT 0.9 03/10/2021    METASPCT 0.9 03/09/2021    LYMPHOPCT 6.1 03/10/2021    PROMYELOPCT 0.9 03/10/2021    MONOPCT 7.0 03/10/2021    BASOPCT 0.2 03/10/2021    MONOSABS 0.85 03/10/2021    LYMPHSABS 0.73 03/10/2021    EOSABS 0.21 03/10/2021    BASOSABS 0.00 03/10/2021     BMP:    Lab Results   Component Value Date     03/13/2021    K 4.2 03/13/2021    K 4.2 03/13/2021    CL 94 03/13/2021    CO2 32 03/13/2021    BUN 16 03/13/2021    LABALBU 2.9 03/12/2021    CREATININE 0.7 03/13/2021    CALCIUM 8.7 03/13/2021    GFRAA >60 03/13/2021    LABGLOM >60 03/13/2021    GLUCOSE 139 03/13/2021        PRO-BNP:   Lab Results   Component Value Date    PROBNP 13,167 (H) 03/12/2021    PROBNP 33,238 (H) 03/09/2021      ABGs:   Lab Results   Component Value Date    PH 7.413 03/09/2021    PO2 85.9 03/09/2021    PCO2 42.2 03/09/2021     Hemoglobin A1C: No components found for: HGBA1C    IMAGING:  Imaging tests were completed and reviewed and discussed radiology and care team involved and reveals   Echo Complete  Result Date: 3/2/2021  Findings   Left Ventricle  Mildly dilated left ventricle. Ejection fraction is visually estimated at 25-30%. Overall ejection fraction severely decreased . No regional wall motion abnormalities seen. Normal left ventricle wall thickness. Stage II diastolic dysfunction. Right Ventricle  Normal right ventricular size. Right ventricle global systolic function is moderately reduced . TAPSE 12  mm. Left Atrium  The left atrium is moderately dilated. Right Atrium  Mildly enlarged right atrium size. Mitral Valve  Mild mitral annular calcification. No evidence of mitral valve stenosis. Increased E point septal separation noted,suggesting decreased LV cardiac  output. Moderate mitral regurgitation with centrally directed jet. Tricuspid Valve  The tricuspid valve appears structurally normal.  Mild to moderate tricuspid regurgitation. RVSP is 55 mmHg.  Pulmonary hypertension is moderate .   Aortic Valve  The aortic valve appears mildly sclerotic. The aortic valve is trileaflet. No hemodynamically significant aortic stenosis is present. Moderate aortic regurgitation is noted. Pulmonic Valve  Pulmonic valve is structurally normal.  Physiologic and/or trace pulmonic regurgitation present. No evidence of pulmonic valve stenosis. Pericardial Effusion  Trace pericardial effusion. Pleural Effusion  No evidence of pleural effusion. Aorta  Normal aortic root and ascending aorta. Miscellaneous  Dilated Inferior Vena Cava. Inferior Vena Cava, normal respiratory variation. Conclusions   Summary  Mildly dilated left ventricle. Ejection fraction is visually estimated at 25-30%. Overall ejection fraction severely decreased . No regional wall motion abnormalities seen. Normal left ventricle wall thickness. Stage II diastolic dysfunction. The left atrium is moderately dilated. Normal right ventricular size. Right ventricle global systolic function is moderately reduced . TAPSE 12  mm  Moderate mitral regurgitation with centrally directed jet. No hemodynamically significant aortic stenosis is present. Moderate aortic regurgitation is noted. Mild to moderate tricuspid regurgitation. RVSP is 55 mmHg. Pulmonary hypertension is moderate . Trace pericardial effusion. No previous echo for comparison. Cta Chest W Contrast    Result Date: 3/10/2021  EXAMINATION: CTA OF THE CHEST 3/10/2021 4:42 am TECHNIQUE: CTA of the chest was performed after the administration of intravenous contrast.  Multiplanar reformatted images are provided for review. MIP images are provided for review. Dose modulation, iterative reconstruction, and/or weight based adjustment of the mA/kV was utilized to reduce the radiation dose to as low as reasonably achievable. COMPARISON: None.  HISTORY: ORDERING SYSTEM PROVIDED HISTORY: PE TECHNOLOGIST PROVIDED HISTORY: Reason for exam:->PE What reading provider will be dictating this exam?->CRC FINDINGS: Pulmonary Arteries: There is a subsegmental pulmonary embolus in the right lower lobe seen on images 174-186. There is a segmental pulmonary embolus in the right lower lobe best seen on coronal series 9, image 97. Mediastinum: There are coronary artery calcifications. There is also calcified plaque throughout thoracic aorta. There is no abnormal mediastinal or hilar mass seen. Lungs/pleura: There are bilateral small to moderate pleural effusions. There is infiltrate and/or atelectasis in the left lower lobe. There is some dependent atelectasis in the right lower lobe. There is a peripheral masslike density in the right lower lobe at the peripheral lung base measuring 2.1 x 1.5 cm. This is in the distribution of the small subsegmental embolus and may relate to a small pulmonary infarct. Upper Abdomen: Limited images of the upper abdomen are unremarkable. Soft Tissues/Bones: No acute bone or soft tissue abnormality. Right lower lobe segmental pulmonary embolus. Left lower lobe subsegmental pulmonary embolus. There is nonspecific airspace disease in the right lower lobe. There is a peripheral masslike density in the left lower lobe, in the distribution of the subsegmental embolus. Well a mass is not excluded, this more likely relates to small pulmonary infarct. Suggest short-term follow-up to confirm resolution. Bilateral small to moderate pleural effusions. Findings were sent to Radiology Results [address] at 5:18 a.m. on 03/10/2021 to be communicated to a licensed caregiver. Assessment:   1. Intermediate-Low Risk Pulmonary Embolism, likely provoked in setting of decreased mobility and CHF  2. Acute Decompensated HFrEF (25%, G2DD)  3. Bilateral Pleural Effusions, likely cardiogenic  4. Acute Hypoxic Respiratory Failure  5. CAD/MI, s/p PCI with bare metal stent to RCA in 2007  6. Hypertension  7. Hyperlipidemia  8.  Insulin-Dependent Diabetes Mellitus Type 2  9. H/o Tobacco Abuse  10. Anxiety Disorder      Plan:     1. Continue with Eliquis 10mg BID x 7 days in AM, followed by 5mg BID for total of 3 months  2. Agree with continued diuresis change to po  3. Monitor electrolytes with diuresis, replace prn  4. Ischemic workup and CHF medication titration per cardiology  5. Wean off oxygen   6.  Will follow      Lydia Pulido DO  3/13/2021  2:37 PM

## 2021-03-13 NOTE — PROGRESS NOTES
Unable to wean off the oxygen. 2-3L needed. Patients son is concerned regarding patient going home. Feels she may need assisted living.

## 2021-03-14 LAB
ALBUMIN SERPL-MCNC: 2.8 G/DL (ref 3.5–5.2)
ALP BLD-CCNC: 103 U/L (ref 35–104)
ALT SERPL-CCNC: 41 U/L (ref 0–32)
AMMONIA: 25 UMOL/L (ref 11–51)
ANION GAP SERPL CALCULATED.3IONS-SCNC: 6 MMOL/L (ref 7–16)
ANION GAP SERPL CALCULATED.3IONS-SCNC: 7 MMOL/L (ref 7–16)
AST SERPL-CCNC: 41 U/L (ref 0–31)
BILIRUB SERPL-MCNC: 0.7 MG/DL (ref 0–1.2)
BUN BLDV-MCNC: 17 MG/DL (ref 8–23)
BUN BLDV-MCNC: 19 MG/DL (ref 8–23)
CALCIUM SERPL-MCNC: 8.5 MG/DL (ref 8.6–10.2)
CALCIUM SERPL-MCNC: 8.5 MG/DL (ref 8.6–10.2)
CHLORIDE BLD-SCNC: 97 MMOL/L (ref 98–107)
CHLORIDE BLD-SCNC: 99 MMOL/L (ref 98–107)
CO2: 33 MMOL/L (ref 22–29)
CO2: 34 MMOL/L (ref 22–29)
CREAT SERPL-MCNC: 0.7 MG/DL (ref 0.5–1)
CREAT SERPL-MCNC: 0.7 MG/DL (ref 0.5–1)
GFR AFRICAN AMERICAN: >60
GFR AFRICAN AMERICAN: >60
GFR NON-AFRICAN AMERICAN: >60 ML/MIN/1.73
GFR NON-AFRICAN AMERICAN: >60 ML/MIN/1.73
GLUCOSE BLD-MCNC: 107 MG/DL (ref 74–99)
GLUCOSE BLD-MCNC: 217 MG/DL (ref 74–99)
HCT VFR BLD CALC: 38.1 % (ref 34–48)
HEMOGLOBIN: 11.3 G/DL (ref 11.5–15.5)
MAGNESIUM: 1.3 MG/DL (ref 1.6–2.6)
MCH RBC QN AUTO: 29.1 PG (ref 26–35)
MCHC RBC AUTO-ENTMCNC: 29.7 % (ref 32–34.5)
MCV RBC AUTO: 98.2 FL (ref 80–99.9)
METER GLUCOSE: 105 MG/DL (ref 74–99)
METER GLUCOSE: 122 MG/DL (ref 74–99)
METER GLUCOSE: 146 MG/DL (ref 74–99)
METER GLUCOSE: 197 MG/DL (ref 74–99)
PDW BLD-RTO: 14.3 FL (ref 11.5–15)
PLATELET # BLD: 327 E9/L (ref 130–450)
PMV BLD AUTO: 10.8 FL (ref 7–12)
POTASSIUM REFLEX MAGNESIUM: 4.5 MMOL/L (ref 3.5–5)
POTASSIUM SERPL-SCNC: 4.2 MMOL/L (ref 3.5–5)
POTASSIUM SERPL-SCNC: 4.5 MMOL/L (ref 3.5–5)
RBC # BLD: 3.88 E12/L (ref 3.5–5.5)
SODIUM BLD-SCNC: 138 MMOL/L (ref 132–146)
SODIUM BLD-SCNC: 138 MMOL/L (ref 132–146)
TOTAL PROTEIN: 6.1 G/DL (ref 6.4–8.3)
WBC # BLD: 9.3 E9/L (ref 4.5–11.5)

## 2021-03-14 PROCEDURE — 2700000000 HC OXYGEN THERAPY PER DAY

## 2021-03-14 PROCEDURE — 80053 COMPREHEN METABOLIC PANEL: CPT

## 2021-03-14 PROCEDURE — 6370000000 HC RX 637 (ALT 250 FOR IP): Performed by: FAMILY MEDICINE

## 2021-03-14 PROCEDURE — 6370000000 HC RX 637 (ALT 250 FOR IP): Performed by: CLINICAL NURSE SPECIALIST

## 2021-03-14 PROCEDURE — 2580000003 HC RX 258: Performed by: FAMILY MEDICINE

## 2021-03-14 PROCEDURE — 82962 GLUCOSE BLOOD TEST: CPT

## 2021-03-14 PROCEDURE — 36415 COLL VENOUS BLD VENIPUNCTURE: CPT

## 2021-03-14 PROCEDURE — 6370000000 HC RX 637 (ALT 250 FOR IP): Performed by: INTERNAL MEDICINE

## 2021-03-14 PROCEDURE — 83735 ASSAY OF MAGNESIUM: CPT

## 2021-03-14 PROCEDURE — 6360000002 HC RX W HCPCS: Performed by: INTERNAL MEDICINE

## 2021-03-14 PROCEDURE — 85027 COMPLETE CBC AUTOMATED: CPT

## 2021-03-14 PROCEDURE — 80048 BASIC METABOLIC PNL TOTAL CA: CPT

## 2021-03-14 PROCEDURE — 99232 SBSQ HOSP IP/OBS MODERATE 35: CPT | Performed by: INTERNAL MEDICINE

## 2021-03-14 PROCEDURE — 82140 ASSAY OF AMMONIA: CPT

## 2021-03-14 PROCEDURE — 2060000000 HC ICU INTERMEDIATE R&B

## 2021-03-14 RX ORDER — MAGNESIUM SULFATE IN WATER 40 MG/ML
2000 INJECTION, SOLUTION INTRAVENOUS ONCE
Status: COMPLETED | OUTPATIENT
Start: 2021-03-14 | End: 2021-03-14

## 2021-03-14 RX ORDER — FUROSEMIDE 10 MG/ML
20 INJECTION INTRAMUSCULAR; INTRAVENOUS ONCE
Status: COMPLETED | OUTPATIENT
Start: 2021-03-14 | End: 2021-03-14

## 2021-03-14 RX ADMIN — ASPIRIN 81 MG: 81 TABLET, COATED ORAL at 08:42

## 2021-03-14 RX ADMIN — ALPRAZOLAM 2 MG: 1 TABLET ORAL at 08:41

## 2021-03-14 RX ADMIN — ALPRAZOLAM 2 MG: 1 TABLET ORAL at 20:54

## 2021-03-14 RX ADMIN — CETIRIZINE HYDROCHLORIDE 5 MG: 10 TABLET, FILM COATED ORAL at 08:49

## 2021-03-14 RX ADMIN — APIXABAN 10 MG: 5 TABLET, FILM COATED ORAL at 20:53

## 2021-03-14 RX ADMIN — SPIRONOLACTONE 25 MG: 25 TABLET ORAL at 08:41

## 2021-03-14 RX ADMIN — PANTOPRAZOLE SODIUM 40 MG: 40 TABLET, DELAYED RELEASE ORAL at 08:40

## 2021-03-14 RX ADMIN — SACUBITRIL AND VALSARTAN 1 TABLET: 24; 26 TABLET, FILM COATED ORAL at 21:35

## 2021-03-14 RX ADMIN — ESCITALOPRAM 10 MG: 10 TABLET, FILM COATED ORAL at 08:49

## 2021-03-14 RX ADMIN — FUROSEMIDE 20 MG: 20 TABLET ORAL at 16:37

## 2021-03-14 RX ADMIN — MAGNESIUM SULFATE HEPTAHYDRATE 2000 MG: 40 INJECTION, SOLUTION INTRAVENOUS at 18:58

## 2021-03-14 RX ADMIN — METOPROLOL SUCCINATE 50 MG: 50 TABLET, EXTENDED RELEASE ORAL at 08:40

## 2021-03-14 RX ADMIN — APIXABAN 10 MG: 5 TABLET, FILM COATED ORAL at 08:40

## 2021-03-14 RX ADMIN — ALPRAZOLAM 2 MG: 1 TABLET ORAL at 00:54

## 2021-03-14 RX ADMIN — FUROSEMIDE 20 MG: 10 INJECTION, SOLUTION INTRAMUSCULAR; INTRAVENOUS at 08:41

## 2021-03-14 RX ADMIN — SACUBITRIL AND VALSARTAN 1 TABLET: 24; 26 TABLET, FILM COATED ORAL at 08:41

## 2021-03-14 RX ADMIN — MAGNESIUM GLUCONATE 500 MG ORAL TABLET 400 MG: 500 TABLET ORAL at 20:53

## 2021-03-14 RX ADMIN — Medication 2000 UNITS: at 08:41

## 2021-03-14 RX ADMIN — METOPROLOL SUCCINATE 50 MG: 50 TABLET, EXTENDED RELEASE ORAL at 20:54

## 2021-03-14 RX ADMIN — INSULIN LISPRO 2 UNITS: 100 INJECTION, SOLUTION INTRAVENOUS; SUBCUTANEOUS at 11:41

## 2021-03-14 RX ADMIN — FUROSEMIDE 20 MG: 20 TABLET ORAL at 08:41

## 2021-03-14 RX ADMIN — Medication 10 ML: at 08:43

## 2021-03-14 RX ADMIN — Medication 10 ML: at 20:53

## 2021-03-14 ASSESSMENT — PAIN SCALES - GENERAL: PAINLEVEL_OUTOF10: 0

## 2021-03-14 NOTE — PROGRESS NOTES
Hospitalist Progress Note      Synopsis: Patient admitted on 3/9/2021    Patient was admitted from home this time. She was admitted to [facility] prior with a history of heart failure hyperlipidemia and heart disease and irritable bowel syndrome and obesity. She has runs of ventricular tachycardia. Also hypomagnesemia. Echo had significantly declined ejection fraction visible. This all happened at [facility].  She had multiple levels of care given and needed a psych consult as well because of failure to understand her care and be compliant. She discharged on Ceftin. She was seen in the ER here on 3/9/2021 with confusion and shortness of breath. Found to be in acute heart failure and bilateral segmental pulmonary embolisms and left-sided pulmonary infarction. She has been on anticoagulation. She is  come templating a cardiac catheterization which has been recommended  Subjective  Son in her room. She is trying to eat her meal.  Does not have any overt issues  March 14, 2021  This morning appear to be little bit more short of breath. Extra dose of Lasix was given. Urine does not look very dilute in fact looks more concentrated now. She was sleeping today when seen and stated that she wanted to rest.    Exam:  BP (!) 144/90   Pulse 102   Temp 97.4 °F (36.3 °C) (Temporal)   Resp 14   Ht 5' 1\" (1.549 m)   Wt 159 lb (72.1 kg)   SpO2 95%   BMI 30.04 kg/m²   General appearance: No apparent distress, appears stated age and cooperative. HEENT: Kept her eyes closed throughout her conversation   neck: Supple. No jugular venous distention. Trachea midline. Respiratory: Decreased   cardiovascular: Regular rate and rhythm with normal S1/S2 without murmurs, rubs or gallops. Abdomen: Soft, non-tender, non-distended with normal bowel sounds.   Musculoskeletal: No clubbing, cyanosis   Trace edema  Neurologic: Sleepy and said that she would like to sleep for now    Medications:  Reviewed    Infusion Medications    dextrose       Scheduled Medications    metoprolol succinate  50 mg Oral BID    furosemide  20 mg Oral BID    apixaban  10 mg Oral BID    influenza virus vaccine  0.5 mL Intramuscular Prior to discharge    sacubitril-valsartan  1 tablet Oral BID    spironolactone  25 mg Oral Daily    vitamin D  2,000 Units Oral Daily    escitalopram  10 mg Oral Daily    cetirizine  5 mg Oral Daily    pantoprazole  40 mg Oral Daily    insulin lispro  0-10 Units Subcutaneous 4x Daily AC & HS    sodium chloride flush  10 mL Intravenous 2 times per day    atorvastatin  80 mg Oral Nightly    aspirin  81 mg Oral Daily     PRN Meds: ALPRAZolam, sodium chloride flush, meclizine, glucose, dextrose, glucagon (rDNA), dextrose, promethazine **OR** ondansetron, polyethylene glycol, acetaminophen **OR** acetaminophen, nitroGLYCERIN    I/O    Intake/Output Summary (Last 24 hours) at 3/14/2021 1311  Last data filed at 3/14/2021 0534  Gross per 24 hour   Intake 600 ml   Output 775 ml   Net -175 ml       Labs:   Recent Labs     03/12/21  0658 03/13/21  0729   WBC 11.7* 11.3   HGB 11.8 12.3   HCT 39.3 41.2    306       Recent Labs     03/12/21  1646 03/13/21  0729 03/14/21  0745    135 138   K 4.3 4.2  4.2 4.5  4.5   CL 97* 94* 99   CO2 30* 32* 33*   BUN 16 16 19   CREATININE 0.8 0.7 0.7   CALCIUM 8.2* 8.7 8.5*       Recent Labs     03/12/21  1646   PROT 6.4   ALKPHOS 124*   ALT 54*   AST 54*   BILITOT 1.1       No results for input(s): INR in the last 72 hours. No results for input(s): Teryl Drown in the last 72 hours. Chronic labs:  Lab Results   Component Value Date    CHOL 108 03/10/2021    TRIG 99 03/10/2021    HDL 33 03/10/2021    LDLCALC 55 03/10/2021    TSH 2.070 03/09/2021    INR 1.5 03/09/2021    LABA1C 6.5 (H) 03/09/2021       Microbiology:  Pending  No results for input(s): BC in the last 72 hours. No results for input(s): ORG in the last 72 hours.   No results for input(s): Susie Ortega in the last 72 hours. No results for input(s): STREPNEUMAGU in the last 72 hours. No results for input(s): LP1UAG in the last 72 hours. No results for input(s): ASO in the last 72 hours. No results for input(s): CULTRESP in the last 72 hours. No results for input(s): PROCAL in the last 72 hours. Radiology:  Xr Thoracic Spine (2 Views)    Result Date: 2/27/2021  No evidence of acute thoracic spine trauma. Scoliosis with multilevel degenerative changes. Cardiomegaly. Xr Lumbar Spine (2-3 Views)    Result Date: 2/27/2021  No evidence of acute lumbar spine trauma. Scoliosis with multilevel degenerative changes and grade 1 anterolisthesis L3 over L4. Ct Head Wo Contrast    Result Date: 3/9/2021  No acute intracranial abnormality. Ct Head Wo Contrast    Result Date: 2/27/2021  No acute intracranial abnormality. Acute sphenoid sinusitis. Ct Head Wo Contrast    Result Date: 2/22/2021  No acute CVA, intracranial bleed, or brain mass. Non-specific new mucosal thickening and small fluid level in right sphenoid sinus may reflect acute sinusitis    Xr Chest Portable    Result Date: 3/9/2021  CHF. Superimposed pneumonia cannot be excluded. Cta Chest W Contrast    Result Date: 3/10/2021  Right lower lobe segmental pulmonary embolus. Left lower lobe subsegmental pulmonary embolus. There is nonspecific airspace disease in the right lower lobe. There is a peripheral masslike density in the left lower lobe, in the distribution of the subsegmental embolus. Well a mass is not excluded, this more likely relates to small pulmonary infarct. Suggest short-term follow-up to confirm resolution. Bilateral small to moderate pleural effusions. Findings were sent to Radiology Results [address] at 5:18 a.m. on 03/10/2021 to be communicated to a licensed caregiver. Mri Brain Wo Contrast    Result Date: 3/1/2021  Chronic microvascular disease without acute intracranial abnormality.  Chronic sinusitis involving the right sphenoid sinus. Us Carotid Artery Bilateral    Result Date: 3/3/2021  No evidence of hemodynamically significant stenosis. Bilateral vertebral arteries are patent with flow in the normal direction. Us Dup Lower Extremities Bilateral Venous    Result Date: 3/10/2021  Within the visualized vessels there is no evidence for deep venous thrombosis   ASSESSMENT:    Active Problems:    CAD (coronary artery disease)    Hyperlipidemia    Anxiety    Type 2 diabetes mellitus (HCC)    Mixed hyperlipidemia    Acute on chronic systolic heart failure (HCC)    Acute combined systolic and diastolic congestive heart failure (HCC)    Hypomagnesemia    Valvular heart disease    Abnormal liver enzymes    Hypertensive urgency    Sleep apnea    Fall    Acute respiratory failure with hypoxia (HCC)    Bilateral pulmonary embolism (HCC)    Congestive heart failure ([address]  Resolved Problems:    * No resolved hospital problems. *       PLAN:  1 maintaining on Eliquis loading dose and then to be maintenance dose  2. On nasal O2  3. Cardiology following for diuresis and hopefully heart catheterization  4. Monitor labs and electrolytes. Potassium is better today  5. O2 needs are better    March 14, 2021  Not sure of but episode this morning. She seems better now in fact basic metabolic panel shows an elevated CO2 which may indicate slight overdiuresis  Reorder labs for today including ammonia level. She remains on Lasix 20 mg twice a day. Cardiology has signed off  Blood pressure has room for improvement oxygen saturation on 2 L is 95% she remains afebrile today but had a low-grade yesterday in the afternoon. Still with a Aden's catheter  Anticoagulation being maintained  Given the episode this morning it appears that we can transition her to oral Lasix by tomorrow.     Diet: DIET CARDIAC; Low Sodium (2 GM)  Code Status: Full Code    DVT Prophylaxis: On Eliquis  Recommended disposition at discharge: Notsure yet    +++++++++++++++++++++++++++++++++++++++++++++++++  Rafia Saleh MD   [facility].  +++++++++++++++++++++++++++++++++++++++++++++++++  NOTE: This report was transcribed using voice recognition software. Every effort was made to ensure accuracy; however, inadvertent computerized transcription errors may be present.

## 2021-03-14 NOTE — PROGRESS NOTES
[place]  Department of Internal Medicine  Division of Pulmonary, Critical Care and Sleep Medicine  Progress Note  Olivia Person MD, [facility]    Patient: Filomena Graf  MRN: [id number]  : 1945    Encounter Time: 11:18 AM     Date of Admission: 3/9/2021  2:22 PM    Primary Care Physician: Dewayne Aguilera PA-C    Reason for Consultation: Pulmonary embolism, bilateral pleural effusions     SUBJECTIVE:    - 3 liters, still  Still on oxygen 2 liters      OBJECTIVE:     PHYSICAL EXAM:   VITALS:   Vitals:    21 2004 21 2315 21 0534 21 0830   BP: (!) 152/92 (!) 150/72  (!) 144/90   Pulse: 109 102  102   Resp:    Temp: 99.4 °F (37.4 °C) 97.4 °F (36.3 °C)     TempSrc: Temporal Temporal  Temporal   SpO2: 94% 95%  95%   Weight:   159 lb (72.1 kg)    Height:            Intake/Output Summary (Last 24 hours) at 3/14/2021 1118  Last data filed at 3/14/2021 0534  Gross per 24 hour   Intake 600 ml   Output 775 ml   Net -175 ml        CONSTITUTIONAL:   A&O x 3, NAD  SKIN:     No rash, No suspicious lesions or skin discoloration  HEENT:     EOMI, MMM, No thrush  NECK:    No bruits, No JVP appreciated  CV:      Sinus,  No murmur, No rubs, No gallops  PULMONARY:   Couse BS, bilateral crackles noted  ABDOMEN:     Soft, non-tender. BS normal. No R/R/G  EXT:    No deformities . No clubbing.       + lower extremity edema, No venous stasis  PULSE:   Appears equal and palpable.   PSYCHIATRIC:  Seems appropriate, No acute psycosis  MS:    No fractures, No gross weakness  NEUROLOGIC:   The clinical assessment is non-focal     DATA: IMAGING & TESTING:     LABORATORY TESTS:    CBC with Differential:    Lab Results   Component Value Date    WBC 11.3 2021    RBC 4.16 2021    HGB 12.3 2021    HCT 41.2 2021     2021    MCV 99.0 2021    MCH 29.6 2021    MCHC 29.9 2021    RDW 14.3 03/13/2021    SEGSPCT 62 08/30/2012    BLASTSPCT 0.9 03/10/2021    METASPCT 0.9 03/09/2021    LYMPHOPCT 6.1 03/10/2021    PROMYELOPCT 0.9 03/10/2021    MONOPCT 7.0 03/10/2021    BASOPCT 0.2 03/10/2021    MONOSABS 0.85 03/10/2021    LYMPHSABS 0.73 03/10/2021    EOSABS 0.21 03/10/2021    BASOSABS 0.00 03/10/2021     BMP:    Lab Results   Component Value Date     03/14/2021    K 4.5 03/14/2021    K 4.5 03/14/2021    CL 99 03/14/2021    CO2 33 03/14/2021    BUN 19 03/14/2021    LABALBU 2.9 03/12/2021    CREATININE 0.7 03/14/2021    CALCIUM 8.5 03/14/2021    GFRAA >60 03/14/2021    LABGLOM >60 03/14/2021    GLUCOSE 107 03/14/2021        PRO-BNP:   Lab Results   Component Value Date    PROBNP 13,167 (H) 03/12/2021    PROBNP 33,238 (H) 03/09/2021      ABGs:   Lab Results   Component Value Date    PH 7.413 03/09/2021    PO2 85.9 03/09/2021    PCO2 42.2 03/09/2021     Hemoglobin A1C: No components found for: HGBA1C    IMAGING:  Imaging tests were completed and reviewed and discussed radiology and care team involved and reveals   Echo Complete  Result Date: 3/2/2021  Findings   Left Ventricle  Mildly dilated left ventricle. Ejection fraction is visually estimated at 25-30%. Overall ejection fraction severely decreased . No regional wall motion abnormalities seen. Normal left ventricle wall thickness. Stage II diastolic dysfunction. Right Ventricle  Normal right ventricular size. Right ventricle global systolic function is moderately reduced . TAPSE 12  mm. Left Atrium  The left atrium is moderately dilated. Right Atrium  Mildly enlarged right atrium size. Mitral Valve  Mild mitral annular calcification. No evidence of mitral valve stenosis. Increased E point septal separation noted,suggesting decreased LV cardiac  output. Moderate mitral regurgitation with centrally directed jet. Tricuspid Valve  The tricuspid valve appears structurally normal.  Mild to moderate tricuspid regurgitation.   RVSP is 55 mmHg. Pulmonary hypertension is moderate . Aortic Valve  The aortic valve appears mildly sclerotic. The aortic valve is trileaflet. No hemodynamically significant aortic stenosis is present. Moderate aortic regurgitation is noted. Pulmonic Valve  Pulmonic valve is structurally normal.  Physiologic and/or trace pulmonic regurgitation present. No evidence of pulmonic valve stenosis. Pericardial Effusion  Trace pericardial effusion. Pleural Effusion  No evidence of pleural effusion. Aorta  Normal aortic root and ascending aorta. Miscellaneous  Dilated Inferior Vena Cava. Inferior Vena Cava, normal respiratory variation. Conclusions   Summary  Mildly dilated left ventricle. Ejection fraction is visually estimated at 25-30%. Overall ejection fraction severely decreased . No regional wall motion abnormalities seen. Normal left ventricle wall thickness. Stage II diastolic dysfunction. The left atrium is moderately dilated. Normal right ventricular size. Right ventricle global systolic function is moderately reduced . TAPSE 12  mm  Moderate mitral regurgitation with centrally directed jet. No hemodynamically significant aortic stenosis is present. Moderate aortic regurgitation is noted. Mild to moderate tricuspid regurgitation. RVSP is 55 mmHg. Pulmonary hypertension is moderate . Trace pericardial effusion. No previous echo for comparison. Cta Chest W Contrast    Result Date: 3/10/2021  EXAMINATION: CTA OF THE CHEST 3/10/2021 4:42 am TECHNIQUE: CTA of the chest was performed after the administration of intravenous contrast.  Multiplanar reformatted images are provided for review. MIP images are provided for review. Dose modulation, iterative reconstruction, and/or weight based adjustment of the mA/kV was utilized to reduce the radiation dose to as low as reasonably achievable. COMPARISON: None.  HISTORY: ORDERING SYSTEM PROVIDED HISTORY: PE TECHNOLOGIST PROVIDED HISTORY: Reason for 2007  6. Hypertension  7. Hyperlipidemia  8. Insulin-Dependent Diabetes Mellitus Type 2  9. H/o Tobacco Abuse  10. Anxiety Disorder      Plan:     1. Continue with Eliquis 10mg BID x 7 days in AM, followed by 5mg BID for total of 3 months  2. Agree with continued diuresis change to po  3. Monitor electrolytes with diuresis, replace prn  4. Ischemic workup and CHF medication titration per cardiology  5. Wean off oxygen   6.  Will follow      Ashia Boogie DO  3/14/2021  11:18 AM

## 2021-03-15 LAB
ALBUMIN SERPL-MCNC: 2.5 G/DL (ref 3.5–5.2)
ALP BLD-CCNC: 106 U/L (ref 35–104)
ALT SERPL-CCNC: 52 U/L (ref 0–32)
ANION GAP SERPL CALCULATED.3IONS-SCNC: 7 MMOL/L (ref 7–16)
AST SERPL-CCNC: 75 U/L (ref 0–31)
BILIRUB SERPL-MCNC: 0.7 MG/DL (ref 0–1.2)
BUN BLDV-MCNC: 19 MG/DL (ref 8–23)
CALCIUM SERPL-MCNC: 8.9 MG/DL (ref 8.6–10.2)
CHLORIDE BLD-SCNC: 96 MMOL/L (ref 98–107)
CO2: 33 MMOL/L (ref 22–29)
CREAT SERPL-MCNC: 0.7 MG/DL (ref 0.5–1)
GFR AFRICAN AMERICAN: >60
GFR NON-AFRICAN AMERICAN: >60 ML/MIN/1.73
GLUCOSE BLD-MCNC: 116 MG/DL (ref 74–99)
HCT VFR BLD CALC: 40.9 % (ref 34–48)
HEMOGLOBIN: 11.9 G/DL (ref 11.5–15.5)
MAGNESIUM: 1.9 MG/DL (ref 1.6–2.6)
MCH RBC QN AUTO: 29.7 PG (ref 26–35)
MCHC RBC AUTO-ENTMCNC: 29.1 % (ref 32–34.5)
MCV RBC AUTO: 102 FL (ref 80–99.9)
METER GLUCOSE: 136 MG/DL (ref 74–99)
METER GLUCOSE: 161 MG/DL (ref 74–99)
METER GLUCOSE: 232 MG/DL (ref 74–99)
METER GLUCOSE: 81 MG/DL (ref 74–99)
PDW BLD-RTO: 14.4 FL (ref 11.5–15)
PLATELET # BLD: 267 E9/L (ref 130–450)
PMV BLD AUTO: 11.8 FL (ref 7–12)
POTASSIUM REFLEX MAGNESIUM: 5.4 MMOL/L (ref 3.5–5)
POTASSIUM SERPL-SCNC: 5.4 MMOL/L (ref 3.5–5)
PRO-BNP: ABNORMAL PG/ML (ref 0–125)
RBC # BLD: 4.01 E12/L (ref 3.5–5.5)
SODIUM BLD-SCNC: 136 MMOL/L (ref 132–146)
TOTAL PROTEIN: 5.9 G/DL (ref 6.4–8.3)
WBC # BLD: 9.2 E9/L (ref 4.5–11.5)

## 2021-03-15 PROCEDURE — 83880 ASSAY OF NATRIURETIC PEPTIDE: CPT

## 2021-03-15 PROCEDURE — 85027 COMPLETE CBC AUTOMATED: CPT

## 2021-03-15 PROCEDURE — 2700000000 HC OXYGEN THERAPY PER DAY

## 2021-03-15 PROCEDURE — 6370000000 HC RX 637 (ALT 250 FOR IP): Performed by: INTERNAL MEDICINE

## 2021-03-15 PROCEDURE — 80048 BASIC METABOLIC PNL TOTAL CA: CPT

## 2021-03-15 PROCEDURE — 99232 SBSQ HOSP IP/OBS MODERATE 35: CPT | Performed by: INTERNAL MEDICINE

## 2021-03-15 PROCEDURE — 97530 THERAPEUTIC ACTIVITIES: CPT

## 2021-03-15 PROCEDURE — 97535 SELF CARE MNGMENT TRAINING: CPT

## 2021-03-15 PROCEDURE — 2060000000 HC ICU INTERMEDIATE R&B

## 2021-03-15 PROCEDURE — 6370000000 HC RX 637 (ALT 250 FOR IP): Performed by: FAMILY MEDICINE

## 2021-03-15 PROCEDURE — 82962 GLUCOSE BLOOD TEST: CPT

## 2021-03-15 PROCEDURE — 80053 COMPREHEN METABOLIC PANEL: CPT

## 2021-03-15 PROCEDURE — 36415 COLL VENOUS BLD VENIPUNCTURE: CPT

## 2021-03-15 PROCEDURE — 2580000003 HC RX 258: Performed by: FAMILY MEDICINE

## 2021-03-15 PROCEDURE — 83735 ASSAY OF MAGNESIUM: CPT

## 2021-03-15 PROCEDURE — 6370000000 HC RX 637 (ALT 250 FOR IP): Performed by: CLINICAL NURSE SPECIALIST

## 2021-03-15 RX ADMIN — Medication 10 ML: at 08:50

## 2021-03-15 RX ADMIN — APIXABAN 10 MG: 5 TABLET, FILM COATED ORAL at 20:20

## 2021-03-15 RX ADMIN — ATORVASTATIN CALCIUM 80 MG: 40 TABLET, FILM COATED ORAL at 20:18

## 2021-03-15 RX ADMIN — INSULIN LISPRO 2 UNITS: 100 INJECTION, SOLUTION INTRAVENOUS; SUBCUTANEOUS at 20:22

## 2021-03-15 RX ADMIN — SACUBITRIL AND VALSARTAN 1 TABLET: 24; 26 TABLET, FILM COATED ORAL at 08:49

## 2021-03-15 RX ADMIN — ALPRAZOLAM 2 MG: 1 TABLET ORAL at 20:26

## 2021-03-15 RX ADMIN — ALPRAZOLAM 2 MG: 1 TABLET ORAL at 09:00

## 2021-03-15 RX ADMIN — INSULIN LISPRO 4 UNITS: 100 INJECTION, SOLUTION INTRAVENOUS; SUBCUTANEOUS at 11:38

## 2021-03-15 RX ADMIN — PANTOPRAZOLE SODIUM 40 MG: 40 TABLET, DELAYED RELEASE ORAL at 08:49

## 2021-03-15 RX ADMIN — FUROSEMIDE 20 MG: 20 TABLET ORAL at 08:49

## 2021-03-15 RX ADMIN — SACUBITRIL AND VALSARTAN 1 TABLET: 24; 26 TABLET, FILM COATED ORAL at 20:20

## 2021-03-15 RX ADMIN — CETIRIZINE HYDROCHLORIDE 5 MG: 10 TABLET, FILM COATED ORAL at 08:49

## 2021-03-15 RX ADMIN — APIXABAN 10 MG: 5 TABLET, FILM COATED ORAL at 08:49

## 2021-03-15 RX ADMIN — METOPROLOL SUCCINATE 50 MG: 50 TABLET, EXTENDED RELEASE ORAL at 08:49

## 2021-03-15 RX ADMIN — METOPROLOL SUCCINATE 50 MG: 50 TABLET, EXTENDED RELEASE ORAL at 20:20

## 2021-03-15 RX ADMIN — ESCITALOPRAM 10 MG: 10 TABLET, FILM COATED ORAL at 08:49

## 2021-03-15 RX ADMIN — Medication 2000 UNITS: at 08:49

## 2021-03-15 RX ADMIN — ASPIRIN 81 MG: 81 TABLET, COATED ORAL at 08:49

## 2021-03-15 RX ADMIN — FUROSEMIDE 20 MG: 20 TABLET ORAL at 16:53

## 2021-03-15 RX ADMIN — Medication 10 ML: at 20:57

## 2021-03-15 RX ADMIN — MAGNESIUM GLUCONATE 500 MG ORAL TABLET 400 MG: 500 TABLET ORAL at 08:49

## 2021-03-15 RX ADMIN — SPIRONOLACTONE 25 MG: 25 TABLET ORAL at 08:51

## 2021-03-15 ASSESSMENT — PAIN SCALES - GENERAL: PAINLEVEL_OUTOF10: 0

## 2021-03-15 NOTE — PROGRESS NOTES
Hospitalist Progress Note      Synopsis: Patient admitted on 3/9/2021    Patient was admitted from home this time. She was admitted to [facility] prior with a history of heart failure hyperlipidemia and heart disease and irritable bowel syndrome and obesity. She has runs of ventricular tachycardia. Also hypomagnesemia. Echo had significantly declined ejection fraction visible. This all happened at [facility].  She had multiple levels of care given and needed a psych consult as well because of failure to understand her care and be compliant. She discharged on Ceftin. She was seen in the ER here on 3/9/2021 with confusion and shortness of breath. Found to be in acute heart failure and bilateral segmental pulmonary embolisms and left-sided pulmonary infarction. She has been on anticoagulation. She is  come templating a cardiac catheterization which has been recommended  Subjective    Patient continues to complain of shortness of breath  She reports she is too weak to dc    Exam:  BP (!) 147/81   Pulse 100   Temp 98.2 °F (36.8 °C) (Temporal)   Resp 18   Ht 5' 1\" (1.549 m)   Wt 152 lb 14.4 oz (69.4 kg)   SpO2 92%   BMI 28.89 kg/m²   General appearance: No apparent distress, appears stated age and cooperative. HEENT: Kept her eyes closed throughout her conversation   neck: Supple. No jugular venous distention. Trachea midline. Respiratory: Decreased   cardiovascular: Regular rate and rhythm with normal S1/S2 without murmurs, rubs or gallops. Abdomen: Soft, non-tender, non-distended with normal bowel sounds.   Musculoskeletal: No clubbing, cyanosis   Trace edema  Neurologic: Sleepy and said that she would like to sleep for now    Medications:  Reviewed    Infusion Medications    dextrose       Scheduled Medications    magnesium oxide  400 mg Oral Daily    metoprolol succinate  50 mg Oral BID    furosemide  20 mg Oral BID    apixaban  10 mg Oral BID    influenza virus vaccine  0.5 mL Intramuscular Prior to discharge    sacubitril-valsartan  1 tablet Oral BID    spironolactone  25 mg Oral Daily    vitamin D  2,000 Units Oral Daily    escitalopram  10 mg Oral Daily    cetirizine  5 mg Oral Daily    pantoprazole  40 mg Oral Daily    insulin lispro  0-10 Units Subcutaneous 4x Daily AC & HS    sodium chloride flush  10 mL Intravenous 2 times per day    atorvastatin  80 mg Oral Nightly    aspirin  81 mg Oral Daily     PRN Meds: ALPRAZolam, sodium chloride flush, meclizine, glucose, dextrose, glucagon (rDNA), dextrose, promethazine **OR** ondansetron, polyethylene glycol, acetaminophen **OR** acetaminophen, nitroGLYCERIN    I/O    Intake/Output Summary (Last 24 hours) at 3/15/2021 1632  Last data filed at 3/15/2021 1420  Gross per 24 hour   Intake 710 ml   Output 950 ml   Net -240 ml       Labs:   Recent Labs     03/13/21  0729 03/14/21  1326 03/15/21  0615   WBC 11.3 9.3 9.2   HGB 12.3 11.3* 11.9   HCT 41.2 38.1 40.9    327 267       Recent Labs     03/14/21  0745 03/14/21  1326 03/15/21  0615    138 136   K 4.5  4.5 4.2 5.4*  5.4*   CL 99 97* 96*   CO2 33* 34* 33*   BUN 19 17 19   CREATININE 0.7 0.7 0.7   CALCIUM 8.5* 8.5* 8.9       Recent Labs     03/12/21  1646 03/14/21  1326 03/15/21  0615   PROT 6.4 6.1* 5.9*   ALKPHOS 124* 103 106*   ALT 54* 41* 52*   AST 54* 41* 75*   BILITOT 1.1 0.7 0.7       No results for input(s): INR in the last 72 hours. No results for input(s): Joshua Amy in the last 72 hours. Chronic labs:  Lab Results   Component Value Date    CHOL 108 03/10/2021    TRIG 99 03/10/2021    HDL 33 03/10/2021    LDLCALC 55 03/10/2021    TSH 2.070 03/09/2021    INR 1.5 03/09/2021    LABA1C 6.5 (H) 03/09/2021       Microbiology:  Pending  No results for input(s): BC in the last 72 hours. No results for input(s): ORG in the last 72 hours. No results for input(s): Radha [place] in the last 72 hours.   No results for input(s): STREPNEUMAGU in the last 72 hours. No results for input(s): LP1UAG in the last 72 hours. No results for input(s): ASO in the last 72 hours. No results for input(s): CULTRESP in the last 72 hours. No results for input(s): PROCAL in the last 72 hours. Radiology:  Xr Thoracic Spine (2 Views)    Result Date: 2/27/2021  No evidence of acute thoracic spine trauma. Scoliosis with multilevel degenerative changes. Cardiomegaly. Xr Lumbar Spine (2-3 Views)    Result Date: 2/27/2021  No evidence of acute lumbar spine trauma. Scoliosis with multilevel degenerative changes and grade 1 anterolisthesis L3 over L4. Ct Head Wo Contrast    Result Date: 3/9/2021  No acute intracranial abnormality. Ct Head Wo Contrast    Result Date: 2/27/2021  No acute intracranial abnormality. Acute sphenoid sinusitis. Ct Head Wo Contrast    Result Date: 2/22/2021  No acute CVA, intracranial bleed, or brain mass. Non-specific new mucosal thickening and small fluid level in right sphenoid sinus may reflect acute sinusitis    Xr Chest Portable    Result Date: 3/9/2021  CHF. Superimposed pneumonia cannot be excluded. Cta Chest W Contrast    Result Date: 3/10/2021  Right lower lobe segmental pulmonary embolus. Left lower lobe subsegmental pulmonary embolus. There is nonspecific airspace disease in the right lower lobe. There is a peripheral masslike density in the left lower lobe, in the distribution of the subsegmental embolus. Well a mass is not excluded, this more likely relates to small pulmonary infarct. Suggest short-term follow-up to confirm resolution. Bilateral small to moderate pleural effusions. Findings were sent to Radiology Results [address] at 5:18 a.m. on 03/10/2021 to be communicated to a licensed caregiver. Mri Brain Wo Contrast    Result Date: 3/1/2021  Chronic microvascular disease without acute intracranial abnormality. Chronic sinusitis involving the right sphenoid sinus.     Us Carotid Artery Bilateral    Result Date: 3/3/2021  No evidence of hemodynamically significant stenosis. Bilateral vertebral arteries are patent with flow in the normal direction. Us Dup Lower Extremities Bilateral Venous    Result Date: 3/10/2021  Within the visualized vessels there is no evidence for deep venous thrombosis   ASSESSMENT:    Active Problems:    CAD (coronary artery disease)    Hyperlipidemia    Anxiety    Type 2 diabetes mellitus (HCC)    Mixed hyperlipidemia    Acute on chronic systolic heart failure (HCC)    Acute combined systolic and diastolic congestive heart failure (HCC)    Hypomagnesemia    Valvular heart disease    Abnormal liver enzymes    Hypertensive urgency    Sleep apnea    Fall    Acute respiratory failure with hypoxia (HCC)    Bilateral pulmonary embolism (HCC)    Congestive heart failure ([address]  Resolved Problems:    * No resolved hospital problems. *       PLAN:  1 maintaining on Eliquis loading dose and then to be maintenance dose  2. On nasal O2  3. Cardiology following for diuresis and hopefully heart catheterization  4. Monitor labs and electrolytes. Potassium is better today  5. O2 needs are better    March 14, 2021  Not sure of but episode this morning. She seems better now in fact basic metabolic panel shows an elevated CO2 which may indicate slight overdiuresis  Reorder labs for today including ammonia level. She remains on Lasix 20 mg twice a day. Cardiology has signed off  Blood pressure has room for improvement oxygen saturation on 2 L is 95% she remains afebrile today but had a low-grade yesterday in the afternoon. Still with a Aden's catheter  Anticoagulation being maintained  Given the episode this morning it appears that we can transition her to oral Lasix by tomorrow.     3/15/21  Declined cath  Declining discharge  Reports she has a special needs son and cannot discharge yet  Urged to reconsider cath decision    Diet: DIET CARDIAC; Low Sodium (2 GM)  Code Status: Full Code    DVT Prophylaxis: On Eliquis  Recommended disposition at discharge:   Notsure yet    +++++++++++++++++++++++++++++++++++++++++++++++++  Merlyn Bergman MD   [facility].  +++++++++++++++++++++++++++++++++++++++++++++++++  NOTE: This report was transcribed using voice recognition software. Every effort was made to ensure accuracy; however, inadvertent computerized transcription errors may be present.

## 2021-03-15 NOTE — PROGRESS NOTES
OT BEDSIDE TREATMENT NOTE      Date:3/15/2021  Patient Name: Jv Gusman  MRN: [id number]  : 1945  Room: [address] [place] [address]     Evaluating OT: YARELY Stock, OTR/L #[id number]     Referring physician: Bong Veliz MD  AM-PAC Daily Activity Raw Score: 17/24  Recommended Adaptive Equipment: Patient may benefit from a shower chair      Diagnosis: Acute on chronic systolic heart failure (HCC) [I50.23]  Acute combined systolic and diastolic congestive heart failure ([address] [I50.41]  Reason for Admission: Presented to hospital with confusion, per imaging patient with B PE with pleural effusions  Pertinent Medical History/Surgery: abnormal echocardiogram, anxiety, CAD, DM type II, GERD, heart attack, HLD, HTN, IBS, obesity, primary OA, sleep apnea, UTI, coronary angioplasty     -Patient with recent hospitalization 21-3/3/21 at [facility] due to vertigo. Patient left AMA  Precautions:  Falls, tele, supplemental O2, impaired cognition, alarm     Home Living: Pt lives with son (who has Down syndrome) in a two story house with 2 steps to enter without railing. Bedroom and bathroom are located on the secon floor. Patient has half bathroom on the first floor. Laundry is located in the basement (patient reports she does not negotiate steps to basement). Bathroom setup: Tub/shower, standard commode   Equipment owned: cane  Prior Level of Function:   Patient is a questionable historian and inconsistent with responses. Per patient, I with ADLs ,  assist with IADLs. Patient reports son assists with home management tasks including cleaning and laundry. Patient was using cane for functional mobility. Per chart, patient was observed crawling around home with patient inconsistent in response regarding how she completed mobility at home. Patient also unclear how much assistance required with home management.  Patient reports groceries are delivered to home then stated she drives to store  Driving: Unable to determine, reported yes and no                                   Pain Level: Pt did not complain of pain this session  Cognition: A&O: unable to assess, patient continuously distracted and would not respond to orientation questions when asked. Communication: Impaired, patient with disorganized thought process, easily distracted requiring continuous cues to attend to task  Follows 1 step directions with increased processing time and cues              Memory:  fair               Sequencing:  fair               Problem solving:  poor              Judgement/safety:  poor                Functional Assessment:    Initial Eval Status  Date: 3/11/21 Treatment Status  Date: 3/15/21 Short Term Goals=LTG  Treatment frequency: PRN 1-3 x/week   Feeding Set-up/Stand by Assist  Independent Modified [place]    Grooming Minimal Assist  Completed hand hygiene while standing at sink  Supervision  Pt washed face, combed hair, applied deodorant/powder and brushed teeth seated in chair at sink Modified [place]     UB Dressing Minimal Assist  SBA  [place] hospital gown seated upright in chair, assistance to manage of O2 line Modified [place]    LB Dressing SBA  Doffed slippers SBA  Pt donned/doffed slipper socks using cross over technique seated upright in chair  Modified [place]    Bathing Minimal Assist  Gaudencio/CGA  Pt completed sponge bathing task seated/standing, with pt able to wash of UB/LB, with assistance to stand and wash of buttocks/wilfredo area for safety  Modified [place]    Toileting Moderate Assist  Completed toileting using standard commode with SBA for wilfredo-hygiene while seated, patient with obrien catheter  DNT  Pt having of obrien catheter Modified [place]    Bed Mobility  Supine to sit: SBA  Sit to supine: SBA Supervision  Supine<>EOB    Use of bed rails and HOB slightly elevated Supine to sit:Mod I   Sit to supine:  Mod I   Functional Transfers Sit to stand: CGA  Stand to sit: CGA  Toilet Transfer:

## 2021-03-15 NOTE — CARE COORDINATION
Was notified by RN, that pt does not need home O2. [facility] following. Family to transport home. Terri JOHNSON

## 2021-03-15 NOTE — PROGRESS NOTES
Physical Therapy  Treatment Note   Name: Catherine Cha  : 1945  MRN: [id number]    Referring Provider:  Darrel Soulier, MD    Date of Service: 3/15/2021    Evaluating PT:  Jenniferkatty Tyson, PT, DPT [id number]    Room #:  3276/3681-C  Diagnosis:  Acute on chronic systolic heart failure  PMHx/PSHx:  CAD, HLD, heart attack, IBS, depression, DM, anxiety, carotid artery narrowing, GERD, HTN, OS, acute renal insufficiency, CHF, sleep apnea  Precautions:  Falls, O2 via NC  Equipment Needs:  Front [employer]    SUBJECTIVE:    Pt lives with special needs son in a 2 story home with 2 stairs to enter and 0 rail. Bed is on second floor and bath is on second floor. 13 steps with 1 rail to second floor. Pt ambulated with no AD independently PTA. Pt reports son assists with housework. She states she drives to grocery store for curbside pickup or has groceries delivered. Overall, pt states she is mostly sedentary at home. OBJECTIVE:   Initial Evaluation  Date: 3/11/2021 Treatment  Date: 3/15/2021 Short Term/ Long Term   Goals   AM-PAC 6 Clicks 60/62 69/26    Was pt agreeable to Eval/treatment? yes yes    Does pt have pain? No c/o pain none    Bed Mobility  Rolling: SBA  Supine to sit: SBA  Sit to supine: SBA  Scooting: SBA Independent  Rolling: Independent  Supine to sit: Independent  Sit to supine: Independent  Scooting: Independent   Transfers Sit to stand: SBA  Stand to sit: SBA  Stand pivot: SBA front [employer] Sit<>stand: SBA  Stand pivot: SBA front [employer] Sit to stand: Independent  Stand to sit:  Independent  Stand pivot: Eddie front [employer]   Ambulation    150 feet with front [employer]  feet front [employer]  feet with front [employer] Eddie   Stair negotiation: ascended and descended  7 steps with 1 rail CGA NT on this date 13 steps with 1 rail Eddie   ROM BUE:  Per OT note  BLE:  WNL     Strength BUE:  Per OT note  BLE:  WNL     Balance Sitting EOB:  Independent  Dynamic Standing:  SBA front [employer] Sitting: Independent   Dynamic standing: SBA front [employer] Sitting EOB:  Independent  Dynamic Standing:  Eddie front [employer]     Pt is A & O x 4  Sensation:  Pt denies numbness and tingling to extremities  Edema:  unremarkable    Vitals:  PULSE OX ON ROOM AIR SITTING 94%  PULSE OX ON ROOM AIR AMBULATING 92-95%      Patient education  Pt educated on role of PT intervention. Pt educated on safety in room with utilization of call light for assistance with mobility. Pt educated on importance of maximizing OOB time by transferring to bedside chair for meals and ambulating to bathroom/transferring to bedside commode with assistance from nursing and therapy staff to increase functional activity tolerance and overall functional independence. Patient response to education:   Pt verbalized understanding Pt demonstrated skill Pt requires further education in this area   yes yes yes     ASSESSMENT:    Comments:  RN cleared pt for activity prior to session. Pt received seated in chair in bathroom and agreeable to PT intervention at this time. Pt performed all functional mobility as noted above. Pt functioning at baseline at this time. Pt anxious about returning home but can not state a specific reason. Pt returned to bedside chair at end of session and left with all needs met and call light in reach. Recommending front [employer] for home use for safety. Pt would benefit from [facility] PT intervention at discharge to ensure safety and increasing functional independence at home. Treatment:  Patient practiced and was instructed in the following treatment:     Therapeutic Activities Completed:  o Functional mobility as noted above:   - Transfer training: SBA from chair with and without arms. Cues for proper hand placement and sequencing. Stand pivot with front [employer] SBA for safety. Pt mildly impulsive but no major LOB noted during tranfers.   Pt attempts to abandon front [employer] early during stand pivot despite her reporting she needs an AD for safety.  - Ambulation: 300 feet with front [employer] SBA.  Pt mildly impulsive but overall no LOB except when pt decided to jokingly dance in the hallway and had minor LOB that she was able to self-correct. SPo2 monitored on RA throughout as noted above.  o Skilled repositioning in seated position for comfort.  o Pt education as noted above. PLAN:    Patient is making fair progress towards established goals. Will continue with current POC.       Time in  1044  Time out  1053    Total Treatment Time  9 minutes     CPT codes:  [] Gait training 57034 0 minutes  [] Manual therapy 52050 0 minutes  [x] Therapeutic activities 98883 9 minutes  [] Therapeutic exercises 39850 0 minutes  [] Neuromuscular reeducation 39653 0 minutes    Elle Abts, PT, DPT  [id number]

## 2021-03-15 NOTE — PROGRESS NOTES
[facility]  Department of Internal Medicine  Division of Pulmonary, Critical Care and Sleep Medicine  Progress Note  Nicolette Fry, Jhonny Forrester MD, [place]    Patient: Amarilys Mars  MRN: [id number]  : 1945    Encounter Time: 7:54 PM     Date of Admission: 3/9/2021  2:22 PM    Primary Care Physician: Jessica Bucio PA-C    Reason for Consultation: Pulmonary embolism, bilateral pleural effusions     SUBJECTIVE:    - 3.7 liters, still  Still on oxygen 2 liters  Declining cath and discharge      OBJECTIVE:     PHYSICAL EXAM:   VITALS:   Vitals:    03/15/21 0446 03/15/21 0748 03/15/21 1128 03/15/21 1517   BP:  (!) 159/85  (!) 147/81   Pulse:  96  100   Resp:  18  18   Temp:  96.5 °F (35.8 °C)  98.2 °F (36.8 °C)   TempSrc:  Temporal  Temporal   SpO2:  97% 93% 92%   Weight: 152 lb 14.4 oz (69.4 kg)      Height:            Intake/Output Summary (Last 24 hours) at 3/15/2021 1954  Last data filed at 3/15/2021 1420  Gross per 24 hour   Intake 410 ml   Output 700 ml   Net -290 ml        CONSTITUTIONAL:   A&O x 3, NAD  SKIN:     No rash, No suspicious lesions or skin discoloration  HEENT:     EOMI, MMM, No thrush  NECK:    No bruits, No JVP appreciated  CV:      Sinus,  No murmur, No rubs, No gallops  PULMONARY:   Couse BS, bilateral crackles noted  ABDOMEN:     Soft, non-tender. BS normal. No R/R/G  EXT:    No deformities . No clubbing.       + lower extremity edema, No venous stasis  PULSE:   Appears equal and palpable.   PSYCHIATRIC:  Seems appropriate, No acute psycosis  MS:    No fractures, No gross weakness  NEUROLOGIC:   The clinical assessment is non-focal     DATA: IMAGING & TESTING:     LABORATORY TESTS:    CBC with Differential:    Lab Results   Component Value Date    WBC 9.2 03/15/2021    RBC 4.01 03/15/2021    HGB 11.9 03/15/2021    HCT 40.9 03/15/2021     03/15/2021    .0 03/15/2021    MCH 29.7 03/15/2021    MCHC 29.1 03/15/2021    RDW 14.4 03/15/2021    SEGSPCT 62 08/30/2012    BLASTSPCT 0.9 03/10/2021    METASPCT 0.9 03/09/2021    LYMPHOPCT 6.1 03/10/2021    PROMYELOPCT 0.9 03/10/2021    MONOPCT 7.0 03/10/2021    BASOPCT 0.2 03/10/2021    MONOSABS 0.85 03/10/2021    LYMPHSABS 0.73 03/10/2021    EOSABS 0.21 03/10/2021    BASOSABS 0.00 03/10/2021     BMP:    Lab Results   Component Value Date     03/15/2021    K 5.4 03/15/2021    K 5.4 03/15/2021    CL 96 03/15/2021    CO2 33 03/15/2021    BUN 19 03/15/2021    LABALBU 2.5 03/15/2021    CREATININE 0.7 03/15/2021    CALCIUM 8.9 03/15/2021    GFRAA >60 03/15/2021    LABGLOM >60 03/15/2021    GLUCOSE 116 03/15/2021        PRO-BNP:   Lab Results   Component Value Date    PROBNP 16,843 (H) 03/15/2021    PROBNP 13,167 (H) 03/12/2021      ABGs:   Lab Results   Component Value Date    PH 7.413 03/09/2021    PO2 85.9 03/09/2021    PCO2 42.2 03/09/2021     Hemoglobin A1C: No components found for: HGBA1C    IMAGING:  Imaging tests were completed and reviewed and discussed radiology and care team involved and reveals   Echo Complete  Result Date: 3/2/2021  Findings   Left Ventricle  Mildly dilated left ventricle. Ejection fraction is visually estimated at 25-30%. Overall ejection fraction severely decreased . No regional wall motion abnormalities seen. Normal left ventricle wall thickness. Stage II diastolic dysfunction. Right Ventricle  Normal right ventricular size. Right ventricle global systolic function is moderately reduced . TAPSE 12  mm. Left Atrium  The left atrium is moderately dilated. Right Atrium  Mildly enlarged right atrium size. Mitral Valve  Mild mitral annular calcification. No evidence of mitral valve stenosis. Increased E point septal separation noted,suggesting decreased LV cardiac  output. Moderate mitral regurgitation with centrally directed jet.    Tricuspid Valve  The tricuspid valve appears structurally normal.  Mild to moderate tricuspid regurgitation. RVSP is 55 mmHg. Pulmonary hypertension is moderate . Aortic Valve  The aortic valve appears mildly sclerotic. The aortic valve is trileaflet. No hemodynamically significant aortic stenosis is present. Moderate aortic regurgitation is noted. Pulmonic Valve  Pulmonic valve is structurally normal.  Physiologic and/or trace pulmonic regurgitation present. No evidence of pulmonic valve stenosis. Pericardial Effusion  Trace pericardial effusion. Pleural Effusion  No evidence of pleural effusion. Aorta  Normal aortic root and ascending aorta. Miscellaneous  Dilated Inferior Vena Cava. Inferior Vena Cava, normal respiratory variation. Conclusions   Summary  Mildly dilated left ventricle. Ejection fraction is visually estimated at 25-30%. Overall ejection fraction severely decreased . No regional wall motion abnormalities seen. Normal left ventricle wall thickness. Stage II diastolic dysfunction. The left atrium is moderately dilated. Normal right ventricular size. Right ventricle global systolic function is moderately reduced . TAPSE 12  mm  Moderate mitral regurgitation with centrally directed jet. No hemodynamically significant aortic stenosis is present. Moderate aortic regurgitation is noted. Mild to moderate tricuspid regurgitation. RVSP is 55 mmHg. Pulmonary hypertension is moderate . Trace pericardial effusion. No previous echo for comparison. Cta Chest W Contrast    Result Date: 3/10/2021  EXAMINATION: CTA OF THE CHEST 3/10/2021 4:42 am TECHNIQUE: CTA of the chest was performed after the administration of intravenous contrast.  Multiplanar reformatted images are provided for review. MIP images are provided for review. Dose modulation, iterative reconstruction, and/or weight based adjustment of the mA/kV was utilized to reduce the radiation dose to as low as reasonably achievable. COMPARISON: None.  HISTORY: ORDERING SYSTEM PROVIDED HISTORY: PE TECHNOLOGIST PROVIDED HISTORY: Reason for exam:->PE What reading provider will be dictating this exam?->CRC FINDINGS: Pulmonary Arteries: There is a subsegmental pulmonary embolus in the right lower lobe seen on images 174-186. There is a segmental pulmonary embolus in the right lower lobe best seen on coronal series 9, image 97. Mediastinum: There are coronary artery calcifications. There is also calcified plaque throughout thoracic aorta. There is no abnormal mediastinal or hilar mass seen. Lungs/pleura: There are bilateral small to moderate pleural effusions. There is infiltrate and/or atelectasis in the left lower lobe. There is some dependent atelectasis in the right lower lobe. There is a peripheral masslike density in the right lower lobe at the peripheral lung base measuring 2.1 x 1.5 cm. This is in the distribution of the small subsegmental embolus and may relate to a small pulmonary infarct. Upper Abdomen: Limited images of the upper abdomen are unremarkable. Soft Tissues/Bones: No acute bone or soft tissue abnormality. Right lower lobe segmental pulmonary embolus. Left lower lobe subsegmental pulmonary embolus. There is nonspecific airspace disease in the right lower lobe. There is a peripheral masslike density in the left lower lobe, in the distribution of the subsegmental embolus. Well a mass is not excluded, this more likely relates to small pulmonary infarct. Suggest short-term follow-up to confirm resolution. Bilateral small to moderate pleural effusions. Findings were sent to Radiology Results [address] at 5:18 a.m. on 03/10/2021 to be communicated to a licensed caregiver. Assessment:   1. Intermediate-Low Risk Pulmonary Embolism, likely provoked in setting of decreased mobility and CHF  2. Acute Decompensated HFrEF (25%, G2DD)  3. Bilateral Pleural Effusions, likely cardiogenic  4. Acute Hypoxic Respiratory Failure  5.  CAD/MI, s/p PCI with bare metal stent to RCA in 2007  6. Hypertension  7. Hyperlipidemia  8. Insulin-Dependent Diabetes Mellitus Type 2  9. H/o Tobacco Abuse  10. Anxiety Disorder      Plan:     1. Continue with Eliquis 10mg BID x 7 days in AM, followed by 5mg BID for total of 3 months  2. Agree with continued diuresis change to po  3. Monitor electrolytes with diuresis, replace prn  4. Ischemic workup and CHF medication titration per cardiology  5. Wean off oxygen   6. Will follow  7.  Home care      Jose Shelton DO  3/15/2021  7:54 PM

## 2021-03-15 NOTE — PROGRESS NOTES
[facility]   Department of Pharmacy   Pharmacist Transition of Care Services         Patient Demographics  Name:  Toyin Orlando   Medical Record Number:  [id number]  Gender:  female   Age:  70 y.o. YOB: 1945    Readmission Risk: 15 %       Pharmacist Review and Summary of Medications     Date of last reviewed/update: 3/15/21    Category Comments   New Medication Started   Eliquis 10mg PO BID x 7 days; then 5mg PO BID x 3 months  lipitor 80mg PO nightly   entresto 24-26mg PO BID  Magnesium oxide 400mg  Atorvastatin 80mg   Change in Outpatient Medication toprol XL decreased to 50mg PO BID     Discontinued/On hold Outpatient Medication  Metformin on hold  ceftin (unsure if she took this post last discharge for UTI)  Fenofibrate micronized 134mg capsule  Losartan 50mg PO BID   Other          Pharmacist Patient Education:    Date  Person Educated Content of Education             Documentation of Pharmacist Interventions and Follow-up Plan:     The following Pharmacist Transition of Care Services were completed:   [x]  Reviewed and summarized medication changes  [x]  Entire home medication list was reviewed for accuracy  []  Home medication list was updated or corrected    [x]  Reviewed discharge medication reconciliation  []  Discharge medication list was updated or corrected    []  Added information to AVS   []  Patient education was provided on new medications  []  Patient education was provided on medication changes  []  Reviewed the AVS with patient    Additional Interventions:  []  Inpatient prescriber was contacted and the following pharmacy recommendations        were accepted: **     [] Other interventions: **        Pharmacist: Wm Kim PharmD, [facility]  Date:  3/15/2021 10:32 AM

## 2021-03-16 LAB
ALBUMIN SERPL-MCNC: 3.1 G/DL (ref 3.5–5.2)
ALP BLD-CCNC: 106 U/L (ref 35–104)
ALT SERPL-CCNC: 51 U/L (ref 0–32)
ANION GAP SERPL CALCULATED.3IONS-SCNC: 8 MMOL/L (ref 7–16)
AST SERPL-CCNC: 56 U/L (ref 0–31)
BILIRUB SERPL-MCNC: 0.8 MG/DL (ref 0–1.2)
BUN BLDV-MCNC: 19 MG/DL (ref 8–23)
CALCIUM SERPL-MCNC: 9.2 MG/DL (ref 8.6–10.2)
CHLORIDE BLD-SCNC: 99 MMOL/L (ref 98–107)
CO2: 33 MMOL/L (ref 22–29)
CREAT SERPL-MCNC: 0.8 MG/DL (ref 0.5–1)
GFR AFRICAN AMERICAN: >60
GFR NON-AFRICAN AMERICAN: >60 ML/MIN/1.73
GLUCOSE BLD-MCNC: 119 MG/DL (ref 74–99)
HCT VFR BLD CALC: 41.4 % (ref 34–48)
HEMOGLOBIN: 12.4 G/DL (ref 11.5–15.5)
MAGNESIUM: 1.6 MG/DL (ref 1.6–2.6)
MCH RBC QN AUTO: 29.8 PG (ref 26–35)
MCHC RBC AUTO-ENTMCNC: 30 % (ref 32–34.5)
MCV RBC AUTO: 99.5 FL (ref 80–99.9)
METER GLUCOSE: 111 MG/DL (ref 74–99)
METER GLUCOSE: 124 MG/DL (ref 74–99)
METER GLUCOSE: 168 MG/DL (ref 74–99)
METER GLUCOSE: 184 MG/DL (ref 74–99)
PDW BLD-RTO: 14.3 FL (ref 11.5–15)
PLATELET # BLD: 400 E9/L (ref 130–450)
PMV BLD AUTO: 10.9 FL (ref 7–12)
POTASSIUM REFLEX MAGNESIUM: 4.8 MMOL/L (ref 3.5–5)
POTASSIUM SERPL-SCNC: 4.8 MMOL/L (ref 3.5–5)
RBC # BLD: 4.16 E12/L (ref 3.5–5.5)
SODIUM BLD-SCNC: 140 MMOL/L (ref 132–146)
TOTAL PROTEIN: 6.8 G/DL (ref 6.4–8.3)
WBC # BLD: 10.3 E9/L (ref 4.5–11.5)

## 2021-03-16 PROCEDURE — 83735 ASSAY OF MAGNESIUM: CPT

## 2021-03-16 PROCEDURE — 6370000000 HC RX 637 (ALT 250 FOR IP): Performed by: INTERNAL MEDICINE

## 2021-03-16 PROCEDURE — 6370000000 HC RX 637 (ALT 250 FOR IP): Performed by: CLINICAL NURSE SPECIALIST

## 2021-03-16 PROCEDURE — 80048 BASIC METABOLIC PNL TOTAL CA: CPT

## 2021-03-16 PROCEDURE — 85027 COMPLETE CBC AUTOMATED: CPT

## 2021-03-16 PROCEDURE — 2700000000 HC OXYGEN THERAPY PER DAY

## 2021-03-16 PROCEDURE — 2060000000 HC ICU INTERMEDIATE R&B

## 2021-03-16 PROCEDURE — 6370000000 HC RX 637 (ALT 250 FOR IP): Performed by: FAMILY MEDICINE

## 2021-03-16 PROCEDURE — 99223 1ST HOSP IP/OBS HIGH 75: CPT | Performed by: INTERNAL MEDICINE

## 2021-03-16 PROCEDURE — 80053 COMPREHEN METABOLIC PANEL: CPT

## 2021-03-16 PROCEDURE — 2580000003 HC RX 258: Performed by: FAMILY MEDICINE

## 2021-03-16 PROCEDURE — 36415 COLL VENOUS BLD VENIPUNCTURE: CPT

## 2021-03-16 PROCEDURE — 2580000003 HC RX 258: Performed by: RADIOLOGY

## 2021-03-16 PROCEDURE — 99232 SBSQ HOSP IP/OBS MODERATE 35: CPT | Performed by: INTERNAL MEDICINE

## 2021-03-16 PROCEDURE — 6360000002 HC RX W HCPCS: Performed by: INTERNAL MEDICINE

## 2021-03-16 PROCEDURE — 82962 GLUCOSE BLOOD TEST: CPT

## 2021-03-16 RX ORDER — ATORVASTATIN CALCIUM 40 MG/1
40 TABLET, FILM COATED ORAL DAILY
Qty: 30 TABLET | Refills: 0 | Status: SHIPPED | OUTPATIENT
Start: 2021-03-16 | End: 2021-04-05 | Stop reason: SDUPTHER

## 2021-03-16 RX ORDER — MAGNESIUM SULFATE 1 G/100ML
1000 INJECTION INTRAVENOUS ONCE
Status: COMPLETED | OUTPATIENT
Start: 2021-03-16 | End: 2021-03-16

## 2021-03-16 RX ORDER — METOPROLOL SUCCINATE 100 MG/1
200 TABLET, EXTENDED RELEASE ORAL DAILY
Status: DISCONTINUED | OUTPATIENT
Start: 2021-03-17 | End: 2021-03-20 | Stop reason: HOSPADM

## 2021-03-16 RX ORDER — FUROSEMIDE 40 MG/1
40 TABLET ORAL DAILY
Status: DISCONTINUED | OUTPATIENT
Start: 2021-03-17 | End: 2021-03-20 | Stop reason: HOSPADM

## 2021-03-16 RX ORDER — MAGNESIUM SULFATE HEPTAHYDRATE 500 MG/ML
1000 INJECTION, SOLUTION INTRAMUSCULAR; INTRAVENOUS ONCE
Status: DISCONTINUED | OUTPATIENT
Start: 2021-03-16 | End: 2021-03-16

## 2021-03-16 RX ORDER — FUROSEMIDE 40 MG/1
40 TABLET ORAL DAILY
Qty: 30 TABLET | Refills: 0 | Status: SHIPPED | OUTPATIENT
Start: 2021-03-16 | End: 2021-04-05 | Stop reason: SDUPTHER

## 2021-03-16 RX ADMIN — SACUBITRIL AND VALSARTAN 1 TABLET: 49; 51 TABLET, FILM COATED ORAL at 19:41

## 2021-03-16 RX ADMIN — Medication 2000 UNITS: at 09:27

## 2021-03-16 RX ADMIN — ASPIRIN 81 MG: 81 TABLET, COATED ORAL at 09:28

## 2021-03-16 RX ADMIN — INSULIN LISPRO 2 UNITS: 100 INJECTION, SOLUTION INTRAVENOUS; SUBCUTANEOUS at 12:01

## 2021-03-16 RX ADMIN — INSULIN LISPRO 2 UNITS: 100 INJECTION, SOLUTION INTRAVENOUS; SUBCUTANEOUS at 20:19

## 2021-03-16 RX ADMIN — ALPRAZOLAM 2 MG: 1 TABLET ORAL at 19:47

## 2021-03-16 RX ADMIN — ENOXAPARIN SODIUM 70 MG: 80 INJECTION SUBCUTANEOUS at 19:47

## 2021-03-16 RX ADMIN — SPIRONOLACTONE 25 MG: 25 TABLET ORAL at 09:27

## 2021-03-16 RX ADMIN — SACUBITRIL AND VALSARTAN 1 TABLET: 24; 26 TABLET, FILM COATED ORAL at 09:28

## 2021-03-16 RX ADMIN — ACETAMINOPHEN 650 MG: 325 TABLET ORAL at 22:37

## 2021-03-16 RX ADMIN — SODIUM CHLORIDE, PRESERVATIVE FREE 10 ML: 5 INJECTION INTRAVENOUS at 17:21

## 2021-03-16 RX ADMIN — ALPRAZOLAM 2 MG: 1 TABLET ORAL at 09:28

## 2021-03-16 RX ADMIN — MAGNESIUM GLUCONATE 500 MG ORAL TABLET 400 MG: 500 TABLET ORAL at 09:28

## 2021-03-16 RX ADMIN — ESCITALOPRAM 10 MG: 10 TABLET, FILM COATED ORAL at 09:28

## 2021-03-16 RX ADMIN — Medication 10 ML: at 09:28

## 2021-03-16 RX ADMIN — APIXABAN 10 MG: 5 TABLET, FILM COATED ORAL at 09:27

## 2021-03-16 RX ADMIN — CETIRIZINE HYDROCHLORIDE 5 MG: 10 TABLET, FILM COATED ORAL at 09:28

## 2021-03-16 RX ADMIN — Medication 10 ML: at 19:41

## 2021-03-16 RX ADMIN — MAGNESIUM SULFATE HEPTAHYDRATE 1000 MG: 1 INJECTION, SOLUTION INTRAVENOUS at 17:22

## 2021-03-16 RX ADMIN — ACETAMINOPHEN 650 MG: 325 TABLET ORAL at 00:17

## 2021-03-16 RX ADMIN — PANTOPRAZOLE SODIUM 40 MG: 40 TABLET, DELAYED RELEASE ORAL at 09:27

## 2021-03-16 RX ADMIN — ATORVASTATIN CALCIUM 80 MG: 40 TABLET, FILM COATED ORAL at 19:41

## 2021-03-16 RX ADMIN — FUROSEMIDE 20 MG: 20 TABLET ORAL at 09:27

## 2021-03-16 RX ADMIN — METOPROLOL SUCCINATE 50 MG: 50 TABLET, EXTENDED RELEASE ORAL at 09:27

## 2021-03-16 ASSESSMENT — PAIN SCALES - GENERAL: PAINLEVEL_OUTOF10: 8

## 2021-03-16 NOTE — PROGRESS NOTES
Inpatient Cardiology RECONSULT     PATIENT IS BEING FOLLOWED FOR: Patient now agreeable to [facility]. HFrEF. Newly diagnosed cardiomyopathy    Swati Valerio is a 70 y. o. female known to Dr Pollo Marcelo seen in consult 3/10/2021    PMH: HTN, HLD, CAD s/p BMS to RCA, GERD, T2DM insulin requiring, intolerance to statins, and ex smoker. Seen by Dr Jewels Noriega 3/2/2021 for NSVT ( hypomagnesia/Mag 1.3)--> Toprol and Cozaar initiated. Left AMA    [facility]-ED 3/9/2021 at 1420 with confusion, possibly not taking medications as prescribed. She is a poor historian. BP upon arrival 186/100  Sinus tachycardia. CTA + bilateral PE's. Na 142, K+ 4.8-->4.1, Bun/Cr 19/0.9, magnesium 1.1-->1.4, p-BNP 47633, troponin 0.03 x 3, CPK 97, CKMB 1.7, Albumin 3.3, AlT 70-->56, AST 48-->36, pro-calcitonin 0.10, WBC 12.2, Hgb 11.7, Plt 233, D-dimer 2372, INR 1.5, UA negative     SUBJECTIVE: Denies SOB or CP  OBJECTIVE: No apparent distress     ROS:  Consist: Denies fevers, chills or night sweats  Heart: Denies chest pain, palpitations, lightheadedness, dizziness or syncope  Lungs: Denies SOB, cough, wheezing, orthopnea or PND  GI: Denies abdominal pain, vomiting or diarrhea    PHYSICAL EXAM:   BP (!) 149/76   Pulse 89   Temp 97.6 °F (36.4 °C) (Temporal)   Resp 16   Ht 5' 1\" (1.549 m)   Wt 152 lb 14.4 oz (69.4 kg)   SpO2 94%   BMI 28.89 kg/m²    B/P Range last 24 hours: Systolic (76FNV), DGI:318 , Min:147 , LMK:749    Diastolic (17HHO), AVM:88, Min:76, Max:81  24 hour BP range: 147//76 HR 's SR ST, afebrile, 2 liters NC O2 saturation 93-94%  CONST: Well developed,  female who appears stated age. Awake, alert and cooperative. No apparent distress  HEENT:   Head- Normocephalic, atraumatic   Eyes- Conjunctivae pink, anicteric  Throat- Oral mucosa pink and moist  Neck-  No stridor, trachea midline, no jugular venous distention. No carotid bruit  CHEST: Chest symmetrical and non-tender to palpation.  No accessory muscle use or intercostal retractions  RESPIRATORY:  Lung sounds - clear throughout fields   CARDIOVASCULAR:     Heart Ausculation- Regular rate and rhythm, no murmur heard. PV: Trace lower extremity edema. No varicosities. Pedal pulses palpable, no clubbing or cyanosis   ABDOMEN: Soft, non-tender to light palpation. Bowel sounds present. No palpable masses no organomegaly; no abdominal bruit  MS: Good muscle strength and tone. No atrophy or abnormal movements. : Deferred  SKIN: Warm and dry no statis dermatitis or ulcers   NEURO / PSYCH: Oriented to person, place and time. Speech clear and appropriate. Follows all commands.  Pleasant affect       Intake/Output Summary (Last 24 hours) at 3/16/2021 1500  Last data filed at 3/16/2021 1429  Gross per 24 hour   Intake 130 ml   Output 400 ml   Net -270 ml       Weight:   Wt Readings from Last 3 Encounters:   03/15/21 152 lb 14.4 oz (69.4 kg)   03/01/21 160 lb (72.6 kg)   02/22/21 167 lb (75.8 kg)     Current Inpatient Medications:   magnesium oxide  400 mg Oral Daily    metoprolol succinate  50 mg Oral BID    furosemide  20 mg Oral BID    apixaban  10 mg Oral BID    influenza virus vaccine  0.5 mL Intramuscular Prior to discharge    sacubitril-valsartan  1 tablet Oral BID    spironolactone  25 mg Oral Daily    vitamin D  2,000 Units Oral Daily    escitalopram  10 mg Oral Daily    cetirizine  5 mg Oral Daily    pantoprazole  40 mg Oral Daily    insulin lispro  0-10 Units Subcutaneous 4x Daily AC & HS    sodium chloride flush  10 mL Intravenous 2 times per day    atorvastatin  80 mg Oral Nightly    aspirin  81 mg Oral Daily       IV Infusions (if any):   dextrose         DIAGNOSTIC/ LABORATORY DATA:  Labs:   CBC:   Recent Labs     03/15/21  0615 03/16/21  0621   WBC 9.2 10.3   HGB 11.9 12.4   HCT 40.9 41.4    400     BMP:   Recent Labs     03/15/21  0615 03/16/21  0621    140   K 5.4*  5.4* 4.8  4.8   CO2 33* 33*   BUN 19 19   CREATININE 0.7 0.8 LABGLOM >60 >60   CALCIUM 8.9 9.2     Mag:   Recent Labs     03/15/21  0615 03/16/21  0621   MG 1.9 1.6     HgA1c:   Lab Results   Component Value Date    LABA1C 6.5 (H) 03/09/2021     FASTING LIPID PANEL:  Lab Results   Component Value Date    CHOL 108 03/10/2021    HDL 33 03/10/2021    LDLCALC 55 03/10/2021    TRIG 99 03/10/2021     LIVER PROFILE:  Recent Labs     03/15/21  0615 03/16/21 0621   AST 75* 56*   ALT 52* 51*   LABALBU 2.5* 3.1*     CXR 3/9/2021: CHF.  Superimposed pneumonia cannot be excluded     CT Head 3/9/2021: No acute intracranial abnormality     CTA Chest 3/10/2021: Right lower lobe segmental pulmonary embolus.  Left lower lobe subsegmental pulmonary embolus. There is nonspecific airspace disease in the right lower lobe. There is a peripheral masslike density in the left lower lobe, in the   distribution of the subsegmental embolus.  Well a mass is not excluded, this   more likely relates to small pulmonary infarct.  Suggest short-term follow-up   to confirm resolution. Bilateral small to moderate pleural effusions.     BLE Dopplers 3/10/2021: Within the visualized vessels there is no evidence for deep venous thrombosis    Telemetry:      TTE 2/21/2021 Dr Sahni Headings: Mildly dilated left ventricle. LVDD 5.5. LVMI 92 l/min/m2. EF estimated at 25-30%. NWM.  Normal left ventricle wall thickness. Stage II DD. Moderately dilated LA. Normal right ventricular size. Right ventricle global systolic function is moderately reduced . TAPSE 12 mm  Moderate MR with centrally directed jet. No hemodynamically significant aortic stenosis is present. Moderate AI. Mild to moderate TR. RVSP is 55 mmHg. Pulmonary hypertension is moderate . Trace pericardial effusion. No previous echo for comparison. ASSESSMENT:   1. Newly diagnosed CMP with severe LV systolic dysfunction (with EF 25-30% on TTE 3/2/2021) possible ischemic  2. Known CAD Hx PCI to RCA in 2007  3. Acute HFrEF  4.  Bilateral Pulmonary emboli on Eliquis  5. Hypoxic respiratory failure  6. HTN  7. VHD with moderate MR and mild to moderate TR and moderate AI on TTE 3/2/2021  8. NSVT in context hypomagnesemia    9. Hypomagnesemia: supplemented with low normal on repeat  10. Elevated LFT's: trending down  11. Hypoalbuminemia    12. HLD with intolerance to statin's  13. T2DM insulin requiring with neuropathy   14. JAXSON not complaint with C-pap  15. Anxiety  16. GERD    PLAN:  1. Stop Eliquis and start Lovenox (in preparation for LHC)  2. Change Lasix to 40 mg PO daily   3. Increase Entrestro  4. Increase Toprol XL and change ot daily dosing  5. Supplement magnesium  6. Tentative LHC on Friday 3/19/2021 AUC HF indication: 7  7.  Will follow    Electronically signed by Tereza Yeboah MD on 3/16/2021 at 3:00 PM

## 2021-03-16 NOTE — PROGRESS NOTES
Physician Progress Note      Morisadine   CSN #:                  [id number]  :                       1945  ADMIT DATE:       3/9/2021 2:22 PM  [address] [place] DATE:  RESPONDING  PROVIDER #:        Mira Loya MD          QUERY TEXT:    Pt admitted with acute heart failure. Pt noted to also have hypertension,   valvular heart disease and newly diagnosed cardiomyopathy. If possible, please   document in progress notes and discharge summary the etiology of CHF, if able   to be determined. The medical record reflects the following:  Risk Factors: CHF  Clinical Indicators: Per H&P Acute combined systolic and diastolic CHF,   patient needs ischemic work-up; Per Cardiology Consult Newly diagnosed   cardiomyopathy.   She is clinically hypervolemic at this time, pt agrees to   defibrillator vest at discharge;  Treatment: Cardiology consult, BP control, IV diuresis, Defibrillator vest at   discharge    Sarah MARTINEZ, RN, CCDS  Clinical Documentation Improvement  Options provided:  -- CHF due to Hypertensive Heart Disease  -- CHF due to Hypertensive Heart Disease and CAD  -- CHF not due to Hypertension but due to CAD  -- CHF due to Hypertensive Heart Disease and ICMP  -- CHF not due to Hypertension but due to ICMP  -- CHF due to Hypertensive Heart Disease and Valvular Heart Disease  -- CHF not due to Hypertension but due to valvular heart disease  -- Other - I will add my own diagnosis  -- Disagree - Not applicable / Not valid  -- Disagree - Clinically unable to determine / Unknown  -- Refer to Clinical Documentation Reviewer    PROVIDER RESPONSE TEXT:    Not following patient    Query created by: Edie Rice on 5458 5:73 PM      Electronically signed by:  Mira Loya MD 3/16/2021 10:28 AM

## 2021-03-16 NOTE — PROGRESS NOTES
[facility]  Department of Internal Medicine  Division of Pulmonary, Critical Care and Sleep Medicine  Progress Note  Nicolette Fry, Jhonny Forrester MD, [facility]    Patient: Amarilys Mars  MRN: [id number]  : 1945    Encounter Time: 11:50 AM     Date of Admission: 3/9/2021  2:22 PM    Primary Care Physician: Jessica Bucio PA-C    Reason for Consultation: Pulmonary embolism, bilateral pleural effusions     SUBJECTIVE:    - 3.9 liters, still  Still on oxygen 2 liters  Declining cath and discharge      OBJECTIVE:     PHYSICAL EXAM:   VITALS:   Vitals:    03/15/21 2018 03/16/21 0908 21 0914 21 0959   BP: (!) 147/79 (!) 149/76     Pulse: 108 89     Resp: 18 16     Temp: 98.4 °F (36.9 °C) 97.6 °F (36.4 °C)     TempSrc: Temporal Temporal     SpO2: 94% (!) 86% 94%    Weight:       Height:    5' 1\" (1.549 m)        Intake/Output Summary (Last 24 hours) at 3/16/2021 1150  Last data filed at 3/16/2021 1039  Gross per 24 hour   Intake 10 ml   Output 350 ml   Net -340 ml        CONSTITUTIONAL:   A&O x 3, NAD  SKIN:     No rash, No suspicious lesions or skin discoloration  HEENT:     EOMI, MMM, No thrush  NECK:    No bruits, No JVP appreciated  CV:      Sinus,  No murmur, No rubs, No gallops  PULMONARY:   Couse BS, bilateral crackles noted  ABDOMEN:     Soft, non-tender. BS normal. No R/R/G  EXT:    No deformities . No clubbing.       + lower extremity edema, No venous stasis  PULSE:   Appears equal and palpable.   PSYCHIATRIC:  Seems appropriate, No acute psycosis  MS:    No fractures, No gross weakness  NEUROLOGIC:   The clinical assessment is non-focal     DATA: IMAGING & TESTING:     LABORATORY TESTS:    CBC with Differential:    Lab Results   Component Value Date    WBC 10.3 2021    RBC 4.16 2021    HGB 12.4 2021    HCT 41.4 2021     2021    MCV 99.5 2021    MCH 29.8 2021    MCHC 30.0 2021 RDW 14.3 03/16/2021    SEGSPCT 62 08/30/2012    BLASTSPCT 0.9 03/10/2021    METASPCT 0.9 03/09/2021    LYMPHOPCT 6.1 03/10/2021    PROMYELOPCT 0.9 03/10/2021    MONOPCT 7.0 03/10/2021    BASOPCT 0.2 03/10/2021    MONOSABS 0.85 03/10/2021    LYMPHSABS 0.73 03/10/2021    EOSABS 0.21 03/10/2021    BASOSABS 0.00 03/10/2021     BMP:    Lab Results   Component Value Date     03/16/2021    K 4.8 03/16/2021    K 4.8 03/16/2021    CL 99 03/16/2021    CO2 33 03/16/2021    BUN 19 03/16/2021    LABALBU 3.1 03/16/2021    CREATININE 0.8 03/16/2021    CALCIUM 9.2 03/16/2021    GFRAA >60 03/16/2021    LABGLOM >60 03/16/2021    GLUCOSE 119 03/16/2021        PRO-BNP:   Lab Results   Component Value Date    PROBNP 16,843 (H) 03/15/2021    PROBNP 13,167 (H) 03/12/2021      ABGs:   Lab Results   Component Value Date    PH 7.413 03/09/2021    PO2 85.9 03/09/2021    PCO2 42.2 03/09/2021     Hemoglobin A1C: No components found for: HGBA1C    IMAGING:  Imaging tests were completed and reviewed and discussed radiology and care team involved and reveals   Echo Complete  Result Date: 3/2/2021  Findings   Left Ventricle  Mildly dilated left ventricle. Ejection fraction is visually estimated at 25-30%. Overall ejection fraction severely decreased . No regional wall motion abnormalities seen. Normal left ventricle wall thickness. Stage II diastolic dysfunction. Right Ventricle  Normal right ventricular size. Right ventricle global systolic function is moderately reduced . TAPSE 12  mm. Left Atrium  The left atrium is moderately dilated. Right Atrium  Mildly enlarged right atrium size. Mitral Valve  Mild mitral annular calcification. No evidence of mitral valve stenosis. Increased E point septal separation noted,suggesting decreased LV cardiac  output. Moderate mitral regurgitation with centrally directed jet. Tricuspid Valve  The tricuspid valve appears structurally normal.  Mild to moderate tricuspid regurgitation.   RVSP is 55 mmHg. Pulmonary hypertension is moderate . Aortic Valve  The aortic valve appears mildly sclerotic. The aortic valve is trileaflet. No hemodynamically significant aortic stenosis is present. Moderate aortic regurgitation is noted. Pulmonic Valve  Pulmonic valve is structurally normal.  Physiologic and/or trace pulmonic regurgitation present. No evidence of pulmonic valve stenosis. Pericardial Effusion  Trace pericardial effusion. Pleural Effusion  No evidence of pleural effusion. Aorta  Normal aortic root and ascending aorta. Miscellaneous  Dilated Inferior Vena Cava. Inferior Vena Cava, normal respiratory variation. Conclusions   Summary  Mildly dilated left ventricle. Ejection fraction is visually estimated at 25-30%. Overall ejection fraction severely decreased . No regional wall motion abnormalities seen. Normal left ventricle wall thickness. Stage II diastolic dysfunction. The left atrium is moderately dilated. Normal right ventricular size. Right ventricle global systolic function is moderately reduced . TAPSE 12  mm  Moderate mitral regurgitation with centrally directed jet. No hemodynamically significant aortic stenosis is present. Moderate aortic regurgitation is noted. Mild to moderate tricuspid regurgitation. RVSP is 55 mmHg. Pulmonary hypertension is moderate . Trace pericardial effusion. No previous echo for comparison. Cta Chest W Contrast    Result Date: 3/10/2021  EXAMINATION: CTA OF THE CHEST 3/10/2021 4:42 am TECHNIQUE: CTA of the chest was performed after the administration of intravenous contrast.  Multiplanar reformatted images are provided for review. MIP images are provided for review. Dose modulation, iterative reconstruction, and/or weight based adjustment of the mA/kV was utilized to reduce the radiation dose to as low as reasonably achievable. COMPARISON: None.  HISTORY: ORDERING SYSTEM PROVIDED HISTORY: PE TECHNOLOGIST PROVIDED HISTORY: Reason for exam:->PE What reading provider will be dictating this exam?->CRC FINDINGS: Pulmonary Arteries: There is a subsegmental pulmonary embolus in the right lower lobe seen on images 174-186. There is a segmental pulmonary embolus in the right lower lobe best seen on coronal series 9, image 97. Mediastinum: There are coronary artery calcifications. There is also calcified plaque throughout thoracic aorta. There is no abnormal mediastinal or hilar mass seen. Lungs/pleura: There are bilateral small to moderate pleural effusions. There is infiltrate and/or atelectasis in the left lower lobe. There is some dependent atelectasis in the right lower lobe. There is a peripheral masslike density in the right lower lobe at the peripheral lung base measuring 2.1 x 1.5 cm. This is in the distribution of the small subsegmental embolus and may relate to a small pulmonary infarct. Upper Abdomen: Limited images of the upper abdomen are unremarkable. Soft Tissues/Bones: No acute bone or soft tissue abnormality. Right lower lobe segmental pulmonary embolus. Left lower lobe subsegmental pulmonary embolus. There is nonspecific airspace disease in the right lower lobe. There is a peripheral masslike density in the left lower lobe, in the distribution of the subsegmental embolus. Well a mass is not excluded, this more likely relates to small pulmonary infarct. Suggest short-term follow-up to confirm resolution. Bilateral small to moderate pleural effusions. Findings were sent to Radiology Results [address] at 5:18 a.m. on 03/10/2021 to be communicated to a licensed caregiver. Assessment:   1. Intermediate-Low Risk Pulmonary Embolism, likely provoked in setting of decreased mobility and CHF  2. Acute Decompensated HFrEF (25%, G2DD)  3. Bilateral Pleural Effusions, likely cardiogenic  4. Acute Hypoxic Respiratory Failure  5.  CAD/MI, s/p PCI with bare metal stent to RCA in 2007  6. Hypertension  7. Hyperlipidemia  8. Insulin-Dependent Diabetes Mellitus Type 2  9. H/o Tobacco Abuse  10. Anxiety Disorder      Plan:     1. Continue with Eliquis 10mg BID x 7 days in AM, followed by 5mg BID for total of 3 months  2. Agree with continued diuresis change to po  3. Monitor electrolytes with diuresis, replace prn  4. Ischemic workup and CHF medication titration per cardiology  5. Wean off oxygen if able  6. Will follow  7.  Home care      Johana Brambila DO  3/16/2021  11:50 AM

## 2021-03-16 NOTE — PROGRESS NOTES
Comprehensive Nutrition Assessment    Type and Reason for Visit:  Initial(LOS)    Nutrition Recommendations/Plan: Recommend and start Glucerna supplement daily to help meet nutritional needs. Nutrition Assessment:  Patients po intake has been a little sporadic, averaging ~50% of meals served ; adm w/ pulmonary embolism and bilateral pleural effusions ; hx of DM and CHF ; will start ONS    Malnutrition Assessment:  Malnutrition Status: At risk for malnutrition (Comment)    Context:  Acute Illness     Findings of the 6 clinical characteristics of malnutrition:  Energy Intake:  1 - 75% or less of estimated energy requirements for 7 or more days  Weight Loss:  Unable to assess(d/t possible fluid shifts ; hx of CHF)     Body Fat Loss:  Unable to assess     Muscle Mass Loss:  Unable to assess    Fluid Accumulation:  No significant fluid accumulation     Strength:  Not Performed    Estimated Daily Nutrient Needs:  Energy (kcal):  4075-6021 (REE 1133 x 1.1 SF); Weight Used for Energy Requirements:  Current     Protein (g):  55-70 (1.2-1.4g/kg IBW); Weight Used for Protein Requirements:  Ideal        Fluid (ml/day):  3058-9587; Method Used for Fluid Requirements:  1 ml/kcal      Nutrition Related Findings:  -I&Os (-3.9 L), no edema, active BS, rounded abd, missing teeth, fair appetite      Wounds:  None       Current Nutrition Therapies:    DIET CARDIAC; Low Sodium (2 GM)    Anthropometric Measures:  · Height: 5' 1\" (154.9 cm)  · Current Body Weight: 152 lb (68.9 kg)(3/15, standing scale)   · Admission Body Weight: 160 lb (72.6 kg)(3/9, no method)    · Usual Body Weight: 160 lb (72.6 kg)(2/27/21, standing scale ; EMR also shows past weight of 167# no method on 9/15/20)     · Ideal Body Weight: 105 lbs; % Ideal Body Weight 144.8 %   · BMI: 28.7  · BMI Categories: Overweight (BMI 25.0-29. 9)       Nutrition Diagnosis:   · Inadequate oral intake related to inadequate protein-energy intake(2/2 SOB) as evidenced by poor intake prior to admission, intake 26-50%, intake 51-75%      Nutrition Interventions:   Food and/or Nutrient Delivery:  Continue Current Diet, Start Oral Nutrition Supplement  Nutrition Education/Counseling:  Education completed   Coordination of Nutrition Care:  Continue to monitor while inpatient    Goals:  Patient will consume ~75% of meals served       Nutrition Monitoring and Evaluation:   Behavioral-Environmental Outcomes:  Beliefs and Attitutes   Food/Nutrient Intake Outcomes:  Food and Nutrient Intake, Supplement Intake  Physical Signs/Symptoms Outcomes:  Biochemical Data, Chewing or Swallowing, GI Status, Fluid Status or Edema, Hemodynamic Status, Meal Time Behavior, Nutrition Focused Physical Findings, Weight, Skin     Discharge Planning:     Too soon to determine     Electronically signed by Tyrone Snyder RD, LD on 3/16/21 at 10:05 AM EDT    Contact: [phone]

## 2021-03-16 NOTE — PROGRESS NOTES
versus CHF. CT scan of the head is negative. EKG shows sinus tachycardia rate of 111 with Q waves in V1 through V3. Echo in February showed EF of 25 to 30% with grade 2 diastolic dysfunction, no wall motion abnormalities, moderate MR, mild to moderate TR, moderate AR and moderate pulmonary hypertension with pressure of 55 mmHg. Echo in  showed EF of 60% with no significant valvular heart disease. Hospital course  Consultations with cardiology and pulmonology services  Patient started on therapeutic Lovenox and transition to oral Eliquis  Patient had continued IV diuresis with plans of changing to oral Lasix. Patient declined cardiac catheterization initially. She was unsure she if she wants to wear LifeVest at discharge. Lengthy conversations were had. Patient was overwhelmed. Allergic medical therapy was initiated while in house with [facility]. On 3/16 patient agreed for cardiac catheterization. Cardiology reconsulted. SUBJECTIVE:    Patient seen and examined  Records reviewed. Sitting up at bedside wearing make-up. No current complaints of shortness of breath  Tangential conversations  Stable overnight. No other overnight issues reported. Temp (24hrs), Av.1 °F (36.7 °C), Min:97.6 °F (36.4 °C), Max:98.4 °F (36.9 °C)    DIET: DIET CARDIAC; Low Sodium (2 GM)  Dietary Nutrition Supplements: Diabetic Oral Supplement  CODE: Full Code    Intake/Output Summary (Last 24 hours) at 3/16/2021 1403  Last data filed at 3/16/2021 1333  Gross per 24 hour   Intake 10 ml   Output 550 ml   Net -540 ml       OBJECTIVE:    BP (!) 149/76   Pulse 89   Temp 97.6 °F (36.4 °C) (Temporal)   Resp 16   Ht 5' 1\" (1.549 m)   Wt 152 lb 14.4 oz (69.4 kg)   SpO2 94%   BMI 28.89 kg/m²     General appearance: No apparent distress, appears stated age and cooperative. HEENT:  Conjunctivae/corneas clear. Neck: Supple. No jugular venous distention. Respiratory: Bilateral crackles at bases.   No respiratory distress. Cardiovascular: Regular rate rhythm, normal S1-S2  Abdomen: Soft, nontender, nondistended  Musculoskeletal: No clubbing, cyanosis, 2+ bilateral lower extremity edema. Brisk capillary refill. Skin:  No rashes  on visible skin  Neurologic: awake, alert and following commands     ASSESSMENT:    Acute hypoxic respiratory failure  Acute heart failure with reduced ejection fraction  Newly diagnosed cardiomyopathy-unknown type  Coronary artery disease status post PCI  Bilateral pulmonary embolism on Eliquis  Bilateral pleural effusion likely cardiogenic  Hypertension  Diabetes mellitus with neuropathy, with an A1c of 6.5, well controlled  Sinus tachycardia  Anxiety disorder  History of tobacco abuse       PLAN:    Continue Eliquis  Continue diuresis with p.o. Lasix  Reconsult cardiology for evaluation of cardiac catheterization. Patient agreeable for procedure now.   Check ambulatory pulse ox  Home health is highly recommended at the time of discharge  Hold discharge until further recommendations from cardiology    DISPOSITION: Home pending further recommendations    Medications:  REVIEWED DAILY    Infusion Medications    dextrose       Scheduled Medications    magnesium oxide  400 mg Oral Daily    metoprolol succinate  50 mg Oral BID    furosemide  20 mg Oral BID    apixaban  10 mg Oral BID    influenza virus vaccine  0.5 mL Intramuscular Prior to discharge    sacubitril-valsartan  1 tablet Oral BID    spironolactone  25 mg Oral Daily    vitamin D  2,000 Units Oral Daily    escitalopram  10 mg Oral Daily    cetirizine  5 mg Oral Daily    pantoprazole  40 mg Oral Daily    insulin lispro  0-10 Units Subcutaneous 4x Daily AC & HS    sodium chloride flush  10 mL Intravenous 2 times per day    atorvastatin  80 mg Oral Nightly    aspirin  81 mg Oral Daily     PRN Meds: ALPRAZolam, sodium chloride flush, meclizine, glucose, dextrose, glucagon (rDNA), dextrose, promethazine **OR** ondansetron, polyethylene glycol, acetaminophen **OR** acetaminophen, nitroGLYCERIN    Labs:     Recent Labs     03/14/21  1326 03/15/21  0615 03/16/21  0621   WBC 9.3 9.2 10.3   HGB 11.3* 11.9 12.4   HCT 38.1 40.9 41.4    267 400       Recent Labs     03/14/21  1326 03/15/21  0615 03/16/21  0621    136 140   K 4.2 5.4*  5.4* 4.8  4.8   CL 97* 96* 99   CO2 34* 33* 33*   BUN 17 19 19   CREATININE 0.7 0.7 0.8   CALCIUM 8.5* 8.9 9.2       Recent Labs     03/14/21  1326 03/15/21  0615 03/16/21  0621   PROT 6.1* 5.9* 6.8   ALKPHOS 103 106* 106*   ALT 41* 52* 51*   AST 41* 75* 56*   BILITOT 0.7 0.7 0.8       No results for input(s): INR in the last 72 hours. No results for input(s): Joshua Amy in the last 72 hours. Chronic labs:    Lab Results   Component Value Date    CHOL 108 03/10/2021    TRIG 99 03/10/2021    HDL 33 03/10/2021    LDLCALC 55 03/10/2021    TSH 2.070 03/09/2021    INR 1.5 03/09/2021    LABA1C 6.5 (H) 03/09/2021       Radiology: REVIEWED DAILY    +++++++++++++++++++++++++++++++++++++++++++++++++  Dominga Walls Physician - [address], [address]  +++++++++++++++++++++++++++++++++++++++++++++++++  NOTE: This report was transcribed using voice recognition software. Every effort was made to ensure accuracy; however, inadvertent computerized transcription errors may be present.

## 2021-03-17 LAB
ALBUMIN SERPL-MCNC: 2.7 G/DL (ref 3.5–5.2)
ALP BLD-CCNC: 93 U/L (ref 35–104)
ALT SERPL-CCNC: 45 U/L (ref 0–32)
ANION GAP SERPL CALCULATED.3IONS-SCNC: 9 MMOL/L (ref 7–16)
AST SERPL-CCNC: 45 U/L (ref 0–31)
BILIRUB SERPL-MCNC: 0.6 MG/DL (ref 0–1.2)
BUN BLDV-MCNC: 18 MG/DL (ref 8–23)
CALCIUM SERPL-MCNC: 9 MG/DL (ref 8.6–10.2)
CHLORIDE BLD-SCNC: 101 MMOL/L (ref 98–107)
CO2: 31 MMOL/L (ref 22–29)
CREAT SERPL-MCNC: 0.7 MG/DL (ref 0.5–1)
GFR AFRICAN AMERICAN: >60
GFR NON-AFRICAN AMERICAN: >60 ML/MIN/1.73
GLUCOSE BLD-MCNC: 101 MG/DL (ref 74–99)
MAGNESIUM: 1.7 MG/DL (ref 1.6–2.6)
METER GLUCOSE: 100 MG/DL (ref 74–99)
METER GLUCOSE: 130 MG/DL (ref 74–99)
METER GLUCOSE: 131 MG/DL (ref 74–99)
METER GLUCOSE: 172 MG/DL (ref 74–99)
POTASSIUM REFLEX MAGNESIUM: 4.8 MMOL/L (ref 3.5–5)
POTASSIUM SERPL-SCNC: 4.8 MMOL/L (ref 3.5–5)
SODIUM BLD-SCNC: 141 MMOL/L (ref 132–146)
TOTAL PROTEIN: 5.9 G/DL (ref 6.4–8.3)

## 2021-03-17 PROCEDURE — 83735 ASSAY OF MAGNESIUM: CPT

## 2021-03-17 PROCEDURE — 94060 EVALUATION OF WHEEZING: CPT

## 2021-03-17 PROCEDURE — 36415 COLL VENOUS BLD VENIPUNCTURE: CPT

## 2021-03-17 PROCEDURE — 2580000003 HC RX 258: Performed by: FAMILY MEDICINE

## 2021-03-17 PROCEDURE — 80048 BASIC METABOLIC PNL TOTAL CA: CPT

## 2021-03-17 PROCEDURE — 6370000000 HC RX 637 (ALT 250 FOR IP): Performed by: INTERNAL MEDICINE

## 2021-03-17 PROCEDURE — 80053 COMPREHEN METABOLIC PANEL: CPT

## 2021-03-17 PROCEDURE — 6370000000 HC RX 637 (ALT 250 FOR IP): Performed by: FAMILY MEDICINE

## 2021-03-17 PROCEDURE — 2060000000 HC ICU INTERMEDIATE R&B

## 2021-03-17 PROCEDURE — 94729 DIFFUSING CAPACITY: CPT

## 2021-03-17 PROCEDURE — 99222 1ST HOSP IP/OBS MODERATE 55: CPT | Performed by: INTERNAL MEDICINE

## 2021-03-17 PROCEDURE — 94726 PLETHYSMOGRAPHY LUNG VOLUMES: CPT

## 2021-03-17 PROCEDURE — 82962 GLUCOSE BLOOD TEST: CPT

## 2021-03-17 PROCEDURE — 6360000002 HC RX W HCPCS: Performed by: INTERNAL MEDICINE

## 2021-03-17 PROCEDURE — 99232 SBSQ HOSP IP/OBS MODERATE 35: CPT | Performed by: INTERNAL MEDICINE

## 2021-03-17 RX ADMIN — SACUBITRIL AND VALSARTAN 1 TABLET: 49; 51 TABLET, FILM COATED ORAL at 21:28

## 2021-03-17 RX ADMIN — ALPRAZOLAM 2 MG: 1 TABLET ORAL at 10:24

## 2021-03-17 RX ADMIN — SACUBITRIL AND VALSARTAN 1 TABLET: 49; 51 TABLET, FILM COATED ORAL at 10:19

## 2021-03-17 RX ADMIN — FUROSEMIDE 40 MG: 40 TABLET ORAL at 10:19

## 2021-03-17 RX ADMIN — Medication 10 ML: at 21:31

## 2021-03-17 RX ADMIN — MAGNESIUM GLUCONATE 500 MG ORAL TABLET 400 MG: 500 TABLET ORAL at 10:19

## 2021-03-17 RX ADMIN — CETIRIZINE HYDROCHLORIDE 5 MG: 10 TABLET, FILM COATED ORAL at 10:19

## 2021-03-17 RX ADMIN — Medication 10 ML: at 10:24

## 2021-03-17 RX ADMIN — ASPIRIN 81 MG: 81 TABLET, COATED ORAL at 10:19

## 2021-03-17 RX ADMIN — INSULIN LISPRO 2 UNITS: 100 INJECTION, SOLUTION INTRAVENOUS; SUBCUTANEOUS at 11:45

## 2021-03-17 RX ADMIN — SPIRONOLACTONE 25 MG: 25 TABLET ORAL at 10:19

## 2021-03-17 RX ADMIN — Medication 2000 UNITS: at 10:18

## 2021-03-17 RX ADMIN — METOPROLOL SUCCINATE 200 MG: 100 TABLET, EXTENDED RELEASE ORAL at 10:19

## 2021-03-17 RX ADMIN — ENOXAPARIN SODIUM 70 MG: 80 INJECTION SUBCUTANEOUS at 10:19

## 2021-03-17 RX ADMIN — ATORVASTATIN CALCIUM 80 MG: 40 TABLET, FILM COATED ORAL at 21:28

## 2021-03-17 RX ADMIN — ESCITALOPRAM 10 MG: 10 TABLET, FILM COATED ORAL at 10:19

## 2021-03-17 RX ADMIN — PANTOPRAZOLE SODIUM 40 MG: 40 TABLET, DELAYED RELEASE ORAL at 10:19

## 2021-03-17 RX ADMIN — ENOXAPARIN SODIUM 70 MG: 80 INJECTION SUBCUTANEOUS at 21:28

## 2021-03-17 RX ADMIN — ALPRAZOLAM 2 MG: 1 TABLET ORAL at 18:12

## 2021-03-17 ASSESSMENT — PAIN SCALES - GENERAL
PAINLEVEL_OUTOF10: 0
PAINLEVEL_OUTOF10: 0

## 2021-03-17 NOTE — PROGRESS NOTES
Hospitalist Progress Note      SYNOPSIS:   70 y.o. female who presented to [facility] with past medical history of CHF, hyperlipidemia, coronary artery disease status post RCA stents in 2007, IBS, obesity, hypertension, hyperlipidemia, diabetes, sleep apnea and unsteady gait. Patient was at [facility] on 2/28/2021, she left AMA on 3/3/2021 because \"I did not like the care there\". She was admitted for vertigo, KIKI, dizziness and sphenoid sinusitis on CT scan. She had wax removed from her left ear. She was given Rocephin and ENT saw her for benign positional vertigo. While in the hospital, she had runs of V. tach, she had low magnesium that was replaced. Echo obtained during that admission showed that her EF had dropped from 60% 3 years ago to 25%. Patient was seen by cardiologist and stress test was planned however patient left before completion of work-up. She had some changes in mental status/behavioral dyscontrol for which they wanted her to see psychiatry, this did not happen as well. She was discharged on Ceftin, she does not know if she was taking it. Patient came back to the ER today with altered mental status described as mostly confusion, this is constant, moderate intensity, associated with shortness of breath. Patient answers \"I do not know\" to many questions. She does not know why she is in the hospital and she does not think she is sick. She is visibly dyspneic and states that this is normal for her. She reports falling 2 weeks ago and complains of pain in the left upper abdomen which is sharp and worse with movement. She has some leg swelling. No cough or fever. Denies any chest pain. Vital signs notable for heart rate of 129, respiratory rate 30, blood pressure 190/119.   Labs showed troponin 0 0.03, AST 48, ALT 70, alkaline phosphatase 194, urinalysis is negative besides crystals, glucose 143, INR 1.5, lactic acid level 2.1, white count 12.1, proBNP 33 238, chest x-ray shows pneumonia versus CHF. CT scan of the head is negative. EKG shows sinus tachycardia rate of 111 with Q waves in V1 through V3. Echo in February showed EF of 25 to 30% with grade 2 diastolic dysfunction, no wall motion abnormalities, moderate MR, mild to moderate TR, moderate AR and moderate pulmonary hypertension with pressure of 55 mmHg. Echo in  showed EF of 60% with no significant valvular heart disease. Hospital course  Consultations with cardiology and pulmonology services  Patient started on therapeutic Lovenox and transition to oral Eliquis  Patient had continued IV diuresis with plans of changing to oral Lasix. Patient declined cardiac catheterization initially. She was unsure she if she wants to wear LifeVest at discharge. Lengthy conversations were had. Patient was overwhelmed. Allergic medical therapy was initiated while in house with [facility]. On 3/16 patient agreed for cardiac catheterization. Cardiology reconsulted. [facility] planned Friday 3/19      SUBJECTIVE:    Patient seen and examined  Records reviewed. No new complaints  Wearing make up again this AM    Stable overnight. No other overnight issues reported. Temp (24hrs), Av.4 °F (36.3 °C), Min:97.4 °F (36.3 °C), Max:97.4 °F (36.3 °C)    DIET: DIET CARDIAC; Low Sodium (2 GM)  Dietary Nutrition Supplements: Diabetic Oral Supplement  CODE: Full Code    Intake/Output Summary (Last 24 hours) at 3/17/2021 0943  Last data filed at 3/17/2021 0046  Gross per 24 hour   Intake 120 ml   Output 500 ml   Net -380 ml       OBJECTIVE:    /76   Pulse 94   Temp 97.4 °F (36.3 °C) (Temporal)   Resp 20   Ht 5' 1\" (1.549 m)   Wt 152 lb 14.4 oz (69.4 kg)   SpO2 98%   BMI 28.89 kg/m²     General appearance: No apparent distress, appears stated age and cooperative. HEENT:  Conjunctivae/corneas clear. Neck: Supple. No jugular venous distention. Respiratory: Left base rales today, Right clear. No respiratory distress.   Cardiovascular: Regular rate rhythm, normal S1-S2  Abdomen: Soft, nontender, nondistended  Musculoskeletal: No clubbing, cyanosis, Trace bilateral lower extremity edema. Brisk capillary refill. Skin:  No rashes  on visible skin  Neurologic: awake, alert and following commands     ASSESSMENT:    Acute hypoxic respiratory failure  Acute heart failure with reduced ejection fraction  Newly diagnosed cardiomyopathy-unknown type  Coronary artery disease status post PCI  Bilateral pulmonary embolism on Eliquis  Bilateral pleural effusion likely cardiogenic  Hypertension  Diabetes mellitus with neuropathy, with an A1c of 6.5, well controlled  Sinus tachycardia  Anxiety disorder  History of tobacco abuse    Echo 2/27/21   Summary   Mildly dilated left ventricle. Ejection fraction is visually estimated at 25-30%. Overall ejection fraction severely decreased . No regional wall motion abnormalities seen. Normal left ventricle wall thickness. Stage II diastolic dysfunction. The left atrium is moderately dilated. Normal right ventricular size. Right ventricle global systolic function is moderately reduced . TAPSE 12   mm   Moderate mitral regurgitation with centrally directed jet. No hemodynamically significant aortic stenosis is present. Moderate aortic regurgitation is noted. Mild to moderate tricuspid regurgitation. RVSP is 55 mmHg. Pulmonary hypertension is moderate . Trace pericardial effusion. No previous echo for comparison. PLAN:  Echo as above  Converted Eliquis > Lovenox full dose  Continue diuresis with p.o.  Lasix 40, Entresto and BB  Cardio plans for Cath on 3/19 noted  Wean oxygen  Home health is highly recommended at the time of discharge    DISPOSITION: Home pending further recommendations    Medications:  REVIEWED DAILY    Infusion Medications    dextrose       Scheduled Medications    enoxaparin  1 mg/kg Subcutaneous BID    metoprolol succinate  200 mg Oral Daily    sacubitril-valsartan  1 tablet Oral BID    furosemide  40 mg Oral Daily    magnesium oxide  400 mg Oral Daily    [Held by provider] apixaban  10 mg Oral BID    influenza virus vaccine  0.5 mL Intramuscular Prior to discharge    spironolactone  25 mg Oral Daily    vitamin D  2,000 Units Oral Daily    escitalopram  10 mg Oral Daily    cetirizine  5 mg Oral Daily    pantoprazole  40 mg Oral Daily    insulin lispro  0-10 Units Subcutaneous 4x Daily AC & HS    sodium chloride flush  10 mL Intravenous 2 times per day    atorvastatin  80 mg Oral Nightly    aspirin  81 mg Oral Daily     PRN Meds: ALPRAZolam, meclizine, glucose, dextrose, glucagon (rDNA), dextrose, promethazine **OR** ondansetron, polyethylene glycol, acetaminophen **OR** acetaminophen, nitroGLYCERIN    Labs:     Recent Labs     03/14/21  1326 03/15/21  0615 03/16/21 0621   WBC 9.3 9.2 10.3   HGB 11.3* 11.9 12.4   HCT 38.1 40.9 41.4    267 400       Recent Labs     03/15/21  0615 03/16/21  0621 03/17/21  0748    140 141   K 5.4*  5.4* 4.8  4.8 4.8   CL 96* 99 101   CO2 33* 33* 31*   BUN 19 19 18   CREATININE 0.7 0.8 0.7   CALCIUM 8.9 9.2 9.0       Recent Labs     03/15/21  0615 03/16/21  0621 03/17/21  0748   PROT 5.9* 6.8 5.9*   ALKPHOS 106* 106* 93   ALT 52* 51* 45*   AST 75* 56* 45*   BILITOT 0.7 0.8 0.6       No results for input(s): INR in the last 72 hours. No results for input(s): Coralmani Brennerers in the last 72 hours. Chronic labs:    Lab Results   Component Value Date    CHOL 108 03/10/2021    TRIG 99 03/10/2021    HDL 33 03/10/2021    LDLCALC 55 03/10/2021    TSH 2.070 03/09/2021    INR 1.5 03/09/2021    LABA1C 6.5 (H) 03/09/2021       Radiology: REVIEWED DAILY    +++++++++++++++++++++++++++++++++++++++++++++++++  Ora Alpers Sound Physician - [address], [place]  +++++++++++++++++++++++++++++++++++++++++++++++++  NOTE: This report was transcribed using voice recognition software. Every effort was made to ensure accuracy; however, inadvertent computerized transcription errors may be present.

## 2021-03-17 NOTE — PROGRESS NOTES
[place]  Department of Internal Medicine  Division of Pulmonary, Critical Care and Sleep Medicine  Progress Note  Radha Lentz MD, [facility]    Patient: Chacorta Greenberg  MRN: [id number]  : 1945    Encounter Time: 10:39 AM     Date of Admission: 3/9/2021  2:22 PM    Primary Care Physician: Cash Granados PA-C    Reason for Consultation: Pulmonary embolism, bilateral pleural effusions     SUBJECTIVE:    - 4.3 liters, still  Still on oxygen 2 liters  [facility] for Friday      OBJECTIVE:     PHYSICAL EXAM:   VITALS:   Vitals:    21 1715 21 1717 21 1930 21 1015   BP: 138/82  134/76 125/74   Pulse: 97  94 92   Resp:    Temp: 97.4 °F (36.3 °C)  97.4 °F (36.3 °C) 97.7 °F (36.5 °C)   TempSrc: Temporal  Temporal Temporal   SpO2: (!) 88% 93% 98% 94%   Weight:       Height:            Intake/Output Summary (Last 24 hours) at 3/17/2021 1039  Last data filed at 3/17/2021 0046  Gross per 24 hour   Intake 120 ml   Output 500 ml   Net -380 ml        CONSTITUTIONAL:   A&O x 3, NAD  SKIN:     No rash, No suspicious lesions or skin discoloration  HEENT:     EOMI, MMM, No thrush  NECK:    No bruits, No JVP appreciated  CV:      Sinus,  No murmur, No rubs, No gallops  PULMONARY:   Couse BS, bilateral crackles noted  ABDOMEN:     Soft, non-tender. BS normal. No R/R/G  EXT:    No deformities . No clubbing.       + lower extremity edema, No venous stasis  PULSE:   Appears equal and palpable.   PSYCHIATRIC:  Seems appropriate, No acute psycosis  MS:    No fractures, No gross weakness  NEUROLOGIC:   The clinical assessment is non-focal     DATA: IMAGING & TESTING:     LABORATORY TESTS:    CBC with Differential:    Lab Results   Component Value Date    WBC 10.3 2021    RBC 4.16 2021    HGB 12.4 2021    HCT 41.4 2021     2021    MCV 99.5 2021    MCH 29.8 2021    MCHC 30.0 2021 RVSP is 55 mmHg. Pulmonary hypertension is moderate . Aortic Valve  The aortic valve appears mildly sclerotic. The aortic valve is trileaflet. No hemodynamically significant aortic stenosis is present. Moderate aortic regurgitation is noted. Pulmonic Valve  Pulmonic valve is structurally normal.  Physiologic and/or trace pulmonic regurgitation present. No evidence of pulmonic valve stenosis. Pericardial Effusion  Trace pericardial effusion. Pleural Effusion  No evidence of pleural effusion. Aorta  Normal aortic root and ascending aorta. Miscellaneous  Dilated Inferior Vena Cava. Inferior Vena Cava, normal respiratory variation. Conclusions   Summary  Mildly dilated left ventricle. Ejection fraction is visually estimated at 25-30%. Overall ejection fraction severely decreased . No regional wall motion abnormalities seen. Normal left ventricle wall thickness. Stage II diastolic dysfunction. The left atrium is moderately dilated. Normal right ventricular size. Right ventricle global systolic function is moderately reduced . TAPSE 12  mm  Moderate mitral regurgitation with centrally directed jet. No hemodynamically significant aortic stenosis is present. Moderate aortic regurgitation is noted. Mild to moderate tricuspid regurgitation. RVSP is 55 mmHg. Pulmonary hypertension is moderate . Trace pericardial effusion. No previous echo for comparison. Cta Chest W Contrast    Result Date: 3/10/2021  EXAMINATION: CTA OF THE CHEST 3/10/2021 4:42 am TECHNIQUE: CTA of the chest was performed after the administration of intravenous contrast.  Multiplanar reformatted images are provided for review. MIP images are provided for review. Dose modulation, iterative reconstruction, and/or weight based adjustment of the mA/kV was utilized to reduce the radiation dose to as low as reasonably achievable. COMPARISON: None.  HISTORY: ORDERING SYSTEM PROVIDED HISTORY: PE TECHNOLOGIST PROVIDED HISTORY: Reason for exam:->PE What reading provider will be dictating this exam?->CRC FINDINGS: Pulmonary Arteries: There is a subsegmental pulmonary embolus in the right lower lobe seen on images 174-186. There is a segmental pulmonary embolus in the right lower lobe best seen on coronal series 9, image 97. Mediastinum: There are coronary artery calcifications. There is also calcified plaque throughout thoracic aorta. There is no abnormal mediastinal or hilar mass seen. Lungs/pleura: There are bilateral small to moderate pleural effusions. There is infiltrate and/or atelectasis in the left lower lobe. There is some dependent atelectasis in the right lower lobe. There is a peripheral masslike density in the right lower lobe at the peripheral lung base measuring 2.1 x 1.5 cm. This is in the distribution of the small subsegmental embolus and may relate to a small pulmonary infarct. Upper Abdomen: Limited images of the upper abdomen are unremarkable. Soft Tissues/Bones: No acute bone or soft tissue abnormality. Right lower lobe segmental pulmonary embolus. Left lower lobe subsegmental pulmonary embolus. There is nonspecific airspace disease in the right lower lobe. There is a peripheral masslike density in the left lower lobe, in the distribution of the subsegmental embolus. Well a mass is not excluded, this more likely relates to small pulmonary infarct. Suggest short-term follow-up to confirm resolution. Bilateral small to moderate pleural effusions. Findings were sent to Radiology Results [id number] at 5:18 a.m. on 03/10/2021 to be communicated to a licensed caregiver. Assessment:   1. Intermediate-Low Risk Pulmonary Embolism, likely provoked in setting of decreased mobility and CHF  2. Acute Decompensated HFrEF (25%, G2DD)  3. Bilateral Pleural Effusions, likely cardiogenic  4. Acute Hypoxic Respiratory Failure  5.  CAD/MI, s/p PCI with bare metal stent to RCA in 2007  6. Hypertension  7. Hyperlipidemia  8. Insulin-Dependent Diabetes Mellitus Type 2  9. H/o Tobacco Abuse  10. Anxiety Disorder      Plan:     1. Hold Eliquis for cath  2. Agree with continued diuresis change to po  3. Monitor electrolytes with diuresis, replace prn  4. Ischemic workup and CHF medication titration per cardiology  5. Wean off oxygen if able  6. Cards Increase Entrestro, Toprol XL and change ot daily dosing  7. Supplement magnesium  8. Tentative LHC on Friday 3/19/2021 AUC HF indication: 7  9.  Will follow         Jade Ponce DO  3/17/2021  10:39 AM

## 2021-03-17 NOTE — CARE COORDINATION
Pt now agreeing for heart cath, scheduled for Friday 3/19.  Updated [facility].Isis JOHNSON

## 2021-03-18 LAB
ALBUMIN SERPL-MCNC: 3 G/DL (ref 3.5–5.2)
ALP BLD-CCNC: 96 U/L (ref 35–104)
ALT SERPL-CCNC: 36 U/L (ref 0–32)
ANION GAP SERPL CALCULATED.3IONS-SCNC: 10 MMOL/L (ref 7–16)
AST SERPL-CCNC: 31 U/L (ref 0–31)
BILIRUB SERPL-MCNC: 0.6 MG/DL (ref 0–1.2)
BUN BLDV-MCNC: 19 MG/DL (ref 8–23)
CALCIUM SERPL-MCNC: 8.8 MG/DL (ref 8.6–10.2)
CHLORIDE BLD-SCNC: 99 MMOL/L (ref 98–107)
CO2: 29 MMOL/L (ref 22–29)
CREAT SERPL-MCNC: 0.7 MG/DL (ref 0.5–1)
GFR AFRICAN AMERICAN: >60
GFR NON-AFRICAN AMERICAN: >60 ML/MIN/1.73
GLUCOSE BLD-MCNC: 106 MG/DL (ref 74–99)
MAGNESIUM: 1.5 MG/DL (ref 1.6–2.6)
METER GLUCOSE: 109 MG/DL (ref 74–99)
METER GLUCOSE: 125 MG/DL (ref 74–99)
METER GLUCOSE: 130 MG/DL (ref 74–99)
METER GLUCOSE: 145 MG/DL (ref 74–99)
POTASSIUM REFLEX MAGNESIUM: 4.6 MMOL/L (ref 3.5–5)
POTASSIUM SERPL-SCNC: 4.6 MMOL/L (ref 3.5–5)
PRO-BNP: ABNORMAL PG/ML (ref 0–125)
SODIUM BLD-SCNC: 138 MMOL/L (ref 132–146)
TOTAL PROTEIN: 5.8 G/DL (ref 6.4–8.3)

## 2021-03-18 PROCEDURE — 6360000002 HC RX W HCPCS: Performed by: FAMILY MEDICINE

## 2021-03-18 PROCEDURE — 83880 ASSAY OF NATRIURETIC PEPTIDE: CPT

## 2021-03-18 PROCEDURE — 80053 COMPREHEN METABOLIC PANEL: CPT

## 2021-03-18 PROCEDURE — 80048 BASIC METABOLIC PNL TOTAL CA: CPT

## 2021-03-18 PROCEDURE — 6370000000 HC RX 637 (ALT 250 FOR IP): Performed by: INTERNAL MEDICINE

## 2021-03-18 PROCEDURE — 83735 ASSAY OF MAGNESIUM: CPT

## 2021-03-18 PROCEDURE — 82962 GLUCOSE BLOOD TEST: CPT

## 2021-03-18 PROCEDURE — 2060000000 HC ICU INTERMEDIATE R&B

## 2021-03-18 PROCEDURE — 6370000000 HC RX 637 (ALT 250 FOR IP): Performed by: FAMILY MEDICINE

## 2021-03-18 PROCEDURE — 99232 SBSQ HOSP IP/OBS MODERATE 35: CPT | Performed by: INTERNAL MEDICINE

## 2021-03-18 PROCEDURE — 36415 COLL VENOUS BLD VENIPUNCTURE: CPT

## 2021-03-18 PROCEDURE — 6360000002 HC RX W HCPCS: Performed by: INTERNAL MEDICINE

## 2021-03-18 PROCEDURE — 2580000003 HC RX 258: Performed by: FAMILY MEDICINE

## 2021-03-18 RX ORDER — MAGNESIUM SULFATE IN WATER 40 MG/ML
2000 INJECTION, SOLUTION INTRAVENOUS ONCE
Status: COMPLETED | OUTPATIENT
Start: 2021-03-18 | End: 2021-03-18

## 2021-03-18 RX ADMIN — ENOXAPARIN SODIUM 70 MG: 80 INJECTION SUBCUTANEOUS at 10:22

## 2021-03-18 RX ADMIN — ALPRAZOLAM 2 MG: 1 TABLET ORAL at 12:37

## 2021-03-18 RX ADMIN — ACETAMINOPHEN 650 MG: 325 TABLET ORAL at 22:11

## 2021-03-18 RX ADMIN — ASPIRIN 81 MG: 81 TABLET, COATED ORAL at 10:23

## 2021-03-18 RX ADMIN — Medication 10 ML: at 22:13

## 2021-03-18 RX ADMIN — Medication 2000 UNITS: at 10:23

## 2021-03-18 RX ADMIN — ATORVASTATIN CALCIUM 80 MG: 40 TABLET, FILM COATED ORAL at 22:12

## 2021-03-18 RX ADMIN — SACUBITRIL AND VALSARTAN 1 TABLET: 49; 51 TABLET, FILM COATED ORAL at 10:23

## 2021-03-18 RX ADMIN — PANTOPRAZOLE SODIUM 40 MG: 40 TABLET, DELAYED RELEASE ORAL at 10:22

## 2021-03-18 RX ADMIN — MAGNESIUM GLUCONATE 500 MG ORAL TABLET 400 MG: 500 TABLET ORAL at 10:23

## 2021-03-18 RX ADMIN — FUROSEMIDE 40 MG: 40 TABLET ORAL at 10:23

## 2021-03-18 RX ADMIN — ACETAMINOPHEN 650 MG: 325 TABLET ORAL at 05:01

## 2021-03-18 RX ADMIN — MAGNESIUM SULFATE HEPTAHYDRATE 2000 MG: 40 INJECTION, SOLUTION INTRAVENOUS at 10:24

## 2021-03-18 RX ADMIN — CETIRIZINE HYDROCHLORIDE 5 MG: 10 TABLET, FILM COATED ORAL at 10:23

## 2021-03-18 RX ADMIN — ALPRAZOLAM 2 MG: 1 TABLET ORAL at 22:11

## 2021-03-18 RX ADMIN — METOPROLOL SUCCINATE 200 MG: 100 TABLET, EXTENDED RELEASE ORAL at 10:22

## 2021-03-18 RX ADMIN — SACUBITRIL AND VALSARTAN 1 TABLET: 49; 51 TABLET, FILM COATED ORAL at 22:12

## 2021-03-18 RX ADMIN — Medication 10 ML: at 10:21

## 2021-03-18 RX ADMIN — ESCITALOPRAM 10 MG: 10 TABLET, FILM COATED ORAL at 10:23

## 2021-03-18 RX ADMIN — SPIRONOLACTONE 25 MG: 25 TABLET ORAL at 10:23

## 2021-03-18 ASSESSMENT — PAIN SCALES - GENERAL
PAINLEVEL_OUTOF10: 0
PAINLEVEL_OUTOF10: 5
PAINLEVEL_OUTOF10: 0

## 2021-03-18 ASSESSMENT — PAIN DESCRIPTION - PROGRESSION: CLINICAL_PROGRESSION: NOT CHANGED

## 2021-03-18 NOTE — PROGRESS NOTES
[place]  Department of Internal Medicine  Division of Pulmonary, Critical Care and Sleep Medicine  Progress Note  Hodges Paget, Yash Romoe MD, [facility]    Patient: Harjinder Ansari  MRN: [id number]  : 1945    Encounter Time: 10:54 AM     Date of Admission: 3/9/2021  2:22 PM    Primary Care Physician: Yazmin Thorne PA-C    Reason for Consultation: Pulmonary embolism, bilateral pleural effusions     SUBJECTIVE:    - 5 liters, still  Still on oxygen 4 liters  [facility] for Friday  Same   PFT severe restrictive       OBJECTIVE:     PHYSICAL EXAM:   VITALS:   Vitals:    21 1545 21 2115 21 0523 21 0900   BP: 133/78 128/73  138/76   Pulse: 88 96  86   Resp: 18 19  20   Temp: 96.4 °F (35.8 °C) 97.2 °F (36.2 °C)  97.4 °F (36.3 °C)   TempSrc: Temporal Temporal  Temporal   SpO2: 91% 95%  94%   Weight:   158 lb (71.7 kg)    Height:            Intake/Output Summary (Last 24 hours) at 3/18/2021 1054  Last data filed at 3/18/2021 0843  Gross per 24 hour   Intake 120 ml   Output 750 ml   Net -630 ml        CONSTITUTIONAL:   A&O x 3, NAD  SKIN:     No rash, No suspicious lesions or skin discoloration  HEENT:     EOMI, MMM, No thrush  NECK:    No bruits, No JVP appreciated  CV:      Sinus,  No murmur, No rubs, No gallops  PULMONARY:   Couse BS, bilateral crackles noted  ABDOMEN:     Soft, non-tender. BS normal. No R/R/G  EXT:    No deformities . No clubbing.       + lower extremity edema, No venous stasis  PULSE:   Appears equal and palpable.   PSYCHIATRIC:  Seems appropriate, No acute psycosis  MS:    No fractures, No gross weakness  NEUROLOGIC:   The clinical assessment is non-focal     DATA: IMAGING & TESTING:     LABORATORY TESTS:    CBC with Differential:    Lab Results   Component Value Date    WBC 10.3 2021    RBC 4.16 2021    HGB 12.4 2021    HCT 41.4 2021     2021    MCV 99.5 2021    MCH 29.8 03/16/2021    MCHC 30.0 03/16/2021    RDW 14.3 03/16/2021    SEGSPCT 62 08/30/2012    BLASTSPCT 0.9 03/10/2021    METASPCT 0.9 03/09/2021    LYMPHOPCT 6.1 03/10/2021    PROMYELOPCT 0.9 03/10/2021    MONOPCT 7.0 03/10/2021    BASOPCT 0.2 03/10/2021    MONOSABS 0.85 03/10/2021    LYMPHSABS 0.73 03/10/2021    EOSABS 0.21 03/10/2021    BASOSABS 0.00 03/10/2021     BMP:    Lab Results   Component Value Date     03/18/2021    K 4.6 03/18/2021    K 4.6 03/18/2021    CL 99 03/18/2021    CO2 29 03/18/2021    BUN 19 03/18/2021    LABALBU 3.0 03/18/2021    CREATININE 0.7 03/18/2021    CALCIUM 8.8 03/18/2021    GFRAA >60 03/18/2021    LABGLOM >60 03/18/2021    GLUCOSE 106 03/18/2021        PRO-BNP:   Lab Results   Component Value Date    PROBNP 14,904 (H) 03/18/2021    PROBNP 16,843 (H) 03/15/2021      ABGs:   Lab Results   Component Value Date    PH 7.413 03/09/2021    PO2 85.9 03/09/2021    PCO2 42.2 03/09/2021     Hemoglobin A1C: No components found for: HGBA1C    IMAGING:  Imaging tests were completed and reviewed and discussed radiology and care team involved and reveals   Echo Complete  Result Date: 3/2/2021  Findings   Left Ventricle  Mildly dilated left ventricle. Ejection fraction is visually estimated at 25-30%. Overall ejection fraction severely decreased . No regional wall motion abnormalities seen. Normal left ventricle wall thickness. Stage II diastolic dysfunction. Right Ventricle  Normal right ventricular size. Right ventricle global systolic function is moderately reduced . TAPSE 12  mm. Left Atrium  The left atrium is moderately dilated. Right Atrium  Mildly enlarged right atrium size. Mitral Valve  Mild mitral annular calcification. No evidence of mitral valve stenosis. Increased E point septal separation noted,suggesting decreased LV cardiac  output. Moderate mitral regurgitation with centrally directed jet.    Tricuspid Valve  The tricuspid valve appears structurally normal.  Mild PE TECHNOLOGIST PROVIDED HISTORY: Reason for exam:->PE What reading provider will be dictating this exam?->CRC FINDINGS: Pulmonary Arteries: There is a subsegmental pulmonary embolus in the right lower lobe seen on images 174-186. There is a segmental pulmonary embolus in the right lower lobe best seen on coronal series 9, image 97. Mediastinum: There are coronary artery calcifications. There is also calcified plaque throughout thoracic aorta. There is no abnormal mediastinal or hilar mass seen. Lungs/pleura: There are bilateral small to moderate pleural effusions. There is infiltrate and/or atelectasis in the left lower lobe. There is some dependent atelectasis in the right lower lobe. There is a peripheral masslike density in the right lower lobe at the peripheral lung base measuring 2.1 x 1.5 cm. This is in the distribution of the small subsegmental embolus and may relate to a small pulmonary infarct. Upper Abdomen: Limited images of the upper abdomen are unremarkable. Soft Tissues/Bones: No acute bone or soft tissue abnormality. Right lower lobe segmental pulmonary embolus. Left lower lobe subsegmental pulmonary embolus. There is nonspecific airspace disease in the right lower lobe. There is a peripheral masslike density in the left lower lobe, in the distribution of the subsegmental embolus. Well a mass is not excluded, this more likely relates to small pulmonary infarct. Suggest short-term follow-up to confirm resolution. Bilateral small to moderate pleural effusions. Findings were sent to Radiology Results [id number] at 5:18 a.m. on 03/10/2021 to be communicated to a licensed caregiver. Assessment:   1. Intermediate-Low Risk Pulmonary Embolism, likely provoked in setting of decreased mobility and CHF  2. Restrictve lung disease from fluid, effusions  3. Acute Decompensated HFrEF (25%, G2DD)  4. Bilateral Pleural Effusions, likely cardiogenic  5.  Acute Hypoxic Respiratory Failure  6. CAD/MI, s/p PCI with bare metal stent to RCA in 2007  7. Hypertension  8. Hyperlipidemia  9. Insulin-Dependent Diabetes Mellitus Type 2  10. H/o Tobacco Abuse  11. Anxiety Disorder      Plan:     1. Hold Eliquis for cath  2. Agree with continued diuresis change to po  3. Monitor electrolytes with diuresis, replace prn  4. Ischemic workup and CHF medication titration per cardiology  5. Wean off oxygen if able  6. Cards Increase Entrestro, Toprol XL and change ot daily dosing  7. Supplement magnesium  8. Tentative LHC on Friday 3/19/2021 AUC HF indication: 7  9.  Will follow         Yanet Reilly DO  3/18/2021  10:54 AM

## 2021-03-18 NOTE — PROGRESS NOTES
Inpatient Cardiology RECONSULT     PATIENT IS BEING FOLLOWED FOR: Patient now agreeable to [facility]. HFrEF. Newly diagnosed cardiomyopathy    Harjinder Ansari is a 70 y. o. female known to Dr Rik Lam seen in consult 3/10/2021    PMH: HTN, HLD, CAD s/p BMS to RCA, GERD, T2DM insulin requiring, intolerance to statins, and ex smoker. Seen by Dr Todd Graham 3/2/2021 for NSVT ( hypomagnesia/Mag 1.3)--> Toprol and Cozaar initiated. Left AMA    SE-ED 3/9/2021 at 1420 with confusion, possibly not taking medications as prescribed. She is a poor historian. BP upon arrival 186/100  Sinus tachycardia. CTA + bilateral PE's. Na 142, K+ 4.8-->4.1, Bun/Cr 19/0.9, magnesium 1.1-->1.4, p-BNP 14634, troponin 0.03 x 3, CPK 97, CKMB 1.7, Albumin 3.3, AlT 70-->56, AST 48-->36, pro-calcitonin 0.10, WBC 12.2, Hgb 11.7, Plt 233, D-dimer 2372, INR 1.5, UA negative     SUBJECTIVE: Denies SOB or CP  OBJECTIVE: No apparent distress     ROS:  Consist: Denies fevers, chills or night sweats  Heart: Denies chest pain, palpitations, lightheadedness, dizziness or syncope  Lungs: Denies SOB, cough, wheezing, orthopnea or PND  GI: Denies abdominal pain, vomiting or diarrhea    PHYSICAL EXAM:   BP (!) 144/78   Pulse 90   Temp 96.3 °F (35.7 °C) (Temporal)   Resp 20   Ht 5' 1\" (1.549 m)   Wt 158 lb (71.7 kg)   SpO2 94%   BMI 29.85 kg/m²    B/P Range last 24 hours: Systolic (24VMQ), BEZ:920 , Min:128 , LILIA:103    Diastolic (58GWN), AXN:94, Min:73, Max:78  24 hour BP range: 125//76  HR 90's SR, afebrile, 2 liters NC O2 saturation 94-98%  CONST: Well developed,  female who appears of stated age. Awake, alert and cooperative. No apparent distress  HEENT:   Head- Normocephalic, atraumatic   Eyes- Conjunctivae pink, anicteric  Throat- Oral mucosa pink and moist  Neck-  No stridor, trachea midline, no jugular venous distention. No bilateral carotid bruit  CHEST: Chest symmetrical and non-tender to palpation.  No accessory muscle use or intercostal retractions  RESPIRATORY:  Lung sounds - clear throughout fields   CARDIOVASCULAR:     Heart Ausculation- Regular rate and rhythm, no  mumur heard  PV: Trace lower extremity edema. No varicosities. Pedal pulses palpable, no clubbing or cyanosis   ABDOMEN: Soft, non-tender to light palpation. Bowel sounds present. No palpable masses no organomegaly; no abdominal bruit  MS: Good muscle strength and tone. No atrophy or abnormal movements. : Deferred  SKIN: Warm and dry no statis dermatitis or ulcers   NEURO / PSYCH: Oriented to person, place and time. Speech clear and appropriate. Follows all commands.  Pleasant affect       Intake/Output Summary (Last 24 hours) at 3/18/2021 1649  Last data filed at 3/18/2021 1416  Gross per 24 hour   Intake 590 ml   Output 650 ml   Net -60 ml       Weight:   Wt Readings from Last 3 Encounters:   03/18/21 158 lb (71.7 kg)   03/01/21 160 lb (72.6 kg)   02/22/21 167 lb (75.8 kg)     Current Inpatient Medications:   enoxaparin  1 mg/kg Subcutaneous BID    metoprolol succinate  200 mg Oral Daily    sacubitril-valsartan  1 tablet Oral BID    furosemide  40 mg Oral Daily    magnesium oxide  400 mg Oral Daily    [Held by provider] apixaban  10 mg Oral BID    influenza virus vaccine  0.5 mL Intramuscular Prior to discharge    spironolactone  25 mg Oral Daily    vitamin D  2,000 Units Oral Daily    escitalopram  10 mg Oral Daily    cetirizine  5 mg Oral Daily    pantoprazole  40 mg Oral Daily    insulin lispro  0-10 Units Subcutaneous 4x Daily AC & HS    sodium chloride flush  10 mL Intravenous 2 times per day    atorvastatin  80 mg Oral Nightly    aspirin  81 mg Oral Daily       IV Infusions (if any):   dextrose         DIAGNOSTIC/ LABORATORY DATA:  Labs:   CBC:   Recent Labs     03/16/21  0621   WBC 10.3   HGB 12.4   HCT 41.4        BMP:   Recent Labs     03/17/21  0748 03/18/21  0620    138   K 4.8  4.8 4.6  4.6   CO2 31* 29   BUN 18 19 CREATININE 0.7 0.7   LABGLOM >60 >60   CALCIUM 9.0 8.8     Mag:   Recent Labs     03/17/21  0748 03/18/21  0620   MG 1.7 1.5*     HgA1c:   Lab Results   Component Value Date    LABA1C 6.5 (H) 03/09/2021     FASTING LIPID PANEL:  Lab Results   Component Value Date    CHOL 108 03/10/2021    HDL 33 03/10/2021    LDLCALC 55 03/10/2021    TRIG 99 03/10/2021     LIVER PROFILE:  Recent Labs     03/17/21  0748 03/18/21  0620   AST 45* 31   ALT 45* 36*   LABALBU 2.7* 3.0*     CXR 3/9/2021: CHF.  Superimposed pneumonia cannot be excluded     CT Head 3/9/2021: No acute intracranial abnormality     CTA Chest 3/10/2021: Right lower lobe segmental pulmonary embolus.  Left lower lobe subsegmental pulmonary embolus. There is nonspecific airspace disease in the right lower lobe. There is a peripheral masslike density in the left lower lobe, in the   distribution of the subsegmental embolus.  Well a mass is not excluded, this   more likely relates to small pulmonary infarct.  Suggest short-term follow-up   to confirm resolution. Bilateral small to moderate pleural effusions.     BLE Dopplers 3/10/2021: Within the visualized vessels there is no evidence for deep venous thrombosis    Telemetry: SR      TTE 2/21/2021 Dr Fox How: Mildly dilated left ventricle. LVDD 5.5. LVMI 92 l/min/m2. EF estimated at 25-30%. NWM.  Normal left ventricle wall thickness. Stage II DD. Moderately dilated LA. Normal right ventricular size. Right ventricle global systolic function is moderately reduced . TAPSE 12 mm  Moderate MR with centrally directed jet. No hemodynamically significant aortic stenosis is present. Moderate AI. Mild to moderate TR. RVSP is 55 mmHg. Pulmonary hypertension is moderate . Trace pericardial effusion. No previous echo for comparison. ASSESSMENT:   1. Newly diagnosed CMP with severe LV systolic dysfunction (with EF 25-30% on TTE 3/2/2021) possible ischemic  2. Known CAD Hx PCI to RCA in 2007  3. Acute HFrEF  4.  Bilateral Pulmonary emboli: Eliquis held 3/16/21 and she was started on therapeutic dose Lovenox  5. Hypoxic respiratory failure  6. HTN  7. VHD with moderate MR and mild to moderate TR and moderate AI on TTE 3/2/2021  8. NSVT in context hypomagnesemia    9. Hypomagnesemia: supplemented with low normal on repeat  10. Elevated LFT's: trending down  11. Hypoalbuminemia    12. HLD with intolerance to statin's  13. T2DM insulin requiring with neuropathy   14. JAXSON not complaint with C-pap  15. Anxiety  16. GERD    PLAN:  1. Continue current cardiac medications  2. Cath ( AUC: HF-7 ) +/- PCI tomorrow Friday 3/19/21. Risks, benefits, and alternative therapies discussed with patient she understood and consented to proceed  3.  Will continue to follow    Electronically signed by Vilma Serna MD on 3/18/2021 at 4:49 PM

## 2021-03-18 NOTE — PROGRESS NOTES
Hospitalist Progress Note      SYNOPSIS:   70 y.o. female who presented to [facility] with past medical history of CHF, hyperlipidemia, coronary artery disease status post RCA stents in 2007, IBS, obesity, hypertension, hyperlipidemia, diabetes, sleep apnea and unsteady gait. Patient was at [facility] on 2/28/2021, she left AMA on 3/3/2021 because \"I did not like the care there\". She was admitted for vertigo, KIKI, dizziness and sphenoid sinusitis on CT scan. She had wax removed from her left ear. She was given Rocephin and ENT saw her for benign positional vertigo. While in the hospital, she had runs of V. tach, she had low magnesium that was replaced. Echo obtained during that admission showed that her EF had dropped from 60% 3 years ago to 25%. Patient was seen by cardiologist and stress test was planned however patient left before completion of work-up. She had some changes in mental status/behavioral dyscontrol for which they wanted her to see psychiatry, this did not happen as well. She was discharged on Ceftin, she does not know if she was taking it. Patient came back to the ER today with altered mental status described as mostly confusion, this is constant, moderate intensity, associated with shortness of breath. Patient answers \"I do not know\" to many questions. She does not know why she is in the hospital and she does not think she is sick. She is visibly dyspneic and states that this is normal for her. She reports falling 2 weeks ago and complains of pain in the left upper abdomen which is sharp and worse with movement. She has some leg swelling. No cough or fever. Denies any chest pain. Vital signs notable for heart rate of 129, respiratory rate 30, blood pressure 190/119.   Labs showed troponin 0 0.03, AST 48, ALT 70, alkaline phosphatase 194, urinalysis is negative besides crystals, glucose 143, INR 1.5, lactic acid level 2.1, white count 12.1, proBNP 33 238, chest x-ray shows pneumonia versus CHF. CT scan of the head is negative. EKG shows sinus tachycardia rate of 111 with Q waves in V1 through V3. Echo in February showed EF of 25 to 30% with grade 2 diastolic dysfunction, no wall motion abnormalities, moderate MR, mild to moderate TR, moderate AR and moderate pulmonary hypertension with pressure of 55 mmHg. Echo in  showed EF of 60% with no significant valvular heart disease. Hospital course  Consultations with cardiology and pulmonology services  Patient started on therapeutic Lovenox and transition to oral Eliquis  Patient had continued IV diuresis with plans of changing to oral Lasix. Patient declined cardiac catheterization initially. She was unsure she if she wants to wear LifeVest at discharge. Lengthy conversations were had. Patient was overwhelmed. Allergic medical therapy was initiated while in house with [facility]. On 3/16 patient agreed for cardiac catheterization. Cardiology reconsulted. [facility] planned Friday 3/19      SUBJECTIVE:    Patient seen and examined  Records reviewed. No new complaints  She feels well today    Stable overnight. No other overnight issues reported. Temp (24hrs), Av.1 °F (36.2 °C), Min:96.4 °F (35.8 °C), Max:97.7 °F (36.5 °C)    DIET: DIET CARDIAC; Low Sodium (2 GM)  Dietary Nutrition Supplements: Diabetic Oral Supplement  Diet NPO, After Midnight Exceptions are: Sips of Water with Meds  CODE: Full Code    Intake/Output Summary (Last 24 hours) at 3/18/2021 0808  Last data filed at 3/18/2021 0526  Gross per 24 hour   Intake 120 ml   Output 750 ml   Net -630 ml       OBJECTIVE:    /73   Pulse 96   Temp 97.2 °F (36.2 °C) (Temporal)   Resp 19   Ht 5' 1\" (1.549 m)   Wt 158 lb (71.7 kg)   SpO2 95%   BMI 29.85 kg/m²     General appearance: No apparent distress, appears stated age and cooperative. HEENT:  Conjunctivae/corneas clear. Neck: Supple. No jugular venous distention.    Respiratory: Left base rales today, Right clear.  No increased work of breathing  Cardiovascular: Regular rate rhythm, normal S1-S2  Abdomen: Soft, nontender, nondistended  Musculoskeletal: No clubbing, cyanosis, Trace bilateral lower extremity edema. Brisk capillary refill. Skin:  No rashes  on visible skin  Neurologic: awake, alert and following commands     ASSESSMENT:    Acute hypoxic respiratory failure  Acute heart failure with reduced ejection fraction  Newly diagnosed cardiomyopathy-unknown type  Coronary artery disease status post PCI  Bilateral pulmonary embolism on Eliquis  Bilateral pleural effusion likely cardiogenic  Hypertension  Diabetes mellitus with neuropathy, with an A1c of 6.5, well controlled  Sinus tachycardia  Anxiety disorder  History of tobacco abuse    Echo 2/27/21   Summary   Mildly dilated left ventricle. Ejection fraction is visually estimated at 25-30%. Overall ejection fraction severely decreased . No regional wall motion abnormalities seen. Normal left ventricle wall thickness. Stage II diastolic dysfunction. The left atrium is moderately dilated. Normal right ventricular size. Right ventricle global systolic function is moderately reduced . TAPSE 12   mm   Moderate mitral regurgitation with centrally directed jet. No hemodynamically significant aortic stenosis is present. Moderate aortic regurgitation is noted. Mild to moderate tricuspid regurgitation. RVSP is 55 mmHg. Pulmonary hypertension is moderate . Trace pericardial effusion. No previous echo for comparison. PLAN:  Echo as above  Converted Eliquis > Lovenox full dose until cath  Continue diuresis with p.o.  Lasix 40, Entresto and BB  Cardio plans for Cath on 3/19 noted  Wean oxygen  [facility] is highly recommended at the time of discharge    DISPOSITION: Home with [facility], when stable for discharge    Medications:  REVIEWED DAILY    Infusion Medications    dextrose       Scheduled Medications    magnesium sulfate  2,000 mg Intravenous Once    enoxaparin  1 mg/kg Subcutaneous BID    metoprolol succinate  200 mg Oral Daily    sacubitril-valsartan  1 tablet Oral BID    furosemide  40 mg Oral Daily    magnesium oxide  400 mg Oral Daily    [Held by provider] apixaban  10 mg Oral BID    influenza virus vaccine  0.5 mL Intramuscular Prior to discharge    spironolactone  25 mg Oral Daily    vitamin D  2,000 Units Oral Daily    escitalopram  10 mg Oral Daily    cetirizine  5 mg Oral Daily    pantoprazole  40 mg Oral Daily    insulin lispro  0-10 Units Subcutaneous 4x Daily AC & HS    sodium chloride flush  10 mL Intravenous 2 times per day    atorvastatin  80 mg Oral Nightly    aspirin  81 mg Oral Daily     PRN Meds: ALPRAZolam, meclizine, glucose, dextrose, glucagon (rDNA), dextrose, promethazine **OR** ondansetron, polyethylene glycol, acetaminophen **OR** acetaminophen, nitroGLYCERIN    Labs:     Recent Labs     03/16/21 0621   WBC 10.3   HGB 12.4   HCT 41.4          Recent Labs     03/16/21  0621 03/17/21  0748 03/18/21  0620    141 138   K 4.8  4.8 4.8  4.8 4.6  4.6   CL 99 101 99   CO2 33* 31* 29   BUN 19 18 19   CREATININE 0.8 0.7 0.7   CALCIUM 9.2 9.0 8.8       Recent Labs     03/16/21  0621 03/17/21  0748 03/18/21  0620   PROT 6.8 5.9* 5.8*   ALKPHOS 106* 93 96   ALT 51* 45* 36*   AST 56* 45* 31   BILITOT 0.8 0.6 0.6       No results for input(s): INR in the last 72 hours. No results for input(s): Yahaira Whelan in the last 72 hours.     Chronic labs:    Lab Results   Component Value Date    CHOL 108 03/10/2021    TRIG 99 03/10/2021    HDL 33 03/10/2021    LDLCALC 55 03/10/2021    TSH 2.070 03/09/2021    INR 1.5 03/09/2021    LABA1C 6.5 (H) 03/09/2021       Radiology: REVIEWED DAILY    +++++++++++++++++++++++++++++++++++++++++++++++++  Devonte Gallegos [place]  +++++++++++++++++++++++++++++++++++++++++++++++++  NOTE: This report was transcribed using voice recognition software. Every effort was made to ensure accuracy; however, inadvertent computerized transcription errors may be present.

## 2021-03-18 NOTE — CARE COORDINATION
Received call from son, and reviewed discharge plan. Pt for heart cath tomorrow 3/19, [facility] services. Son is agreeable, he is working on getting POA for pt. Pt currently on RA. Pt may benefit from APS referral, will make closer to discharge. Per son, pt doesn't care for self well, and wants everyone to care for her. Raghu Ordoñez [facility]

## 2021-03-18 NOTE — PROCEDURES
[address]                  [address] [facility],  [facility]                               PULMONARY FUNCTION    PATIENT NAME: Dee Dee Hernandez                       :        1945  MED REC NO:   [id number]                            ROOM:       12  ACCOUNT NO:   [de-identified]                           ADMIT DATE: 2021  PROVIDER:     Bia Gonzalez DO    DATE OF PROCEDURE:  2021    INDICATIONS:  This is a 68-year-old female, 61 inches, 152 pounds with  decompensated heart failure and ejection fraction of 20%, possible need  for bypass surgery. FINDINGS:  Spirometry reveals a forced vital capacity of 0.72 liters,  29% of predicted and FEV1 of 0.69 liters, 36% of predicted with an  FEV1/FVC ratio of 95%. Maximum voluntary ventilation of 34 liters per  minute, 43% of predicted. Static lung volumes with slow vital capacity of 1.39 liters, 52% of  predicted. Inspiratory capacity of 0.96 liters, 48% of predicted. Expiratory reserve volume of 0.34 liters, 71% of predicted. Thoracic  gas volume of 1.62 liters, 61% of predicted. Residual volume 1.29  liters, 61% of predicted with an RV/TLC ratio of 56%. The RV/TLC ratio  is 110% of predicted. Diffusion capacity is 5.01 mL/minute per mmHg,  which is 24% of predicted. IMPRESSION:  Severe restrictive lung pathology based on total lung  capacity of 56% of predicted with no definitive airflow obstruction. The above is secondary likely to pulmonary edema, obesity and heart  failure.         Gennaro Masterson DO    D: 2021 14:07:20       T: 2021 14:13:55     [id number]  Job#: [id number]     Doc#: [id number]    CC:

## 2021-03-19 LAB
ABO/RH: NORMAL
ANION GAP SERPL CALCULATED.3IONS-SCNC: 7 MMOL/L (ref 7–16)
ANTIBODY SCREEN: NORMAL
BUN BLDV-MCNC: 18 MG/DL (ref 8–23)
CALCIUM SERPL-MCNC: 9.2 MG/DL (ref 8.6–10.2)
CHLORIDE BLD-SCNC: 98 MMOL/L (ref 98–107)
CO2: 29 MMOL/L (ref 22–29)
CREAT SERPL-MCNC: 0.8 MG/DL (ref 0.5–1)
GFR AFRICAN AMERICAN: >60
GFR NON-AFRICAN AMERICAN: >60 ML/MIN/1.73
GLUCOSE BLD-MCNC: 103 MG/DL (ref 74–99)
MAGNESIUM: 1.7 MG/DL (ref 1.6–2.6)
METER GLUCOSE: 105 MG/DL (ref 74–99)
METER GLUCOSE: 106 MG/DL (ref 74–99)
METER GLUCOSE: 124 MG/DL (ref 74–99)
METER GLUCOSE: 185 MG/DL (ref 74–99)
POTASSIUM REFLEX MAGNESIUM: 5.1 MMOL/L (ref 3.5–5)
SODIUM BLD-SCNC: 134 MMOL/L (ref 132–146)

## 2021-03-19 PROCEDURE — 86900 BLOOD TYPING SEROLOGIC ABO: CPT

## 2021-03-19 PROCEDURE — 6370000000 HC RX 637 (ALT 250 FOR IP): Performed by: INTERNAL MEDICINE

## 2021-03-19 PROCEDURE — 6370000000 HC RX 637 (ALT 250 FOR IP): Performed by: FAMILY MEDICINE

## 2021-03-19 PROCEDURE — 4A023N7 MEASUREMENT OF CARDIAC SAMPLING AND PRESSURE, LEFT HEART, PERCUTANEOUS APPROACH: ICD-10-PCS | Performed by: INTERNAL MEDICINE

## 2021-03-19 PROCEDURE — 83735 ASSAY OF MAGNESIUM: CPT

## 2021-03-19 PROCEDURE — 6360000002 HC RX W HCPCS: Performed by: STUDENT IN AN ORGANIZED HEALTH CARE EDUCATION/TRAINING PROGRAM

## 2021-03-19 PROCEDURE — 86850 RBC ANTIBODY SCREEN: CPT

## 2021-03-19 PROCEDURE — 99233 SBSQ HOSP IP/OBS HIGH 50: CPT | Performed by: INTERNAL MEDICINE

## 2021-03-19 PROCEDURE — 2709999900 HC NON-CHARGEABLE SUPPLY

## 2021-03-19 PROCEDURE — 99232 SBSQ HOSP IP/OBS MODERATE 35: CPT | Performed by: INTERNAL MEDICINE

## 2021-03-19 PROCEDURE — 93458 L HRT ARTERY/VENTRICLE ANGIO: CPT | Performed by: INTERNAL MEDICINE

## 2021-03-19 PROCEDURE — C1760 CLOSURE DEV, VASC: HCPCS

## 2021-03-19 PROCEDURE — 99222 1ST HOSP IP/OBS MODERATE 55: CPT | Performed by: STUDENT IN AN ORGANIZED HEALTH CARE EDUCATION/TRAINING PROGRAM

## 2021-03-19 PROCEDURE — 6360000002 HC RX W HCPCS: Performed by: INTERNAL MEDICINE

## 2021-03-19 PROCEDURE — B2111ZZ FLUOROSCOPY OF MULTIPLE CORONARY ARTERIES USING LOW OSMOLAR CONTRAST: ICD-10-PCS | Performed by: INTERNAL MEDICINE

## 2021-03-19 PROCEDURE — 2700000000 HC OXYGEN THERAPY PER DAY

## 2021-03-19 PROCEDURE — 2580000003 HC RX 258: Performed by: FAMILY MEDICINE

## 2021-03-19 PROCEDURE — 80048 BASIC METABOLIC PNL TOTAL CA: CPT

## 2021-03-19 PROCEDURE — 36415 COLL VENOUS BLD VENIPUNCTURE: CPT

## 2021-03-19 PROCEDURE — 2060000000 HC ICU INTERMEDIATE R&B

## 2021-03-19 PROCEDURE — 6360000002 HC RX W HCPCS

## 2021-03-19 PROCEDURE — B2151ZZ FLUOROSCOPY OF LEFT HEART USING LOW OSMOLAR CONTRAST: ICD-10-PCS | Performed by: INTERNAL MEDICINE

## 2021-03-19 PROCEDURE — 82962 GLUCOSE BLOOD TEST: CPT

## 2021-03-19 PROCEDURE — 2500000003 HC RX 250 WO HCPCS

## 2021-03-19 PROCEDURE — C1769 GUIDE WIRE: HCPCS

## 2021-03-19 PROCEDURE — C1894 INTRO/SHEATH, NON-LASER: HCPCS

## 2021-03-19 PROCEDURE — 86901 BLOOD TYPING SEROLOGIC RH(D): CPT

## 2021-03-19 RX ORDER — THIAMINE HYDROCHLORIDE 100 MG/ML
100 INJECTION, SOLUTION INTRAMUSCULAR; INTRAVENOUS DAILY
Status: DISCONTINUED | OUTPATIENT
Start: 2021-03-19 | End: 2021-03-20 | Stop reason: HOSPADM

## 2021-03-19 RX ORDER — SPIRONOLACTONE 25 MG/1
25 TABLET ORAL DAILY
Status: CANCELLED | OUTPATIENT
Start: 2021-03-20

## 2021-03-19 RX ORDER — FUROSEMIDE 40 MG/1
40 TABLET ORAL DAILY
Status: CANCELLED | OUTPATIENT
Start: 2021-03-20

## 2021-03-19 RX ORDER — MAGNESIUM SULFATE IN WATER 40 MG/ML
2000 INJECTION, SOLUTION INTRAVENOUS ONCE
Status: COMPLETED | OUTPATIENT
Start: 2021-03-19 | End: 2021-03-19

## 2021-03-19 RX ADMIN — ENOXAPARIN SODIUM 70 MG: 80 INJECTION SUBCUTANEOUS at 12:27

## 2021-03-19 RX ADMIN — MAGNESIUM SULFATE HEPTAHYDRATE 2000 MG: 40 INJECTION, SOLUTION INTRAVENOUS at 15:02

## 2021-03-19 RX ADMIN — CETIRIZINE HYDROCHLORIDE 5 MG: 10 TABLET, FILM COATED ORAL at 12:29

## 2021-03-19 RX ADMIN — PANTOPRAZOLE SODIUM 40 MG: 40 TABLET, DELAYED RELEASE ORAL at 12:28

## 2021-03-19 RX ADMIN — ESCITALOPRAM 10 MG: 10 TABLET, FILM COATED ORAL at 12:28

## 2021-03-19 RX ADMIN — SACUBITRIL AND VALSARTAN 1 TABLET: 49; 51 TABLET, FILM COATED ORAL at 12:28

## 2021-03-19 RX ADMIN — Medication 10 ML: at 12:29

## 2021-03-19 RX ADMIN — FUROSEMIDE 40 MG: 40 TABLET ORAL at 12:28

## 2021-03-19 RX ADMIN — SACUBITRIL AND VALSARTAN 1 TABLET: 49; 51 TABLET, FILM COATED ORAL at 21:21

## 2021-03-19 RX ADMIN — ENOXAPARIN SODIUM 70 MG: 80 INJECTION SUBCUTANEOUS at 21:21

## 2021-03-19 RX ADMIN — THIAMINE HYDROCHLORIDE 100 MG: 100 INJECTION, SOLUTION INTRAMUSCULAR; INTRAVENOUS at 12:03

## 2021-03-19 RX ADMIN — INSULIN LISPRO 2 UNITS: 100 INJECTION, SOLUTION INTRAVENOUS; SUBCUTANEOUS at 21:25

## 2021-03-19 RX ADMIN — Medication 10 ML: at 21:21

## 2021-03-19 RX ADMIN — ALPRAZOLAM 2 MG: 1 TABLET ORAL at 12:47

## 2021-03-19 RX ADMIN — ACETAMINOPHEN 650 MG: 325 TABLET ORAL at 21:29

## 2021-03-19 RX ADMIN — SPIRONOLACTONE 25 MG: 25 TABLET ORAL at 12:28

## 2021-03-19 RX ADMIN — ASPIRIN 81 MG: 81 TABLET, COATED ORAL at 07:33

## 2021-03-19 RX ADMIN — METOPROLOL SUCCINATE 200 MG: 100 TABLET, EXTENDED RELEASE ORAL at 12:27

## 2021-03-19 RX ADMIN — ATORVASTATIN CALCIUM 80 MG: 40 TABLET, FILM COATED ORAL at 21:21

## 2021-03-19 RX ADMIN — Medication 2000 UNITS: at 12:27

## 2021-03-19 RX ADMIN — MAGNESIUM GLUCONATE 500 MG ORAL TABLET 400 MG: 500 TABLET ORAL at 12:28

## 2021-03-19 ASSESSMENT — PAIN SCALES - GENERAL
PAINLEVEL_OUTOF10: 3
PAINLEVEL_OUTOF10: 0
PAINLEVEL_OUTOF10: 0

## 2021-03-19 ASSESSMENT — PAIN DESCRIPTION - LOCATION: LOCATION: HEAD

## 2021-03-19 ASSESSMENT — PAIN DESCRIPTION - PROGRESSION: CLINICAL_PROGRESSION: NOT CHANGED

## 2021-03-19 ASSESSMENT — PAIN DESCRIPTION - DESCRIPTORS: DESCRIPTORS: ACHING

## 2021-03-19 NOTE — PROCEDURES
[address]                  [address] [facility],  [facility]                            CARDIAC CATHETERIZATION    PATIENT NAME: Haley Zarate                       :        1945  MED REC NO:   [id number]                            ROOM:       3923  ACCOUNT NO:   [de-identified]                           ADMIT DATE: 2021  PROVIDER:     Alma Castillo MD    DATE OF PROCEDURE:  2021    PROCEDURES:  Left heart catheterization, selective coronary angiography,  and left ventriculography. The procedure was done through right femoral artery approach (using  ultrasound guidance). Attempted access through the right radial artery  was successful under ultrasound guidance with pulsatile flow, but the  wire would not advance beyond the level of the antecubital fossa. The patient received intravenous Versed and intravenous fentanyl for  sedation. INDICATION:  Newly diagnosed cardiomyopathy with severe LV systolic  dysfunction and congestive heart failure. Known coronary artery  disease. Nonsustained V-tach. PRESSURES:  1. Aorta 163/96 with a mean of 126.  2.  Left ventricular systolic pressure 060. Left ventricular  end-diastolic pressure 25.  3.  There was no gradient across the aortic valve. CORONARY ANGIOGRAPHY:  LEFT MAIN:  The left main artery is a short vessel practically giving  separate ostia to the LAD and the left circumflex and did not appear to  have any significant angiographic disease. LAD:  The left anterior descending artery is heavily calcified in its  proximal segment. There is around 20% to 30% proximal LAD disease. LCX:  The left circumflex is heavily calcified in its proximal segment. There was no significant angiographic disease of the left circumflex. RCA:  The right coronary artery is a large, dominant vessel.   There is  stent in its middle segment which is widely patent with around 30% to  40% disease at No Exposure

## 2021-03-19 NOTE — CARE COORDINATION
Pt went for heart cath today, anticipate discharge tomorrow. Notified [facility] of anticipated discharge. hhc orders are written. familt to transport home. Mervin JOHNSON

## 2021-03-19 NOTE — CONSULTS
[address] [facility]  CARDIAC ELECTROPHYSIOLOGY DEPARTMENT/DIVISION OF CARDIOLOGY  Consultation Report  PATIENT: Amarilys Mars  MEDICAL RECORD NUMBER: [id number]  DATE OF SERVICE:  3/19/2021  ATTENDING ELECTROPHYSIOLOGIST:  Sandi Tuttle DO  REFERRING PHYSICIAN: No ref. provider found and Jessica Bucio PA-C  CHIEF COMPLAINT:     HPI: Patient with a history of HTN, CAD sp BMS RCA (2007), PAD, DM2, GERD, CKD, anxiety, obesity, JAXSON, and depression. She presented on 3/9/21 with chief complaint of confusion. She was diagnosed with HFrEF-nonischemic (TTE: LVEF = 25-30% compared to 60% in 2018) and pulmonary embolism. A [facility] reported no significant CAD. She was treated with apixaban, metoprolol XL, and Entresto. EP service was consulted by admitting provider for NSVT. She denies any complaints at this time.     Past Medical History:   Diagnosis Date    Abnormal echocardiogram     Acute combined systolic and diastolic congestive heart failure (HCC) 03/09/2021    TTE 3/2/21 25-30% EF    Acute renal insufficiency 02/28/2021    Anxiety     CAD (coronary artery disease)     normal adenisone stress test    Carotid artery narrowing     <40% bilaterally    Depression     Diabetes mellitus (HCC)     type II    Drug intolerance     Lipitor, Crestor, high doses of Simvastatin    GERD (gastroesophageal reflux disease)     Heart attack ([address]     Hyperlipidemia     Hypertension     IBS (irritable bowel syndrome)     Obesity     Primary osteoarthritis involving multiple joints 01/18/2017    Sleep apnea 03/10/2021    SOB (shortness of breath) on exertion     UTI (urinary tract infection)        Family History   Problem Relation Age of Onset    Emphysema Mother     Heart Failure Mother     Heart Attack Father     Down Syndrome Son      There is no family history of sudden cardiac arrest    Social History     Tobacco Use    Smoking status: Former Smoker     Packs/day: 1.00     Years: 35.00 Pack years: 35.00     Quit date: 6/17/2010     Years since quitting: 10.7    Smokeless tobacco: Never Used   Substance Use Topics    Alcohol use: No       Current Facility-Administered Medications   Medication Dose Route Frequency Provider Last Rate Last Admin    thiamine (B-1) injection 100 mg  100 mg Intravenous Daily Pravin Álvarez DO        enoxaparin (LOVENOX) injection 70 mg  1 mg/kg Subcutaneous BID Laith Inman MD   70 mg at 03/19/21 1227    metoprolol succinate (TOPROL XL) extended release tablet 200 mg  200 mg Oral Daily Laith Inman MD   200 mg at 03/19/21 1227    sacubitril-valsartan (ENTRESTO) 49-51 MG per tablet 1 tablet  1 tablet Oral BID Laith Inman MD   1 tablet at 03/19/21 1228    furosemide (LASIX) tablet 40 mg  40 mg Oral Daily Laith Inman MD   40 mg at 03/19/21 1228    magnesium oxide (MAG-OX) tablet 400 mg  400 mg Oral Daily Jeff Loyola MD   400 mg at 03/19/21 1228    ALPRAZolam (XANAX) tablet 2 mg  2 mg Oral TID PRN Jose Nicole MD   2 mg at 03/19/21 1247    influenza quadrivalent split vaccine (FLUZONE;FLUARIX;FLULAVAL;AFLURIA) injection 0.5 mL  0.5 mL Intramuscular Prior to discharge Brandee Calderon MD        spironolactone (ALDACTONE) tablet 25 mg  25 mg Oral Daily Jyotsna Adler DO   25 mg at 03/19/21 1228    vitamin D (CHOLECALCIFEROL) tablet 2,000 Units  2,000 Units Oral Daily Malcom Chow MD   2,000 Units at 03/19/21 1227    escitalopram (LEXAPRO) tablet 10 mg  10 mg Oral Daily Malcom Chow MD   10 mg at 03/19/21 1228    cetirizine (ZYRTEC) tablet 5 mg  5 mg Oral Daily Malcom Chow MD   5 mg at 03/19/21 1229    meclizine (ANTIVERT) tablet 25 mg  25 mg Oral TID PRN Malcom Chow MD        pantoprazole (PROTONIX) tablet 40 mg  40 mg Oral Daily Malcom Chow MD   40 mg at 03/19/21 1228    insulin lispro (HUMALOG) injection vial 0-10 Units  0-10 Units Subcutaneous 4x Daily AC & HS Malcom Chow MD   2 Units at 03/17/21 1148  glucose (GLUTOSE) 40 % oral gel 15 g  15 g Oral PRN Michelle William MD        dextrose 50 % IV solution  12.5 g Intravenous PRN Michelle William MD        glucagon (rDNA) injection 1 mg  1 mg Intramuscular PRN Michelle William MD        dextrose 5 % solution  100 mL/hr Intravenous PRN Michelle William MD        sodium chloride flush 0.9 % injection 10 mL  10 mL Intravenous 2 times per day Michelle William MD   10 mL at 03/19/21 1229    promethazine (PHENERGAN) tablet 12.5 mg  12.5 mg Oral Q6H PRN Michelle William MD        Or    ondansetron (ZOFRAN) injection 4 mg  4 mg Intravenous Q6H PRN Michelle William MD        polyethylene glycol (GLYCOLAX) packet 17 g  17 g Oral Daily PRN Michelle William MD        acetaminophen (TYLENOL) tablet 650 mg  650 mg Oral Q6H PRN Michelle William MD   650 mg at 03/18/21 2211    Or    acetaminophen (TYLENOL) suppository 650 mg  650 mg Rectal Q6H PRN Michelle William MD        nitroGLYCERIN (NITROSTAT) SL tablet 0.4 mg  0.4 mg Sublingual Q5 Min PRN Michelle William MD        atorvastatin (LIPITOR) tablet 80 mg  80 mg Oral Nightly Michelle William MD   80 mg at 03/18/21 2212    aspirin EC tablet 81 mg  81 mg Oral Daily Michelle William MD   81 mg at 03/19/21 5828      Allergies   Allergen Reactions    Sulfa Antibiotics Nausea Only    Amoxicillin Nausea Only    Clavulanic Acid Nausea Only    Levofloxacin     Omeprazole      Interferes with plavix    Other      Lipitor, Crestor, high doses of Simvastatin    Pneumococcal Vaccines     Amoxicillin-Pot Clavulanate Nausea Only     Makes her stomach hurt, doesn't want to eat.  Bee Venom Rash    Doxycycline Rash     ROS:   Constitutional: Negative for fever, activity change and appetite change. HENT: Negative for epistaxis. Eyes: Negative for diploplia, blurred vision. Respiratory: Negative for cough, chest tightness, shortness of breath and wheezing.    Cardiovascular: pertinent positives in HPI  Gastrointestinal: Negative for abdominal pain and blood in stool. Genitourinary: Negative for hematuria and difficulty urinating. Musculoskeletal: Negative for myalgias and gait problem. Skin: Negative for color change and rash. Neurological: Negative for syncope and light-headedness. Psychiatric/Behavioral: Negative for confusion and agitation. The patient is not nervous/anxious. Heme: no bleeding disorders, no melena or hematochezia  All other review of systems are negative     PHYSICAL EXAM:  Vitals:    03/18/21 1630 03/18/21 2200 03/19/21 0503 03/19/21 1215   BP: (!) 144/78 124/84  (!) 185/99   Pulse: 90 87  96   Resp: 20 21 17   Temp: 96.3 °F (35.7 °C) 96.6 °F (35.9 °C)  97.1 °F (36.2 °C)   TempSrc: Temporal Temporal  Temporal   SpO2: 94% 98%  92%   Weight:   159 lb (72.1 kg)    Height:       Constitutional: Well-developed, no acute distress, well groomed, obese  Eyes: conjunctivae normal, no xanthelasma   Head, Ears, Nose, Throat: normocephalic, atraumatic, oral mucosa moist, no cyanosis   Neck: supple, no JVD  CV: RRR, pulses 2+ b/l  Lungs: normal respiratory effort without used of accessory muscles, no audible wheezes  Abdomen: soft, non-tender, nondistended  Extremities: no digital clubbing, no edema   Skin: warm, no rashes   Neuro/Psych: A&O x 3, normal mood and affect    Data:    No results for input(s): WBC, HGB, HCT, PLT in the last 72 hours. Recent Labs     03/17/21  0748 03/18/21  0620 03/19/21  1119    138 134   K 4.8  4.8 4.6  4.6 5.1*    99 98   CO2 31* 29 29   BUN 18 19 18   CREATININE 0.7 0.7 0.8   CALCIUM 9.0 8.8 9.2     No results for input(s): INR in the last 72 hours. No results for input(s): TSH in the last 72 hours. Lab Results   Component Value Date    MG 1.7 03/19/2021       Telemetry: sinus rhythm with HR = 81 - 104 bpm, episodes NSVT (longest: 9 beats)     Assessment/Plan:  1. HFrEF-nonischemic  -New diagnosis.   -Medical management per Cardiology. -Recommend LifeVest at time of discharge. My service will contact rep.  -After 3 months of optimal medical therapy recheck LVEF. If remains < 35% will consider permanent ICD. -Follow-up with EP service as outpatient in 3 months. EP service will sign off. Please contact with questions/concerns. 2. NSVT  -Continue metoprolol  mg daily. -Maintain K+ > 4 and Mg++ > 2. I will order 2 gm MgSO4 for today. 3. PE  -Apixaban  -Management per Pulmonary. I have spent a total of 60 minutes with the patient and his/her family reviewing the above stated recommendations. And a total of >50% of that time involved face-to-face time providing counseling and or coordination of care with the other providers. Thank you for allowing me to participate in your patient's care. Please call me if there are any questions. Yarely Judge D.O.   Cardiac Electrophysiology  [address] [facility]

## 2021-03-19 NOTE — OP NOTE
Operative Note      Patient: Ana Ramos  YOB: 1945  MRN: [id number]    Date of Procedure: 3/19/21             Indication:  1. HFrEF  2. AUC score 7  3. AUC indication :  HF    Procedure: Left heart catheterization, coronary angiography, left ventriculography  Anesthesia: Versed, Fentanyl  Time sedation was administered: 07:55. I was present in the room when sedation was administered. Procedure end time: 08:35  Time spent with face to face monitoring of moderate sedation: 50 minutes    Cath performed by right femoral approach using 6F sheath. Findings:  Left main: 0% stenosis  LAD: 20-30 % stenosis  Circumflex: 0 %  stenosis  RCA: Dominant. 30-40 % stenosis. LV angio: 25-30%  ejection fraction    Hemodynamics:  LV: 163 mmHg. LVEDP 25  No gradient across AV. Ao: 163/96 ( 126 ).        Complication: None   Blood loss: 10 cc  Contrast used: 50 cc    Post Op diagnosis:  Mild CAD  Severe LV dysfunction    PLAN:  Medical therapy ---> continue current cardiac medications  EP consult ( NSVT )  Cardiology will sign off        Electronically signed by Karime Berry MD on 3/19/2021 at 8:44 AM

## 2021-03-19 NOTE — PROGRESS NOTES
Anesthesia POST Procedure Evaluation    Patient: Nicole Fritsche   MRN:     [id number] Gender:   female   Age:    52 year old :      1967        Preoperative Diagnosis: Cervical Cancer   Procedure(s):  Anesthesia Coverage Rad Therapy @0930   Postop Comments: No value filed.       Anesthesia Type:  Not documented  MAC    Reportable Event: NO     PAIN: Uncomplicated   Sign Out status: Comfortable, Well controlled pain     PONV: No PONV   Sign Out status:  No Nausea or Vomiting     Neuro/Psych: Uneventful perioperative course   Sign Out Status: Preoperative baseline; Age appropriate mentation     Airway/Resp.: Uneventful perioperative course   Sign Out Status: Non labored breathing, age appropriate RR; Resp. Status within EXPECTED Parameters     CV: Uneventful perioperative course   Sign Out status: Appropriate BP and perfusion indices; Appropriate HR/Rhythm     Disposition:     Recovery Course: Uneventful  Follow-Up: Not required           Last Anesthesia Record Vitals:  CRNA VITALS  2019 0919 - 2019 1019      2019             Pulse:  91    Ht Rate:  87    SpO2:  97 %    EKG:  Sinus rhythm          Last PACU Vitals:  No vitals data found for the desired time range.        Electronically Signed By: Grady Real MD, 2019, 12:17 PM   Hospitalist Progress Note      SYNOPSIS:   70 y.o. female who presented to [facility] with past medical history of CHF, hyperlipidemia, coronary artery disease status post RCA stents in 2007, IBS, obesity, hypertension, hyperlipidemia, diabetes, sleep apnea and unsteady gait. Patient was at [facility] on 2/28/2021, she left AMA on 3/3/2021 because \"I did not like the care there\". She was admitted for vertigo, KIKI, dizziness and sphenoid sinusitis on CT scan. She had wax removed from her left ear. She was given Rocephin and ENT saw her for benign positional vertigo. While in the hospital, she had runs of V. tach, she had low magnesium that was replaced. Echo obtained during that admission showed that her EF had dropped from 60% 3 years ago to 25%. Patient was seen by cardiologist and stress test was planned however patient left before completion of work-up. She had some changes in mental status/behavioral dyscontrol for which they wanted her to see psychiatry, this did not happen as well. She was discharged on Ceftin, she does not know if she was taking it. Patient came back to the ER today with altered mental status described as mostly confusion, this is constant, moderate intensity, associated with shortness of breath. Patient answers \"I do not know\" to many questions. She does not know why she is in the hospital and she does not think she is sick. She is visibly dyspneic and states that this is normal for her. She reports falling 2 weeks ago and complains of pain in the left upper abdomen which is sharp and worse with movement. She has some leg swelling. No cough or fever. Denies any chest pain. Vital signs notable for heart rate of 129, respiratory rate 30, blood pressure 190/119.   Labs showed troponin 0 0.03, AST 48, ALT 70, alkaline phosphatase 194, urinalysis is negative besides crystals, glucose 143, INR 1.5, lactic acid level 2.1, white count 12.1, proBNP 33 238, chest x-ray shows pneumonia versus CHF. CT scan of the head is negative. EKG shows sinus tachycardia rate of 111 with Q waves in V1 through V3. Echo in February showed EF of 25 to 30% with grade 2 diastolic dysfunction, no wall motion abnormalities, moderate MR, mild to moderate TR, moderate AR and moderate pulmonary hypertension with pressure of 55 mmHg. Echo in  showed EF of 60% with no significant valvular heart disease. Hospital course  Consultations with cardiology and pulmonology services  Patient started on therapeutic Lovenox and transition to oral Eliquis  Patient had continued IV diuresis with plans of changing to oral Lasix. Patient declined cardiac catheterization initially. She was unsure she if she wants to wear LifeVest at discharge. Lengthy conversations were had. Patient was overwhelmed. Allergic medical therapy was initiated while in house with Mervat Lee. On 3/16 patient agreed for cardiac catheterization. Cath this morning is nonischemic      SUBJECTIVE:    Patient seen and examined  Records reviewed. No new complaints  She feels well today. Stable overnight. No other overnight issues reported. Temp (24hrs), Av.7 °F (35.9 °C), Min:96.3 °F (35.7 °C), Max:97.1 °F (36.2 °C)    DIET: DIET CARDIAC; Low Sodium (2 GM)  CODE: Full Code    Intake/Output Summary (Last 24 hours) at 3/19/2021 1415  Last data filed at 3/19/2021 1324  Gross per 24 hour   Intake 360 ml   Output 950 ml   Net -590 ml       OBJECTIVE:    BP (!) 185/99   Pulse 96   Temp 97.1 °F (36.2 °C) (Temporal)   Resp 17   Ht 5' 1\" (1.549 m)   Wt 159 lb (72.1 kg)   SpO2 92%   BMI 30.04 kg/m²     General appearance: No apparent distress, appears stated age and cooperative. HEENT:  Conjunctivae/corneas clear. Neck: Supple. No jugular venous distention. Respiratory: Left base rales today, Right clear.   No increased work of breathing  Cardiovascular: Regular rate rhythm, normal S1-S2  Abdomen: Soft, nontender, Daily    sacubitril-valsartan  1 tablet Oral BID    furosemide  40 mg Oral Daily    magnesium oxide  400 mg Oral Daily    influenza virus vaccine  0.5 mL Intramuscular Prior to discharge    spironolactone  25 mg Oral Daily    vitamin D  2,000 Units Oral Daily    escitalopram  10 mg Oral Daily    cetirizine  5 mg Oral Daily    pantoprazole  40 mg Oral Daily    insulin lispro  0-10 Units Subcutaneous 4x Daily AC & HS    sodium chloride flush  10 mL Intravenous 2 times per day    atorvastatin  80 mg Oral Nightly    aspirin  81 mg Oral Daily     PRN Meds: ALPRAZolam, meclizine, glucose, dextrose, glucagon (rDNA), dextrose, promethazine **OR** ondansetron, polyethylene glycol, acetaminophen **OR** acetaminophen, nitroGLYCERIN    Labs:     No results for input(s): WBC, HGB, HCT, PLT in the last 72 hours. Recent Labs     03/17/21  0748 03/18/21  0620 03/19/21  1119    138 134   K 4.8  4.8 4.6  4.6 5.1*    99 98   CO2 31* 29 29   BUN 18 19 18   CREATININE 0.7 0.7 0.8   CALCIUM 9.0 8.8 9.2       Recent Labs     03/17/21  0748 03/18/21  0620   PROT 5.9* 5.8*   ALKPHOS 93 96   ALT 45* 36*   AST 45* 31   BILITOT 0.6 0.6       No results for input(s): INR in the last 72 hours. No results for input(s): Leonid Mancuso in the last 72 hours. Chronic labs:    Lab Results   Component Value Date    CHOL 108 03/10/2021    TRIG 99 03/10/2021    HDL 33 03/10/2021    LDLCALC 55 03/10/2021    TSH 2.070 03/09/2021    INR 1.5 03/09/2021    LABA1C 6.5 (H) 03/09/2021       Radiology: REVIEWED DAILY    +++++++++++++++++++++++++++++++++++++++++++++++++  [facility]  [facility] Physician - [address], [place]  +++++++++++++++++++++++++++++++++++++++++++++++++  NOTE: This report was transcribed using voice recognition software. Every effort was made to ensure accuracy; however, inadvertent computerized transcription errors may be present.

## 2021-03-19 NOTE — PROGRESS NOTES
Pulse ox only 86% on room air at this time. Placed on 2L of O2 via NC and pulse ox up to 94%. Patient resting in bed with family member in room.

## 2021-03-19 NOTE — PROGRESS NOTES
Inpatient Cardiology RECONSULT     PATIENT IS BEING FOLLOWED FOR: Patient now agreeable to [address] [place] [address]. HFrEF. Newly diagnosed cardiomyopathy    Taylor Cruz is a 70 y. o. female known to Dr Harjinder Henderson seen in consult 3/10/2021    PMH: HTN, HLD, CAD s/p BMS to RCA, GERD, T2DM insulin requiring, intolerance to statins, and ex smoker. Seen by Dr Marcelina Newton 3/2/2021 for NSVT ( hypomagnesia/Mag 1.3)--> Toprol and Cozaar initiated. Left AMA    [facility]-ED 3/9/2021 at 1420 with confusion, possibly not taking medications as prescribed. She is a poor historian. BP upon arrival 186/100  Sinus tachycardia. CTA + bilateral PE's. Na 142, K+ 4.8-->4.1, Bun/Cr 19/0.9, magnesium 1.1-->1.4, p-BNP 67424, troponin 0.03 x 3, CPK 97, CKMB 1.7, Albumin 3.3, AlT 70-->56, AST 48-->36, pro-calcitonin 0.10, WBC 12.2, Hgb 11.7, Plt 233, D-dimer 2372, INR 1.5, UA negative     SUBJECTIVE: Denies SOB or CP. No overnight issues  OBJECTIVE: apparent distress     ROS:  Consist: Denies fevers, chills or night sweats  Heart: Denies chest pain, palpitations, lightheadedness, dizziness or syncope  Lungs: Denies SOB, cough, wheezing, orthopnea or PND  GI: Denies abdominal pain, vomiting or diarrhea    PHYSICAL EXAM:   /84   Pulse 87   Temp 96.6 °F (35.9 °C) (Temporal)   Resp 21   Ht 5' 1\" (1.549 m)   Wt 159 lb (72.1 kg)   SpO2 98%   BMI 30.04 kg/m²    B/P Range last 24 hours: Systolic (53VOT), MJL:047 , Min:124 , AGK:936    Diastolic (76NUN), INX:47, Min:76, Max:84  24 hour BP range: 125//76  HR 90's SR, afebrile, 2 liters NC O2 saturation 94-98%  CONST: Well developed,  female who appears of stated age. Awake, alert and cooperative. No apparent distress  HEENT:   Head- Normocephalic, atraumatic   Eyes- Conjunctivae pink, anicteric  Throat- Oral mucosa pink and moist  Neck-  No stridor, trachea midline, no jugular venous distention. No bilateral carotid bruit  CHEST: Chest symmetrical and non-tender to palpation.  No accessory muscle use or intercostal retractions  RESPIRATORY:  Lung sounds - clear throughout fields   CARDIOVASCULAR:     Heart Ausculation- Regular rate and rhythm, no  mumur heard  PV: Trace lower extremity edema. No varicosities. Pedal pulses palpable, no clubbing or cyanosis   ABDOMEN: Soft, non-tender to light palpation. Bowel sounds present. No palpable masses no organomegaly; no abdominal bruit  MS: Good muscle strength and tone. No atrophy or abnormal movements. : Deferred  SKIN: Warm and dry no statis dermatitis or ulcers   NEURO / PSYCH: Oriented to person, place and time. Speech clear and appropriate. Follows all commands. Pleasant affect       Intake/Output Summary (Last 24 hours) at 3/19/2021 0743  Last data filed at 3/19/2021 0459  Gross per 24 hour   Intake 830 ml   Output 625 ml   Net 205 ml       Weight:   Wt Readings from Last 3 Encounters:   03/19/21 159 lb (72.1 kg)   03/01/21 160 lb (72.6 kg)   02/22/21 167 lb (75.8 kg)     Current Inpatient Medications:   enoxaparin  1 mg/kg Subcutaneous BID    metoprolol succinate  200 mg Oral Daily    sacubitril-valsartan  1 tablet Oral BID    furosemide  40 mg Oral Daily    magnesium oxide  400 mg Oral Daily    [Held by provider] apixaban  10 mg Oral BID    influenza virus vaccine  0.5 mL Intramuscular Prior to discharge    spironolactone  25 mg Oral Daily    vitamin D  2,000 Units Oral Daily    escitalopram  10 mg Oral Daily    cetirizine  5 mg Oral Daily    pantoprazole  40 mg Oral Daily    insulin lispro  0-10 Units Subcutaneous 4x Daily AC & HS    sodium chloride flush  10 mL Intravenous 2 times per day    atorvastatin  80 mg Oral Nightly    aspirin  81 mg Oral Daily       IV Infusions (if any):   dextrose         DIAGNOSTIC/ LABORATORY DATA:  Labs:   CBC:   No results for input(s): WBC, HGB, HCT, PLT in the last 72 hours.   BMP:   Recent Labs     03/17/21  0748 03/18/21  0620    138   K 4.8  4.8 4.6  4.6   CO2 31* 29   BUN 18 19   CREATININE 0.7 0.7   LABGLOM >60 >60   CALCIUM 9.0 8.8     Mag:   Recent Labs     03/17/21  0748 03/18/21  0620   MG 1.7 1.5*     HgA1c:   Lab Results   Component Value Date    LABA1C 6.5 (H) 03/09/2021     FASTING LIPID PANEL:  Lab Results   Component Value Date    CHOL 108 03/10/2021    HDL 33 03/10/2021    LDLCALC 55 03/10/2021    TRIG 99 03/10/2021     LIVER PROFILE:  Recent Labs     03/17/21  0748 03/18/21  0620   AST 45* 31   ALT 45* 36*   LABALBU 2.7* 3.0*     CXR 3/9/2021: CHF.  Superimposed pneumonia cannot be excluded     CT Head 3/9/2021: No acute intracranial abnormality     CTA Chest 3/10/2021: Right lower lobe segmental pulmonary embolus.  Left lower lobe subsegmental pulmonary embolus. There is nonspecific airspace disease in the right lower lobe. There is a peripheral masslike density in the left lower lobe, in the   distribution of the subsegmental embolus.  Well a mass is not excluded, this   more likely relates to small pulmonary infarct.  Suggest short-term follow-up   to confirm resolution. Bilateral small to moderate pleural effusions.     BLE Dopplers 3/10/2021: Within the visualized vessels there is no evidence for deep venous thrombosis    Telemetry:       TTE 2/21/2021 Dr Asher [place]: Mildly dilated left ventricle. LVDD 5.5. LVMI 92 l/min/m2. EF estimated at 25-30%. NWM.  Normal left ventricle wall thickness. Stage II DD. Moderately dilated LA. Normal right ventricular size. Right ventricle global systolic function is moderately reduced . TAPSE 12 mm  Moderate MR with centrally directed jet. No hemodynamically significant aortic stenosis is present. Moderate AI. Mild to moderate TR. RVSP is 55 mmHg. Pulmonary hypertension is moderate . Trace pericardial effusion. No previous echo for comparison. ASSESSMENT:   1. Newly diagnosed CMP with severe LV systolic dysfunction (with EF 25-30% on TTE 3/2/2021) possible ischemic  2. Known CAD Hx PCI to RCA in 2007  3. Acute HFrEF  4.  Bilateral Pulmonary emboli: Eliquis held 3/16/21 and she was started on therapeutic dose Lovenox  5. Hypoxic respiratory failure  6. HTN  7. VHD with moderate MR and mild to moderate TR and moderate AI on TTE 3/2/2021  8. NSVT in context hypomagnesemia    9. Hypomagnesemia: supplemented with low normal on repeat  10. Elevated LFT's: trending down  11. Hypoalbuminemia    12. HLD with intolerance to statin's  13. T2DM insulin requiring with neuropathy   14. JAXSON not complaint with C-pap  15. Anxiety  16. GERD    PLAN:  1. Continue current cardiac medications  2. Cath ( AUC: HF-7 ) +/- PCI today. Risks, benefits, and alternative therapies discussed with patient she understood and consented to proceed  3.  Further recommendations to follow    Electronically signed by Lisa Solitario MD on 3/19/2021 at 7:43 AM

## 2021-03-19 NOTE — PROGRESS NOTES
[facility]  Department of Internal Medicine  Division of Pulmonary, Critical Care and Sleep Medicine  Progress Note  Ean Menon, Riya William MD, [facility]    Patient: Blanco Robles  MRN: [id number]  : 1945    Encounter Time: 11:53 AM     Date of Admission: 3/9/2021  2:22 PM    Primary Care Physician: Sujata Pepe PA-C    Reason for Consultation: Pulmonary embolism, bilateral pleural effusions     SUBJECTIVE:    - 5 liters, still  LHC non ischemic CMP  NSVT EP consulted  PFT severe restrictive       OBJECTIVE:     PHYSICAL EXAM:   VITALS:   Vitals:    21 0900 21 1630 21 2200 21 0503   BP: 138/76 (!) 144/78 124/84    Pulse: 86 90 87    Resp:     Temp: 97.4 °F (36.3 °C) 96.3 °F (35.7 °C) 96.6 °F (35.9 °C)    TempSrc: Temporal Temporal Temporal    SpO2: 94% 94% 98%    Weight:    159 lb (72.1 kg)   Height:            Intake/Output Summary (Last 24 hours) at 3/19/2021 1153  Last data filed at 3/19/2021 1029  Gross per 24 hour   Intake 830 ml   Output 625 ml   Net 205 ml        CONSTITUTIONAL:   A&O x 3, NAD  SKIN:     No rash, No suspicious lesions or skin discoloration  HEENT:     EOMI, MMM, No thrush  NECK:    No bruits, No JVP appreciated  CV:      Sinus,  No murmur, No rubs, No gallops  PULMONARY:   Couse BS, bilateral crackles noted  ABDOMEN:     Soft, non-tender. BS normal. No R/R/G  EXT:    No deformities . No clubbing.       + lower extremity edema, No venous stasis  PULSE:   Appears equal and palpable.   PSYCHIATRIC:  Seems appropriate, No acute psycosis  MS:    No fractures, No gross weakness  NEUROLOGIC:   The clinical assessment is non-focal     DATA: IMAGING & TESTING:     LABORATORY TESTS:    CBC with Differential:    Lab Results   Component Value Date    WBC 10.3 2021    RBC 4.16 2021    HGB 12.4 2021    HCT 41.4 2021     2021    MCV 99.5 2021    MCH 29.8 03/16/2021    MCHC 30.0 03/16/2021    RDW 14.3 03/16/2021    SEGSPCT 62 08/30/2012    BLASTSPCT 0.9 03/10/2021    METASPCT 0.9 03/09/2021    LYMPHOPCT 6.1 03/10/2021    PROMYELOPCT 0.9 03/10/2021    MONOPCT 7.0 03/10/2021    BASOPCT 0.2 03/10/2021    MONOSABS 0.85 03/10/2021    LYMPHSABS 0.73 03/10/2021    EOSABS 0.21 03/10/2021    BASOSABS 0.00 03/10/2021     BMP:    Lab Results   Component Value Date     03/18/2021    K 4.6 03/18/2021    K 4.6 03/18/2021    CL 99 03/18/2021    CO2 29 03/18/2021    BUN 19 03/18/2021    LABALBU 3.0 03/18/2021    CREATININE 0.7 03/18/2021    CALCIUM 8.8 03/18/2021    GFRAA >60 03/18/2021    LABGLOM >60 03/18/2021    GLUCOSE 106 03/18/2021        PRO-BNP:   Lab Results   Component Value Date    PROBNP 14,904 (H) 03/18/2021    PROBNP 16,843 (H) 03/15/2021      ABGs:   Lab Results   Component Value Date    PH 7.413 03/09/2021    PO2 85.9 03/09/2021    PCO2 42.2 03/09/2021     Hemoglobin A1C: No components found for: HGBA1C    IMAGING:  Imaging tests were completed and reviewed and discussed radiology and care team involved and reveals   Echo Complete  Result Date: 3/2/2021  Findings   Left Ventricle  Mildly dilated left ventricle. Ejection fraction is visually estimated at 25-30%. Overall ejection fraction severely decreased . No regional wall motion abnormalities seen. Normal left ventricle wall thickness. Stage II diastolic dysfunction. Right Ventricle  Normal right ventricular size. Right ventricle global systolic function is moderately reduced . TAPSE 12  mm. Left Atrium  The left atrium is moderately dilated. Right Atrium  Mildly enlarged right atrium size. Mitral Valve  Mild mitral annular calcification. No evidence of mitral valve stenosis. Increased E point septal separation noted,suggesting decreased LV cardiac  output. Moderate mitral regurgitation with centrally directed jet.    Tricuspid Valve  The tricuspid valve appears structurally normal.  Mild to moderate tricuspid regurgitation. RVSP is 55 mmHg. Pulmonary hypertension is moderate . Aortic Valve  The aortic valve appears mildly sclerotic. The aortic valve is trileaflet. No hemodynamically significant aortic stenosis is present. Moderate aortic regurgitation is noted. Pulmonic Valve  Pulmonic valve is structurally normal.  Physiologic and/or trace pulmonic regurgitation present. No evidence of pulmonic valve stenosis. Pericardial Effusion  Trace pericardial effusion. Pleural Effusion  No evidence of pleural effusion. Aorta  Normal aortic root and ascending aorta. Miscellaneous  Dilated Inferior Vena Cava. Inferior Vena Cava, normal respiratory variation. Conclusions   Summary  Mildly dilated left ventricle. Ejection fraction is visually estimated at 25-30%. Overall ejection fraction severely decreased . No regional wall motion abnormalities seen. Normal left ventricle wall thickness. Stage II diastolic dysfunction. The left atrium is moderately dilated. Normal right ventricular size. Right ventricle global systolic function is moderately reduced . TAPSE 12  mm  Moderate mitral regurgitation with centrally directed jet. No hemodynamically significant aortic stenosis is present. Moderate aortic regurgitation is noted. Mild to moderate tricuspid regurgitation. RVSP is 55 mmHg. Pulmonary hypertension is moderate . Trace pericardial effusion. No previous echo for comparison. Cta Chest W Contrast    Result Date: 3/10/2021  EXAMINATION: CTA OF THE CHEST 3/10/2021 4:42 am TECHNIQUE: CTA of the chest was performed after the administration of intravenous contrast.  Multiplanar reformatted images are provided for review. MIP images are provided for review. Dose modulation, iterative reconstruction, and/or weight based adjustment of the mA/kV was utilized to reduce the radiation dose to as low as reasonably achievable. COMPARISON: None.  HISTORY: ORDERING SYSTEM PROVIDED HISTORY: PE TECHNOLOGIST PROVIDED HISTORY: Reason for exam:->PE What reading provider will be dictating this exam?->CRC FINDINGS: Pulmonary Arteries: There is a subsegmental pulmonary embolus in the right lower lobe seen on images 174-186. There is a segmental pulmonary embolus in the right lower lobe best seen on coronal series 9, image 97. Mediastinum: There are coronary artery calcifications. There is also calcified plaque throughout thoracic aorta. There is no abnormal mediastinal or hilar mass seen. Lungs/pleura: There are bilateral small to moderate pleural effusions. There is infiltrate and/or atelectasis in the left lower lobe. There is some dependent atelectasis in the right lower lobe. There is a peripheral masslike density in the right lower lobe at the peripheral lung base measuring 2.1 x 1.5 cm. This is in the distribution of the small subsegmental embolus and may relate to a small pulmonary infarct. Upper Abdomen: Limited images of the upper abdomen are unremarkable. Soft Tissues/Bones: No acute bone or soft tissue abnormality. Right lower lobe segmental pulmonary embolus. Left lower lobe subsegmental pulmonary embolus. There is nonspecific airspace disease in the right lower lobe. There is a peripheral masslike density in the left lower lobe, in the distribution of the subsegmental embolus. Well a mass is not excluded, this more likely relates to small pulmonary infarct. Suggest short-term follow-up to confirm resolution. Bilateral small to moderate pleural effusions. Findings were sent to Radiology Results [address] at 5:18 a.m. on 03/10/2021 to be communicated to a licensed caregiver. Assessment:   1. Intermediate-Low Risk Pulmonary Embolism, likely provoked in setting of decreased mobility and CHF  2. NSVT  3. Non ischemic CMP  4. 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  Acute Decompensated HFrEF (25%, G2DD)  6. Bilateral Pleural Effusions, likely cardiogenic  7. Acute Hypoxic Respiratory Failure  8. CAD/MI, s/p PCI with bare metal stent to RCA in 2007  9. Hypertension  10. Hyperlipidemia  11. Insulin-Dependent Diabetes Mellitus Type 2  12. H/o Tobacco Abuse  13. Anxiety Disorder      Plan:     1. Resume Eliquis  2. Eleveated LVEDV continued diuresis change to po  3. Monitor electrolytes with diuresis, replace prn  4. EP consult for NSVT  5. Wean off oxygen if able  6. Continue with Entrestro, Toprol XL   7. Will follow  8. Sleep Study as outpatient  9.  Add thiamine for 5 days         Yazmin Lyn DO  3/19/2021  11:53 AM

## 2021-03-20 VITALS
WEIGHT: 158 LBS | HEART RATE: 76 BPM | HEIGHT: 61 IN | BODY MASS INDEX: 29.83 KG/M2 | OXYGEN SATURATION: 92 % | SYSTOLIC BLOOD PRESSURE: 115 MMHG | DIASTOLIC BLOOD PRESSURE: 56 MMHG | RESPIRATION RATE: 20 BRPM | TEMPERATURE: 97.5 F

## 2021-03-20 LAB
ANION GAP SERPL CALCULATED.3IONS-SCNC: 14 MMOL/L (ref 7–16)
BUN BLDV-MCNC: 22 MG/DL (ref 8–23)
CALCIUM SERPL-MCNC: 9.4 MG/DL (ref 8.6–10.2)
CHLORIDE BLD-SCNC: 96 MMOL/L (ref 98–107)
CO2: 27 MMOL/L (ref 22–29)
CREAT SERPL-MCNC: 0.8 MG/DL (ref 0.5–1)
GFR AFRICAN AMERICAN: >60
GFR NON-AFRICAN AMERICAN: >60 ML/MIN/1.73
GLUCOSE BLD-MCNC: 102 MG/DL (ref 74–99)
MAGNESIUM: 1.8 MG/DL (ref 1.6–2.6)
METER GLUCOSE: 136 MG/DL (ref 74–99)
METER GLUCOSE: 227 MG/DL (ref 74–99)
POTASSIUM REFLEX MAGNESIUM: 5.1 MMOL/L (ref 3.5–5)
SODIUM BLD-SCNC: 137 MMOL/L (ref 132–146)

## 2021-03-20 PROCEDURE — 6370000000 HC RX 637 (ALT 250 FOR IP): Performed by: INTERNAL MEDICINE

## 2021-03-20 PROCEDURE — 83735 ASSAY OF MAGNESIUM: CPT

## 2021-03-20 PROCEDURE — 2580000003 HC RX 258: Performed by: FAMILY MEDICINE

## 2021-03-20 PROCEDURE — 80048 BASIC METABOLIC PNL TOTAL CA: CPT

## 2021-03-20 PROCEDURE — 6370000000 HC RX 637 (ALT 250 FOR IP): Performed by: FAMILY MEDICINE

## 2021-03-20 PROCEDURE — 99233 SBSQ HOSP IP/OBS HIGH 50: CPT | Performed by: INTERNAL MEDICINE

## 2021-03-20 PROCEDURE — 36415 COLL VENOUS BLD VENIPUNCTURE: CPT

## 2021-03-20 PROCEDURE — 6360000002 HC RX W HCPCS: Performed by: INTERNAL MEDICINE

## 2021-03-20 PROCEDURE — 82962 GLUCOSE BLOOD TEST: CPT

## 2021-03-20 PROCEDURE — 2700000000 HC OXYGEN THERAPY PER DAY

## 2021-03-20 RX ORDER — METOPROLOL SUCCINATE 200 MG/1
200 TABLET, EXTENDED RELEASE ORAL DAILY
Qty: 30 TABLET | Refills: 0 | Status: SHIPPED | OUTPATIENT
Start: 2021-03-21 | End: 2021-04-05 | Stop reason: SDUPTHER

## 2021-03-20 RX ORDER — FUROSEMIDE 40 MG/1
40 TABLET ORAL DAILY
Qty: 30 TABLET | Refills: 0 | Status: SHIPPED | OUTPATIENT
Start: 2021-03-20 | End: 2021-03-20 | Stop reason: HOSPADM

## 2021-03-20 RX ADMIN — ALPRAZOLAM 2 MG: 1 TABLET ORAL at 06:32

## 2021-03-20 RX ADMIN — METOPROLOL SUCCINATE 200 MG: 100 TABLET, EXTENDED RELEASE ORAL at 10:04

## 2021-03-20 RX ADMIN — ASPIRIN 81 MG: 81 TABLET, COATED ORAL at 10:04

## 2021-03-20 RX ADMIN — SACUBITRIL AND VALSARTAN 1 TABLET: 49; 51 TABLET, FILM COATED ORAL at 10:04

## 2021-03-20 RX ADMIN — PANTOPRAZOLE SODIUM 40 MG: 40 TABLET, DELAYED RELEASE ORAL at 10:04

## 2021-03-20 RX ADMIN — THIAMINE HYDROCHLORIDE 100 MG: 100 INJECTION, SOLUTION INTRAMUSCULAR; INTRAVENOUS at 10:04

## 2021-03-20 RX ADMIN — SPIRONOLACTONE 25 MG: 25 TABLET ORAL at 11:35

## 2021-03-20 RX ADMIN — MAGNESIUM GLUCONATE 500 MG ORAL TABLET 400 MG: 500 TABLET ORAL at 10:04

## 2021-03-20 RX ADMIN — FUROSEMIDE 40 MG: 40 TABLET ORAL at 10:38

## 2021-03-20 RX ADMIN — ESCITALOPRAM 10 MG: 10 TABLET, FILM COATED ORAL at 10:04

## 2021-03-20 RX ADMIN — INSULIN LISPRO 4 UNITS: 100 INJECTION, SOLUTION INTRAVENOUS; SUBCUTANEOUS at 12:10

## 2021-03-20 RX ADMIN — Medication 2000 UNITS: at 10:03

## 2021-03-20 RX ADMIN — Medication 10 ML: at 12:05

## 2021-03-20 RX ADMIN — ENOXAPARIN SODIUM 70 MG: 80 INJECTION SUBCUTANEOUS at 10:03

## 2021-03-20 RX ADMIN — CETIRIZINE HYDROCHLORIDE 5 MG: 10 TABLET, FILM COATED ORAL at 10:03

## 2021-03-20 ASSESSMENT — PAIN DESCRIPTION - PROGRESSION: CLINICAL_PROGRESSION: NOT CHANGED

## 2021-03-20 NOTE — CARE COORDINATION
Daryl notified of discharge today. They have homecare orders. Patient on room air. For questions I can be reached at [phone].  Dustin Blackburn [facility]

## 2021-03-20 NOTE — PLAN OF CARE
Problem: Skin Integrity:  Goal: Will show no infection signs and symptoms  Description: Will show no infection signs and symptoms  3/20/2021 0355 by Geralynn Cushing, RN  Outcome: Met This Shift     Problem: Skin Integrity:  Goal: Absence of new skin breakdown  Description: Absence of new skin breakdown  3/20/2021 0355 by Geralynn Cushing, RN  Outcome: Met This Shift     Problem: Falls - Risk of:  Goal: Will remain free from falls  Description: Will remain free from falls  3/20/2021 0355 by Geralynn Cushing, RN  Outcome: Met This Shift     Problem: Falls - Risk of:  Goal: Absence of physical injury  Description: Absence of physical injury  3/20/2021 0355 by Geralynn Cushing, RN  Outcome: Met This Shift

## 2021-03-20 NOTE — PROGRESS NOTES
[facility]  Department of Internal Medicine  Division of Pulmonary, Critical Care and Sleep Medicine  Progress Note  Indy Martinez DO, Everardo Birch MD, [place]    Patient: Hawk Cornell  MRN: [id number]  : 1945    Encounter Time: 1:42 PM     Date of Admission: 3/9/2021  2:22 PM    Primary Care Physician: Sedrick CitizenFILI    Reason for Consultation: Pulmonary embolism, bilateral pleural effusions     SUBJECTIVE:    - 5.9 liters,   LHC non ischemic CMP  Life vest placed  NSVT EP consulted  PFT severe restrictive       OBJECTIVE:     PHYSICAL EXAM:   VITALS:   Vitals:    21 2115 21 0029 21 0456 21 0815   BP: (!) 120/57 116/63  (!) 115/56   Pulse: 78 73  76   Resp:    Temp: 96.4 °F (35.8 °C) 96.7 °F (35.9 °C)  97.5 °F (36.4 °C)   TempSrc: Temporal Temporal  Temporal   SpO2: 96% 97%  92%   Weight:   158 lb (71.7 kg)    Height:            Intake/Output Summary (Last 24 hours) at 3/20/2021 1342  Last data filed at 3/20/2021 0932  Gross per 24 hour   Intake 180 ml   Output 1050 ml   Net -870 ml        CONSTITUTIONAL:   A&O x 3, NAD  SKIN:     No rash, No suspicious lesions or skin discoloration  HEENT:     EOMI, MMM, No thrush  NECK:    No bruits, No JVP appreciated  CV:      Sinus,  No murmur, No rubs, No gallops  PULMONARY:   Couse BS, bilateral crackles noted  ABDOMEN:     Soft, non-tender. BS normal. No R/R/G  EXT:    No deformities . No clubbing.       + lower extremity edema, No venous stasis  PULSE:   Appears equal and palpable.   PSYCHIATRIC:  Seems appropriate, No acute psycosis  MS:    No fractures, No gross weakness  NEUROLOGIC:   The clinical assessment is non-focal     DATA: IMAGING & TESTING:     LABORATORY TESTS:    CBC with Differential:    Lab Results   Component Value Date    WBC 10.3 2021    RBC 4.16 2021    HGB 12.4 2021    HCT 41.4 2021     2021    MCV 99.5 03/16/2021    MCH 29.8 03/16/2021    MCHC 30.0 03/16/2021    RDW 14.3 03/16/2021    SEGSPCT 62 08/30/2012    BLASTSPCT 0.9 03/10/2021    METASPCT 0.9 03/09/2021    LYMPHOPCT 6.1 03/10/2021    PROMYELOPCT 0.9 03/10/2021    MONOPCT 7.0 03/10/2021    BASOPCT 0.2 03/10/2021    MONOSABS 0.85 03/10/2021    LYMPHSABS 0.73 03/10/2021    EOSABS 0.21 03/10/2021    BASOSABS 0.00 03/10/2021     BMP:    Lab Results   Component Value Date     03/20/2021    K 5.1 03/20/2021    CL 96 03/20/2021    CO2 27 03/20/2021    BUN 22 03/20/2021    LABALBU 3.0 03/18/2021    CREATININE 0.8 03/20/2021    CALCIUM 9.4 03/20/2021    GFRAA >60 03/20/2021    LABGLOM >60 03/20/2021    GLUCOSE 102 03/20/2021        PRO-BNP:   Lab Results   Component Value Date    PROBNP 14,904 (H) 03/18/2021    PROBNP 16,843 (H) 03/15/2021      ABGs:   Lab Results   Component Value Date    PH 7.413 03/09/2021    PO2 85.9 03/09/2021    PCO2 42.2 03/09/2021     Hemoglobin A1C: No components found for: HGBA1C    IMAGING:  Imaging tests were completed and reviewed and discussed radiology and care team involved and reveals   Echo Complete  Result Date: 3/2/2021  Findings   Left Ventricle  Mildly dilated left ventricle. Ejection fraction is visually estimated at 25-30%. Overall ejection fraction severely decreased . No regional wall motion abnormalities seen. Normal left ventricle wall thickness. Stage II diastolic dysfunction. Right Ventricle  Normal right ventricular size. Right ventricle global systolic function is moderately reduced . TAPSE 12  mm. Left Atrium  The left atrium is moderately dilated. Right Atrium  Mildly enlarged right atrium size. Mitral Valve  Mild mitral annular calcification. No evidence of mitral valve stenosis. Increased E point septal separation noted,suggesting decreased LV cardiac  output. Moderate mitral regurgitation with centrally directed jet.    Tricuspid Valve  The tricuspid valve appears structurally normal.  Mild PE TECHNOLOGIST PROVIDED HISTORY: Reason for exam:->PE What reading provider will be dictating this exam?->CRC FINDINGS: Pulmonary Arteries: There is a subsegmental pulmonary embolus in the right lower lobe seen on images 174-186. There is a segmental pulmonary embolus in the right lower lobe best seen on coronal series 9, image 97. Mediastinum: There are coronary artery calcifications. There is also calcified plaque throughout thoracic aorta. There is no abnormal mediastinal or hilar mass seen. Lungs/pleura: There are bilateral small to moderate pleural effusions. There is infiltrate and/or atelectasis in the left lower lobe. There is some dependent atelectasis in the right lower lobe. There is a peripheral masslike density in the right lower lobe at the peripheral lung base measuring 2.1 x 1.5 cm. This is in the distribution of the small subsegmental embolus and may relate to a small pulmonary infarct. Upper Abdomen: Limited images of the upper abdomen are unremarkable. Soft Tissues/Bones: No acute bone or soft tissue abnormality. Right lower lobe segmental pulmonary embolus. Left lower lobe subsegmental pulmonary embolus. There is nonspecific airspace disease in the right lower lobe. There is a peripheral masslike density in the left lower lobe, in the distribution of the subsegmental embolus. Well a mass is not excluded, this more likely relates to small pulmonary infarct. Suggest short-term follow-up to confirm resolution. Bilateral small to moderate pleural effusions. Findings were sent to Radiology Results [address] at 5:18 a.m. on 03/10/2021 to be communicated to a licensed caregiver. Assessment:   1. Intermediate-Low Risk Pulmonary Embolism, likely provoked in setting of decreased mobility and CHF  2. NSVT  3. Non ischemic CMP  4. 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  Acute Decompensated HFrEF (25%, G2DD)  6. Bilateral Pleural Effusions, likely cardiogenic  7. Acute Hypoxic Respiratory Failure  8. CAD/MI, s/p PCI with bare metal stent to RCA in 2007  9. Hypertension  10. Hyperlipidemia  11. Insulin-Dependent Diabetes Mellitus Type 2  12. H/o Tobacco Abuse  13. Anxiety Disorder      Plan:     1. Resume Eliquis  2. Eleveated LVEDV continued diuresis change to po  3. Monitor electrolytes with diuresis, replace prn  4. EP consult for NSVT  5. Wean off oxygen if able  6. Continue with Entrestro, Toprol XL   7. Will follow  8. Sleep Study as outpatient  9. Add thiamine for 5 days  10.  Ok to discharge from pulmonary POV     Valeria Marr DO  3/20/2021  1:42 PM

## 2021-03-20 NOTE — DISCHARGE SUMMARY
Hospitalist Discharge Summary    Patient ID: Landon Restrepo   Patient : 1945  Patient's PCP: Day Pena PA-C    Admit Date: 3/9/2021   Admitting Physician: Miky Cohen MD    Discharge Date:  3/20/2021   Discharge Physician: Josefina Gibson MD   Discharge Condition: Stable  Discharge Disposition: Home with [facility] course in brief:  (Please refer to daily progress notes for a comprehensive review of the hospitalization by requesting medical records)      70 y.o. female who presented to [address] [place] [address] with past medical history of CHF, hyperlipidemia, coronary artery disease status post RCA stents in , IBS, obesity, hypertension, hyperlipidemia, diabetes, sleep apnea and unsteady gait. Patient was at [facility] on 2021, she left [place] on 3/3/2021 because \"I did not like the care there\". She was admitted for vertigo, KIKI, dizziness and sphenoid sinusitis on CT scan. She had wax removed from her left ear. She was given Rocephin and ENT saw her for benign positional vertigo. While in the hospital, she had runs of V. tach, she had low magnesium that was replaced. Echo obtained during that admission showed that her EF had dropped from 60% 3 years ago to 25%. Patient was seen by cardiologist and stress test was planned however patient left before completion of work-up. She had some changes in mental status/behavioral dyscontrol for which they wanted her to see psychiatry, this did not happen as well. She was discharged on Ceftin, she does not know if she was taking it.     Patient came back to the ER today with altered mental status described as mostly confusion, this is constant, moderate intensity, associated with shortness of breath. Patient answers \"I do not know\" to many questions. She does not know why she is in the hospital and she does not think she is sick. She is visibly dyspneic and states that this is normal for her.   She reports falling 2 weeks ago and complains of pain in the left upper abdomen which is sharp and worse with movement. She has some leg swelling. No cough or fever. Denies any chest pain.     Vital signs notable for heart rate of 129, respiratory rate 30, blood pressure 190/119. Labs showed troponin 0 0.03, AST 48, ALT 70, alkaline phosphatase 194, urinalysis is negative besides crystals, glucose 143, INR 1.5, lactic acid level 2.1, white count 12.1, proBNP 33 238, chest x-ray shows pneumonia versus CHF. CT scan of the head is negative. EKG shows sinus tachycardia rate of 111 with Q waves in V1 through V3. Echo in February showed EF of 25 to 30% with grade 2 diastolic dysfunction, no wall motion abnormalities, moderate MR, mild to moderate TR, moderate AR and moderate pulmonary hypertension with pressure of 55 mmHg. Echo in 2018 showed EF of 60% with no significant valvular heart disease.           Hospital course  Consultations with cardiology and pulmonology services  Patient started on therapeutic Lovenox and transition to oral Eliquis  Patient had continued IV diuresis with plans of changing to oral Lasix. Patient declined cardiac catheterization initially. She was unsure she if she wants to wear LifeVest at discharge. Lengthy conversations were had. Patient was overwhelmed. Allergic medical therapy was initiated while in house with [facility]. On 3/16 patient agreed for cardiac catheterization. Cath is nonischemic  Discharged with LifeVest      Discharge exam  BP (!) 115/56   Pulse 76   Temp 97.5 °F (36.4 °C) (Temporal)   Resp 20   Ht 5' 1\" (1.549 m)   Wt 158 lb (71.7 kg)   SpO2 92%   BMI 29.85 kg/m²   General appearance: No apparent distress, appears stated age and cooperative. HEENT:  Conjunctivae/corneas clear. Neck: Supple. No jugular venous distention. Respiratory: Left base rales today, Right clear.   No increased work of breathing  Cardiovascular: Regular rate rhythm, normal S1-S2  Abdomen: Soft, nontender, Any Other  Procedure Type of Study   TTE procedure:Echo Complete W/Doppler & Color Flow. Procedure Date Date: 03/02/2021 Start: 06:57 AM Study Location: Portable Technical Quality: Adequate visualization Indications:Ventricular tachycardia. Patient Status: Routine Height: 62 inches Weight: 160 pounds BSA: 1.74 m^2 BMI: 29.26 kg/m^2 HR: 80 bpm BP: 156/88 mmHg  Findings   Left Ventricle  Mildly dilated left ventricle. Ejection fraction is visually estimated at 25-30%. Overall ejection fraction severely decreased . No regional wall motion abnormalities seen. Normal left ventricle wall thickness. Stage II diastolic dysfunction. Right Ventricle  Normal right ventricular size. Right ventricle global systolic function is moderately reduced . TAPSE 12  mm. Left Atrium  The left atrium is moderately dilated. Right Atrium  Mildly enlarged right atrium size. Mitral Valve  Mild mitral annular calcification. No evidence of mitral valve stenosis. Increased E point septal separation noted,suggesting decreased LV cardiac  output. Moderate mitral regurgitation with centrally directed jet. Tricuspid Valve  The tricuspid valve appears structurally normal.  Mild to moderate tricuspid regurgitation. RVSP is 55 mmHg. Pulmonary hypertension is moderate . Aortic Valve  The aortic valve appears mildly sclerotic. The aortic valve is trileaflet. No hemodynamically significant aortic stenosis is present. Moderate aortic regurgitation is noted. Pulmonic Valve  Pulmonic valve is structurally normal.  Physiologic and/or trace pulmonic regurgitation present. No evidence of pulmonic valve stenosis. Pericardial Effusion  Trace pericardial effusion. Pleural Effusion  No evidence of pleural effusion. Aorta  Normal aortic root and ascending aorta. Miscellaneous  Dilated Inferior Vena Cava. Inferior Vena Cava, normal respiratory variation.    Conclusions   Summary  Mildly dilated left ventricle. Ejection fraction is visually estimated at 25-30%. Overall ejection fraction severely decreased . No regional wall motion abnormalities seen. Normal left ventricle wall thickness. Stage II diastolic dysfunction. The left atrium is moderately dilated. Normal right ventricular size. Right ventricle global systolic function is moderately reduced . TAPSE 12  mm  Moderate mitral regurgitation with centrally directed jet. No hemodynamically significant aortic stenosis is present. Moderate aortic regurgitation is noted. Mild to moderate tricuspid regurgitation. RVSP is 55 mmHg. Pulmonary hypertension is moderate . Trace pericardial effusion. No previous echo for comparison.    Signature   ----------------------------------------------------------------  Electronically signed by Deb Chase MD(Interpreting  physician) on 03/02/2021 08:15 PM  ----------------------------------------------------------------  M-Mode/2D Measurements & Calculations   LV Diastolic    LV Systolic Dimension: 4.8   AV Cusp Separation: 1.7 cmLA  Dimension: 5.5  cm                           Dimension: 4.6 cmAO Root  cm              LV Volume Diastolic: 521.6   Dimension: 2.9 cm  LV FS:12.7 %    ml  LV PW           LV Volume Systolic: 306.0 ml  Diastolic: 0.8  LV EDV/LV EDV Index: 150.4  cm              ml/86 ml/m^2LV ESV/LV ESV    RV Diastolic Dimension: 3.4  LV PW Systolic: Index: 382.3 EJ/21DF/ m^2    cm  0.9 cm          EF Calculated: 30 %  Septum          LV Mass Index: 92 l/min*m^2  LA/Aorta: 7.39  Diastolic: 0.8  LV Length: 7.9 cm            Ascending Aorta: 3.5 cm  cm                                           LA volume/Index: 80 ml  Septum          LVOT: 2 cm                   /40.28DD/K^6  Systolic: 1 cm                               RA Area: 23 cm^2  CO: 3.87 l/min  CI: 2.22                                     IVC Expiration: 2.4 cm  l/m*m^2  LV Mass: 159.96  g  Doppler Measurements & Calculations   MV Peak E-Wave: 0.63 m/s  AV Peak Velocity:     LVOT Peak Velocity: 0.91  MV Peak A-Wave: 0.58 m/s  1.33 m/s              m/s  MV E/A Ratio: 1.08        AV Peak Gradient:     LVOT Mean Velocity: 0.55  MV Peak Gradient: 2.1     7.02 mmHg             m/s  mmHg                      AV Mean Velocity:     LVOT Peak Gradient: 3.3  MV Mean Gradient: 1.1     0.84 m/s              mmHgLVOT Mean Gradient:  mmHg                      AV Mean Gradient: 3.3 1.5 mmHg  MV Mean Velocity: 0.49    mmHg                  Estimated RVSP: 54.6 mmHg  m/s                       AV VTI: 21.3 cm       Estimated RAP:8 mmHg  MV Deceleration Time: 102 AV Area  msec                      (Continuity):2.27  MV P1/2t: 62.6 msec       cm^2                  TR Velocity:3.41 m/s  MVA by PHT:3.51 cm^2      AV Deceleration Time: TR Gradient:46.59 mmHg  MV Area (continuity): 2.7 1233.9 msec           PV Peak Velocity: 0.59 m/s  cm^2                      LVOT VTI: 15.4 cm     PV Peak Gradient: 1.37  MV E' Septal Velocity:                          mmHg  0.03 m/s                  Estimated PASP: 54.59 PV Mean Velocity: 0.4 m/s  MV E' Lateral Velocity: 4 mmHg                  PV Mean Gradient: 0.7 mmHg  m/s  MR Velocity: 6.34 m/s                           MT ED Velocity: 1.24 m/s  MV DAYAMI PISA: 0.17 cm^2  MR VTI: 215.1 cm  Alias Velocity: 0.27  m/sPISA Radius: 0.8 cm   PISA area: 4.02 cm^2MR  flow rate: 109.75 ml/sMR  volume:36.57 ml  [email or web address] [id number]    Xr Thoracic Spine (2 Views)    Result Date: 2/27/2021  EXAMINATION: XRAY VIEWS OF THE THORACIC SPINE 2/27/2021 7:41 pm COMPARISON: 05/28/2013 HISTORY: ORDERING SYSTEM PROVIDED HISTORY: fall, back pain TECHNOLOGIST PROVIDED HISTORY: Reason for exam:->fall, back pain FINDINGS: No acute fractures. Multilevel degenerative changes with scoliosis. Cardiomegaly.   The visualized portion of the lungs are unremarkable. No evidence of acute thoracic spine trauma. Scoliosis with multilevel degenerative changes. Cardiomegaly. Xr Lumbar Spine (2-3 Views)    Result Date: 2/27/2021  EXAMINATION: THREE XRAY VIEWS OF THE LUMBAR SPINE 2/27/2021 7:41 pm COMPARISON: None. HISTORY: ORDERING SYSTEM PROVIDED HISTORY: back pain, fall TECHNOLOGIST PROVIDED HISTORY: Reason for exam:->back pain, fall FINDINGS: No fractures. Multilevel degenerative changes with grade 1 anterolisthesis L3 over L4. Scoliosis. Degenerative changes involving the SI joints. The hip joints are intact. Calcified plaque involving the abdominal aorta. No evidence of acute lumbar spine trauma. Scoliosis with multilevel degenerative changes and grade 1 anterolisthesis L3 over L4. Ct Head Wo Contrast    Result Date: 3/9/2021  EXAMINATION: CT OF THE HEAD WITHOUT CONTRAST  3/9/2021 6:18 pm TECHNIQUE: CT of the head was performed without the administration of intravenous contrast. Dose modulation, iterative reconstruction, and/or weight based adjustment of the mA/kV was utilized to reduce the radiation dose to as low as reasonably achievable. COMPARISON: February 27, 2021 HISTORY: ORDERING SYSTEM PROVIDED HISTORY: [facility] TECHNOLOGIST PROVIDED HISTORY: Reason for exam:->ams Has a \"code stroke\" or \"stroke alert\" been called? ->No Decision Support Exception->Emergency Medical Condition (MA) What reading provider will be dictating this exam?->CRC FINDINGS: BRAIN/VENTRICLES: There is no acute intracranial hemorrhage, mass effect or midline shift. No abnormal extra-axial fluid collection. The gray-white differentiation is maintained without evidence of an acute infarct. There is no evidence of hydrocephalus. ORBITS: The visualized portion of the orbits demonstrate no acute abnormality. SINUSES: Mucosal thickening associated with sphenoid sinus. Mastoid air cells are clear. SOFT TISSUES/SKULL:  No acute abnormality of the visualized skull or soft tissues. No acute intracranial abnormality. Ct Head Wo Contrast    Result Date: 2/27/2021  EXAMINATION: CT OF THE HEAD WITHOUT CONTRAST  2/27/2021 5:19 pm TECHNIQUE: CT of the head was performed without the administration of intravenous contrast. Dose modulation, iterative reconstruction, and/or weight based adjustment of the mA/kV was utilized to reduce the radiation dose to as low as reasonably achievable. COMPARISON: February 22, 2021 HISTORY: ORDERING SYSTEM PROVIDED HISTORY: dizziness, multiple falls recently, leaning to left with ambulation. TECHNOLOGIST PROVIDED HISTORY: Reason for exam:->dizziness, multiple falls recently, leaning to left with ambulation. Has a \"code stroke\" or \"stroke alert\" been called? ->No Decision Support Exception->Emergency Medical Condition (MA) FINDINGS: BRAIN/VENTRICLES: Stable mild cortical atrophy and periventricular ischemic changes. Stable old lacunar infarct right basal ganglia. There is no acute intracranial hemorrhage, mass effect or midline shift. No abnormal extra-axial fluid collection. The gray-white differentiation is maintained without evidence of an acute infarct. There is no evidence of hydrocephalus. ORBITS: The visualized portion of the orbits demonstrate no acute abnormality. SINUSES: The visualized mastoid air cells demonstrate no acute abnormality. Air-fluid level in the right sphenoid sinus. SOFT TISSUES/SKULL:  No acute abnormality of the visualized skull or soft tissues. No acute intracranial abnormality. Acute sphenoid sinusitis. Ct Head Wo Contrast    Result Date: 2/22/2021  EXAMINATION: CT OF THE HEAD WITHOUT CONTRAST  2/22/2021 6:40 pm TECHNIQUE: CT of the head was performed without the administration of intravenous contrast. Dose modulation, iterative reconstruction, and/or weight based adjustment of the mA/kV was utilized to reduce the radiation dose to as low as reasonably achievable.  COMPARISON: 11/23/2013 HISTORY: ORDERING SYSTEM PROVIDED HISTORY: vertigo TECHNOLOGIST PROVIDED HISTORY: Reason for exam:->vertigo Has a \"code stroke\" or \"stroke alert\" been called? ->No Decision Support Exception->Emergency Medical Condition (MA) Dizziness. Recent history of sinusitis. FINDINGS: Small benign osteoma stable in medial mid left ethmoid sinus. New slight mucosal thickening right sphenoid sinus with small fluid level. Slight mucosal thickening alveolar recess of right maxillary sinus. Clear mastoid air cells and middle ear cavities. Bone windows demonstrate no acute fracture. There is ventricular and sulcal prominence compatible with age-appropriate global cerebral cortical atrophy, slightly more pronounced than 2013. The brain contains no mass, mass effect, hemorrhage, or acute infarct. There is no extra-axial intracranial bleed or brain bleed. No evidence of midline shift. No acute CVA, intracranial bleed, or brain mass. Non-specific new mucosal thickening and small fluid level in right sphenoid sinus may reflect acute sinusitis    Xr Chest Portable    Result Date: 3/9/2021  EXAMINATION: ONE XRAY VIEW OF THE CHEST 3/9/2021 2:52 pm COMPARISON: September 2, 2019 HISTORY: ORDERING SYSTEM PROVIDED HISTORY: sob TECHNOLOGIST PROVIDED HISTORY: Reason for exam:->sob What reading provider will be dictating this exam?->CRC FINDINGS: The heart is enlarged. Bibasilar opacities worse on the left. No pneumothorax. Mild bilateral pleural effusion noted. CHF. Superimposed pneumonia cannot be excluded. Cta Chest W Contrast    Result Date: 3/10/2021  EXAMINATION: CTA OF THE CHEST 3/10/2021 4:42 am TECHNIQUE: CTA of the chest was performed after the administration of intravenous contrast.  Multiplanar reformatted images are provided for review. MIP images are provided for review. Dose modulation, iterative reconstruction, and/or weight based adjustment of the mA/kV was utilized to reduce the radiation dose to as low as reasonably achievable. COMPARISON: None. HISTORY: ORDERING SYSTEM PROVIDED HISTORY: PE TECHNOLOGIST PROVIDED HISTORY: Reason for exam:->PE What reading provider will be dictating this exam?->CRC FINDINGS: Pulmonary Arteries: There is a subsegmental pulmonary embolus in the right lower lobe seen on images 174-186. There is a segmental pulmonary embolus in the right lower lobe best seen on coronal series 9, image 97. Mediastinum: There are coronary artery calcifications. There is also calcified plaque throughout thoracic aorta. There is no abnormal mediastinal or hilar mass seen. Lungs/pleura: There are bilateral small to moderate pleural effusions. There is infiltrate and/or atelectasis in the left lower lobe. There is some dependent atelectasis in the right lower lobe. There is a peripheral masslike density in the right lower lobe at the peripheral lung base measuring 2.1 x 1.5 cm. This is in the distribution of the small subsegmental embolus and may relate to a small pulmonary infarct. Upper Abdomen: Limited images of the upper abdomen are unremarkable. Soft Tissues/Bones: No acute bone or soft tissue abnormality. Right lower lobe segmental pulmonary embolus. Left lower lobe subsegmental pulmonary embolus. There is nonspecific airspace disease in the right lower lobe. There is a peripheral masslike density in the left lower lobe, in the distribution of the subsegmental embolus. Well a mass is not excluded, this more likely relates to small pulmonary infarct. Suggest short-term follow-up to confirm resolution. Bilateral small to moderate pleural effusions. Findings were sent to Radiology Results [address] at 5:18 a.m. on 03/10/2021 to be communicated to a licensed caregiver.      Mri Brain Wo Contrast    Result Date: 3/1/2021  EXAMINATION: MRI OF THE BRAIN WITHOUT CONTRAST  3/1/2021 11:30 am TECHNIQUE: Multiplanar multisequence MRI of the brain was performed without the administration of intravenous contrast. COMPARISON: CT brain performed 02/27/2021. HISTORY: ORDERING SYSTEM PROVIDED HISTORY: persistent vertigo; leaning to right side when standing TECHNOLOGIST PROVIDED HISTORY: Reason for exam:->persistent vertigo; leaning to right side when standing FINDINGS: INTRACRANIAL STRUCTURES/VENTRICLES: The sellar and suprasellar structures, optic chiasm, corpus callosum, pineal gland, tectum, and midline brainstem structures are unremarkable. The craniocervical junction is unremarkable. There is no acute hemorrhage, mass effect, or midline shift. There is satisfactory overall gray-white matter differentiation. There is chronic microvascular disease. The ventricular structures are symmetric and unremarkable. The infratentorial structures including the cerebellopontine angles and internal auditory canals are unremarkable. There is no abnormal restricted diffusion. There is no abnormal blooming artifact on susceptibility weighted imaging. ORBITS: The visualized portion of the orbits demonstrate no acute abnormality. SINUSES: There is chronic sinusitis involving the right sphenoid sinus. BONES/SOFT TISSUES: The bone marrow signal intensity appears normal. The soft tissues demonstrate no acute abnormality. Chronic microvascular disease without acute intracranial abnormality. Chronic sinusitis involving the right sphenoid sinus. Us Carotid Artery Bilateral    Result Date: 3/3/2021  EXAMINATION: ULTRASOUND EVALUATION OF THE CAROTID ARTERIES 3/1/2021 COMPARISON: None. HISTORY: ORDERING SYSTEM PROVIDED HISTORY: vertigo; hx of hyperlipidemia/DM2 TECHNOLOGIST PROVIDED HISTORY: Reason for exam:->vertigo; hx of hyperlipidemia/DM2 FINDINGS: RIGHT: The right common carotid artery demonstrates peak systolic velocities of 36 cm/sec in the proximal and distal segments respectively. The right internal carotid artery demonstrates the systolic velocities of 61 cm/sec in the proximal, mid and distal segments respectively.  There is antegrade flow within the right vertebral artery. There is a mild amount of noncalcific plaque in the distal common carotid artery and bulb area. The external carotid artery is patent. There is no grayscale evidence of significant stenosis. ICA/CCA ratio of 1.7. LEFT: The left common carotid artery demonstrates peak systolic velocities of 99.5 cm/sec in the proximal and distal segments respectively. The left internal carotid artery demonstrates the systolic velocities of 54.9 cm/sec in the proximal, mid and distal segments respectively. There is a small amount of calcific plaque at the bifurcation. There is antegrade flow within the left vertebral artery. There is no grayscale evidence of significant stenosis. The external carotid artery is patent. ICA/CCA ratio of 1.2. No evidence of hemodynamically significant stenosis. Bilateral vertebral arteries are patent with flow in the normal direction. Us Dup Lower Extremities Bilateral Venous    Result Date: 3/10/2021  Patient MRN:  [id number] : 1945 Age: 70 years Gender: Female Order Date:  3/10/2021 2:09 PM EXAM: US DUP LOWER EXTREMITIES BILATERAL VENOUS NUMBER OF IMAGES:  55 INDICATION:  DVT DVT What reading provider will be dictating this exam?->[facility] COMPARISON: None Within the visualized vessels, there is no evidence for deep venous thrombosis There is good compressibility, there is good augmentation, there is good color flow.      Within the visualized vessels there is no evidence for deep venous thrombosis       Discharge Medications:      Medication List      START taking these medications    apixaban starter pack 5 MG Tbpk tablet  Commonly known as: ELIQUIS  Take 1 tablet by mouth See Admin Instructions     atorvastatin 40 MG tablet  Commonly known as: Lipitor  Take 1 tablet by mouth daily     furosemide 40 MG tablet  Commonly known as: LASIX  Take 1 tablet by mouth daily     magnesium oxide 400 (241.3 Mg) MG Tabs tablet  Commonly known as: MAG-OX  Take 1 tablet by mouth daily     sacubitril-valsartan 49-51 MG per tablet  Commonly known as: ENTRESTO  Take 1 tablet by mouth 2 times daily        CHANGE how you take these medications    metoprolol succinate 200 MG extended release tablet  Commonly known as: TOPROL XL  Take 1 tablet by mouth daily  Start taking on: March 21, 2021  What changed:   · medication strength  · See the new instructions.         CONTINUE taking these medications    ALPRAZolam 2 MG tablet  Commonly known as: XANAX     aspirin EC 81 MG EC tablet     azelastine 0.1 % nasal spray  Commonly known as: ASTELIN     Co-Enzyme Q-10 100 MG Caps     escitalopram 10 MG tablet  Commonly known as: LEXAPRO  take 1 tablet by mouth once daily     fenofibrate micronized 134 MG capsule  Commonly known as: LOFIBRA  Take 1 capsule by mouth every morning (before breakfast)     fluticasone 50 MCG/ACT nasal spray  Commonly known as: FLONASE     loratadine 10 MG tablet  Commonly known as: CLARITIN  Take 1 tablet by mouth daily     metFORMIN 500 MG extended release tablet  Commonly known as: GLUCOPHAGE-XR  Take 1 tablet by mouth 2 times daily (with meals)     pantoprazole 40 MG tablet  Commonly known as: PROTONIX  TAKE ONE TABLET BY MOUTH ONCE DAILY     Vitamin D3 50 MCG (2000 UT) Caps        STOP taking these medications    cefUROXime 250 MG tablet  Commonly known as: CEFTIN     losartan 50 MG tablet  Commonly known as: COZAAR           Where to Get Your Medications      You can get these medications from any pharmacy    Bring a paper prescription for each of these medications  · apixaban starter pack 5 MG Tbpk tablet  · atorvastatin 40 MG tablet  · furosemide 40 MG tablet  · magnesium oxide 400 (241.3 Mg) MG Tabs tablet  · metoprolol succinate 200 MG extended release tablet  · sacubitril-valsartan 49-51 MG per tablet         Time Spent on discharge is more than 45 minutes in the examination, evaluation, counseling and review of medications and discharge plan.    +++++++++++++++++++++++++++++++++++++++++++++++++  Guillaume Donaldson  +++++++++++++++++++++++++++++++++++++++++++++++++  NOTE: This report was transcribed using voice recognition software. Every effort was made to ensure accuracy; however, inadvertent computerized transcription errors may be present.

## 2021-03-22 ENCOUNTER — CARE COORDINATION (OUTPATIENT)
Dept: CASE MANAGEMENT | Age: 76
End: 2021-03-22

## 2021-03-22 ENCOUNTER — OFFICE VISIT (OUTPATIENT)
Dept: CARDIOLOGY CLINIC | Age: 76
End: 2021-03-22
Payer: MEDICARE

## 2021-03-22 VITALS
OXYGEN SATURATION: 96 % | RESPIRATION RATE: 20 BRPM | DIASTOLIC BLOOD PRESSURE: 68 MMHG | BODY MASS INDEX: 29.85 KG/M2 | HEART RATE: 89 BPM | HEIGHT: 61 IN | SYSTOLIC BLOOD PRESSURE: 110 MMHG

## 2021-03-22 DIAGNOSIS — G47.10 HYPERSOMNOLENCE: ICD-10-CM

## 2021-03-22 DIAGNOSIS — R53.82 CHRONIC FATIGUE, UNSPECIFIED: ICD-10-CM

## 2021-03-22 DIAGNOSIS — I50.41 ACUTE COMBINED SYSTOLIC AND DIASTOLIC CONGESTIVE HEART FAILURE (HCC): Primary | ICD-10-CM

## 2021-03-22 DIAGNOSIS — I42.8 NONISCHEMIC CARDIOMYOPATHY (HCC): ICD-10-CM

## 2021-03-22 PROCEDURE — 1036F TOBACCO NON-USER: CPT | Performed by: CLINICAL NURSE SPECIALIST

## 2021-03-22 PROCEDURE — 93000 ELECTROCARDIOGRAM COMPLETE: CPT | Performed by: INTERNAL MEDICINE

## 2021-03-22 PROCEDURE — G8427 DOCREV CUR MEDS BY ELIG CLIN: HCPCS | Performed by: CLINICAL NURSE SPECIALIST

## 2021-03-22 PROCEDURE — 1123F ACP DISCUSS/DSCN MKR DOCD: CPT | Performed by: CLINICAL NURSE SPECIALIST

## 2021-03-22 PROCEDURE — G8417 CALC BMI ABV UP PARAM F/U: HCPCS | Performed by: CLINICAL NURSE SPECIALIST

## 2021-03-22 PROCEDURE — 3017F COLORECTAL CA SCREEN DOC REV: CPT | Performed by: CLINICAL NURSE SPECIALIST

## 2021-03-22 PROCEDURE — 1111F DSCHRG MED/CURRENT MED MERGE: CPT | Performed by: CLINICAL NURSE SPECIALIST

## 2021-03-22 PROCEDURE — G8484 FLU IMMUNIZE NO ADMIN: HCPCS | Performed by: CLINICAL NURSE SPECIALIST

## 2021-03-22 PROCEDURE — 1090F PRES/ABSN URINE INCON ASSESS: CPT | Performed by: CLINICAL NURSE SPECIALIST

## 2021-03-22 PROCEDURE — 4040F PNEUMOC VAC/ADMIN/RCVD: CPT | Performed by: CLINICAL NURSE SPECIALIST

## 2021-03-22 PROCEDURE — G8400 PT W/DXA NO RESULTS DOC: HCPCS | Performed by: CLINICAL NURSE SPECIALIST

## 2021-03-22 PROCEDURE — 99214 OFFICE O/P EST MOD 30 MIN: CPT | Performed by: CLINICAL NURSE SPECIALIST

## 2021-03-22 RX ORDER — PERPHENAZINE 16 MG/1
TABLET, FILM COATED ORAL
Qty: 100 STRIP | Refills: 0 | Status: SHIPPED
Start: 2021-03-22 | End: 2021-06-16

## 2021-03-22 NOTE — TELEPHONE ENCOUNTER
Upon review of chart, patient seen by Sana Lowe NP today and is already scheduled for one month follow-up with Dr. Carlo Fernando.

## 2021-03-22 NOTE — CARE COORDINATION
EARNEST Colbert is a 89 year old female presenting with her daughter, for follow up of her DM.     LOV with me 7/10/17 for a candidal rash under her breasts. The Lotrisone didn't help, but washing and using a powder did. It is okay now.   She did stop her Simvastatin.     She notes occasionally having left sided headaches. She had a negative MRI 4/11/15. She doesn't take meds for it. She doesn't know what triggers them. 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 No new problems with it. Eyes last checked 10/26/16.     Last DM visit 5/2/17. She walks quite a bit, and uses her walker outside. She is a little unsteady.    On PDMP, temazepam last filled #30 on 6/21.     Current Outpatient Prescriptions   Medication Sig Dispense Refill   • oxyCODONE/APAP (PERCOCET) 5-325 MG per tablet Take 1 tablet by mouth every 4 hours as needed for Pain. Takes 4 daily. 120 tablet 0   • lisinopril (PRINIVIL,ZESTRIL) 40 MG tablet Take 1 tablet by mouth daily. 90 tablet 0   • Temazepam 30 MG capsule Take 1 capsule by mouth nightly as needed. 30 capsule 0   • levothyroxine (SYNTHROID, LEVOTHROID) 75 MCG tablet Take 1 tablet by mouth daily. 90 tablet 1   • chlorthalidone (THALITONE) 25 MG tablet TAKE 1 TABLET BY MOUTH DAILY 90 tablet 0   • metoPROLOL (LOPRESSOR) 50 MG tablet Take 1 tablet by mouth 2 times daily. 180 tablet 1   • traMADol (ULTRAM) 50 MG tablet Take 1 tablet by mouth every 6 hours as needed for Pain. Doesn't take often. Maybe 2 in a week. 30 tablet 0   • gabapentin (NEURONTIN) 300 MG capsule TAKE 1 CAPSULE BY MOUTH FOUR TIMES DAILY 360 capsule 0   • doxazosin (CARDURA) 1 MG tablet Take 1 tablet by mouth nightly. 90 tablet 3   • Blood Glucose Monitoring Suppl (ACCU-CHEK MARIOLA SMARTVIEW) W/DEVICE Kit 1 each daily. To test blood sugar once daily 100 each 3   • Glucose Blood (ACCU-CHEK SMARTVIEW) strip To test blood sugar once daily rotating between before breakfast and 2 hours after a meal 100 each 3   • DISPENSE  [facility] Transitions Initial Follow Up Call    Call within 2 business days of discharge: Yes    Patient: Hawk Cornell Patient : 1945   MRN: <[id number]>  Reason for Admission:   Discharge Date: 3/20/21 RARS: Readmission Risk Score: 18      Last Discharge [facility]       Complaint Diagnosis Description Type Department Provider    3/9/21 Shortness of Breath Congestive heart failure, unspecified HF chronicity, unspecified heart failure type ([address] . .. ED to Hosp-Admission (Discharged) (ADMITTED) Sudhir Rosario MD; Venancio Veras,... Attempted to reach pt for follow up call. Left message requesting call back.     Care Transitions 24 Hour Call    Care Transitions Interventions         Follow Up  Future Appointments   Date Time Provider Killian Cohn   2021 11:00 AM Professor Beau Uriostegui [place] [address] [facility]       Jessicajaison Scott Accucheck Fastclick Lancets to test blood sugar once daily       No current facility-administered medications for this visit.     No other use of herbal or over-the-counter medications.     Hemoglobin A1C (%)   Date Value   05/02/2017 6.4 (H)   09/13/2016 6.6 (H)     CHOLESTEROL (mg/dL)   Date Value   05/02/2017 192   06/04/2015 200     HDL (mg/dL)   Date Value   05/02/2017 39 (L)   06/04/2015 58     CALCULATED LDL (mg/dL)   Date Value   05/02/2017 105   06/04/2015 93     TRIGLYCERIDE (mg/dL)   Date Value   05/02/2017 242 (H)   06/04/2015 245 (H)     LDL (Direct) (mg/dL)   Date Value   03/25/2015 97   03/13/2014 141 (H)     Creatinine (mg/dL)   Date Value   09/13/2016 1.25 (H)   03/08/2016 1.29 (H)     MICROALBUMIN,UA (TTL) (mg/dL)   Date Value   03/30/2012 5.27     MICROALBUMIN, UA (TTL) (mg/dL)   Date Value   06/04/2015 3.24   11/05/2014 3.67     MICROALBUMIN/CREAT (mcg/mg)   Date Value   03/30/2012 62.2 (H)     MICROALBUMIN/CREATININE (mcg/mg)   Date Value   06/04/2015 49.5 (H)   11/05/2014 33.1 (H)     No results found for: 24PROT  No results found for: 24CREAT  No results found for: CRCL7    Visit Blood Pressure:   7/10/2017   110/64  5/02/2017   130/74    O - NAD. Blood pressure 132/74, pulse 60, resp. rate 16, weight 83.8 kg.   Neck - No carotid bruits. Lungs - CTA without wheezes, crackles, ronchi. Good air movement. Heart - RRR without murmurs, rubs, gallops Abd - soft, bowel sounds present,nontender, no HSM to percussion or palpation. No renal bruits. Extremities - no edema. Knobby varicosities of her lower legs. Feet - DPP 2+. Warm. Normal hair distribution. No calluses. No maceration between toes. Normal exam with [place] filament.     ASSESSMENT: (E08.42) Diabetic polyneuropathy associated with diabetes mellitus due to underlying condition (CMS/HCC)  (primary encounter diagnosis)  Comment: stable  Plan: refilled gabapentin (NEURONTIN) 300 MG capsule, BASIC         METABOLIC PANEL,  GLYCOHEMOGLOBIN        We will contact them with the results. RTO 3 months.     (G47.09) Other insomnia  Comment: stable  Plan: Temazepam 30 MG capsule        Refilled.     (G44.229) Chronic tension-type headache, not intractable  Comment:   Plan: Given Patient Information about headaches and triggers. Call or return to clinic as needed if these symptoms worsen, fail to improve as anticipated, or if new symptoms develop.

## 2021-03-22 NOTE — CARE COORDINATION
Verified on 3/22/21 that patient has a non-qualifying BPCI-A DRG of 286 for hospital encounter admission date 3/9/21 at [facility] [place] ([place]

## 2021-03-22 NOTE — PATIENT INSTRUCTIONS
- We are giving you samples of entresto until you are able to pick yours up from the pharmacy. Take these twice daily (at least two hours apart from the metoprolol)    - We ordered blood tests to check your kidney function, for fluid retention, and for viral infections. Home health care will draw this and send results to us. - Referral was given to Dr. Chun Hassan as you requested. -  When you are finished with home health care, we will send you to the cardiac rehab unit and CHF infusion clinic to help increase your endurance and monitor your medications, weights, and labs. - Weigh yourself daily    -Stay hydrated and make sure you are eating regular meals    -Diet should sodium restricted to 2 grams    -Again watch your daily weight trends and if you gain water weight please follow below instructions.    -If you gain 3-5 pounds in 2-3 days OR notice that you are retaining fluid in anyway just like you did before then take an extra dose of your water pill (lasix) every day until you lose the weight or feel better.     -If you notice that you have taken more than 2 extra doses in 1 week then please call and let us know. -If at any time you feel that you are retaining fluid, your medications are not working, or you feel ill in anyway, then please call us for either same day appointment or the next day, and for instructions. Our goal is to keep you out of the emergency room and the hospital and we have ways to do it. You just need to call us in a timely manner.     -If you become sick for other reasons, and notice that you are not urinating as much, the urine is very dark, you have significant diarrhea or vomiting, then please DO NOT take your water pill and CALL US immediately.   Patient Education        Cardiac Rehabilitation: Care Instructions  Your Care Instructions     Cardiac rehabilitation is a program for people who have a heart problem, such as a heart attack, heart failure, or a heart valve disease. The program includes exercise, lifestyle changes, education, and emotional support. Cardiac rehab can help you improve the quality of your life through better overall health. It can help you lose weight and feel better about yourself. On your cardiac rehab team, you may have your doctor, a nurse specialist, a dietitian, and a physical therapist. They will design your cardiac rehab program specifically for you. You will learn how to reduce your risk for heart problems, how to manage stress, and how to eat a heart-healthy diet. By the end of the program, you will be ready to maintain a healthier lifestyle on your own. Follow-up care is a key part of your treatment and safety. Be sure to make and go to all appointments, and call your doctor if you are having problems. It's also a good idea to know your test results and keep a list of the medicines you take. How can you care for yourself at home? · Take your medicines exactly as prescribed. Call your doctor if you think you are having a problem with your medicine. You will get more details on the specific medicines your doctor prescribes. · Weigh yourself every day if your doctor tells you to. Watch for sudden weight gain. Weigh yourself on the same scale with the same amount of clothing at the same time of day. · Plan your meals so that you are eating heart-healthy foods. ? Eat a variety of foods daily. Fresh fruits and vegetables and whole-grains are good choices. ? Limit your fat intake, especially saturated and trans fat. ? Limit salt (sodium). ? Increase fiber in your diet. ? Limit alcohol. · Learn how to take your pulse so that you can track your heart rate during exercise. · Always check with your doctor before you begin a new exercise program.  · Warm up before you exercise and cool down afterward for at least 15 minutes each. This will help your heart gradually prepare for and recover from exercise and avoid pushing your heart too hard.   · Stop exercising if you have any unusual discomfort, such as chest pain. · Do not smoke. Smoking can make heart problems worse. If you need help quitting, talk to your doctor about stop-smoking programs and medicines. These can increase your chances of quitting for good. When should you call for help? Call 911 anytime you think you may need emergency care. For example, call if:    · You have severe trouble breathing.     · You cough up pink, foamy mucus and you have trouble breathing.     · You have symptoms of a heart attack. These may include:  ? Chest pain or pressure, or a strange feeling in the chest.  ? Sweating. ? Shortness of breath. ? Nausea or vomiting. ? Pain, pressure, or a strange feeling in the back, neck, jaw, or upper belly or in one or both shoulders or arms. ? Lightheadedness or sudden weakness. ? A fast or irregular heartbeat. After you call 911, the  may tell you to chew 1 adult-strength or 2 to 4 low-dose aspirin. Wait for an ambulance. Do not try to drive yourself.     · You have angina symptoms (such as chest pain or pressure) that do not go away with rest or are not getting better within 5 minutes after you take a dose of nitroglycerin.     · You have symptoms of a stroke. These may include:  ? Sudden numbness, tingling, weakness, or loss of movement in your face, arm, or leg, especially on only one side of your body. ? Sudden vision changes. ? Sudden trouble speaking. ? Sudden confusion or trouble understanding simple statements. ? Sudden problems with walking or balance. ? A sudden, severe headache that is different from past headaches.     · You passed out (lost consciousness). Call your doctor now or seek immediate medical care if:    · You have new or increased shortness of breath.     · You are dizzy or lightheaded, or you feel like you may faint.     · You gain weight suddenly, such as more than 2 to 3 pounds in a day or 5 pounds in a week.  (Your doctor may suggest a different range of weight gain.)     · You have increased swelling in your legs, ankles, or feet. Watch closely for changes in your health, and be sure to contact your doctor if you have any problems. Where can you learn more? Go to https://danisha.[employer]. org and sign in to your Tame account. Enter K956 in the [employer] box to learn more about \"Cardiac Rehabilitation: Care Instructions. \"     If you do not have an account, please click on the \"Sign Up Now\" link. Current as of: August 31, 2020               Content Version: 12.8  © 2006-2021 [employer]. Care instructions adapted under license by [facility] ([facility]). If you have questions about a medical condition or this instruction, always ask your healthcare professional. [address] any warranty or liability for your use of this information. Patient Education        Learning About Heart Failure Zones  What are heart failure zones? Heart failure zones give you an easy way to see changes in your heart failure symptoms. They also tell you when you need to get help. Check every day to see which zone you are in. Green zone. You are doing well. This is where you want to be. · Your weight is stable. It's not going up or down. · You breathe easily. · You are sleeping well. You are able to lie flat without shortness of breath. · You can do your usual activities. Yellow zone. Be careful. Your symptoms are changing. Call your doctor. · You have new or increased shortness of breath. · You are dizzy or lightheaded, or you feel like you may faint. · You have sudden weight gain, such as more than 2 to 3 pounds in a day or 5 pounds in a week. (Your doctor may suggest a different range of weight gain.)  · You have increased swelling in your legs, ankles, or feet. · You are so tired or weak that you can't do your usual activities. · You are not sleeping well.  Shortness of breath wakes you up at night. You need extra pillows. Red zone. This is an emergency. Call 911. You have symptoms of sudden heart failure. For example:  · You have severe trouble breathing. · You cough up pink, foamy mucus. · You have a new irregular or fast heartbeat. You have symptoms of a heart attack. These may include:  · Chest pain or pressure, or a strange feeling in the chest.  · Sweating. · Shortness of breath. · Nausea or vomiting. · Pain, pressure, or a strange feeling in the back, neck, jaw, or upper belly or in one or both shoulders or arms. · Lightheadedness or sudden weakness. · A fast or irregular heartbeat. If you have symptoms of a heart attack: After you call 911, the  may tell you to chew 1 adult-strength or 2 to 4 low-dose aspirin. Wait for an ambulance. Do not try to drive yourself. Follow-up care is a key part of your treatment and safety. Be sure to make and go to all appointments, and call your doctor if you are having problems. It's also a good idea to know your test results and keep a list of the medicines you take. Where can you learn more? Go to https://[email or web address].[email or web address] org and sign in to your Sequenom account. Enter T174 in the [facility] box to learn more about \"Learning About Heart Failure Zones. \"     If you do not have an account, please click on the \"Sign Up Now\" link. Current as of: August 31, 2020               Content Version: 12.8  © 0811-1113 [employer]. Care instructions adapted under license by [facility] ([facility]). If you have questions about a medical condition or this instruction, always ask your healthcare professional. [place] [address] any warranty or liability for your use of this information. Patient Education        Avoiding Triggers With Heart Failure: Care Instructions  Your Care Instructions     Triggers are anything that make your heart failure flare up.  A flare-up is also called \"sudden heart failure\" or \"acute heart failure. \" When you have a flare-up, fluid builds up in your lungs, and you have problems breathing. You might need to go to the hospital. By watching for changes in your condition and avoiding triggers, you can prevent heart failure flare-ups. Follow-up care is a key part of your treatment and safety. Be sure to make and go to all appointments, and call your doctor if you are having problems. It's also a good idea to know your test results and keep a list of the medicines you take. How can you care for yourself at home? Watch for changes in your weight and condition  · Weigh yourself without clothing at the same time each day. Record your weight. Call your doctor if you have sudden weight gain, such as more than 2 to 3 pounds in a day or 5 pounds in a week. (Your doctor may suggest a different range of weight gain.) A sudden weight gain may mean that your heart failure is getting worse. · Keep a daily record of your symptoms. Write down any changes in how you feel, such as new shortness of breath, cough, or problems eating. Also record if your ankles are more swollen than usual and if you feel more tired than usual. Note anything that you ate or did that could have triggered these changes. Limit sodium  Sodium causes your body to hold on to extra water. This may cause your heart failure symptoms to get worse. People get most of their sodium from processed foods. Fast food and restaurant meals also tend to be very high in sodium. · Your doctor may suggest that you limit sodium. Your doctor can tell you how much sodium is right for you. This includes limiting sodium in cooked and packaged foods. · Read food labels on cans and food packages. They tell you how much sodium you get in one serving. Check the serving size. If you eat more than one serving, you are getting more sodium.   · Be aware that sodium can come in forms other than salt, including monosodium glutamate (MSG), sodium citrate, and sodium bicarbonate (baking soda). MSG is often added to Asian food. You can sometimes ask for food without MSG or salt. · Slowly reducing salt will help you adjust to the taste. Take the salt shaker off the table. · Flavor your food with garlic, lemon juice, onion, vinegar, herbs, and spices instead of salt. Do not use soy sauce, steak sauce, onion salt, garlic salt, mustard, or ketchup on your food, unless it is labeled \"low-sodium\" or \"low-salt. \"  · Make your own salad dressings, sauces, and ketchup without adding salt. · Use fresh or frozen ingredients, instead of canned ones, whenever you can. Choose low-sodium canned goods. · Eat less processed food and food from restaurants, including fast food. Exercise as directed  Moderate, regular exercise is very good for your heart. It improves your blood flow and helps control your weight. But too much exercise can stress your heart and cause a heart failure flare-up. · Check with your doctor before you start an exercise program.  · Walking is an easy way to get exercise. Start out slowly. Gradually increase the length and pace of your walk. Swimming, riding a bike, and using a treadmill are also good forms of exercise. · When you exercise, watch for signs that your heart is working too hard. You are pushing yourself too hard if you cannot talk while you are exercising. If you become short of breath or dizzy or have chest pain, stop, sit down, and rest.  · Do not exercise when you do not feel well. Take medicines correctly  · Take your medicines exactly as prescribed. Call your doctor if you think you are having a problem with your medicine. · Make a list of all the medicines you take. Include those prescribed to you by other doctors and any over-the-counter medicines, vitamins, or supplements you take. Take this list with you when you go to any doctor. · Take your medicines at the same time every day.  It may help you to post a list of all the Instructions  Your Care Instructions     Sodium causes your body to hold on to extra water. This may cause your heart failure symptoms to get worse. Limiting sodium may help you feel better. People get most of their sodium from processed foods. Fast food and restaurant meals also tend to be very high in sodium. Your doctor may suggest that you limit sodium. Your doctor can tell you how much sodium is right for you. An example is less than 3,000 mg a day. This includes all the salt you eat in cooked or packaged foods. Follow-up care is a key part of your treatment and safety. Be sure to make and go to all appointments, and call your doctor if you are having problems. It's also a good idea to know your test results and keep a list of the medicines you take. How can you care for yourself at home? Read food labels  · Read food labels on cans and food packages. The labels tell you how much sodium is in each serving. Make sure that you look at the serving size. If you eat more than the serving size, you have eaten more sodium than is listed for one serving. · Food labels also tell you the Percent Daily Value for sodium. Choose products with low Percent Daily Values for sodium. · Be aware that sodium can come in forms other than salt, including monosodium glutamate (MSG), sodium citrate, and sodium bicarbonate (baking soda). MSG is often added to Asian food. You can sometimes ask for food without MSG or salt. Buy low-sodium foods  · Buy foods that are labeled \"unsalted\" (no salt added), \"sodium-free\" (less than 5 mg of sodium per serving), or \"low-sodium\" (140 mg or less of sodium per serving). A food labeled \"light sodium\" has less than half of the full-sodium version of that food. Foods labeled \"reduced-sodium\" may still have too much sodium. · Buy fresh vegetables or plain, frozen vegetables. Buy low-sodium versions of canned vegetables, soups, and other canned goods.   Prepare low-sodium meals  · Use less salt each day when cooking. Reducing salt in this way will help you adjust to the taste. Do not add salt after cooking. Take the salt shaker off the table. · Flavor your food with garlic, lemon juice, onion, vinegar, herbs, and spices instead of salt. Do not use soy sauce, steak sauce, onion salt, garlic salt, or ketchup on your food. · Make your own salad dressings, sauces, and ketchup without adding salt. · Use less salt (or none) when recipes call for it. You can often use half the salt a recipe calls for without losing flavor. Other dishes like rice, pasta, and grains do not need added salt. · Rinse canned vegetables. This removes somebut not allof the salt. · Avoid water that has a naturally high sodium content or that has been treated with water softeners, which add sodium. If you buy bottled water, read the label and choose a sodium-free brand. Avoid high-sodium foods, such as:  · Smoked, cured, salted, and canned meat, fish, and poultry. · Ham, funes, hot dogs, and luncheon meats. · Regular, hard, and processed cheese and regular peanut butter. · Crackers with salted tops. · Frozen prepared meals. · Canned and dried soups, broths, and bouillon, unless labeled sodium-free or low-sodium. · Canned vegetables, unless labeled sodium-free or low-sodium. · Salted snack foods such as chips and pretzels. · Western Zoe fries, pizza, tacos, and other fast foods. · Pickles, olives, ketchup, and other condiments, especially soy sauce, unless labeled sodium-free or low-sodium. If you cannot cook for yourself  · Have family members or friends help you, or have someone cook low-sodium meals. · Check with your local senior nutrition program to find out where meals are served and whether they offer a low-sodium option. You can often find these programs through your local health department or hospital.  · Have meals delivered to your home.  Most [facility] have a Meals on [employer]. These programs provide one hot meal a day for older adults, delivered to their homes. Ask whether these meals are low-sodium. Let them know that you are on a low-sodium diet. Where can you learn more? Go to https://Hipsterblaire.[employer]. org and sign in to your [employer]vest account. Enter A166 in the [facility] box to learn more about \"Limiting Sodium With Heart Failure: Care Instructions. \"     If you do not have an account, please click on the \"Sign Up Now\" link. Current as of: August 31, 2020               Content Version: 12.8  © 6412-5643 [employer]. Care instructions adapted under license by [facility] ([facility]). If you have questions about a medical condition or this instruction, always ask your healthcare professional. [address] any warranty or liability for your use of this information. Patient Education        Medicines for Heart Failure: Care Instructions  Your Care Instructions     Most people with heart failure are helped by taking several medicines to protect their heart. These medicines can help you feel better and live longer. It is important to take all your medicines exactly as prescribed to get the best results. They can also cause side effects. If you think that any of your medicines are causing side effects, talk with your doctor. Follow-up care is a key part of your treatment and safety. Be sure to make and go to all appointments. Call your doctor if you are having problems. It's also a good idea to know your test results and keep a list of the medicines you take. What medicines are used for heart failure? · Aldosterone receptor antagonists. These are a type of diuretic. They make the kidneys get rid of extra fluid. · Angiotensin-converting enzyme (ACE) inhibitors help blood flow. They relax the blood vessels and lower your blood pressure. The heart can then pump more blood through your body without working harder. These include benazepril and lisinopril.   · Angiotensin II receptor blockers (ARBs) work like ACE inhibitors. Some people use them instead. They include losartan. · Angiotensin receptor neprilysin inhibitor (ARNI) medicine works like ARBs and ACE inhibitors. Some people take an ARNI medicine instead. An example is sacubitril/valsartan. · Beta-blockers can slow the heart rate and decrease blood pressure. They can help heart failure. They include bisoprolol, carvedilol, and metoprolol. · Digoxin relieves symptoms in some people with heart failure. · Diuretics reduce swelling. They do this by helping the kidneys get rid of excess fluid. They are also called water pills. · Hydralazine. This may be taken with a nitrate to widen blood vessels. It can lower blood pressure and reduce the workload on the heart. · Ivabradine slows the heart rate. What should you know about these medicines? This is not a complete list of medicines used for heart failure. If you have questions about your medicine, ask your doctor or pharmacist.  ACE inhibitors  Before you start to take an ACE inhibitor, talk to your doctor. Tell him or her if:  · You take any anti-inflammatory medicines. These include ibuprofen (Advil, Motrin) and naproxen (Aleve). · You take antacids, potassium pills or a salt substitute, or lithium. · You take a diuretic. · You are pregnant or breastfeeding or you plan to get pregnant. · You have kidney disease. Side effects include:  · A dry cough. If the cough is bad enough to make you stop the medicine, talk to your doctor. You may need to take a different one. · Feeling lightheaded. This may happen when you stand up too fast. It usually gets better with time. Angiotensin II receptor blockers (ARBs)  Before you start to take an ARB, talk to your doctor. Tell him or her if:  · You take any anti-inflammatory medicines. These include ibuprofen (Advil, Motrin) and naproxen (Aleve). · You take antacids, potassium pills or a salt substitute, or lithium.   · You have kidney disease. · You take a diuretic. · You are pregnant or breastfeeding or you plan to get pregnant. Side effects include:  · Feeling lightheaded or dizzy. These are the most common side effects. Angiotensin receptor neprilysin inhibitor (ARNI)  Before you start to take an ARNI, talk to your doctor. Tell him or her if:  · You take any anti-inflammatory medicines. These include ibuprofen (Advil, Motrin) and naproxen (Aleve). · You take antacids, potassium pills or a salt substitute, or lithium. · You take an ACE inhibitor or an ARB. · You have kidney or liver problems. · You take a diuretic. · You are pregnant or breastfeeding or you plan to get pregnant. Side effects include:  · Feeling lightheaded or dizzy. These are the most common side effects. Diuretics (water pills)  Before you start to take a diuretic, talk to your doctor. Tell him or her if:  · You take lithium or anti-inflammatory medicines such as Advil or Aleve. Side effects include:  · Urinating often. Ask your doctor about timing these pills so that you don't have to use the bathroom at a bad time. · Muscle cramps. This may mean that you are losing too much potassium. It is an important mineral. Call your doctor if you get muscle cramps. · Tender breasts. This may occur in men who take spironolactone. Call your doctor if you get tender breasts that bother you. Beta-blockers  Before you start to take a beta-blocker, talk to your doctor. Tell him or her if:  · You have asthma or diabetes. Side effects include:  · Feeling dizzy or tired. This usually gets better with time. Digoxin  Before you start to take digoxin, talk to your doctor. Tell him or her if:  · You have kidney disease. · You take a diuretic or other medicines. This includes over-the-counter medicines. Side effects include:  · Nausea or vomiting. · Confusion, changes in vision, a racing or slowed heartbeat, or dizziness.  Call your doctor right away if you have any of these side effects. Where can you learn more? Go to https://chpepiceweb.[email or web address] org and sign in to your [employer] account. Enter F132 in the [facility] box to learn more about \"Medicines for Heart Failure: Care Instructions. \"     If you do not have an account, please click on the \"Sign Up Now\" link. Current as of: August 31, 2020               Content Version: 12.8  © 2006-2021 [employer]. Care instructions adapted under license by [facility] ([facility]). If you have questions about a medical condition or this instruction, always ask your healthcare professional. [address] any warranty or liability for your use of this information. Patient Education        Medicines to Avoid With Heart Failure: Care Instructions  Your Care Instructions     Your doctor gave you medicines to help treat your heart failure. But did you know that many other medicines can make heart failure worse? Even medicines and herbs that you buy over the counter (OTC) can harm you. Be sure your doctor knows all of the OTC and prescription drugs you take. And don't start to take any medicine unless your doctor says it's okay. Follow-up care is a key part of your treatment and safety. Be sure to make and go to all appointments, and call your doctor if you are having problems. It's also a good idea to know your test results and keep a list of the medicines you take. How can you care for yourself at home? Over-the-counter drugs  · Before you take any over-the-counter drug, ask your doctor or pharmacist if it's safe. This includes herbs and vitamins. Medicines to avoid include:  ? Pain relievers called NSAIDs. These include ibuprofen and naproxen. Use acetaminophen instead. For example, you can take Tylenol for pain or fever. ? Low-dose aspirin. If your doctor has told you to take aspirin every day for your heart, follow the doctor's instructions on how much to take.  Don't take aspirin for pain. ? Antacids or laxatives. Don't take ones that have sodium in them. These include [place]. ? Cold, cough, flu, or sinus medicines. Read the label. Don't take ones that have pseudoephedrine, ephedrine, phenylephrine, or oxymetazoline in them. And make sure they don't have aspirin or ibuprofen in them. Watch for all of these in allergy medicines, nose sprays, and herbal products too.  ? Supplements and vitamins. These include black cohosh, Kevin's wort, and vitamin E.  Prescription drugs  · Each time you see a doctor, make sure that your doctor knows that you take drugs for heart failure. Before you fill any new prescription, ask the pharmacist if it's okay to take the new drug. Drugs that can make heart failure worse include:  ? Calcium channel blockers. These include nifedipine. If you need to take this type of drug for another health problem, your doctor will closely watch your health. ? Heart rhythm drugs. These include disopyramide and flecainide. These can treat a fast or uneven heart rhythm. ? Prescription NSAIDs. These include celecoxib (Celebrex) and diclofenac.  ? Certain medicines for diabetes. These include pioglitazone, rosiglitazone, and saxagliptin. Where can you learn more? Go to https://[employer]. org and sign in to your [employer] account. Enter S531 in the [facility] box to learn more about \"Medicines to Avoid With Heart Failure: Care Instructions. \"     If you do not have an account, please click on the \"Sign Up Now\" link. Current as of: August 31, 2020               Content Version: 12.8  © 3309-3132 [employer]. Care instructions adapted under license by [facility] ([facility]). If you have questions about a medical condition or this instruction, always ask your healthcare professional. [place] [address] any warranty or liability for your use of this information.          Patient Education        Learning About Self-Care for Heart Failure  What is self-care for heart failure? Heart failure usually gets worse over time. But there are many things you can do to feel better, avoid the hospital, and live longer. Self-care means managing your health by doing certain things every day, like weighing yourself. It's about knowing which symptoms to watch for so you can avoid getting worse. When you practice good self-care, you know when it's time to call your doctor and when your heart failure has turned into an emergency. The list below can help. Top 5 self-care tips for every day   1. Take your medicines as prescribed. This gives them the best chance of helping you. 2. Weigh yourself every day. This helps you watch for signs that you're getting worse. Weight gain may be a sign that your body is holding on to too much fluid. Weigh yourself at the same time each day, using the same scale, on a hard, flat surface. The best time is in the morning after you go to the bathroom and before you eat or drink anything. 3. Keep a daily record of your symptoms. Checking your symptoms helps you see what symptoms are normal for you and if they change or get worse. 4. Limit sodium. This helps keep fluid from building up and may help you feel better. Your doctor can tell you how much sodium is right for you. An example is less than 3,000 milligrams (mg) a day. Try limiting the salt you eat at home, and by watching for \"hidden\" sodium when you eat out or shop for food. 5. Try to exercise throughout the week. Exercise makes your heart stronger and can help you avoid symptoms. Walking is a great way to get exercise. If your doctor says it's safe, start out with some short walks. Then slowly build up to longer ones. Some people with heart failure also may need to limit how much fluid they drink each day. Ask your doctor if this is true for you and, if it is, how much fluid is safe for you to drink each day. When should you act?   Try to

## 2021-03-22 NOTE — PROGRESS NOTES
anorexia/change in appetite, unintentional weight loss. She does not lower extremity edema. She denies exertional lightheadedness. She denies palpitations, syncope or near syncope. She complains of chronic fatigue and has difficulty staying awake. Her gait is unsteady in our office; she does not have any assistive devices at home, but states her sister is bringing her a cane today. Review of systems is negative for chest pain, pressure, discomfort. When ambulating on an incline, she does not have leg claudication. History is negative for neurological symptoms including transient loss of vision, asymmetric weakness, aphasia, dysphasia, numbness, tingling. Patient Active Problem List    Diagnosis Date Noted    Hypomagnesemia 03/10/2021    Valvular heart disease 03/10/2021    Abnormal liver enzymes 03/10/2021    Hypertensive urgency 03/10/2021    Sleep apnea 03/10/2021    Fall 03/10/2021    Acute respiratory failure with hypoxia ([address] 03/10/2021    Bilateral pulmonary embolism ([address] 03/10/2021    Congestive heart failure ([address]     Acute on chronic systolic heart failure ([address] 03/09/2021    Acute combined systolic and diastolic congestive heart failure ([address] 03/09/2021    Ataxic gait     Vertigo 02/28/2021    Acute renal insufficiency 02/28/2021    Acute cystitis without hematuria 02/28/2021    Ataxia 02/27/2021    Mixed hyperlipidemia 01/18/2017    Primary osteoarthritis involving multiple joints 01/18/2017    Type 2 diabetes mellitus ([address] 04/25/2016    GERD (gastroesophageal reflux disease) 04/25/2016    CAD (coronary artery disease)        A. Adenosine nuclear stress test (6/6/08): Normal adenosine portion, negative ischemia, negative scar, EF 60%. B. PTCA and stenting RCA (11/25/07):  bare metal stent 2.5 x 24 mm and 2.5 x 12 mm stents placed. C. Cardiac catheterization (11/25/07): Left main normal; LAD 40% prox;  Cx minimal luminal irregularities; % mid; LV gram not performed. D. Acute inferior wall myocardial infarction (11/25/07).  Abnormal echocardiogram      11/26/07: Mild inferior hypokinesis, EF > 60%, Stage I DD, trace MR, trace TR, normal RVSP.  Hypertension     Hyperlipidemia     Anxiety     Carotid artery narrowing      <40% bilaterally      Drug intolerance      Lipitor, Crestor, high doses of Simvastatin             Past Medical History:   Diagnosis Date    Abnormal echocardiogram     Acute combined systolic and diastolic congestive heart failure ([facility] 03/09/2021    TTE 3/2/21 25-30% EF    Acute renal insufficiency 02/28/2021    Anxiety     CAD (coronary artery disease)     normal adenisone stress test    Carotid artery narrowing     <40% bilaterally    Depression     Diabetes mellitus ([facility]     type II    Drug intolerance     Lipitor, Crestor, high doses of Simvastatin    GERD (gastroesophageal reflux disease)     Heart attack [facility]     Hyperlipidemia     Hypertension     IBS (irritable bowel syndrome)     Obesity     Primary osteoarthritis involving multiple joints 01/18/2017    Sleep apnea 03/10/2021    SOB (shortness of breath) on exertion     UTI (urinary tract infection)            Past Surgical History:   Procedure Laterality Date    CARDIAC CATHETERIZATION  03/19/2021    Dr Welsh Erp      x 1    CORONARY ANGIOPLASTY  11/25/2007    CYST REMOVAL      ganglionic cyst on finger    DIAGNOSTIC CARDIAC CATH LAB PROCEDURE  11/25/2007    DILATION AND CURETTAGE OF UTERUS      X 2    PTCA  11/25/2007    TONSILLECTOMY       1. Ex smoker quit in 2010:  40 pack years  2. Hypertension   3. Hyperlipidemia   4. Myalgias with Lipitor, Crestor, High dose Zocor  5. T2DM insulin requiring with neuropathy  6. Obesity   7. GERD  8. Anxiety  9. JAXSON:  non complaint with C-pap  10. 11/25/20217 Inferior STEMI  11. 11/25/2007 Dr Hi Salinas: PTCA/BMS x 2 ( 2.5 x 12 mm and 2.5 x 12 mm) to RCA. LM no disease.  LAD long area of 40 % main: 0% stenosis. LAD: 20-30 % stenosis. Circumflex: 0 %  stenosis. RCA: Dominant. 30-40 % stenosis. LV angio: 25-30%  ejection fraction        Hemodynamics: LV: 163 mmHg. LVEDP 25  No gradient across AV. Ao: 163/96 ( 126 ).      Allergies   Allergen Reactions    Sulfa Antibiotics Nausea Only    Amoxicillin Nausea Only    Clavulanic Acid Nausea Only    Levofloxacin     Omeprazole      Interferes with plavix    Other      Lipitor, Crestor, high doses of Simvastatin    Pneumococcal Vaccines     Amoxicillin-Pot Clavulanate Nausea Only     Makes her stomach hurt, doesn't want to eat.  Bee Venom Rash    Doxycycline Rash         Outpatient Medications Marked as Taking for the 3/22/21 encounter (Office Visit) with IGNACIO Rogers CNP   Medication Sig Dispense Refill    metoprolol succinate (TOPROL XL) 200 MG extended release tablet Take 1 tablet by mouth daily 30 tablet 0    apixaban starter pack (ELIQUIS) 5 MG TBPK tablet Take 1 tablet by mouth See Admin Instructions 60 each 0    atorvastatin (LIPITOR) 40 MG tablet Take 1 tablet by mouth daily 30 tablet 0    furosemide (LASIX) 40 MG tablet Take 1 tablet by mouth daily 30 tablet 0    magnesium oxide (MAG-OX) 400 (241.3 Mg) MG TABS tablet Take 1 tablet by mouth daily 30 tablet 0    ALPRAZolam (XANAX) 2 MG tablet Take 2 mg by mouth 3 times daily as needed.        Cholecalciferol (VITAMIN D3) 50 MCG (2000 UT) CAPS Take 2,000 Units by mouth daily      azelastine (ASTELIN) 0.1 % nasal spray 2 sprays by Nasal route 2 times daily as directed      escitalopram (LEXAPRO) 10 MG tablet take 1 tablet by mouth once daily 90 tablet 1    metFORMIN (GLUCOPHAGE-XR) 500 MG extended release tablet Take 1 tablet by mouth 2 times daily (with meals) 180 tablet 1    pantoprazole (PROTONIX) 40 MG tablet TAKE ONE TABLET BY MOUTH ONCE DAILY 90 tablet 1    aspirin EC 81 MG EC tablet Take 81 mg by mouth daily       Co-Enzyme Q-10 100 MG CAPS Take 200 mg by mouth ventricle. LVDD 5.5   LVMI 92 l/min/m2   Ejection fraction is visually estimated at 25-30%.   Overall ejection fraction severely decreased .   No regional wall motion abnormalities seen.   Normal left ventricle wall thickness.   Stage II diastolic dysfunction.   The left atrium is moderately dilated.   Normal right ventricular size.   Right ventricle global systolic function is moderately reduced . TAPSE 12  mm   Moderate mitral regurgitation with centrally directed jet.   No hemodynamically significant aortic stenosis is present.   Moderate aortic regurgitation is noted.   Mild to moderate tricuspid regurgitation.  RVSP is 55 mmHg. Pulmonary hypertension is moderate .   Trace pericardial effusion.   No previous echo for comparison. Left heart cath 3/19/2021 (Dr. Tanika Mccrary):  Findings:  Left main: 0% stenosis  LAD: 20-30 % stenosis  Circumflex: 0 %  stenosis  RCA: Dominant. 30-40 % stenosis. LV angio: 25-30%  ejection fraction  Hemodynamics:  LV: 163 mmHg. LVEDP 25  No gradient across AV. Ao: 163/96 ( 126 ).      Assessment:  1. Chronic HFrEF  · ACC stage C, NYHA class III  2. Nonischemic cardiomyopathy with moderately reduced RV function and evidence of pulmonary hypertension with mild to moderate TR, moderate AI  · Diagnosed 3/2021  · ? Etiology   3. Moderate MR   4. Coronary artery disease s/p BMS to RCA 2007. Mild CAD on [facility] 3/2021  5. Recurrent NSVT  6. Bilateral pulmonary emboli: on Eliquis  7. Hypertension: stable today   8. Diabetes mellitus with neuropathy   9. Elevated LFTs in the setting of acute heart failure 3/2021  10. Anxiety : she states she is taking Xanax only twice daily   11. Questionable dementia   12. Poor medical literacy       Plan:  1. Repeat BMP and BNP; pending results will plan to titrate to high dose entresto and later add spironolactone if there is no hyperkalemia. She was given samples of moderate dose entresto until her pharmacy has them available . 2.  Check viral panel to assess for viral cardiomyopathy  3. Continue toprol xl and lasix at present doses  4. Would recommend changing metformin to jardiance for cardiovascular benefits   5. Be sure to take eliquis twice daily. 6. Home health care will draw your labs: we will call your regarding your lab results and any changes in medications. 7. Sleep studies  8. Referral to Dr. Graves Begun to establish as a new patient   9. Referral to CHF clinic and cardiac rehab once home health care discharges you. 10. Follow up office visit with Dr. Cesar Holland in one month       -Weigh yourself daily    -Stay Hydrated    - Change positions gradually, and allow your legs to dangle over the side of the bed before abruptly standing.     -Diet should sodium restricted to 2 grams    -Again watch your daily weight trends and if you gain water weight please follow below instructions.    -If you gain 3-5 pounds in 2-3 days OR notice that you are retaining fluid in anyway just like you did before then take an extra dose of your water pill (furosemide/Lasix OR bumetanide/Bumex OR Demadex/Torsemide) every day until you lose the weight or feel better.     -If you notice that you have taken more than 2 extra doses in 1 week then please call and let us know. -If at any time you feel that you are retaining fluid, your medications are not working, or you feel ill in anyway, then please call us for either same day appointment or the next day, and for instructions. Our goal is to keep you out of the emergency room and the hospital and we have ways to do it. You just need to call us in a timely manner.     -If you become sick for other reasons, and notice that you are not urinating as much, the urine is very dark, you have significant diarrhea or vomiting, then please DO NOT take your water pill and CALL US immediately. - Please don't hesitate to call us with any questions or change in your symptoms prior to your appointment with Dr. Cesar Holland.        > 40 minutes was spent reviewing chart and more than 50 % of that time was spent face to face with patient educating on heart failure, prognosis, treatment, medications and diet.      James Strickland, [facility]

## 2021-03-23 ENCOUNTER — CARE COORDINATION (OUTPATIENT)
Dept: CASE MANAGEMENT | Age: 76
End: 2021-03-23

## 2021-03-23 ENCOUNTER — TELEPHONE (OUTPATIENT)
Dept: CARDIOLOGY CLINIC | Age: 76
End: 2021-03-23

## 2021-03-23 DIAGNOSIS — F41.9 ANXIETY: ICD-10-CM

## 2021-03-23 DIAGNOSIS — I42.8 NONISCHEMIC CARDIOMYOPATHY (HCC): ICD-10-CM

## 2021-03-23 DIAGNOSIS — I50.22 CHRONIC HFREF (HEART FAILURE WITH REDUCED EJECTION FRACTION) (HCC): Primary | ICD-10-CM

## 2021-03-23 NOTE — TELEPHONE ENCOUNTER
Patient was seen by Concepción Seay yesterday - please forward to her as I am not sure if the patient has certain request for PCP.      Thank you

## 2021-03-23 NOTE — CARE COORDINATION
[facility] Transitions Initial Follow Up Call    Call within 2 business days of discharge: Yes    Patient: Harjinder Ansari Patient : [id number]   MRN: <[id number]>  Reason for Admission: altered mental status  Discharge Date: 3/20/21 RARS: Readmission Risk Score: 18      Last Discharge [facility]       Complaint Diagnosis Description Type Department Provider    3/9/21 Shortness of Breath Congestive heart failure, unspecified HF chronicity, unspecified heart failure type ([address] . .. ED to Hosp-Admission (Discharged) (ADMITTED) Trisha Upton MD; Gabrielle Wyman,... Second attempt at Initial 24 hour CTN call     Spoke with: Called to speak with patient for update with transition of care. Left HIPPA compliant voice message with contact information [phone] for a call  Back with an update.     Facility: [facility]    Non-face-to-face services provided:      Care Transitions 24 Hour Call    Care Transitions Interventions         Follow Up  Future Appointments   Date Time Provider Killian Cohn   2021 11:00 AM Buckley Mortimer, [address] Tiffany Schwab, [place]

## 2021-03-23 NOTE — TELEPHONE ENCOUNTER
[phone]. Dr. Stacey Mcclain is not taking new patients. She said you recommended someone else who was taking new patients?

## 2021-03-23 NOTE — TELEPHONE ENCOUNTER
She had really wanted to see Dr. Adarsh Duncan, I suggested Dr. Alex Cross if Dr. Adarsh Duncan wasn't taking new patients. Ill order the referral for her. Thank you!

## 2021-03-26 ENCOUNTER — TELEPHONE (OUTPATIENT)
Dept: CARDIOLOGY CLINIC | Age: 76
End: 2021-03-26

## 2021-03-26 DIAGNOSIS — I50.22 CHRONIC HFREF (HEART FAILURE WITH REDUCED EJECTION FRACTION) (HCC): Primary | ICD-10-CM

## 2021-03-26 NOTE — TELEPHONE ENCOUNTER
----- Message from Kayy Granados IGNACIO - CNS sent at 3/22/2021  9:43 AM EDT -----  Regarding: titration patient  Cardiologist: Dr. Lilo Gauthier  EP: Dr. Roshan Boyce       1. Chronic HFrEF  2. Nonischemic cardiomyopathy with moderately reduced RV function and evidence of pulmonary hypertension with mild to moderate TR, moderate AI  Diagnosed 3/2021  ? Etiology   3. Moderate MR   4. Coronary artery disease s/p BMS to RCA 2007. Mild CAD on [facility] 3/2021  5. Recurrent NSVT    Current GDMT:   Entresto 49/51 mg twice daily (she was given one week of samples 3/22 as her pharmacy did not have)  Toprol  mg daily  Lasix 40 mg daily     BNP, BMP, and viral panel were ordered today. She was discharged March 20 with [facility], if we could please ask them to draws labs? She is agreeable to cardiac rehab and CHF clinic referral when home health care is complete.      Thank you!!

## 2021-03-26 NOTE — LETTER
[facility]  [address] [place] [address] Drive  Phone: [phone]  Fax: [phone]    IGNACIO Aguilar        March 26, 2021    [place]  [address] [place] [address]      Dear Karen Motta are your lab script that need drawn(home care [place] can do if still active otherwise go to lab of choice to have done). Please call CHF Clinic [phone] to set initial visit within 7 days of home care completion  Please call cardiac rehab [phone] to set initial visit after home care is completed. If you have any questions or concerns, please don't hesitate to call.     Sincerely,        IGNACIO Aguilar

## 2021-03-26 NOTE — TELEPHONE ENCOUNTER
See letter mailed to Valerie Delong     See fax to [place] with labs enclosed. Scripts also mailed to patient for labs.     Swetha Thompson RN

## 2021-03-29 ENCOUNTER — HOSPITAL ENCOUNTER (OUTPATIENT)
Dept: OTHER | Age: 76
Setting detail: THERAPIES SERIES
Discharge: HOME | End: 2021-03-29
Payer: MEDICARE

## 2021-04-05 DIAGNOSIS — I50.22 CHRONIC HFREF (HEART FAILURE WITH REDUCED EJECTION FRACTION) (HCC): Primary | ICD-10-CM

## 2021-04-05 DIAGNOSIS — R53.82 CHRONIC FATIGUE, UNSPECIFIED: ICD-10-CM

## 2021-04-05 DIAGNOSIS — Z12.31 SCREENING MAMMOGRAM, ENCOUNTER FOR: ICD-10-CM

## 2021-04-05 DIAGNOSIS — Z12.11 ENCOUNTER FOR FIT (FECAL IMMUNOCHEMICAL TEST) SCREENING: ICD-10-CM

## 2021-04-05 DIAGNOSIS — I42.8 NONISCHEMIC CARDIOMYOPATHY (HCC): ICD-10-CM

## 2021-04-05 PROCEDURE — 82274 ASSAY TEST FOR BLOOD FECAL: CPT | Performed by: NURSE PRACTITIONER

## 2021-04-05 RX ORDER — FUROSEMIDE 40 MG/1
40 TABLET ORAL DAILY
Qty: 90 TABLET | Refills: 1 | Status: SHIPPED
Start: 2021-04-05 | End: 2021-08-19 | Stop reason: DRUGHIGH

## 2021-04-05 RX ORDER — ATORVASTATIN CALCIUM 40 MG/1
40 TABLET, FILM COATED ORAL DAILY
Qty: 90 TABLET | Refills: 1 | Status: SHIPPED | OUTPATIENT
Start: 2021-04-05

## 2021-04-05 RX ORDER — METOPROLOL SUCCINATE 200 MG/1
200 TABLET, EXTENDED RELEASE ORAL DAILY
Qty: 90 TABLET | Refills: 1 | Status: SHIPPED
Start: 2021-04-05 | End: 2021-10-04

## 2021-04-05 NOTE — TELEPHONE ENCOUNTER
Son called for rx refills and female provider PCP to f/u with . Given DR Liu Pitch name and #  As per his request for mercy provider.      Jennifer Izaguirre RN

## 2021-04-06 ENCOUNTER — TELEPHONE (OUTPATIENT)
Dept: PRIMARY CARE CLINIC | Age: 76
End: 2021-04-06

## 2021-04-06 NOTE — TELEPHONE ENCOUNTER
[address] called. Patient has been discharged from skilled nursing but PT and OT are still active.

## 2021-04-07 DIAGNOSIS — I50.22 CHRONIC SYSTOLIC HEART FAILURE (HCC): Primary | ICD-10-CM

## 2021-04-07 DIAGNOSIS — I50.22 CHRONIC HFREF (HEART FAILURE WITH REDUCED EJECTION FRACTION) (HCC): ICD-10-CM
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
## 2021-04-22 ENCOUNTER — TELEPHONE (OUTPATIENT)
Dept: CARDIOLOGY CLINIC | Age: 76
End: 2021-04-22

## 2021-04-22 NOTE — TELEPHONE ENCOUNTER
Spoke to pt and she want to talk to Cathy Sexton before scheduling the sleep study. She sees him tomorrow.

## 2021-04-23 ENCOUNTER — OFFICE VISIT (OUTPATIENT)
Dept: CARDIOLOGY CLINIC | Age: 76
End: 2021-04-23
Payer: MEDICARE

## 2021-04-23 ENCOUNTER — APPOINTMENT (OUTPATIENT)
Dept: OTHER | Age: 76
End: 2021-04-23
Payer: MEDICARE

## 2021-04-23 ENCOUNTER — HOSPITAL ENCOUNTER (OUTPATIENT)
Dept: OTHER | Age: 76
Setting detail: THERAPIES SERIES
Discharge: HOME | End: 2021-04-23
Payer: MEDICARE

## 2021-04-23 VITALS
DIASTOLIC BLOOD PRESSURE: 72 MMHG | WEIGHT: 141.2 LBS | HEIGHT: 66 IN | SYSTOLIC BLOOD PRESSURE: 114 MMHG | HEART RATE: 67 BPM | BODY MASS INDEX: 22.69 KG/M2 | OXYGEN SATURATION: 99 % | RESPIRATION RATE: 16 BRPM

## 2021-04-23 VITALS
WEIGHT: 148 LBS | DIASTOLIC BLOOD PRESSURE: 68 MMHG | SYSTOLIC BLOOD PRESSURE: 131 MMHG | RESPIRATION RATE: 18 BRPM | HEART RATE: 71 BPM | OXYGEN SATURATION: 99 % | BODY MASS INDEX: 27.96 KG/M2

## 2021-04-23 DIAGNOSIS — I38 VALVULAR HEART DISEASE: ICD-10-CM

## 2021-04-23 DIAGNOSIS — I50.9 CONGESTIVE HEART FAILURE, UNSPECIFIED HF CHRONICITY, UNSPECIFIED HEART FAILURE TYPE (HCC): ICD-10-CM

## 2021-04-23 DIAGNOSIS — I50.23 ACUTE ON CHRONIC SYSTOLIC HEART FAILURE (HCC): ICD-10-CM

## 2021-04-23 DIAGNOSIS — I65.29 STENOSIS OF CAROTID ARTERY, UNSPECIFIED LATERALITY: ICD-10-CM

## 2021-04-23 DIAGNOSIS — E78.2 MIXED HYPERLIPIDEMIA: ICD-10-CM

## 2021-04-23 DIAGNOSIS — I16.0 HYPERTENSIVE URGENCY: ICD-10-CM

## 2021-04-23 DIAGNOSIS — I50.41 ACUTE COMBINED SYSTOLIC AND DIASTOLIC CONGESTIVE HEART FAILURE (HCC): ICD-10-CM

## 2021-04-23 DIAGNOSIS — I10 ESSENTIAL HYPERTENSION: ICD-10-CM

## 2021-04-23 DIAGNOSIS — I25.10 CORONARY ARTERY DISEASE DUE TO LIPID RICH PLAQUE: Primary | ICD-10-CM

## 2021-04-23 DIAGNOSIS — I26.99 BILATERAL PULMONARY EMBOLISM (HCC): ICD-10-CM

## 2021-04-23 DIAGNOSIS — I25.83 CORONARY ARTERY DISEASE DUE TO LIPID RICH PLAQUE: Primary | ICD-10-CM

## 2021-04-23 DIAGNOSIS — R93.1 ABNORMAL ECHOCARDIOGRAM: ICD-10-CM

## 2021-04-23 LAB
ANION GAP SERPL CALCULATED.3IONS-SCNC: 13 MMOL/L (ref 7–16)
BUN BLDV-MCNC: 20 MG/DL (ref 6–23)
CALCIUM SERPL-MCNC: 9.3 MG/DL (ref 8.6–10.2)
CHLORIDE BLD-SCNC: 102 MMOL/L (ref 98–107)
CO2: 24 MMOL/L (ref 22–29)
CREAT SERPL-MCNC: 0.8 MG/DL (ref 0.5–1)
GFR AFRICAN AMERICAN: >60
GFR NON-AFRICAN AMERICAN: >60 ML/MIN/1.73
GLUCOSE BLD-MCNC: 125 MG/DL (ref 74–99)
POTASSIUM SERPL-SCNC: 4.6 MMOL/L (ref 3.5–5)
PRO-BNP: 4656 PG/ML (ref 0–125)
SODIUM BLD-SCNC: 139 MMOL/L (ref 132–146)

## 2021-04-23 PROCEDURE — 1123F ACP DISCUSS/DSCN MKR DOCD: CPT | Performed by: INTERNAL MEDICINE

## 2021-04-23 PROCEDURE — 36415 COLL VENOUS BLD VENIPUNCTURE: CPT

## 2021-04-23 PROCEDURE — 83880 ASSAY OF NATRIURETIC PEPTIDE: CPT

## 2021-04-23 PROCEDURE — 1090F PRES/ABSN URINE INCON ASSESS: CPT | Performed by: INTERNAL MEDICINE

## 2021-04-23 PROCEDURE — G8417 CALC BMI ABV UP PARAM F/U: HCPCS | Performed by: INTERNAL MEDICINE

## 2021-04-23 PROCEDURE — 4040F PNEUMOC VAC/ADMIN/RCVD: CPT | Performed by: INTERNAL MEDICINE

## 2021-04-23 PROCEDURE — 99214 OFFICE O/P EST MOD 30 MIN: CPT | Performed by: INTERNAL MEDICINE

## 2021-04-23 PROCEDURE — 3017F COLORECTAL CA SCREEN DOC REV: CPT | Performed by: INTERNAL MEDICINE

## 2021-04-23 PROCEDURE — G8400 PT W/DXA NO RESULTS DOC: HCPCS | Performed by: INTERNAL MEDICINE

## 2021-04-23 PROCEDURE — G8427 DOCREV CUR MEDS BY ELIG CLIN: HCPCS | Performed by: INTERNAL MEDICINE

## 2021-04-23 PROCEDURE — 86645 CMV ANTIBODY IGM: CPT

## 2021-04-23 PROCEDURE — 1036F TOBACCO NON-USER: CPT | Performed by: INTERNAL MEDICINE

## 2021-04-23 PROCEDURE — 99204 OFFICE O/P NEW MOD 45 MIN: CPT

## 2021-04-23 PROCEDURE — 80048 BASIC METABOLIC PNL TOTAL CA: CPT

## 2021-04-23 PROCEDURE — 93000 ELECTROCARDIOGRAM COMPLETE: CPT | Performed by: INTERNAL MEDICINE

## 2021-04-23 PROCEDURE — 86644 CMV ANTIBODY: CPT

## 2021-04-23 RX ORDER — LORATADINE 10 MG/1
10 TABLET ORAL DAILY
COMMUNITY

## 2021-04-23 RX ORDER — LANOLIN ALCOHOL/MO/W.PET/CERES
CREAM (GRAM) TOPICAL
COMMUNITY
Start: 2021-03-21 | End: 2021-04-23

## 2021-04-23 NOTE — PROGRESS NOTES
Jessica Freitas  1945  Date of Service: 4/23/2021    Patient Active Problem List    Diagnosis Date Noted    Hypomagnesemia 03/10/2021    Valvular heart disease 03/10/2021    Abnormal liver enzymes 03/10/2021    Hypertensive urgency 03/10/2021    Sleep apnea 03/10/2021    Acute respiratory failure with hypoxia ([address] 03/10/2021    Bilateral pulmonary embolism ([address] 03/10/2021    Congestive heart failure ([address]     Acute on chronic systolic heart failure ([address] 03/09/2021    Acute combined systolic and diastolic congestive heart failure ([address] 03/09/2021    Ataxic gait     Vertigo 02/28/2021    Acute renal insufficiency 02/28/2021    Acute cystitis without hematuria 02/28/2021    Ataxia 02/27/2021    Mixed hyperlipidemia 01/18/2017    Primary osteoarthritis involving multiple joints 01/18/2017    Type 2 diabetes mellitus ([address] 04/25/2016    GERD (gastroesophageal reflux disease) 04/25/2016    CAD (coronary artery disease)      Overview Note:       A. Adenosine nuclear stress test (6/6/08): Normal adenosine portion, negative ischemia, negative scar, EF 60%. B. PTCA and stenting RCA (11/25/07):  bare metal stent 2.5 x 24 mm and 2.5 x 12 mm stents placed. C. Cardiac catheterization (11/25/07): Left main normal; LAD 40% prox; Cx minimal luminal irregularities; % mid; LV gram not performed. D. Acute inferior wall myocardial infarction (11/25/07).  Abnormal echocardiogram      Overview Note:     11/26/07: Mild inferior hypokinesis, EF > 60%, Stage I DD, trace MR, trace TR, normal RVSP.       Hypertension     Hyperlipidemia     Anxiety     Carotid artery narrowing      Overview Note:     <40% bilaterally      Drug intolerance      Overview Note:     Lipitor, Crestor, high doses of Simvastatin         Social History     Socioeconomic History    Marital status:      Spouse name: None    Number of children: None    Years of education: None    Highest education level: None   Occupational History     Employer: RETIRED   Social Needs    Financial resource strain: None    Food insecurity     Worry: None     Inability: None    Transportation needs     Medical: None     Non-medical: None   Tobacco Use    Smoking status: Former Smoker     Packs/day: 1.00     Years: 35.00     Pack years: 35.00     Quit date: 6/17/2010     Years since quitting: 10.8    Smokeless tobacco: Never Used   Substance and Sexual Activity    Alcohol use: No    Drug use: No    Sexual activity: Not Currently   Lifestyle    Physical activity     Days per week: None     Minutes per session: None    Stress: None   Relationships    Social connections     Talks on phone: None     Gets together: None     Attends Roman [employer] service: None     Active member of club or organization: None     Attends meetings of clubs or organizations: None     Relationship status: None    Intimate partner violence     Fear of current or ex partner: None     Emotionally abused: None     Physically abused: None     Forced sexual activity: None   Other Topics Concern    None   Social History Narrative    None       Current Outpatient Medications   Medication Sig Dispense Refill    loratadine (CLARITIN) 10 MG tablet Take 10 mg by mouth daily      sacubitril-valsartan (ENTRESTO) 49-51 MG per tablet Take 1 tablet by mouth 2 times daily 60 tablet 11    atorvastatin (LIPITOR) 40 MG tablet Take 1 tablet by mouth daily 90 tablet 1    metoprolol succinate (TOPROL XL) 200 MG extended release tablet Take 1 tablet by mouth daily 90 tablet 1    furosemide (LASIX) 40 MG tablet Take 1 tablet by mouth daily 90 tablet 1    CONTOUR NEXT TEST strip TEST once daily 100 strip 0    apixaban starter pack (ELIQUIS) 5 MG TBPK tablet Take 1 tablet by mouth See Admin Instructions (Patient taking differently: Take 5 mg by mouth 2 times daily ) 60 each 0    magnesium oxide (MAG-OX) 400 (241.3 Mg) MG TABS tablet Take 1 tablet by mouth daily 30 tablet 0    ALPRAZolam (XANAX) 2 MG tablet Take 2 mg by mouth 3 times daily as needed.  Cholecalciferol (VITAMIN D3) 50 MCG (2000 UT) CAPS Take 2,000 Units by mouth daily      azelastine (ASTELIN) 0.1 % nasal spray 2 sprays by Nasal route 2 times daily as directed      fluticasone (FLONASE) 50 MCG/ACT nasal spray 2 sprays by Each Nostril route daily      escitalopram (LEXAPRO) 10 MG tablet take 1 tablet by mouth once daily 90 tablet 1    metFORMIN (GLUCOPHAGE-XR) 500 MG extended release tablet Take 1 tablet by mouth 2 times daily (with meals) 180 tablet 1    pantoprazole (PROTONIX) 40 MG tablet TAKE ONE TABLET BY MOUTH ONCE DAILY 90 tablet 1    aspirin EC 81 MG EC tablet Take 81 mg by mouth daily       Co-Enzyme Q-10 100 MG CAPS Take 200 mg by mouth daily       fenofibrate micronized (LOFIBRA) 134 MG capsule Take 1 capsule by mouth every morning (before breakfast) (Patient not taking: Reported on 3/22/2021) 90 capsule 1     No current facility-administered medications for this visit. Allergies   Allergen Reactions    Sulfa Antibiotics Nausea Only    Amoxicillin Nausea Only    Clavulanic Acid Nausea Only    Levofloxacin     Omeprazole      Interferes with plavix    Other      Lipitor, Crestor, high doses of Simvastatin    Pneumococcal Vaccines     Amoxicillin-Pot Clavulanate Nausea Only     Makes her stomach hurt, doesn't want to eat.  Bee Venom Rash    Doxycycline Rash       Chief Complaint:  Gisselle Wood is here today for follow up and management/recomendations for CAD, HTN, hypercholesterolemia. History of Present Illness: Gisselle Wood is being seen today for initial hospital follow-up. She states that She does house work, & goes shopping. She denies any chest discomfort, dyspnea on exertion. With any of these activities. She denies any orthopnea/PND, or lower extremity edema. She denies any palpitations or presyncopal symptoms. REVIEW OF SYSTEMS:  As above.  Patient does not complain of any fever, chills, nausea, vomiting or diarrhea. No focal, motor or neurological deficits. No changes in his/her vision, hearing, bowel or bladder habits. She is not known to have a history of thyroid problems. No recent nose bleeds. PHYSICAL EXAM:  Vitals:    04/23/21 1106   BP: 114/72   Pulse: 67   Resp: 16   SpO2: 99%   Weight: 141 lb 3.2 oz (64 kg)   Height: 5' 6\" (1.676 m)       GENERAL:  She is alert and oriented x 3, communicates well, in no distress. NECK:  No masses, trachea is mid position. Supple, full ROM, no JVD or bruits. No palpable thyromegaly or lymphadenopathy. HEART:  Regular rate and rhythm. Normal S1 and S2. There is an S4 gallop and a I/VI (Normal physiologic) systolic murmur. LUNGS:  Clear to auscultation bilaterally. No use of accessory muscles. symmetrical excursion. Mildly decreased air movement. ABDOMEN:  Soft, non-tender. Normal bowel sounds. Obese. EXTREMITIES:  Full ROM x 4. No bilateral lower extremity edema on exam.  Good distal pulses. EYES:  Extraocular muscles intact. PERRL. Normal lids & conjunctiva. ENT:  Nares are clear & not bleeding. Moist mucosa. Normal lips formation. No external masses   NEURO: no tremors, full ROM x 4, EOMI. SKIN:  Warm, dry and intact. Normal turgor. EKG: Sinus rhythm, 67 bpm, nl axis, nonspecific ST - T wave changes with T wave inversions. q waves in III & F.  unchanged      Assessment:   1. Coronary artery disease as outlined above. Clinically no signs or symptoms of recurring ischemia at this time. Cardiac catheterization 3/19/2021 shows no ischemic coronary lesions. 2. Cardiomyopathy as outlined above. Clinically euvolemic and no decompensation at this time. EF improved to normal.  Recent severe decline in her ejection fraction to 25-30 %. 3. Moderate mitral regurgitation  4. Moderate aortic regurgitation  5. Hypertension, well controled at this time. 6. Hypercholesterolemia  7. Recurring nonsustained VT. Being followed by electrophysiology. Recommendations:  1. She is following the cholesterol with Dr. Dl Morris. 2. I discussed with her starting an SGLT2 inhibitor. She defers trying any new medications. 3. Limited echo in 3 months. 4. She is wearing a LifeVest.  Follow with EP. Thank you for allowing me to participate in your patient's care.       [id number], [address] [facility]  Interventional Cardiology

## 2021-04-23 NOTE — PROGRESS NOTES
[address] [facility]   1945            Referring Doctor:EBER Crawford NP    Primary Care Physician: Dr. Alban Fry  Cardiologist: Dr. Nas Panchal  Nephrologist:         History of Present Illness:     Ariel Cruz is a 70 y.o. female with a history of HFrEF, most recent EF 25-30%     On 3/2/2021. Patient Story:    She does not  have dyspnea with exertion, shortness of breath, or decline in overall functional capacity. She does not have orthopnea, PND, nocturnal cough or hemoptysis. She does not have abdominal distention or bloating, early satiety, anorexia/change in appetite. She does not has a good urinary response to  oral diuretic. She does have  lower extremity edema. She denies lightheadedness, dizziness. She denies palpitations, syncope or near syncope. She does not complain of chest pain, pressure, discomfort. Allergies   Allergen Reactions    Sulfa Antibiotics Nausea Only    Amoxicillin Nausea Only    Clavulanic Acid Nausea Only    Levofloxacin     Omeprazole      Interferes with plavix    Other      Lipitor, Crestor, high doses of Simvastatin    Pneumococcal Vaccines     Amoxicillin-Pot Clavulanate Nausea Only     Makes her stomach hurt, doesn't want to eat.  Bee Venom Rash    Doxycycline Rash         No outpatient medications have been marked as taking for the 4/23/21 encounter [facility] HOSPITAL Encounter) with [facility] ROOM 5.           Guideline directed medical:  ARNI/ACE I/ARB: Yes  Beta blocker:   Yes  Aldosterone antagonist:  No        Physical Examination:     /68   Pulse 71   Resp 18   Wt 148 lb (67.1 kg)   SpO2 99%   BMI 27.96 kg/m²                    HEENT  HEENT (WDL): Within Defined Limits                   Cardiac  Cardiac Rhythm: Normal sinus rhythm    Rhythm Interpretation  Pulse: 71         Gastrointestinal  Abdominal (WDL): Within Defined Limits               Peripheral Vascular  Peripheral Vascular (WDL): Exceptions to

## 2021-04-26 ENCOUNTER — TELEPHONE (OUTPATIENT)
Dept: CARDIOLOGY CLINIC | Age: 76
End: 2021-04-26

## 2021-04-26 NOTE — TELEPHONE ENCOUNTER
Called pt to see what she decided on for the sleep study. She said she;'s not going to have it done.

## 2021-04-28 LAB
CYTOMEGALOVIRUS IGG ANTIBODY: NORMAL
CYTOMEGALOVIRUS IGM ANTIBODY: NORMAL

## 2021-04-28 NOTE — RESULT ENCOUNTER NOTE
Primary Cardiologist: Dr. Yared Foss and CHF clinic note reviewed. Vital signs at CHF visit on 4/23/2021:   /68   Pulse 71      CMV panel negative  Renal function stable with slight prerenal azotemia (BUN 20, Cr 0.8); potassium 4.6  BNP significantly decreased (33, 238 --> 13,167 --> 16,843 -->14,904 --> 4,656    Medications:  Lasix 40 mg daily  Toprol  mg daily   Entresto 49/51 mg twice daily     She was recently seen by Dr. Lalitha Glynn in office follow up and she declined to start SGLT2-I at that time. Please increase to high dose Entresto and have follow up BMP drawn in one week    Will plan to add Aldactone if renal function and potassium stable    Can we also check if she's established with a PCP yet? She was dismissed from [facility]. Thank you!

## 2021-04-29 ENCOUNTER — TELEPHONE (OUTPATIENT)
Dept: CARDIOLOGY CLINIC | Age: 76
End: 2021-04-29

## 2021-04-29 DIAGNOSIS — I50.22 CHRONIC SYSTOLIC HEART FAILURE (HCC): Primary | ICD-10-CM

## 2021-04-29 RX ORDER — SACUBITRIL AND VALSARTAN 97; 103 MG/1; MG/1
1 TABLET, FILM COATED ORAL 2 TIMES DAILY
Qty: 60 TABLET | Refills: 11 | Status: SHIPPED | OUTPATIENT
Start: 2021-04-29

## 2021-04-29 NOTE — TELEPHONE ENCOUNTER
She is agreeable to titrate  entresto and repeat labs in 7-10 days     Will mail her letter per her request. Her son is not there now and he manages her meds. Mailed letter and lab script.        Results for Misha Patricia (MRN [id number]) as of 4/29/2021 09:09   3/20/2021 06:50 4/23/2021 10:00   Sodium 137 139   Potassium 5.1 (H) 4.6   Chloride 96 (L) 102   CO2 27 24   BUN 22 20   Creatinine 0.8 0.8   Anion Gap 14 13   GFR Non-African American >60 >60   GFR African American >60 >60   Magnesium 1.8    Glucose 102 (H) 125 (H)   Calcium 9.4 9.3   Meter Glucose         Results for Misha Patricia (MRN [id number]) as of 4/29/2021 09:09   3/15/2021 06:15 3/18/2021 06:20 4/23/2021 10:00   Pro-BNP   16,843 (H) 14,904 (H) 4,656 (H)

## 2021-04-29 NOTE — LETTER
[facility] Cardiology  [address]  Phone: [phone]  Fax: [phone]    IGNACIO Meraz        April 29, 2021    [place] [address]      Dear Griselda Cornell: Your heart health is very important to us. To help your heart function the best it can, we are increasing your Entresto from 49-51mg twice daily to 97-103mg by mouth twice a day. This new script was sent to [facility], [address]. Enclosed is lab script for BMP to be done in 7-10 days after starting entresto 97-103mg twice a day. (Do May 7 through May 10th, which ever day is most convenient.)    I will review your labs after they are done and if your kidney numbers and potassium levels are stable our intent will be to add another heart medication spironolactone/ aldactone. My office will be calling you again to see how you are feeling: any weight gain, shortness of breath, worse swelling? Any dizziness or lightheadedness? Please continue to weigh yourself daily, take medications as directed, change positions slow and keep hydrated. If you have not established with a new primary care provider please do so as soon as possible. If you need assistance choosing a provider please call the number on your insurance card or you can use [facility]  Provider finder assistance [phone]. If you have any questions or concerns, please don't hesitate to call.     Sincerely,        IGNACIO Meraz

## 2021-05-07 ENCOUNTER — TELEPHONE (OUTPATIENT)
Dept: CARDIOLOGY CLINIC | Age: 76
End: 2021-05-07

## 2021-05-07 ENCOUNTER — HOSPITAL ENCOUNTER (OUTPATIENT)
Age: 76
Discharge: HOME | End: 2021-05-07
Payer: MEDICARE

## 2021-05-07 DIAGNOSIS — I50.22 CHRONIC SYSTOLIC HEART FAILURE (HCC): ICD-10-CM

## 2021-05-07 LAB
ANION GAP SERPL CALCULATED.3IONS-SCNC: 14 MMOL/L (ref 7–16)
BUN BLDV-MCNC: 17 MG/DL (ref 6–23)
CALCIUM SERPL-MCNC: 9.6 MG/DL (ref 8.6–10.2)
CHLORIDE BLD-SCNC: 101 MMOL/L (ref 98–107)
CO2: 25 MMOL/L (ref 22–29)
CREAT SERPL-MCNC: 0.8 MG/DL (ref 0.5–1)
GFR AFRICAN AMERICAN: >60
GFR NON-AFRICAN AMERICAN: >60 ML/MIN/1.73
GLUCOSE BLD-MCNC: 121 MG/DL (ref 74–99)
POTASSIUM SERPL-SCNC: 4.9 MMOL/L (ref 3.5–5)
SODIUM BLD-SCNC: 140 MMOL/L (ref 132–146)

## 2021-05-07 PROCEDURE — 80048 BASIC METABOLIC PNL TOTAL CA: CPT

## 2021-05-07 PROCEDURE — 36415 COLL VENOUS BLD VENIPUNCTURE: CPT

## 2021-05-07 NOTE — TELEPHONE ENCOUNTER
Buckley American,   · she is going for labs today for L [facility]    Denies s/s CHF   Weights steady, no swelling or SOB. Tolerating entresto dose increase  97-103mg BID  Keeps hydrated  Makes good urine.      GDMT:  Lasix 40mg daily  Toprol xl 200mg daily  Entresto 97-103mg daily    PLAN:   Cyndee Simms will evaluate labs (patient aware it will be next week return f/u call  As it is Friday , office closed weekend)

## 2021-05-09 NOTE — RESULT ENCOUNTER NOTE
Primary Cardiologist: Dr. Cyndee Savage of 5/07/2021 reviewed   BMP stable with potassium of 4.9, chloride 101, CO2 25, BUN 17, Cr 0.8     Medications:  Lasix 40 mg daily  Toprol  mg daily   Entresto 97/103 mg twice daily      She was recently seen by Dr. Sudeep Veronica in office follow up and she declined to start SGLT2-I at that time. Is she agreeable to starting any new medications at this time (e.g. SGLT2-I, MRA?)        Thank you!

## 2021-05-11 ENCOUNTER — TELEPHONE (OUTPATIENT)
Dept: CARDIOLOGY CLINIC | Age: 76
End: 2021-05-11

## 2021-05-11 NOTE — RESULT ENCOUNTER NOTE
Thank you! Lets start Jardiance 10 mg daily     With her potassium of 4.9, we'll hold on spironolactone for now, and hopefully can start it once she is on the [place]. Follow up at CHF clinic and with Dr. Clemente Johnson as scheduled.

## 2021-05-11 NOTE — TELEPHONE ENCOUNTER
Med [employer] covers rx. Is on metformin. Says she's  prediabetic,   pcp managing. She feels great. Breathing good. Making good urine. Denies adverse effects to meds. Cut back on K+ intake (no more banannas)    Agreeable to SGLT2i           PCP is DR Kota Jackson  [address] in [facility].

## 2021-05-12 ENCOUNTER — TELEPHONE (OUTPATIENT)
Dept: CARDIOLOGY CLINIC | Age: 76
End: 2021-05-12

## 2021-05-12 DIAGNOSIS — I50.22 CHRONIC SYSTOLIC HEART FAILURE (HCC): Primary | ICD-10-CM

## 2021-05-12 RX ORDER — EMPAGLIFLOZIN 10 MG/1
10 TABLET, FILM COATED ORAL DAILY
Qty: 30 TABLET | Refills: 3 | Status: SHIPPED
Start: 2021-05-12 | End: 2022-01-21

## 2021-05-12 NOTE — TELEPHONE ENCOUNTER
----- Message from IGNACIO Goldman - CNS sent at 5/11/2021  4:42 PM EDT -----  Thank you! Lets start Jardiance 10 mg daily     With her potassium of 4.9, we'll hold on spironolactone for now, and hopefully can start it once she is on the [place]. Follow up at CHF clinic and with Dr. Danny Gillespie as scheduled.

## 2021-05-12 NOTE — LETTER
[facility]  [facility] [address]  Phone: [phone]  Fax: [phone]    IGNACIO Crandall        May 13, 2021    [address]  [address] [address]      Dear Shanell Buchanan:    Please call [facility] Rx assistance at [phone] to apply for assistance for Jardiance 10mg daily. (See enclosed Jardiance medication guide)     Jardiance ® reduced the combined relative risk of cardiovascular death and hospitalization for heart failure by 25% in adults with and without diabetes who had heart failure with reduced ejection fraction. Once you start Jardiance 10mg daily, have the enclosed labs (BMP/BNP ) done in  7-10 days. If you have any questions or concerns, please don't hesitate to call.     Sincerely,        IGNACIO Crandall

## 2021-05-12 NOTE — TELEPHONE ENCOUNTER
Called [facility] [phone]     $83.94 for 30 day supply. [facility] +   Part D coverage   ID: [id number]  [facility] [id number]  N  part D  [id number]     Jeff Cornelius 1945 [phone] (home)  [facility] Rx assistance to call Jeff Cornelius RE financial assistance options for Jardiance.

## 2021-05-13 NOTE — TELEPHONE ENCOUNTER
Familia Dorsey, at [address] [facility] office sending message to  to review metformin use/ SGLT2i    Pending starting jardiance (financial assistance to be evaluated for med help)     Josephine Pozo in Rx assistance is working on [employer]. I left  for Asha Oh with Rx assistance # and our call back   RE Jardiance  10mg daily initiation. Jardiance ® reduced the combined relative risk of cardiovascular death and hospitalization for heart failure by 25% in adults with and without diabetes who had heart failure with reduced ejection fraction. Aug 29, 2020

## 2021-05-13 NOTE — TELEPHONE ENCOUNTER
Spoke with Alix Miranda   Reviewed Jardiance use adverse effects. Transferred to [facility] Rx assistance. Mailed letter       Pending rx assistance approval.   Will provide samples if Rx assistance can help.      Vaishnavi Smith RN

## 2021-05-17 NOTE — TELEPHONE ENCOUNTER
Jessica Freitas filled out Rx assistnace and mailed it to [facility] for [employer] ready   Jardiance 10mg  3 boxes with 7 pills per box  [id number]  Exp 6 23

## 2021-05-18 NOTE — TELEPHONE ENCOUNTER
Going Friday for 2nd covid vaccine. Will start Jardiance after that. (donest want to start new med and get vaccine close together (would not know adverse effect or not)   Will get labs 7-10 days after staring jardiance.    Applied for Rx assistance  She will call for samples as needed   Treasure Dominique RN

## 2021-06-01 NOTE — TELEPHONE ENCOUNTER
Emi Jang called in   Did well with 2nd covid vaccine. Starting  jardiance today  Will repeat labs in 7-10 days. Wears pad for dribbling.

## 2021-06-08 NOTE — TELEPHONE ENCOUNTER
Patient of DR Blayne Love who [facility]. Spoke with Sidra Thompson   No bananna use in a month. Keeping K+ intake down    Wears lifevest.  (order is in per DR Blayne Love for echo)     She Will have labs done 10 days after starting jardiance. Says she feels no adverse effects. Keeping hydrated and  Good genital hygiene. No dizzy/lightheaded. GDMT:   jardiance 10mg daily (diabetes, CV effect) 6-1-21 started  entresto 97-103mg BID  toprol xl 200mg Daily  Lasix 40mg Daily  (holding spironolactone since 5/12/21) following K+ levels.           Alonza Severs, RN

## 2021-06-11 ENCOUNTER — HOSPITAL ENCOUNTER (OUTPATIENT)
Age: 76
Discharge: HOME | End: 2021-06-11
Payer: MEDICARE

## 2021-06-11 DIAGNOSIS — I50.22 CHRONIC SYSTOLIC HEART FAILURE (HCC): ICD-10-CM

## 2021-06-11 LAB
ANION GAP SERPL CALCULATED.3IONS-SCNC: 16 MMOL/L (ref 7–16)
BUN BLDV-MCNC: 17 MG/DL (ref 6–23)
CALCIUM SERPL-MCNC: 9.4 MG/DL (ref 8.6–10.2)
CHLORIDE BLD-SCNC: 100 MMOL/L (ref 98–107)
CO2: 22 MMOL/L (ref 22–29)
CREAT SERPL-MCNC: 0.9 MG/DL (ref 0.5–1)
GFR AFRICAN AMERICAN: >60
GFR NON-AFRICAN AMERICAN: >60 ML/MIN/1.73
GLUCOSE BLD-MCNC: 138 MG/DL (ref 74–99)
POTASSIUM SERPL-SCNC: 5.1 MMOL/L (ref 3.5–5)
PRO-BNP: 1174 PG/ML (ref 0–125)
SODIUM BLD-SCNC: 138 MMOL/L (ref 132–146)

## 2021-06-11 PROCEDURE — 36415 COLL VENOUS BLD VENIPUNCTURE: CPT

## 2021-06-11 PROCEDURE — 80048 BASIC METABOLIC PNL TOTAL CA: CPT

## 2021-06-11 PROCEDURE — 83880 ASSAY OF NATRIURETIC PEPTIDE: CPT

## 2021-06-14 ENCOUNTER — TELEPHONE (OUTPATIENT)
Dept: CARDIOLOGY CLINIC | Age: 76
End: 2021-06-14

## 2021-06-14 NOTE — TELEPHONE ENCOUNTER
I have reviewed the provider Heidi Mealing CNS instructions with the patient, answering all questions to her satisfaction. Has list of K+ foods high low and medium. Follows eating low K+ and some moderate  Lost another 6#. Feeling good. No new c/o. See's EP next week DR Isai Solis  Wearing lifevest as directed, DR Che Delgadillo order is in for echo for July.      Edelmira Ellison RN

## 2021-06-14 NOTE — TELEPHONE ENCOUNTER
Still in rx assistance waiting  For jardiance determination. Requested samples    Mailed jardiance 10mg . 7 tabs per bottle, 4 bottles enclosed 28 tabs total  Lot [id number]  Exp 3 -2022.     Treasure Dominique RN

## 2021-06-16 ENCOUNTER — OFFICE VISIT (OUTPATIENT)
Dept: NON INVASIVE DIAGNOSTICS | Age: 76
End: 2021-06-16
Payer: MEDICARE

## 2021-06-16 ENCOUNTER — TELEPHONE (OUTPATIENT)
Dept: CARDIOLOGY | Age: 76
End: 2021-06-16

## 2021-06-16 VITALS
RESPIRATION RATE: 16 BRPM | SYSTOLIC BLOOD PRESSURE: 104 MMHG | OXYGEN SATURATION: 99 % | WEIGHT: 151 LBS | HEIGHT: 66 IN | BODY MASS INDEX: 24.27 KG/M2 | DIASTOLIC BLOOD PRESSURE: 70 MMHG

## 2021-06-16 DIAGNOSIS — I50.9 CONGESTIVE HEART FAILURE, UNSPECIFIED HF CHRONICITY, UNSPECIFIED HEART FAILURE TYPE (HCC): Primary | ICD-10-CM

## 2021-06-16 PROCEDURE — G8427 DOCREV CUR MEDS BY ELIG CLIN: HCPCS | Performed by: STUDENT IN AN ORGANIZED HEALTH CARE EDUCATION/TRAINING PROGRAM

## 2021-06-16 PROCEDURE — G8420 CALC BMI NORM PARAMETERS: HCPCS | Performed by: STUDENT IN AN ORGANIZED HEALTH CARE EDUCATION/TRAINING PROGRAM

## 2021-06-16 PROCEDURE — G8400 PT W/DXA NO RESULTS DOC: HCPCS | Performed by: STUDENT IN AN ORGANIZED HEALTH CARE EDUCATION/TRAINING PROGRAM

## 2021-06-16 PROCEDURE — 93000 ELECTROCARDIOGRAM COMPLETE: CPT | Performed by: STUDENT IN AN ORGANIZED HEALTH CARE EDUCATION/TRAINING PROGRAM

## 2021-06-16 PROCEDURE — 99215 OFFICE O/P EST HI 40 MIN: CPT | Performed by: STUDENT IN AN ORGANIZED HEALTH CARE EDUCATION/TRAINING PROGRAM

## 2021-06-16 PROCEDURE — 4040F PNEUMOC VAC/ADMIN/RCVD: CPT | Performed by: STUDENT IN AN ORGANIZED HEALTH CARE EDUCATION/TRAINING PROGRAM

## 2021-06-16 PROCEDURE — 1036F TOBACCO NON-USER: CPT | Performed by: STUDENT IN AN ORGANIZED HEALTH CARE EDUCATION/TRAINING PROGRAM

## 2021-06-16 PROCEDURE — 1123F ACP DISCUSS/DSCN MKR DOCD: CPT | Performed by: STUDENT IN AN ORGANIZED HEALTH CARE EDUCATION/TRAINING PROGRAM

## 2021-06-16 PROCEDURE — 1090F PRES/ABSN URINE INCON ASSESS: CPT | Performed by: STUDENT IN AN ORGANIZED HEALTH CARE EDUCATION/TRAINING PROGRAM

## 2021-06-16 PROCEDURE — 3017F COLORECTAL CA SCREEN DOC REV: CPT | Performed by: STUDENT IN AN ORGANIZED HEALTH CARE EDUCATION/TRAINING PROGRAM

## 2021-06-16 NOTE — PROGRESS NOTES
[facility] CARDIOLOGY  CARDIAC ELECTROPHYSIOLOGY DEPARTMENT/DIVISION OF CARDIOLOGY  Outpatient Follow-up Note  PATIENT: Nisa Sanchez  MEDICAL RECORD NUMBER: [id number]  DATE OF SERVICE:  6/16/2021  CARDIOLOGIST: Dr Mirella Wu  ELECTROPHYSIOLOGIST:  Mariela James DO  REFERRING PHYSICIAN: No ref. provider found and Douglas Saldivar MD  CHIEF COMPLAINT:     Subjective: Nisa Sanchez is a 70 y.o. female with a history of HFrEF-nonischemic (TTE: LVEF = 25-30% compared to 60% in 2018), NSVT, HTN, pulmonary embolism, CAD sp BMS RCA (2007), PAD, DM2, GERD, CKD, anxiety, obesity, JAXSON, and depression. She is managed by Dr Mirella Wu with apixaban 5 mg every 12 hours, aspirin 81 mg daily, lipitor 40 mg daily, lasix 40 mg daily, metoprolol  mg daily, and Entresto  mg every 12 hours. She was diagnosed with HFrEF-nonischemic in 3/2021. A [facility] reported no significant CAD. She was discharged with a LifeVest and GDMT with plan to repeat TTE in 3 months. Unfortunately, TTE was not yet performed. She denies any alarms or shocks from LifeVest. She reports anxiety regarding son's custody, which she reports was taken away from her due to her new cardiomyopathy diagnosis. She denies any other complaints at this time. Prior cardiac testing:  · LHC (3/19/21): LVEF = 25-30%, LAD with 20-30 % stenosis, and dominant RCA with 30-40 % stenosis. · TTE (3/2/21): LVEF = 25-30%, mild LV dilation, stage II LVDD, moderate LAE, mild ALBERT, moderate MR, mild-moderate TR, moderate pulmonary HTN (RVSP ~55 mmHg), moderate AI,   · TTE (2/8/18): LVEF = 60%, stage I LVDD. · Pharmacologic Nuclear Stress Test (6/6/08): LVEF = 60%, normal perfusion.     Past Medical History:   Diagnosis Date    Abnormal echocardiogram     Acute combined systolic and diastolic congestive heart failure ([address] 03/09/2021    TTE 3/2/21 25-30% EF    Acute renal insufficiency 02/28/2021    Anxiety     CAD (coronary artery disease)     normal adenisone stress test    Carotid artery narrowing     <40% bilaterally    Depression     Diabetes mellitus (HCC)     type II    Drug intolerance     Lipitor, Crestor, high doses of Simvastatin    GERD (gastroesophageal reflux disease)     Heart attack ([address]     Hyperlipidemia     Hypertension     IBS (irritable bowel syndrome)     Obesity     Primary osteoarthritis involving multiple joints 2017    Sleep apnea 03/10/2021    SOB (shortness of breath) on exertion     UTI (urinary tract infection)        Family History   Problem Relation Age of Onset    Emphysema Mother     Heart Failure Mother     Heart Attack Father     Down Syndrome Son      There is no family history of sudden cardiac arrest    Social History     Tobacco Use    Smoking status: Former Smoker     Packs/day: 1.00     Years: 35.00     Pack years: 35.00     Quit date: 2010     Years since quittin.0    Smokeless tobacco: Never Used   Substance Use Topics    Alcohol use: No       Current Outpatient Medications   Medication Sig Dispense Refill    empagliflozin (JARDIANCE) 10 MG tablet Take 1 tablet by mouth daily 30 tablet 3    sacubitril-valsartan (ENTRESTO)  MG per tablet Take 1 tablet by mouth 2 times daily 60 tablet 11    loratadine (CLARITIN) 10 MG tablet Take 10 mg by mouth daily      atorvastatin (LIPITOR) 40 MG tablet Take 1 tablet by mouth daily 90 tablet 1    metoprolol succinate (TOPROL XL) 200 MG extended release tablet Take 1 tablet by mouth daily 90 tablet 1    furosemide (LASIX) 40 MG tablet Take 1 tablet by mouth daily 90 tablet 1    apixaban starter pack (ELIQUIS) 5 MG TBPK tablet Take 1 tablet by mouth See Admin Instructions (Patient taking differently: Take 5 mg by mouth 2 times daily ) 60 each 0    magnesium oxide (MAG-OX) 400 (241.3 Mg) MG TABS tablet Take 1 tablet by mouth daily 30 tablet 0    ALPRAZolam (XANAX) 2 MG tablet Take 2 mg by mouth 3 times daily as needed.        negative     PHYSICAL EXAM:  Vitals:    06/16/21 1242   BP: 104/70   Site: Left Upper Arm   Position: Sitting   Cuff Size: Medium Adult   Resp: 16   SpO2: 99%   Weight: 151 lb (68.5 kg)   Height: 5' 6\" (1.676 m)   Constitutional: Well-developed, no acute distress, well groomed  Eyes: conjunctivae normal, no xanthelasma   Ears, Nose, Throat: oral mucosa moist, no cyanosis   Neck: supple, no JVD, no bruits, no thyromegaly   CV: wearing LifeVest, normal rate, regular rhythm,  no murmurs, rubs, or gallops. PMI is nondisplaced, Peripheral pulses normal including no noted aortic bruit, bilateral radial and pedal pulses are normal in quality  Lungs: clear to auscultation bilaterally, normal respiratory effort without used of accessory muscles, no wheezes  Abdomen: soft, non-tender, bowel sounds present, no masses or hepatomegaly   Extremities: no digital clubbing, no edema   Skin: warm, no rashes   Neuro/Psych: A&O x 3, normal mood and affect    Cardiac testing today:  · ECG (6/16/21): sinus rhythm at 64 bpm, short NE (Michell = 110 msec), and fiffuse nonspecific T-abnormality. Assessment/Plan:  1. HFrEF-nonischemic  -Diagnosed in the setting of PE (right and left lower lobe segmental PEs). -Medical management per Dr Radha Ureña with aspirin 81 mg daily, lipitor 40 mg daily, lasix 40 mg daily, metoprolol  mg daily, and Entresto  mg every 12 hours.   -Continue LifeVest.  -TTE next week to reassess LVEF after 3 months of optimal medical therapy. If LVEF < 35%, consider 48 hour Holter monitor and cardiac MRI to further evaluate NICM. -She reports custody of her son with Down Syndrome was taken away form her by her other son due to her medical condition. She requests a letter stating that her cardiomyopathy is not a contraindication to custody of her son, which I will provide.  -Follow-up with my office in 3 months. 2. NSVT  -Continue metoprolol  mg daily.     I spent a total of 55 minutes reviewing previous

## 2021-06-16 NOTE — PATIENT INSTRUCTIONS
No medication changes at this time. Obtain echocardiogram. You will be contacted after echocardiogram with recommendations for further testing and/or permanent ICD implant. Follow-up with Dr Sofia Fry in 3 months. Letter regarding cardiomyopathy and custody provided.

## 2021-06-24 ENCOUNTER — HOSPITAL ENCOUNTER (OUTPATIENT)
Dept: CARDIOLOGY | Age: 76
Discharge: HOME | End: 2021-06-24
Payer: MEDICARE

## 2021-06-24 DIAGNOSIS — I25.10 CORONARY ARTERY DISEASE DUE TO LIPID RICH PLAQUE: ICD-10-CM

## 2021-06-24 DIAGNOSIS — I25.83 CORONARY ARTERY DISEASE DUE TO LIPID RICH PLAQUE: ICD-10-CM

## 2021-06-24 DIAGNOSIS — I50.41 ACUTE COMBINED SYSTOLIC AND DIASTOLIC CONGESTIVE HEART FAILURE (HCC): ICD-10-CM

## 2021-06-24 DIAGNOSIS — I50.23 ACUTE ON CHRONIC SYSTOLIC HEART FAILURE (HCC): ICD-10-CM

## 2021-06-24 DIAGNOSIS — I10 ESSENTIAL HYPERTENSION: ICD-10-CM

## 2021-06-24 PROCEDURE — 93308 TTE F-UP OR LMTD: CPT

## 2021-06-28 ENCOUNTER — TELEPHONE (OUTPATIENT)
Dept: CARDIOLOGY CLINIC | Age: 76
End: 2021-06-28

## 2021-06-28 NOTE — TELEPHONE ENCOUNTER
Per verbal from Dr. hCris Phillips, patient can discontinue LifeVest. Patient notified of echo results and Dr. Jackson Pap recommendation.

## 2021-07-02 ENCOUNTER — TELEPHONE (OUTPATIENT)
Dept: CARDIOLOGY CLINIC | Age: 76
End: 2021-07-02

## 2021-07-02 NOTE — TELEPHONE ENCOUNTER
Patient asking for another letter from Dr. Alon Mendez detailing that her heart function has improved to mildly less than normal and she has no restrictions from a cardiac standpoint. She needs this letter for a probate hearing coming up for her son. Please advise.

## 2021-07-02 NOTE — TELEPHONE ENCOUNTER
Mrs. Gadiel Mayers heart function has improved to mildly less than normal.  She has no cardiac restrictions.

## 2021-07-06 ENCOUNTER — TELEPHONE (OUTPATIENT)
Dept: NON INVASIVE DIAGNOSTICS | Age: 76
End: 2021-07-06

## 2021-07-21 ENCOUNTER — TELEPHONE (OUTPATIENT)
Dept: CARDIOLOGY CLINIC | Age: 76
End: 2021-07-21

## 2021-07-21 NOTE — TELEPHONE ENCOUNTER
Terrie Fuchs 1945 [id number]   She called office,   jardiance  Received 90 day supply from Rx assistance. Says she is supposed to contact medicaid to get refused to continue med assistance  (transferred to Rx assistance downstairs) regarding this process. Has upcoming CHF Clinic visit set. Denies any worsening chf s/s.      Mariely Barron RN

## 2021-07-26 NOTE — TELEPHONE ENCOUNTER
Qualified for LIS.   [id number] she says is her case #  7-22-21 is the notice date. Retroactive to 7-1-2021.    Transferred her to [facility] Rx assistance, she will update the rx team.   Don Dutta RN

## 2021-07-27 ENCOUNTER — HOSPITAL ENCOUNTER (OUTPATIENT)
Dept: OTHER | Age: 76
Setting detail: THERAPIES SERIES
Discharge: HOME | End: 2021-07-27
Payer: MEDICARE

## 2021-07-27 VITALS
OXYGEN SATURATION: 98 % | RESPIRATION RATE: 18 BRPM | BODY MASS INDEX: 24.05 KG/M2 | WEIGHT: 149 LBS | SYSTOLIC BLOOD PRESSURE: 121 MMHG | HEART RATE: 64 BPM | DIASTOLIC BLOOD PRESSURE: 59 MMHG

## 2021-07-27 LAB
ANION GAP SERPL CALCULATED.3IONS-SCNC: 11 MMOL/L (ref 7–16)
BUN BLDV-MCNC: 16 MG/DL (ref 6–23)
CALCIUM SERPL-MCNC: 9.3 MG/DL (ref 8.6–10.2)
CHLORIDE BLD-SCNC: 101 MMOL/L (ref 98–107)
CO2: 28 MMOL/L (ref 22–29)
CREAT SERPL-MCNC: 0.9 MG/DL (ref 0.5–1)
GFR AFRICAN AMERICAN: >60
GFR NON-AFRICAN AMERICAN: >60 ML/MIN/1.73
GLUCOSE BLD-MCNC: 124 MG/DL (ref 74–99)
POTASSIUM SERPL-SCNC: 4 MMOL/L (ref 3.5–5)
PRO-BNP: 1212 PG/ML (ref 0–450)
SODIUM BLD-SCNC: 140 MMOL/L (ref 132–146)

## 2021-07-27 PROCEDURE — 36415 COLL VENOUS BLD VENIPUNCTURE: CPT

## 2021-07-27 PROCEDURE — 99214 OFFICE O/P EST MOD 30 MIN: CPT

## 2021-07-27 PROCEDURE — 80048 BASIC METABOLIC PNL TOTAL CA: CPT

## 2021-07-27 PROCEDURE — 83880 ASSAY OF NATRIURETIC PEPTIDE: CPT

## 2021-07-27 NOTE — PROGRESS NOTES
[address] [facility]   1945            Referring Doctor:EBER Torres NP    Primary Care Physician: Dr. Lorenzo Nuñez  Cardiologist: Dr. Snehal Durand  Nephrologist:         History of Present Illness:     Priscilla Xiao is a 78 y.o. female with a history of HFrEF, most recent EF 25-30%     On 3/2/2021. Patient Story:    She does not  have dyspnea with exertion, shortness of breath, or decline in overall functional capacity. She does not have orthopnea, PND, nocturnal cough or hemoptysis. She does not have abdominal distention or bloating, early satiety, anorexia/change in appetite. She does not has a good urinary response to  oral diuretic. She does not  have  lower extremity edema. She denies lightheadedness, dizziness. She denies palpitations, syncope or near syncope. She does not complain of chest pain, pressure, discomfort. Allergies   Allergen Reactions    Sulfa Antibiotics Nausea Only    Amoxicillin Nausea Only    Clavulanic Acid Nausea Only    Levofloxacin     Omeprazole      Interferes with plavix    Other      Lipitor, Crestor, high doses of Simvastatin    Pneumococcal Vaccines     Amoxicillin-Pot Clavulanate Nausea Only     Makes her stomach hurt, doesn't want to eat.  Bee Venom Rash    Doxycycline Rash         No outpatient medications have been marked as taking for the 7/27/21 encounter [facility] HOSPITAL Encounter) with [facility] ROOM 1.           Guideline directed medical:  ARNI/ACE I/ARB: Yes  Beta blocker:   Yes  Aldosterone antagonist:  No        Physical Examination:     BP (!) 121/59   Pulse 64   Resp 18   Wt 149 lb (67.6 kg)   SpO2 98%   BMI 24.05 kg/m²                    HEENT  HEENT (WDL): Within Defined Limits                   Cardiac  Cardiac Rhythm: Normal sinus rhythm    Rhythm Interpretation  Pulse: 64         Gastrointestinal  Abdominal (WDL): Within Defined Limits               Peripheral Vascular  Peripheral Vascular (WDL): Within Defined Limits  Edema: None  RLE Edema: None  LLE Edema: None                                                 Pulse: 64                   LAB DATA:    BNP  No results for input(s): BNP in the last 72 hours. proBNP  No results for input(s): PROBNP in the last 72 hours. BMP  No results for input(s): NA, K, CL, CO2, BUN, CREATININE, GLUCOSE, CALCIUM in the last 72 hours. WEIGHTS:  Wt Readings from Last 3 Encounters:   07/27/21 149 lb (67.6 kg)   06/16/21 151 lb (68.5 kg)   04/23/21 148 lb (67.1 kg)         TELEMETRY:  Cardiac Regularity: Regular  Cardiac Rhythm/Interpretation: NSR        ASSESSMENT:  Gracia Vivar is evolemic with stable weights     Interventions completed this visit:  IV diuretics given no  Lab work obtained yes, BNP/BMP   Reviewed continue current medications that patient as prescribed answered any questions   Educated on signs and symptoms of HF  Educated on low sodium diet    PLAN:  Scheduled to follow up in CHF clinic on October 12, 2021. Pt requesting to come back in 3 months and will call CHF Clinic with ANY problems  if needed to come in sooner. Given clinic phone number and aware of signs and symptoms to call with any HF change in symptoms.

## 2021-07-30 NOTE — RESULT ENCOUNTER NOTE
Labs and CHF clinic note reviewed    GDMT:   jardiance 10mg daily (diabetes, CV effect) 6-1-21 started  entresto 97-103mg BID  toprol xl 200mg Daily  Lasix 40mg Daily  (holding spironolactone since 5/12/21) following K+ levels.      K levels have improved (4.0)  Restart spironolactone 12.5 mg daily     Follow up labs in 1 week    Thank you

## 2021-08-02 ENCOUNTER — TELEPHONE (OUTPATIENT)
Dept: CARDIOLOGY CLINIC | Age: 76
End: 2021-08-02

## 2021-08-02 DIAGNOSIS — I50.22 CHRONIC SYSTOLIC HEART FAILURE (HCC): Primary | ICD-10-CM

## 2021-08-02 RX ORDER — SPIRONOLACTONE 25 MG/1
12.5 TABLET ORAL DAILY
Qty: 45 TABLET | Refills: 1 | Status: SHIPPED
Start: 2021-08-02 | End: 2022-05-09

## 2021-08-02 NOTE — TELEPHONE ENCOUNTER
----- Message from IGNACIO Eaton CNP sent at 7/30/2021  9:45 AM EDT -----  Labs and CHF clinic note reviewed    GDMT:   jardiance 10mg daily (diabetes, CV effect) 6-1-21 started  entresto 97-103mg BID  toprol xl 200mg Daily  Lasix 40mg Daily  (holding spironolactone since 5/12/21) following K+ levels.      K levels have improved (4.0)  Restart spironolactone 12.5 mg daily     Follow up labs in 1 week    Thank you

## 2021-08-02 NOTE — TELEPHONE ENCOUNTER
I have reviewed the provider's instructions with the patient, answering all questions to her satisfaction. she requested lab scripts be mailed to her. Mailed lab scripts.      Cierra Orozco RN

## 2021-08-04 ENCOUNTER — TELEPHONE (OUTPATIENT)
Dept: NON INVASIVE DIAGNOSTICS | Age: 76
End: 2021-08-04

## 2021-08-04 NOTE — TELEPHONE ENCOUNTER
Called and spoke to patient letting patient know that we had to cancel her appointment that was set for 9/17/2021 at 11:30 and that we will call her to reschedule as soon as we have availability for Dr. Alec Burns.

## 2021-08-16 ENCOUNTER — HOSPITAL ENCOUNTER (OUTPATIENT)
Age: 76
Discharge: HOME | End: 2021-08-16
Payer: MEDICARE

## 2021-08-16 DIAGNOSIS — I50.22 CHRONIC SYSTOLIC HEART FAILURE (HCC): ICD-10-CM

## 2021-08-16 LAB
ANION GAP SERPL CALCULATED.3IONS-SCNC: 13 MMOL/L (ref 7–16)
BUN BLDV-MCNC: 28 MG/DL (ref 6–23)
CALCIUM SERPL-MCNC: 9.8 MG/DL (ref 8.6–10.2)
CHLORIDE BLD-SCNC: 103 MMOL/L (ref 98–107)
CO2: 25 MMOL/L (ref 22–29)
CREAT SERPL-MCNC: 1.1 MG/DL (ref 0.5–1)
GFR AFRICAN AMERICAN: 59
GFR NON-AFRICAN AMERICAN: 49 ML/MIN/1.73
GLUCOSE BLD-MCNC: 92 MG/DL (ref 74–99)
POTASSIUM SERPL-SCNC: 4.4 MMOL/L (ref 3.5–5)
PRO-BNP: 639 PG/ML (ref 0–450)
SODIUM BLD-SCNC: 141 MMOL/L (ref 132–146)

## 2021-08-16 PROCEDURE — 83880 ASSAY OF NATRIURETIC PEPTIDE: CPT

## 2021-08-16 PROCEDURE — 80048 BASIC METABOLIC PNL TOTAL CA: CPT

## 2021-08-16 PROCEDURE — 36415 COLL VENOUS BLD VENIPUNCTURE: CPT

## 2021-08-18 ENCOUNTER — TELEPHONE (OUTPATIENT)
Dept: CARDIOLOGY CLINIC | Age: 76
End: 2021-08-18

## 2021-08-18 DIAGNOSIS — I50.22 CHRONIC SYSTOLIC HEART FAILURE (HCC): Primary | ICD-10-CM

## 2021-08-18 NOTE — TELEPHONE ENCOUNTER
Thank you for the update  Stable potassium level  Rise in bun/scr and down trending pro-bnp    Have her decrease lasix to 20 mg daily   However if needed (increased weight or HF symptoms) she can use extra dose of Lasix PRN  Stay hydrated  Continue to watch diet for high potassium since we are decreasing diuretic   Follow up labs in 1 week      Thank you

## 2021-08-18 NOTE — TELEPHONE ENCOUNTER
Called to report she is taking spironolactone 12.5mg daily    Keeping hydrated  Making good urine , voids a lot  Denies dizziness or lightheaded  Denies weight change   feels good          Results for Naomi Espinoza (MRN [id number]) as of 8/18/2021 09:55   5/7/2021 14:14 6/11/2021 14:41 7/27/2021 12:30 8/16/2021 15:05   Sodium 140 138 140 141   Potassium 4.9 5.1 (H) 4.0 4.4   Chloride 101 100 101 103   CO2 25 22 28 25   BUN 17 17 16 28 (H)   Creatinine 0.8 0.9 0.9 1.1 (H)   Anion Gap 14 16 11 13   GFR Non- >60 >60 >60 49   GFR  >60 >60 >60 59   Glucose 121 (H) 138 (H) 124 (H) 92   Calcium 9.6 9.4 9.3 9.8   Pro-BNP  1,174 (H) 1,212 (H) 639 (H)

## 2021-08-19 RX ORDER — FUROSEMIDE 20 MG/1
20 TABLET ORAL SEE ADMIN INSTRUCTIONS
Qty: 180 TABLET | Refills: 1 | Status: SHIPPED
Start: 2021-08-19 | End: 2022-01-11 | Stop reason: DRUGHIGH

## 2021-08-23 ENCOUNTER — TELEPHONE (OUTPATIENT)
Dept: CARDIOLOGY CLINIC | Age: 76
End: 2021-08-23

## 2021-08-23 NOTE — TELEPHONE ENCOUNTER
Pt. Called to report she has gained weight gradually over the last few months and wants to know if she should be concerned. Her Lasix was decreased last week from 40 mg to 20 mg daily. Reports wt. 150 lb at Dr. Nimisha Holder in late March. Haven't weighed regularly since then. Reports wt at 153 lb on home scale. Admits to 'cheating' on diet, eating extra calories, going out to lunch frequently, eating desserts more frequently. Denies, swelling, SOB or increased fatigue. \"Feels good. \" Reviewed Lasix order and how to do extra dosing when she is symptomatic or has 2 lb. Weight gain in one day. Pt. Instructed to weigh daily-- same time, scale and clothes, before any PO intake, after emptying bladder. Pt. Verbalized understanding and will comply with daily weights and documenting. I encouraged her to call back with further questions.      Cole Pak RN

## 2021-08-25 NOTE — PLAN OF CARE
I provided her with the following Heart Failure educational material,  which included:      *The Heart Failure book- \"Caring for Your Heart: Living Well with Heart Failure\"      *The Heart Failure Zones       *The Sodium Content pamphlet      *\"What Foods Should You Avoid? \" information sheet. *Fast Food Sodium information sheet         We reviewed the introduction to Heart Failure, the HF zones, signs and symptoms to report on day 1 of onset, medications, medication compliance, the importance of obtaining daily weights, following a low sodium diet, reading food labels for the sodium content, keeping physician appointments, and smoking cessation. We discussed writing / tracking her weights on a calendar / paper because 5# in 1 week can sneak up if you are not tracking it. She can also take her charted weights to her doctor appointments to be reviewed. Her contributing risk factors for Heart Failure are identified as medical compliance/confusion  I advised patient she can reduce the risk for Heart Failure exacerbations by modifying / controlling the risk factors she has. We discussed self-managed care which includes the following:  to take her medications as prescribed, if she experiences any intolerable side effects to any medications to please call her cardiologist / doctor, do not just stop taking the medication; follow a cardiac heart healthy / low sodium diet; weigh herself daily, exercise regularly- per doctor recommendation and not to smoke or use an excess amount of alcohol. We discussed calling her cardiologist / doctor with a weight gain of 3# in one day or a total of 5# or more in one week. Also, if she would have a significant weight loss of 3# or more in one day to call her doctor, she may have to decrease or hold her diuretic dose.  On days she feels nauseated and not eating / drinking, having emesis or diarrhea,  informed to call her cardiologist  / doctor, she may have to decrease Sent E advice to patient to follow up as scheduled.   or hold her diuretic to avoid dehydration. I stressed the importance of informing her cardiologist the first day of onset of any of the signs and symptoms in the \"Yellow Zone\" of the HF Zones. She verbalizes understanding. Echocardiogram: 3/2/2021 EF 25-30%. Stage II DD, TAPSE 12 mm, moderately dilated LA, mildly enlarged RA, moderate MR, mild to moderate TR, RVSP 55 mmHg, moderate AI    BNP:   Lab Results   Component Value Date    PROBNP 33,238 (H) 2021       History of:    has a past medical history of Abnormal echocardiogram, Acute combined systolic and diastolic congestive heart failure ([address] Acute renal insufficiency, Anxiety, CAD (coronary artery disease), Carotid artery narrowing, Depression, Diabetes mellitus ([address] Drug intolerance, GERD (gastroesophageal reflux disease), Heart attack ([address] Hyperlipidemia, Hypertension, IBS (irritable bowel syndrome), Obesity, Primary osteoarthritis involving multiple joints, Sleep apnea, SOB (shortness of breath) on exertion, and UTI (urinary tract infection). has a past surgical history that includes Tonsillectomy; Percutaneous Transluminal Coronary Angio (2007); Dilation and curettage of uterus; cyst removal; Diagnostic Cardiac Cath Lab Procedure (2007); Coronary angioplasty (07); and  section.     Medications:    influenza virus vaccine  0.5 mL Intramuscular Prior to discharge    enoxaparin  1 mg/kg Subcutaneous BID    metoprolol succinate  50 mg Oral Daily    sacubitril-valsartan  1 tablet Oral BID    spironolactone  25 mg Oral Daily    vitamin D  2,000 Units Oral Daily    escitalopram  10 mg Oral Daily    cetirizine  5 mg Oral Daily    pantoprazole  40 mg Oral Daily    insulin lispro  0-10 Units Subcutaneous 4x Daily AC & HS    sodium chloride flush  10 mL Intravenous 2 times per day    furosemide  20 mg Intravenous BID    atorvastatin  80 mg Oral Nightly    aspirin  81 mg Oral Daily      dextrose         Code Status:Full Code    The patient is ordered:  Diet: DIET CARDIAC; Low Sodium (2 GM)   Sodium/fluid restriction daily ordered (fluid restriction recommended if patient is hyponatremic and/or diuretic is initiated or increased):  [x] Yes     [] No  FR:   Daily Weights:   Patient Vitals for the past 96 hrs (Last 3 readings):   Weight   03/11/21 0641 158 lb 3.2 oz (71.8 kg)   03/10/21 0551 163 lb 2 oz (74 kg)   03/09/21 1430 160 lb (72.6 kg)     I/O:     Intake/Output Summary (Last 24 hours) at 3/11/2021 1325  Last data filed at 3/11/2021 0913  Gross per 24 hour   Intake 480 ml   Output 1900 ml   Net -1420 ml        The Heart Failure Booklet given to patient along with other education materials addressing:  · What is Heart Failure? · Things You Can Do to Live Well with HF  · How to Take Your Medications  · How to Eat Less Salt  · Exercising Well with Heart Failure  · Signs and symptoms of HF to report  · Weight Yourself Each Day  · Home Self Management- activity, weight tracking, taking medications as prescribed, meals /diet planning (sodium and fluid restriction), how to read food labels, keeping physician follow ups, smoking cessation, follow the Heart Failure Zones  · Heart Failure Zones  · Sodium content pamphlet  · What food you should avoid tip sheet    Instructed  to call 911 if you have any of the following symptoms:    ·    Struggling to breathe unrelieved with rest,  ·    Having chest pain, confusion or can't think clearly  ·    Have confusion or cant think clearly    Readmission Risk Score: 15        Discharge Plan:  I placed the Heart Failure Home Instructions in her discharge instructions. Per Heart Failure GWTG, the patient should have a follow-up appointment made within 7 days of discharge.     Jaime COHENN, RN  Heart Failure Navigator    CONGESTIVE HEART FAILURE (CHF) GUIDELINES  (Must be completed for Primary Diagnosis CHF or History of CHF)    Type of CHF:    [] Acute   [] Chronic     [x] Acute on Chronic     Ventricular Function Assessment (check):             [] HFpEF  [] HFpEF, borderline  [] HFpEF, improved  [x] HFrEF  Ejection Fraction (%):     25-30%                                                                 Angiotensin-Converting-Enzyme (ACE) inhibitor ordered:  [] Yes  [x] No (specify contraindication):  [] Renal Insufficiency  [] Cough  [] Hypotension  [] Allergy/angioedema  [] No left ventricular systolic dysfunction (LVSD)  [] Hyperkalemia  [] Moderate to severe aortic stenosis  [] Other (Specify):     Angiotensin II receptor blockers (ARB) ordered:  [] Yes  [x] No (specify contraindication):  [] Renal Insufficiency  [] Hypotension  [] Allergy/angioedema  [] No LVSD  [] Hyperkalemia  [] Moderate to severe aortic stenosis  [] Other (Specify):      ARNI - Angiotensin Receptor Neprilysin Inhibitor ordered:  [x] Yes  [] No      ACC/AHA Guidelines Beta Blocker (Carvedilol, Metoprolol Succinate, or Bisoprolol) ordered:    [x] Yes  [] No (specify contraindication):  [] Bradycardia  [] Hypotension  [] LVD  [] 2nd or 3rd degree heart block  [] Bronchospastic airway disease  [] Decompensated CHF  [] Other (Specify):

## 2021-08-26 ENCOUNTER — HOSPITAL ENCOUNTER (OUTPATIENT)
Age: 76
Discharge: HOME | End: 2021-08-26
Payer: MEDICARE

## 2021-08-26 DIAGNOSIS — I50.22 CHRONIC SYSTOLIC HEART FAILURE (HCC): ICD-10-CM

## 2021-08-26 LAB
ANION GAP SERPL CALCULATED.3IONS-SCNC: 12 MMOL/L (ref 7–16)
BUN BLDV-MCNC: 35 MG/DL (ref 6–23)
CALCIUM SERPL-MCNC: 10.2 MG/DL (ref 8.6–10.2)
CHLORIDE BLD-SCNC: 101 MMOL/L (ref 98–107)
CO2: 25 MMOL/L (ref 22–29)
CREAT SERPL-MCNC: 1.2 MG/DL (ref 0.5–1)
GFR AFRICAN AMERICAN: 53
GFR NON-AFRICAN AMERICAN: 44 ML/MIN/1.73
GLUCOSE BLD-MCNC: 112 MG/DL (ref 74–99)
POTASSIUM SERPL-SCNC: 4.3 MMOL/L (ref 3.5–5)
PRO-BNP: 503 PG/ML (ref 0–450)
SODIUM BLD-SCNC: 138 MMOL/L (ref 132–146)

## 2021-08-26 PROCEDURE — 80048 BASIC METABOLIC PNL TOTAL CA: CPT

## 2021-08-26 PROCEDURE — 83880 ASSAY OF NATRIURETIC PEPTIDE: CPT

## 2021-08-26 PROCEDURE — 36415 COLL VENOUS BLD VENIPUNCTURE: CPT

## 2021-08-27 NOTE — RESULT ENCOUNTER NOTE
Labs reviewed  Ongoing down trending pro-bnp  Rise in bun/scr    If feeling ok from a fluid standpoint stop lasix and only use PRN  Stay hydrated    Follow up labs in 1 week.      Thank you

## 2021-08-30 ENCOUNTER — TELEPHONE (OUTPATIENT)
Dept: CARDIOLOGY CLINIC | Age: 76
End: 2021-08-30

## 2021-08-30 DIAGNOSIS — I50.22 CHRONIC SYSTOLIC HEART FAILURE (HCC): Primary | ICD-10-CM

## 2021-08-30 DIAGNOSIS — Z79.899 MEDICATION DOSE DECREASED: ICD-10-CM

## 2021-08-30 RX ORDER — FUROSEMIDE 20 MG/1
20 TABLET ORAL DAILY PRN
Qty: 90 TABLET | Refills: 1 | Status: SHIPPED
Start: 2021-08-30 | End: 2022-08-04

## 2021-08-30 NOTE — TELEPHONE ENCOUNTER
I have reviewed the provider's instructions with the patient, answering all questions to her satisfaction. Also requested pharmacy to review PRN use of lasix at . She will record the days she takes PRN lasix.       Milan Dawson RN

## 2021-08-31 ENCOUNTER — TELEPHONE (OUTPATIENT)
Dept: CARDIOLOGY CLINIC | Age: 76
End: 2021-08-31

## 2021-08-31 NOTE — TELEPHONE ENCOUNTER
Pt. Called re: HR in the 60s. States it is usually in the 76s. States her Lasix was changed to PRN. Pt. States she is \"OCD\" and is concerned. States her O2 is in the 90s. Denies SOB, CP, palpitations, dizziness/lightheadedness, edema to legs, feet. Saw PCP and BP was good then. I assured her that fluctuations in HR is normal and she should continue to monitor. If she becomes symptomatic or HR decreases below 60 and stays there to call us back. She also asked about booster of COVID vaccine. I referred her to PCP for further instruction. Pt. Verbalized understanding and thanked me for the information.      Chery Burnett RN

## 2021-09-07 ENCOUNTER — HOSPITAL ENCOUNTER (OUTPATIENT)
Age: 76
Discharge: HOME | End: 2021-09-07
Payer: MEDICARE

## 2021-09-07 ENCOUNTER — TELEPHONE (OUTPATIENT)
Dept: CARDIOLOGY CLINIC | Age: 76
End: 2021-09-07

## 2021-09-07 DIAGNOSIS — I50.22 CHRONIC SYSTOLIC HEART FAILURE (HCC): ICD-10-CM

## 2021-09-07 DIAGNOSIS — Z79.899 MEDICATION DOSE DECREASED: ICD-10-CM

## 2021-09-07 LAB
ANION GAP SERPL CALCULATED.3IONS-SCNC: 11 MMOL/L (ref 7–16)
BUN BLDV-MCNC: 13 MG/DL (ref 6–23)
CALCIUM SERPL-MCNC: 9.1 MG/DL (ref 8.6–10.2)
CHLORIDE BLD-SCNC: 104 MMOL/L (ref 98–107)
CO2: 24 MMOL/L (ref 22–29)
CREAT SERPL-MCNC: 0.9 MG/DL (ref 0.5–1)
GFR AFRICAN AMERICAN: >60
GFR NON-AFRICAN AMERICAN: >60 ML/MIN/1.73
GLUCOSE BLD-MCNC: 113 MG/DL (ref 74–99)
POTASSIUM SERPL-SCNC: 4.1 MMOL/L (ref 3.5–5)
PRO-BNP: 3286 PG/ML (ref 0–450)
SODIUM BLD-SCNC: 139 MMOL/L (ref 132–146)

## 2021-09-07 PROCEDURE — 80048 BASIC METABOLIC PNL TOTAL CA: CPT

## 2021-09-07 PROCEDURE — 83880 ASSAY OF NATRIURETIC PEPTIDE: CPT

## 2021-09-07 PROCEDURE — 36415 COLL VENOUS BLD VENIPUNCTURE: CPT

## 2021-09-07 NOTE — TELEPHONE ENCOUNTER
In error forgot to take eliquis and entresto last night. First time missed meds    Going for labs this am  Has been off lasix 20mg (has PRN use only now) has not needed to use the lasix    Good urine output.   Weights steady  Denies edema  Denies SOB  No dizzy/lightheaded  No chest pain  Keeps hydrated

## 2021-09-08 ENCOUNTER — TELEPHONE (OUTPATIENT)
Dept: CARDIOLOGY CLINIC | Age: 76
End: 2021-09-08

## 2021-09-08 NOTE — TELEPHONE ENCOUNTER
patient notified and understanding on results. Patient is asking her PCP for a zpak due to sinuses. She would like to know what you think. Please advise.

## 2021-09-08 NOTE — TELEPHONE ENCOUNTER
----- Message from Gala Jiménez DO sent at 9/7/2021  6:48 PM EDT -----  Take Lasix 20 mg daily for 2 days.

## 2021-09-22 ENCOUNTER — TELEPHONE (OUTPATIENT)
Dept: CARDIOLOGY CLINIC | Age: 76
End: 2021-09-22

## 2021-09-22 DIAGNOSIS — I50.22 CHRONIC HFREF (HEART FAILURE WITH REDUCED EJECTION FRACTION) (HCC): ICD-10-CM

## 2021-09-22 RX ORDER — FUROSEMIDE 40 MG/1
TABLET ORAL
Qty: 90 TABLET | Refills: 1 | OUTPATIENT
Start: 2021-09-22

## 2021-09-22 RX ORDER — ATORVASTATIN CALCIUM 40 MG/1
TABLET, FILM COATED ORAL
Qty: 90 TABLET | Refills: 1 | OUTPATIENT
Start: 2021-09-22

## 2021-09-22 NOTE — TELEPHONE ENCOUNTER
Diuretic review:    Lasix 20mg PRN       · Only used lasix 20mg one time since changing med to PRN  Voided good. · Overate  food. Now on her low sodium diet. · No further needed use PRN lasix    · Weight: averages   151-153#    · Breathing good. No swelling    Mailed her HF zones and reviewed them on the phone with her. She is aware to take a lasix/furosemide 20mg if she is in the yellow zones. She says she is not in yellow zones this time.   Self identified as being good \"green Zone\"      Jesus Mauro RN   Electronically signed by Jesus Mauro RN on 2021 at 10:21 AM

## 2021-10-03 DIAGNOSIS — I50.22 CHRONIC HFREF (HEART FAILURE WITH REDUCED EJECTION FRACTION) (HCC): ICD-10-CM

## 2021-10-04 RX ORDER — METOPROLOL SUCCINATE 200 MG/1
TABLET, EXTENDED RELEASE ORAL
Qty: 90 TABLET | Refills: 1 | Status: SHIPPED
Start: 2021-10-04 | End: 2022-03-23 | Stop reason: SDUPTHER

## 2021-10-12 ENCOUNTER — HOSPITAL ENCOUNTER (OUTPATIENT)
Dept: OTHER | Age: 76
Setting detail: THERAPIES SERIES
Discharge: HOME | End: 2021-10-12
Payer: MEDICARE

## 2021-10-12 VITALS
HEART RATE: 67 BPM | DIASTOLIC BLOOD PRESSURE: 60 MMHG | WEIGHT: 153 LBS | SYSTOLIC BLOOD PRESSURE: 117 MMHG | OXYGEN SATURATION: 93 % | RESPIRATION RATE: 18 BRPM | BODY MASS INDEX: 24.69 KG/M2

## 2021-10-12 LAB
ANION GAP SERPL CALCULATED.3IONS-SCNC: 13 MMOL/L (ref 7–16)
BUN BLDV-MCNC: 14 MG/DL (ref 6–23)
CALCIUM SERPL-MCNC: 9.5 MG/DL (ref 8.6–10.2)
CHLORIDE BLD-SCNC: 103 MMOL/L (ref 98–107)
CO2: 21 MMOL/L (ref 22–29)
CREAT SERPL-MCNC: 1 MG/DL (ref 0.5–1)
GFR AFRICAN AMERICAN: >60
GFR NON-AFRICAN AMERICAN: 54 ML/MIN/1.73
GLUCOSE BLD-MCNC: 107 MG/DL (ref 74–99)
POTASSIUM SERPL-SCNC: 4.8 MMOL/L (ref 3.5–5)
PRO-BNP: 789 PG/ML (ref 0–450)
SODIUM BLD-SCNC: 137 MMOL/L (ref 132–146)

## 2021-10-12 PROCEDURE — 99214 OFFICE O/P EST MOD 30 MIN: CPT

## 2021-10-12 PROCEDURE — 80048 BASIC METABOLIC PNL TOTAL CA: CPT

## 2021-10-12 PROCEDURE — 36415 COLL VENOUS BLD VENIPUNCTURE: CPT

## 2021-10-12 PROCEDURE — 83880 ASSAY OF NATRIURETIC PEPTIDE: CPT

## 2021-10-12 NOTE — PROGRESS NOTES
[address] [facility]   1945            Referring Doctor:EBER Sam NP    Primary Care Physician: Dr. Monte Merlin  Cardiologist: Dr. kB Reyes  Nephrologist:         History of Present Illness:     Darrin Farfan is a 78 y.o. female with a history of HFrEF, most recent EF 25-30%     On 3/2/2021. Patient Story:    She does not  have dyspnea with exertion, shortness of breath, or decline in overall functional capacity. She does not have orthopnea, PND, nocturnal cough or hemoptysis. She does not have abdominal distention or bloating, early satiety, anorexia/change in appetite. She has a good urinary response to  oral diuretic. She does not  have  lower extremity edema. She denies lightheadedness, dizziness. She denies palpitations, syncope or near syncope. She does not complain of chest pain, pressure, discomfort. Allergies   Allergen Reactions    Sulfa Antibiotics Nausea Only    Amoxicillin Nausea Only    Clavulanic Acid Nausea Only    Levofloxacin     Omeprazole      Interferes with plavix    Other      Lipitor, Crestor, high doses of Simvastatin    Pneumococcal Vaccines     Amoxicillin-Pot Clavulanate Nausea Only     Makes her stomach hurt, doesn't want to eat.  Bee Venom Rash    Doxycycline Rash         Outpatient Medications Marked as Taking for the 10/12/21 encounter [facility] Encounter) with [facility] ROOM 1   Medication Sig Dispense Refill    metoprolol succinate (TOPROL XL) 200 MG extended release tablet TAKE ONE TABLET BY MOUTH ONCE DAILY 90 tablet 1    furosemide (LASIX) 20 MG tablet Take 1 tablet by mouth daily as needed (take 1 tablet 8am on  days you gain more than 2 pound in a day, worse shortness of breath or increased swelling.   take every day till you loose the weight or feel better) 90 tablet 1    spironolactone (ALDACTONE) 25 MG tablet Take 0.5 tablets by mouth daily 45 tablet 1    empagliflozin (JARDIANCE) 10 MG tablet Take 1 tablet by mouth daily 30 tablet 3    sacubitril-valsartan (ENTRESTO)  MG per tablet Take 1 tablet by mouth 2 times daily 60 tablet 11    loratadine (CLARITIN) 10 MG tablet Take 10 mg by mouth daily      atorvastatin (LIPITOR) 40 MG tablet Take 1 tablet by mouth daily 90 tablet 1    apixaban starter pack (ELIQUIS) 5 MG TBPK tablet Take 1 tablet by mouth See Admin Instructions (Patient taking differently: Take 5 mg by mouth 2 times daily ) 60 each 0    magnesium oxide (MAG-OX) 400 (241.3 Mg) MG TABS tablet Take 1 tablet by mouth daily 30 tablet 0    ALPRAZolam (XANAX) 2 MG tablet Take 2 mg by mouth 3 times daily as needed.  Cholecalciferol (VITAMIN D3) 50 MCG (2000 UT) CAPS Take 2,000 Units by mouth daily      escitalopram (LEXAPRO) 10 MG tablet take 1 tablet by mouth once daily 90 tablet 1    metFORMIN (GLUCOPHAGE-XR) 500 MG extended release tablet Take 1 tablet by mouth 2 times daily (with meals) 180 tablet 1    pantoprazole (PROTONIX) 40 MG tablet TAKE ONE TABLET BY MOUTH ONCE DAILY 90 tablet 1    aspirin EC 81 MG EC tablet Take 81 mg by mouth daily       Co-Enzyme Q-10 100 MG CAPS Take 200 mg by mouth daily              Guideline directed medical:  ARNI/ACE I/ARB: Yes  Beta blocker: Yes  Aldosterone antagonist:  No        Physical Examination:     /60   Pulse 67   Resp 18   Wt 153 lb (69.4 kg)   SpO2 93%   BMI 24.69 kg/m²                    HEENT  HEENT (WDL): Within Defined Limits                   Cardiac  Cardiac Rhythm: Sinus rhythm    Rhythm Interpretation  Pulse: 67         Gastrointestinal  Abdominal (WDL): Within Defined Limits               Peripheral Vascular  Peripheral Vascular (WDL): Within Defined Limits  Edema: None  RLE Edema: None  LLE Edema: None                                                 Pulse: 67                   LAB DATA:    BNP  No results for input(s): BNP in the last 72 hours.     proBNP  No results for input(s): PROBNP in the last 72 hours.    BMP  No results for input(s): NA, K, CL, CO2, BUN, CREATININE, GLUCOSE, CALCIUM in the last 72 hours. WEIGHTS:  Wt Readings from Last 3 Encounters:   10/12/21 153 lb (69.4 kg)   07/27/21 149 lb (67.6 kg)   06/16/21 151 lb (68.5 kg)         TELEMETRY:  Cardiac Regularity: Regular  Cardiac Rhythm/Interpretation: NSR        ASSESSMENT:  Maria R Rich is evolemic with SLIGHT WEIGHT GAIN    Interventions completed this visit:  IV diuretics given no  Lab work obtained yes, BNP/BMP   Reviewed continue current medications that patient as prescribed answered any questions   Educated on signs and symptoms of HF  Educated on low sodium diet    PLAN:  Scheduled to follow up in CHF clinic on   [place]. 11,, 2022. Pt requesting to come back in 3 months and will call CHF Clinic with ANY problems  if needed to come in sooner. Given clinic phone number and aware of signs and symptoms to call with any HF change in symptoms. RE INSTRUCTED PT TO WEIGH DAILY AND TAKE LASIX WITH 3LB WEIGHT GAIN OVERNIGHT OR 5LB WEIGHT GAIN IN A WEEK AND CONTINUE TO TAKE LASIX UNTIL WEIGHT IS BACK TO BASELINE. PT VERBALIZED UNDERSTANDING.     PT INSTRUCTED TO CALL CHF CLINIC WITH QUESTIONS

## 2021-10-18 ENCOUNTER — TELEPHONE (OUTPATIENT)
Dept: CARDIOLOGY CLINIC | Age: 76
End: 2021-10-18

## 2021-11-02 ENCOUNTER — OFFICE VISIT (OUTPATIENT)
Dept: CARDIOLOGY CLINIC | Age: 76
End: 2021-11-02
Payer: MEDICARE

## 2021-11-02 VITALS
RESPIRATION RATE: 16 BRPM | WEIGHT: 153.9 LBS | DIASTOLIC BLOOD PRESSURE: 85 MMHG | HEIGHT: 60 IN | BODY MASS INDEX: 30.22 KG/M2 | SYSTOLIC BLOOD PRESSURE: 150 MMHG | OXYGEN SATURATION: 95 % | HEART RATE: 62 BPM

## 2021-11-02 DIAGNOSIS — I25.83 CORONARY ARTERY DISEASE DUE TO LIPID RICH PLAQUE: Primary | ICD-10-CM

## 2021-11-02 DIAGNOSIS — I25.10 CORONARY ARTERY DISEASE DUE TO LIPID RICH PLAQUE: Primary | ICD-10-CM

## 2021-11-02 PROCEDURE — G8484 FLU IMMUNIZE NO ADMIN: HCPCS | Performed by: INTERNAL MEDICINE

## 2021-11-02 PROCEDURE — 1090F PRES/ABSN URINE INCON ASSESS: CPT | Performed by: INTERNAL MEDICINE

## 2021-11-02 PROCEDURE — 93000 ELECTROCARDIOGRAM COMPLETE: CPT | Performed by: INTERNAL MEDICINE

## 2021-11-02 PROCEDURE — G8427 DOCREV CUR MEDS BY ELIG CLIN: HCPCS | Performed by: INTERNAL MEDICINE

## 2021-11-02 PROCEDURE — 4040F PNEUMOC VAC/ADMIN/RCVD: CPT | Performed by: INTERNAL MEDICINE

## 2021-11-02 PROCEDURE — G8400 PT W/DXA NO RESULTS DOC: HCPCS | Performed by: INTERNAL MEDICINE

## 2021-11-02 PROCEDURE — 1036F TOBACCO NON-USER: CPT | Performed by: INTERNAL MEDICINE

## 2021-11-02 PROCEDURE — G8417 CALC BMI ABV UP PARAM F/U: HCPCS | Performed by: INTERNAL MEDICINE

## 2021-11-02 PROCEDURE — 1123F ACP DISCUSS/DSCN MKR DOCD: CPT | Performed by: INTERNAL MEDICINE

## 2021-11-02 PROCEDURE — 99214 OFFICE O/P EST MOD 30 MIN: CPT | Performed by: INTERNAL MEDICINE

## 2021-11-02 PROCEDURE — 3017F COLORECTAL CA SCREEN DOC REV: CPT | Performed by: INTERNAL MEDICINE

## 2021-11-02 RX ORDER — ZINC GLUCONATE 50 MG
50 TABLET ORAL DAILY
COMMUNITY

## 2021-11-02 RX ORDER — AMLODIPINE BESYLATE 2.5 MG/1
2.5 TABLET ORAL DAILY
Qty: 90 TABLET | Refills: 3 | Status: SHIPPED
Start: 2021-11-02 | End: 2022-10-12

## 2021-11-02 NOTE — PROGRESS NOTES
Jimmie Hou  1945  Date of Service: 11/2/2021    Patient Active Problem List    Diagnosis Date Noted    Hypomagnesemia 03/10/2021    Valvular heart disease 03/10/2021    Abnormal liver enzymes 03/10/2021    Hypertensive urgency 03/10/2021    Sleep apnea 03/10/2021    Acute respiratory failure with hypoxia ([address] 03/10/2021    Bilateral pulmonary embolism ([address] 03/10/2021    Congestive heart failure ([address]     Acute on chronic systolic heart failure ([address] 03/09/2021    Acute combined systolic and diastolic congestive heart failure ([address] 03/09/2021    Ataxic gait     Vertigo 02/28/2021    Acute renal insufficiency 02/28/2021    Acute cystitis without hematuria 02/28/2021    Ataxia 02/27/2021    Mixed hyperlipidemia 01/18/2017    Primary osteoarthritis involving multiple joints 01/18/2017    Type 2 diabetes mellitus ([address] 04/25/2016    GERD (gastroesophageal reflux disease) 04/25/2016    Benign neoplasm of choroid 04/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 Adenosine nuclear stress test (6/6/08): Normal adenosine portion, negative ischemia, negative scar, EF 60%. B. PTCA and stenting RCA (11/25/07):  bare metal stent 2.5 x 24 mm and 2.5 x 12 mm stents placed. C. Cardiac catheterization (11/25/07): Left main normal; LAD 40% prox; Cx minimal luminal irregularities; % mid; LV gram not performed. D. Acute inferior wall myocardial infarction (11/25/07).  Abnormal echocardiogram      Overview Note:     11/26/07: Mild inferior hypokinesis, EF > 60%, Stage I DD, trace MR, trace TR, normal RVSP.       Hypertension     Hyperlipidemia     Anxiety     Carotid artery narrowing      Overview Note:     <40% bilaterally      Drug intolerance      Overview Note:     Lipitor, Crestor, high doses of Simvastatin         Social History     Socioeconomic History    Marital status:      Spouse name: None    Number of children: None    Years of education: None    Highest education level: None   Occupational History     Employer: RETIRED   Tobacco Use    Smoking status: Former Smoker     Packs/day: 1.00     Years: 35.00     Pack years: 35.00     Quit date: 2010     Years since quittin.3    Smokeless tobacco: Never Used   Vaping Use    Vaping Use: Never used   Substance and Sexual Activity    Alcohol use: No    Drug use: No    Sexual activity: Not Currently   Other Topics Concern    None   Social History Narrative    None     Social Determinants of Health     Financial Resource Strain:     Difficulty of Paying Living Expenses:    Food Insecurity:     Worried About Running Out of Food in the Last Year:     Ran Out of Food in the Last Year:    Transportation Needs:     Lack of Transportation (Medical):  Lack of Transportation (Non-Medical):    Physical Activity:     Days of Exercise per Week:     Minutes of Exercise per Session:    Stress:     Feeling of Stress :    Social Connections:     Frequency of Communication with Friends and Family:     Frequency of Social Gatherings with Friends and Family:     Attends Mu-ism Services:     Active Member of Clubs or Organizations:     Attends Club or Organization Meetings:     Marital Status:    Intimate Partner Violence:     Fear of Current or Ex-Partner:     Emotionally Abused:     Physically Abused:     Sexually Abused:        Current Outpatient Medications   Medication Sig Dispense Refill    zinc gluconate 50 MG tablet Take 50 mg by mouth daily      metoprolol succinate (TOPROL XL) 200 MG extended release tablet TAKE ONE TABLET BY MOUTH ONCE DAILY 90 tablet 1    furosemide (LASIX) 20 MG tablet Take 1 tablet by mouth daily as needed (take 1 tablet 8am on  days you gain more than 2 pound in a day, worse shortness of breath or increased swelling.   take every day till you loose the weight or feel better) 90 tablet 1    furosemide (LASIX) 20 MG tablet Take 1 tablet by mouth See Admin Instructions 20mg daily 8am. On days you gain more than 2 lbs or have worse swelling or worse shortness of breath then take 20mg at 2pm. 180 tablet 1    spironolactone (ALDACTONE) 25 MG tablet Take 0.5 tablets by mouth daily 45 tablet 1    empagliflozin (JARDIANCE) 10 MG tablet Take 1 tablet by mouth daily 30 tablet 3    sacubitril-valsartan (ENTRESTO)  MG per tablet Take 1 tablet by mouth 2 times daily 60 tablet 11    loratadine (CLARITIN) 10 MG tablet Take 10 mg by mouth daily      atorvastatin (LIPITOR) 40 MG tablet Take 1 tablet by mouth daily 90 tablet 1    apixaban starter pack (ELIQUIS) 5 MG TBPK tablet Take 1 tablet by mouth See Admin Instructions (Patient taking differently: Take 5 mg by mouth 2 times daily ) 60 each 0    magnesium oxide (MAG-OX) 400 (241.3 Mg) MG TABS tablet Take 1 tablet by mouth daily 30 tablet 0    ALPRAZolam (XANAX) 2 MG tablet Take 2 mg by mouth 2 times daily as needed.  Cholecalciferol (VITAMIN D3) 50 MCG (2000 UT) CAPS Take 2,000 Units by mouth daily      azelastine (ASTELIN) 0.1 % nasal spray 2 sprays by Nasal route 2 times daily as directed      fluticasone (FLONASE) 50 MCG/ACT nasal spray 2 sprays by Each Nostril route daily      escitalopram (LEXAPRO) 10 MG tablet take 1 tablet by mouth once daily 90 tablet 1    metFORMIN (GLUCOPHAGE-XR) 500 MG extended release tablet Take 1 tablet by mouth 2 times daily (with meals) 180 tablet 1    aspirin EC 81 MG EC tablet Take 81 mg by mouth daily       Co-Enzyme Q-10 100 MG CAPS Take 200 mg by mouth daily       pantoprazole (PROTONIX) 40 MG tablet TAKE ONE TABLET BY MOUTH ONCE DAILY (Patient not taking: Reported on 11/2/2021) 90 tablet 1     No current facility-administered medications for this visit.         Allergies   Allergen Reactions    Sulfa Antibiotics Nausea Only    Amoxicillin Nausea Only    Clavulanic Acid Nausea Only    Levofloxacin dante. EKG: Sinus rhythm, 62 bpm, nl axis, nonspecific ST - T wave changes with T wave inversions. q waves in III. Assessment:   1. Coronary artery disease as outlined above. Clinically no signs or symptoms of recurring ischemia at this time. Cardiac catheterization 3/19/2021 shows no ischemic coronary lesions. 2. Cardiomyopathy as outlined above. Clinically euvolemic and no decompensation at this time. EF improved to normal.  EF severely declined again to 25-30 %.  3-21. Echo 6/24/2021 shows significant improvement in the ejection fraction back up to 45%. 3. Moderate mitral regurgitation  4. Moderate aortic regurgitation  5. Hypertension, not well controled at this time. 6. Hypercholesterolemia  7. Recurring nonsustained VT. Being followed by electrophysiology. Recommendations:  1. She is following the cholesterol with Dr. Teodora Cruz. 2. Add Norvasc 2.5 mg daily. Thank you for allowing me to participate in your patient's care.       [address], [address] [facility]  Interventional Cardiology

## 2021-11-16 ENCOUNTER — TELEPHONE (OUTPATIENT)
Dept: NON INVASIVE DIAGNOSTICS | Age: 76
End: 2021-11-16

## 2021-12-06 ENCOUNTER — TELEPHONE (OUTPATIENT)
Dept: CARDIOLOGY CLINIC | Age: 76
End: 2021-12-06

## 2022-01-05 ENCOUNTER — TELEPHONE (OUTPATIENT)
Dept: CARDIOLOGY CLINIC | Age: 77
End: 2022-01-05

## 2022-01-05 NOTE — TELEPHONE ENCOUNTER
Patient sent in a prescription assistance form for Dr. Lalitha Glynn to fill out for Eliquis. I spoke with patient to see who usually refills Eliquis for her and she stated her PCP does. Upon review of chart, it appears that patient is on Eliquis for a PE. She was seen in March 2021 in the hospital and Dr. Richelle Stewart consult from 3/10/21 states: \"Will transition to Eliquis 10mg BID x 7 days, followed by 5mg BID for total of 3 months\". She states she has never followed up with pulmonology. Please advise if there is a cardiac reason for Eliquis.

## 2022-01-05 NOTE — TELEPHONE ENCOUNTER
Patient notified of Dr. Mark Files assessment/ recommendation. Patient was given Dr. Mell Martinez phone number.

## 2022-01-05 NOTE — TELEPHONE ENCOUNTER
She had pulmonary emboli and she should follow with pulmonology. The Eliquis is for the pulmonary emboli not for any cardiac reason.   She needs to do this with pulmonology

## 2022-01-10 ENCOUNTER — TELEPHONE (OUTPATIENT)
Dept: PULMONOLOGY | Age: 77
End: 2022-01-10

## 2022-01-10 DIAGNOSIS — I26.99 OTHER PULMONARY EMBOLISM WITHOUT ACUTE COR PULMONALE, UNSPECIFIED CHRONICITY (HCC): Primary | ICD-10-CM

## 2022-01-10 DIAGNOSIS — R06.02 SOB (SHORTNESS OF BREATH): Primary | ICD-10-CM

## 2022-01-10 NOTE — TELEPHONE ENCOUNTER
Mailed a letter to patient informing her that her Lung Vent/Perfusion is scheduled for 1-24-21 at 10:00 am at the [facility]. She must arrive by 9:30 am. There is no prep for this test    I also sent her a script for a chest xray that needs to be done 24 hours prior to her test

## 2022-01-11 ENCOUNTER — TELEPHONE (OUTPATIENT)
Dept: CARDIOLOGY CLINIC | Age: 77
End: 2022-01-11

## 2022-01-11 ENCOUNTER — HOSPITAL ENCOUNTER (OUTPATIENT)
Dept: OTHER | Age: 77
Setting detail: THERAPIES SERIES
Discharge: HOME | End: 2022-01-11
Payer: MEDICARE

## 2022-01-11 VITALS
WEIGHT: 159 LBS | BODY MASS INDEX: 31.05 KG/M2 | SYSTOLIC BLOOD PRESSURE: 107 MMHG | RESPIRATION RATE: 18 BRPM | HEART RATE: 66 BPM | OXYGEN SATURATION: 96 % | DIASTOLIC BLOOD PRESSURE: 59 MMHG

## 2022-01-11 DIAGNOSIS — I50.9 CONGESTIVE HEART FAILURE, UNSPECIFIED HF CHRONICITY, UNSPECIFIED HEART FAILURE TYPE (HCC): ICD-10-CM

## 2022-01-11 DIAGNOSIS — I50.41 ACUTE COMBINED SYSTOLIC AND DIASTOLIC CONGESTIVE HEART FAILURE (HCC): Primary | ICD-10-CM

## 2022-01-11 LAB
ANION GAP SERPL CALCULATED.3IONS-SCNC: 14 MMOL/L (ref 7–16)
BUN BLDV-MCNC: 18 MG/DL (ref 6–23)
CALCIUM SERPL-MCNC: 10 MG/DL (ref 8.6–10.2)
CHLORIDE BLD-SCNC: 103 MMOL/L (ref 98–107)
CO2: 22 MMOL/L (ref 22–29)
CREAT SERPL-MCNC: 0.9 MG/DL (ref 0.5–1)
GFR AFRICAN AMERICAN: >60
GFR NON-AFRICAN AMERICAN: >60 ML/MIN/1.73
GLUCOSE BLD-MCNC: 131 MG/DL (ref 74–99)
POTASSIUM SERPL-SCNC: 5.4 MMOL/L (ref 3.5–5)
PRO-BNP: 308 PG/ML (ref 0–450)
SODIUM BLD-SCNC: 139 MMOL/L (ref 132–146)

## 2022-01-11 PROCEDURE — 99214 OFFICE O/P EST MOD 30 MIN: CPT

## 2022-01-11 PROCEDURE — 36415 COLL VENOUS BLD VENIPUNCTURE: CPT

## 2022-01-11 PROCEDURE — 80048 BASIC METABOLIC PNL TOTAL CA: CPT

## 2022-01-11 PROCEDURE — 83880 ASSAY OF NATRIURETIC PEPTIDE: CPT

## 2022-01-11 RX ORDER — FUROSEMIDE 20 MG/1
20 TABLET ORAL
COMMUNITY

## 2022-01-11 NOTE — PROGRESS NOTES
Voice message left for Suyapa at Dr. Stanton Srinivasan office to please inform Dr. Danny Gillespie pt.'s potassium level is 5.4 and pt admitted to NOT Taking oral diuretic for at least one month and could not give a reason why she was NOT taking. Pt confirmed she is taking Aldactone 12.5mg daily and confirmed she will start taking Lasix daily.

## 2022-01-11 NOTE — PROGRESS NOTES
[address] [facility]   1945            Referring Doctor:EBER Ash NP    Primary Care Physician: Dr. Юлия Winkler  Cardiologist: Dr. Adolfo Patrick  Nephrologist:         History of Present Illness:     Didier Goodman is a 78 y.o. female with a history of HFrEF, most recent EF 25-30%     On 3/2/2021. Patient Story:    She does not  have dyspnea with exertion, shortness of breath, or decline in overall functional capacity. She does not have orthopnea, PND, nocturnal cough or hemoptysis. She does not have abdominal distention or bloating, early satiety, anorexia/change in appetite. She has a good urinary response to  oral diuretic when she takes it. States she has NOT been taking it. She does not  have  lower extremity edema. She denies lightheadedness, dizziness. She denies palpitations, syncope or near syncope. She does not complain of chest pain, pressure, discomfort. Allergies   Allergen Reactions    Sulfa Antibiotics Nausea Only    Amoxicillin Nausea Only    Clavulanic Acid Nausea Only    Levofloxacin     Omeprazole      Interferes with plavix    Other      Lipitor, Crestor, high doses of Simvastatin    Pneumococcal Vaccines     Amoxicillin-Pot Clavulanate Nausea Only     Makes her stomach hurt, doesn't want to eat.     Bee Venom Rash    Doxycycline Rash         Outpatient Medications Marked as Taking for the 1/11/22 encounter [facility] Encounter) with [facility] ROOM 1   Medication Sig Dispense Refill    zinc gluconate 50 MG tablet Take 50 mg by mouth daily      amLODIPine (NORVASC) 2.5 MG tablet Take 1 tablet by mouth daily 90 tablet 3    metoprolol succinate (TOPROL XL) 200 MG extended release tablet TAKE ONE TABLET BY MOUTH ONCE DAILY 90 tablet 1    spironolactone (ALDACTONE) 25 MG tablet Take 0.5 tablets by mouth daily 45 tablet 1    empagliflozin (JARDIANCE) 10 MG tablet Take 1 tablet by mouth daily 30 tablet 3    sacubitril-valsartan (ENTRESTO)  MG per tablet Take 1 tablet by mouth 2 times daily 60 tablet 11    loratadine (CLARITIN) 10 MG tablet Take 10 mg by mouth daily      atorvastatin (LIPITOR) 40 MG tablet Take 1 tablet by mouth daily 90 tablet 1    apixaban starter pack (ELIQUIS) 5 MG TBPK tablet Take 1 tablet by mouth See Admin Instructions (Patient taking differently: Take 5 mg by mouth 2 times daily ) 60 each 0    magnesium oxide (MAG-OX) 400 (241.3 Mg) MG TABS tablet Take 1 tablet by mouth daily 30 tablet 0    ALPRAZolam (XANAX) 2 MG tablet Take 2 mg by mouth 2 times daily as needed.  Cholecalciferol (VITAMIN D3) 50 MCG (2000 UT) CAPS Take 2,000 Units by mouth daily      azelastine (ASTELIN) 0.1 % nasal spray 2 sprays by Nasal route 2 times daily as directed      fluticasone (FLONASE) 50 MCG/ACT nasal spray 2 sprays by Each Nostril route daily      escitalopram (LEXAPRO) 10 MG tablet take 1 tablet by mouth once daily 90 tablet 1    metFORMIN (GLUCOPHAGE-XR) 500 MG extended release tablet Take 1 tablet by mouth 2 times daily (with meals) 180 tablet 1    aspirin EC 81 MG EC tablet Take 81 mg by mouth daily       Co-Enzyme Q-10 100 MG CAPS Take 200 mg by mouth daily              Guideline directed medical:  ARNI/ACE I/ARB: Yes  Beta blocker:   Yes  Aldosterone antagonist:  Yes        Physical Examination:     BP (!) 107/59   Pulse 66   Resp 18   Wt 159 lb (72.1 kg)   SpO2 96%   BMI 31.05 kg/m²     Assessment  Charting Type: Shift assessment              HEENT  HEENT (WDL): Within Defined Limits                   Cardiac  Cardiac Rhythm: Sinus rhythm    Rhythm Interpretation  Pulse: 66         Gastrointestinal  Abdominal (WDL): Within Defined Limits               Peripheral Vascular  Peripheral Vascular (WDL): Within Defined Limits  Edema: None  RLE Edema: None  LLE Edema: None                                                 Pulse: 66                   LAB DATA:    BNP  No results for input(s): BNP in the last 72 hours.    proBNP  No results for input(s): PROBNP in the last 72 hours. BMP  No results for input(s): NA, K, CL, CO2, BUN, CREATININE, GLUCOSE, CALCIUM in the last 72 hours. WEIGHTS:  Wt Readings from Last 3 Encounters:   01/11/22 159 lb (72.1 kg)   11/02/21 153 lb 14.4 oz (69.8 kg)   10/12/21 153 lb (69.4 kg)         TELEMETRY:  Cardiac Regularity: Regular  Cardiac Rhythm/Interpretation: NSR        ASSESSMENT:  Yocasta Dallas is evolemic with 6 lb  WEIGHT GAIN since October. States she feels fine. Assessment is unremarkable. Has NOT taken Oral diuretic in a long while. Encouraged patient to take medications as prescribed. Confirmed that she will start taking her lasix. Interventions completed this visit:  IV diuretics given: no  Lab work obtained: yes, BNP/BMP   Reviewed continue current medications that patient as prescribed answered any questions   Educated on signs and symptoms of HF  Educated on low sodium diet    PLAN:    Future Appointments   Date Time Provider Killian Cohn   1/24/2022 10:00 AM [facility] [id number] [facility]   2/9/2022 12:30 PM Guido Avila, DO ELECTRO PHYS [facility]   5/3/2022  1:00 PM [facility] CHF ROOM 1 [facility] Shakira Inch        Given clinic phone number and aware of signs and symptoms to call with any HF change in symptoms. RE INSTRUCTED PT TO WEIGH DAILY AND TAKE LASIX WITH 3LB WEIGHT GAIN OVERNIGHT OR 5LB WEIGHT GAIN IN A WEEK AND CONTINUE TO TAKE LASIX UNTIL WEIGHT IS BACK TO BASELINE. PT VERBALIZED UNDERSTANDING.     PT INSTRUCTED TO CALL CHF CLINIC WITH QUESTIONS

## 2022-01-11 NOTE — TELEPHONE ENCOUNTER
Tesha Jameson from the [facility] called stating patient was seen today Potassium is (5.4). Patient advised Tesha Jameson that she has not been taking her water pill, she was advised by Tesha Jameson to start taking it and patient stated she would. She is taking the Aldactone 12.5 mg daily and Entresto. Please advise.

## 2022-01-12 ENCOUNTER — TELEPHONE (OUTPATIENT)
Dept: PULMONOLOGY | Age: 77
End: 2022-01-12

## 2022-01-12 NOTE — TELEPHONE ENCOUNTER
A message was left to notify the patient that she is scheduled for a VQ scan on Jan. 24th @ 10am at the [facility].

## 2022-01-18 ENCOUNTER — HOSPITAL ENCOUNTER (OUTPATIENT)
Age: 77
Discharge: HOME | End: 2022-01-18
Payer: MEDICARE

## 2022-01-18 DIAGNOSIS — I50.9 CONGESTIVE HEART FAILURE, UNSPECIFIED HF CHRONICITY, UNSPECIFIED HEART FAILURE TYPE (HCC): ICD-10-CM

## 2022-01-18 LAB
ANION GAP SERPL CALCULATED.3IONS-SCNC: 12 MMOL/L (ref 7–16)
BUN BLDV-MCNC: 21 MG/DL (ref 6–23)
CALCIUM SERPL-MCNC: 9.6 MG/DL (ref 8.6–10.2)
CHLORIDE BLD-SCNC: 101 MMOL/L (ref 98–107)
CO2: 23 MMOL/L (ref 22–29)
CREAT SERPL-MCNC: 1 MG/DL (ref 0.5–1)
GFR AFRICAN AMERICAN: >60
GFR NON-AFRICAN AMERICAN: 54 ML/MIN/1.73
GLUCOSE BLD-MCNC: 94 MG/DL (ref 74–99)
POTASSIUM SERPL-SCNC: 4.9 MMOL/L (ref 3.5–5)
SODIUM BLD-SCNC: 136 MMOL/L (ref 132–146)

## 2022-01-18 PROCEDURE — 36415 COLL VENOUS BLD VENIPUNCTURE: CPT

## 2022-01-18 PROCEDURE — 80048 BASIC METABOLIC PNL TOTAL CA: CPT

## 2022-01-21 DIAGNOSIS — I50.22 CHRONIC SYSTOLIC HEART FAILURE (HCC): ICD-10-CM

## 2022-01-21 RX ORDER — EMPAGLIFLOZIN 10 MG/1
TABLET, FILM COATED ORAL
Qty: 30 TABLET | Refills: 11 | Status: SHIPPED | OUTPATIENT
Start: 2022-01-21

## 2022-02-05 DIAGNOSIS — J30.9 ALLERGIC RHINITIS, UNSPECIFIED: ICD-10-CM

## 2022-02-05 RX ORDER — AZELASTINE 1 MG/ML
SPRAY, METERED NASAL
Qty: 30 ML | Refills: 5 | OUTPATIENT
Start: 2022-02-05

## 2022-03-23 DIAGNOSIS — I50.22 CHRONIC HFREF (HEART FAILURE WITH REDUCED EJECTION FRACTION) (HCC): ICD-10-CM

## 2022-03-23 RX ORDER — METOPROLOL SUCCINATE 200 MG/1
TABLET, EXTENDED RELEASE ORAL
Qty: 90 TABLET | Refills: 1 | Status: SHIPPED
Start: 2022-03-23 | End: 2022-09-16

## 2022-05-03 ENCOUNTER — HOSPITAL ENCOUNTER (OUTPATIENT)
Dept: OTHER | Age: 77
Setting detail: THERAPIES SERIES
Discharge: HOME | End: 2022-05-03
Payer: MEDICARE

## 2022-05-03 VITALS
SYSTOLIC BLOOD PRESSURE: 137 MMHG | WEIGHT: 163 LBS | BODY MASS INDEX: 31.83 KG/M2 | HEART RATE: 67 BPM | OXYGEN SATURATION: 98 % | DIASTOLIC BLOOD PRESSURE: 59 MMHG | RESPIRATION RATE: 18 BRPM

## 2022-05-03 LAB
ANION GAP SERPL CALCULATED.3IONS-SCNC: 14 MMOL/L (ref 7–16)
BUN BLDV-MCNC: 22 MG/DL (ref 6–23)
CALCIUM SERPL-MCNC: 9.5 MG/DL (ref 8.6–10.2)
CHLORIDE BLD-SCNC: 106 MMOL/L (ref 98–107)
CO2: 20 MMOL/L (ref 22–29)
CREAT SERPL-MCNC: 1 MG/DL (ref 0.5–1)
GFR AFRICAN AMERICAN: >60
GFR NON-AFRICAN AMERICAN: 54 ML/MIN/1.73
GLUCOSE BLD-MCNC: 81 MG/DL (ref 74–99)
POTASSIUM SERPL-SCNC: 5 MMOL/L (ref 3.5–5)
PRO-BNP: 344 PG/ML (ref 0–450)
SODIUM BLD-SCNC: 140 MMOL/L (ref 132–146)

## 2022-05-03 PROCEDURE — 99214 OFFICE O/P EST MOD 30 MIN: CPT

## 2022-05-03 PROCEDURE — 80048 BASIC METABOLIC PNL TOTAL CA: CPT

## 2022-05-03 PROCEDURE — 83880 ASSAY OF NATRIURETIC PEPTIDE: CPT

## 2022-05-03 NOTE — PLAN OF CARE
Problem: Chronic Conditions and Co-morbidities  Goal: Patient's chronic conditions and co-morbidity symptoms are monitored and maintained or improved  Outcome: Progressing  Flowsheets (Taken 5/3/2022 0711)  Care Plan - Patient's Chronic Conditions and Co-Morbidity Symptoms are Monitored and Maintained or Improved:   Monitor and assess patient's chronic conditions and comorbid symptoms for stability, deterioration, or improvement   Collaborate with multidisciplinary team to address chronic and comorbid conditions and prevent exacerbation or deterioration   Update acute care plan with appropriate goals if chronic or comorbid symptoms are exacerbated and prevent overall improvement and discharge     Problem: Discharge Planning  Goal: Discharge to home or other facility with appropriate resources  Outcome: Progressing   Continue plan of care

## 2022-05-03 NOTE — PROGRESS NOTES
[address] [facility]   1945            Referring Doctor:EBER Tesfaye NP    Primary Care Physician: Dr. Fazal Sharma  Cardiologist: Dr. Eder Ulloa  Nephrologist:         History of Present Illness:     Tian Dowell is a 78 y.o. female with a history of HFrEF, most recent EF 25-30%     On 3/2/2021. Patient Story:    She does not  have dyspnea with exertion, shortness of breath, or decline in overall functional capacity. She does not have orthopnea, PND, nocturnal cough or hemoptysis. She does not have abdominal distention or bloating, early satiety, anorexia/change in appetite. She has a good urinary response to  oral diuretic when she takes it. She does not  have  lower extremity edema. She denies lightheadedness, dizziness. She denies palpitations, syncope or near syncope. She does not complain of chest pain, pressure, discomfort. Allergies   Allergen Reactions    Sulfa Antibiotics Nausea Only    Amoxicillin Nausea Only    Clavulanic Acid Nausea Only    Levofloxacin     Omeprazole      Interferes with plavix    Other      Lipitor, Crestor, high doses of Simvastatin    Pneumococcal Vaccines     Amoxicillin-Pot Clavulanate Nausea Only     Makes her stomach hurt, doesn't want to eat.  Bee Venom Rash    Doxycycline Rash         Prior to Visit Medications    Medication Sig Taking?  Authorizing Provider   metoprolol succinate (TOPROL XL) 200 MG extended release tablet TAKE ONE TABLET BY MOUTH ONCE DAILY  IGNACIO Lee - CNP   JARDIANCE 10 MG tablet TAKE ONE TABLET BY MOUTH ONCE DAILY  Nimisha Christy MD   furosemide (LASIX) 20 MG tablet Take 20 mg by mouth three times a week Take on tablet on Mon, Wed, Fri  Historical Provider, MD   zinc gluconate 50 MG tablet Take 50 mg by mouth daily  Historical Provider, MD   amLODIPine (NORVASC) 2.5 MG tablet Take 1 tablet by mouth daily  Shantal Day, DO   furosemide (LASIX) 20 MG tablet Take 1 tablet by mouth daily as needed (take 1 tablet 8am on  days you gain more than 2 pound in a day, worse shortness of breath or increased swelling. take every day till you loose the weight or feel better)  IGNACIO Ortiz CNP   spironolactone (ALDACTONE) 25 MG tablet Take 0.5 tablets by mouth daily  IGNACIO Ortiz CNP   sacubitril-valsartan (ENTRESTO)  MG per tablet Take 1 tablet by mouth 2 times daily  IGNACIO Barnes   loratadine (CLARITIN) 10 MG tablet Take 10 mg by mouth daily  Historical Provider, MD   atorvastatin (LIPITOR) 40 MG tablet Take 1 tablet by mouth daily  IGNACIO Ortiz CNP   apixaban starter pack (ELIQUIS) 5 MG TBPK tablet Take 1 tablet by mouth See Admin Instructions  Patient taking differently: Take 5 mg by mouth 2 times daily   Jorge Avila MD   magnesium oxide (MAG-OX) 400 (241.3 Mg) MG TABS tablet Take 1 tablet by mouth daily  Jorge Avila MD   ALPRAZolam Carlynn Remak) 2 MG tablet Take 2 mg by mouth 2 times daily as needed. Historical Provider, MD   Cholecalciferol (VITAMIN D3) 50 MCG (2000 UT) CAPS Take 2,000 Units by mouth daily  Historical Provider, MD   azelastine (ASTELIN) 0.1 % nasal spray 2 sprays by Nasal route 2 times daily as directed  Historical Provider, MD   fluticasone (FLONASE) 50 MCG/ACT nasal spray 2 sprays by Each Nostril route daily  Historical Provider, MD   escitalopram (LEXAPRO) 10 MG tablet take 1 tablet by mouth once daily  Osmani Bishop PA-C   metFORMIN (GLUCOPHAGE-XR) 500 MG extended release tablet Take 1 tablet by mouth 2 times daily (with meals)  Osmani Bishop PA-C   aspirin EC 81 MG EC tablet Take 81 mg by mouth daily   Historical Provider, MD   Co-Enzyme Q-10 100 MG CAPS Take 200 mg by mouth daily   Historical Provider, MD             Guideline directed medical:  ARNI/ACE I/ARB: Yes  Beta blocker:   Yes  Aldosterone antagonist:  Yes        Physical Examination:     BP (!) 137/59   Pulse 67   Resp 18   Wt 163 lb (73.9 kg) SpO2 98%   BMI 31.83 kg/m²     Assessment  Charting Type: Shift assessment              HEENT (Head, Ears, Eyes, Nose, & Throat)  HEENT (WDL): Within Defined Limits                   Cardiac  Cardiac Rhythm: Sinus rhythm    Rhythm Interpretation  Pulse: 67         Gastrointestinal  Abdominal (WDL): Within Defined Limits               Peripheral Vascular  Peripheral Vascular (WDL): Within Defined Limits  Edema: None  RLE Edema: None  LLE Edema: None                   Genitourinary  Genitourinary (WDL): Within Defined Limits                             Pulse: 67                   LAB DATA:    BNP  No results for input(s): BNP in the last 72 hours. proBNP  No results for input(s): PROBNP in the last 72 hours. BMP  No results for input(s): NA, K, CL, CO2, BUN, CREATININE, GLUCOSE, CALCIUM in the last 72 hours. WEIGHTS:  Wt Readings from Last 3 Encounters:   05/03/22 163 lb (73.9 kg)   01/11/22 159 lb (72.1 kg)   11/02/21 153 lb 14.4 oz (69.8 kg)         TELEMETRY:  Cardiac Regularity: Regular  Cardiac Rhythm/Interpretation: NSR        ASSESSMENT:  Madelaine Argueta HAS STABLE WEIGHTS. States she feels fine. Assessment is unremarkable. PT ADMITS RAKES ORAL DIURETIC  THREE TIMES A WEEK. Interventions completed this visit:  IV diuretics given: no  Lab work obtained: yes, BNP/BMP   Reviewed continue current medications that patient as prescribed answered any questions   Educated on signs and symptoms of HF  Educated on low sodium diet    PLAN:    Future Appointments   Date Time Provider Killian Cohn   5/11/2022 11:00 AM Disha Burks DO [facility]   9/13/2022 12:45 PM [facility] ROOM 1 [address]        Given clinic phone number and aware of signs and symptoms to call with any HF change in symptoms. RE INSTRUCTED PT TO WEIGH DAILY AND TAKE LASIX WITH 3LB WEIGHT GAIN OVERNIGHT OR 5LB WEIGHT GAIN IN A WEEK AND CONTINUE TO TAKE LASIX UNTIL WEIGHT IS BACK TO BASELINE.  PT VERBALIZED UNDERSTANDING. PT INSTRUCTED TO CALL CHF CLINIC WITH QUESTIONS  PT STATES FEELS GREAT. AND WILL CALL FOR EARLIER APPT. IF NEEDED.

## 2022-05-08 DIAGNOSIS — I50.22 CHRONIC SYSTOLIC HEART FAILURE (HCC): ICD-10-CM

## 2022-05-09 RX ORDER — SPIRONOLACTONE 25 MG/1
TABLET ORAL
Qty: 45 TABLET | Refills: 1 | Status: SHIPPED | OUTPATIENT
Start: 2022-05-09

## 2022-05-11 ENCOUNTER — OFFICE VISIT (OUTPATIENT)
Dept: CARDIOLOGY CLINIC | Age: 77
End: 2022-05-11
Payer: MEDICARE

## 2022-05-11 VITALS
HEART RATE: 65 BPM | DIASTOLIC BLOOD PRESSURE: 60 MMHG | HEIGHT: 62 IN | RESPIRATION RATE: 18 BRPM | SYSTOLIC BLOOD PRESSURE: 120 MMHG | BODY MASS INDEX: 28.65 KG/M2 | WEIGHT: 155.7 LBS

## 2022-05-11 DIAGNOSIS — I34.0 NONRHEUMATIC MITRAL VALVE REGURGITATION: ICD-10-CM

## 2022-05-11 DIAGNOSIS — I35.1 NONRHEUMATIC AORTIC VALVE INSUFFICIENCY: ICD-10-CM

## 2022-05-11 DIAGNOSIS — I25.10 CORONARY ARTERY DISEASE DUE TO LIPID RICH PLAQUE: Primary | ICD-10-CM

## 2022-05-11 DIAGNOSIS — I25.83 CORONARY ARTERY DISEASE DUE TO LIPID RICH PLAQUE: Primary | ICD-10-CM

## 2022-05-11 PROCEDURE — G8417 CALC BMI ABV UP PARAM F/U: HCPCS | Performed by: INTERNAL MEDICINE

## 2022-05-11 PROCEDURE — 1090F PRES/ABSN URINE INCON ASSESS: CPT | Performed by: INTERNAL MEDICINE

## 2022-05-11 PROCEDURE — G8427 DOCREV CUR MEDS BY ELIG CLIN: HCPCS | Performed by: INTERNAL MEDICINE

## 2022-05-11 PROCEDURE — 1036F TOBACCO NON-USER: CPT | Performed by: INTERNAL MEDICINE

## 2022-05-11 PROCEDURE — 3017F COLORECTAL CA SCREEN DOC REV: CPT | Performed by: INTERNAL MEDICINE

## 2022-05-11 PROCEDURE — 4040F PNEUMOC VAC/ADMIN/RCVD: CPT | Performed by: INTERNAL MEDICINE

## 2022-05-11 PROCEDURE — 93000 ELECTROCARDIOGRAM COMPLETE: CPT | Performed by: INTERNAL MEDICINE

## 2022-05-11 PROCEDURE — 99214 OFFICE O/P EST MOD 30 MIN: CPT | Performed by: INTERNAL MEDICINE

## 2022-05-11 PROCEDURE — 1123F ACP DISCUSS/DSCN MKR DOCD: CPT | Performed by: INTERNAL MEDICINE

## 2022-05-11 PROCEDURE — G8400 PT W/DXA NO RESULTS DOC: HCPCS | Performed by: INTERNAL MEDICINE

## 2022-05-11 RX ORDER — PANTOPRAZOLE SODIUM 40 MG/1
TABLET, DELAYED RELEASE ORAL
COMMUNITY
Start: 2022-04-28

## 2022-05-11 NOTE — PROGRESS NOTES
[place]  1945  Date of Service: 5/11/2022    Patient Active Problem List    Diagnosis Date Noted    Hypomagnesemia 03/10/2021    Valvular heart disease 03/10/2021    Abnormal liver enzymes 03/10/2021    Hypertensive urgency 03/10/2021    Sleep apnea 03/10/2021    Acute respiratory failure with hypoxia ([address] 03/10/2021    Bilateral pulmonary embolism ([address] 03/10/2021    Congestive heart failure ([address]     Acute on chronic systolic heart failure ([address] 03/09/2021    Acute combined systolic and diastolic congestive heart failure ([address] 03/09/2021    Ataxic gait     Vertigo 02/28/2021    Acute renal insufficiency 02/28/2021    Acute cystitis without hematuria 02/28/2021    Ataxia 02/27/2021    Mixed hyperlipidemia 01/18/2017    Primary osteoarthritis involving multiple joints 01/18/2017    Type 2 diabetes mellitus ([address] 04/25/2016    GERD (gastroesophageal reflux disease) 04/25/2016    Benign neoplasm of choroid 04/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 Adenosine nuclear stress test (6/6/08): Normal adenosine portion, negative ischemia, negative scar, EF 60%. B. PTCA and stenting RCA (11/25/07):  bare metal stent 2.5 x 24 mm and 2.5 x 12 mm stents placed. C. Cardiac catheterization (11/25/07): Left main normal; LAD 40% prox; Cx minimal luminal irregularities; % mid; LV gram not performed. D. Acute inferior wall myocardial infarction (11/25/07).  Abnormal echocardiogram      Overview Note:     11/26/07: Mild inferior hypokinesis, EF > 60%, Stage I DD, trace MR, trace TR, normal RVSP.       Hypertension     Hyperlipidemia     Anxiety     Carotid artery narrowing      Overview Note:     <40% bilaterally      Drug intolerance      Overview Note:     Lipitor, Crestor, high doses of Simvastatin         Social History     Socioeconomic History    Marital status:      Spouse name: None    Number of children: None    Years of education: None    Highest education level: None   Occupational History     Employer: RETIRED   Tobacco Use    Smoking status: Former Smoker     Packs/day: 1.00     Years: 35.00     Pack years: 35.00     Quit date: 2010     Years since quittin.9    Smokeless tobacco: Never Used   Vaping Use    Vaping Use: Never used   Substance and Sexual Activity    Alcohol use: No    Drug use: No    Sexual activity: Not Currently   Other Topics Concern    None   Social History Narrative    None     Social Determinants of Health     Financial Resource Strain:     Difficulty of Paying Living Expenses: Not on file   Food Insecurity:     Worried About Running Out of Food in the Last Year: Not on file    Wendy of Food in the Last Year: Not on file   Transportation Needs:     Lack of Transportation (Medical): Not on file    Lack of Transportation (Non-Medical):  Not on file   Physical Activity:     Days of Exercise per Week: Not on file    Minutes of Exercise per Session: Not on file   Stress:     Feeling of Stress : Not on file   Social Connections:     Frequency of Communication with Friends and Family: Not on file    Frequency of Social Gatherings with Friends and Family: Not on file    Attends Confucianist Services: Not on file    Active Member of [address] [place] [address] [employer] or Organizations: Not on file    Attends Club or Organization Meetings: Not on file    Marital Status: Not on file   Intimate Partner Violence:     Fear of Current or Ex-Partner: Not on file    Emotionally Abused: Not on file    Physically Abused: Not on file    Sexually Abused: Not on file   Housing Stability:     Unable to Pay for Housing in the Last Year: Not on file    Number of [place] in the Last Year: Not on file    Unstable Housing in the Last Year: Not on file       Current Outpatient Medications   Medication Sig Dispense Refill    pantoprazole (PROTONIX) 40 MG tablet TAKE ONE TABLET BY MOUTH ONCE DAILY      spironolactone (ALDACTONE) 25 MG tablet TAKE 1/2 TABLET BY MOUTH ONCE DAILY 45 tablet 1    metoprolol succinate (TOPROL XL) 200 MG extended release tablet TAKE ONE TABLET BY MOUTH ONCE DAILY 90 tablet 1    JARDIANCE 10 MG tablet TAKE ONE TABLET BY MOUTH ONCE DAILY 30 tablet 11    furosemide (LASIX) 20 MG tablet Take 20 mg by mouth three times a week Take on tablet on Mon, Wed, Fri      zinc gluconate 50 MG tablet Take 50 mg by mouth daily      amLODIPine (NORVASC) 2.5 MG tablet Take 1 tablet by mouth daily 90 tablet 3    furosemide (LASIX) 20 MG tablet Take 1 tablet by mouth daily as needed (take 1 tablet 8am on  days you gain more than 2 pound in a day, worse shortness of breath or increased swelling. take every day till you loose the weight or feel better) 90 tablet 1    sacubitril-valsartan (ENTRESTO)  MG per tablet Take 1 tablet by mouth 2 times daily 60 tablet 11    loratadine (CLARITIN) 10 MG tablet Take 10 mg by mouth daily      atorvastatin (LIPITOR) 40 MG tablet Take 1 tablet by mouth daily 90 tablet 1    apixaban starter pack (ELIQUIS) 5 MG TBPK tablet Take 1 tablet by mouth See Admin Instructions (Patient taking differently: Take 5 mg by mouth 2 times daily ) 60 each 0    magnesium oxide (MAG-OX) 400 (241.3 Mg) MG TABS tablet Take 1 tablet by mouth daily 30 tablet 0    ALPRAZolam (XANAX) 2 MG tablet Take 2 mg by mouth 2 times daily as needed.        Cholecalciferol (VITAMIN D3) 50 MCG (2000 UT) CAPS Take 2,000 Units by mouth daily      azelastine (ASTELIN) 0.1 % nasal spray 2 sprays by Nasal route 2 times daily as directed      fluticasone (FLONASE) 50 MCG/ACT nasal spray 2 sprays by Each Nostril route daily      escitalopram (LEXAPRO) 10 MG tablet take 1 tablet by mouth once daily 90 tablet 1    metFORMIN (GLUCOPHAGE-XR) 500 MG extended release tablet Take 1 tablet by mouth 2 times daily (with meals) 180 tablet 1    aspirin EC 81 MG EC tablet Take 81 mg by mouth daily       CO-ENZYME Q-10 PO Take 200 mg by mouth daily        No current facility-administered medications for this visit. Allergies   Allergen Reactions    Sulfa Antibiotics Nausea Only    Amoxicillin Nausea Only    Clavulanic Acid Nausea Only    Levofloxacin     Omeprazole      Interferes with plavix    Other      Lipitor, Crestor, high doses of Simvastatin    Pneumococcal Vaccines     Amoxicillin-Pot Clavulanate Nausea Only     Makes her stomach hurt, doesn't want to eat.  Bee Venom Rash    Doxycycline Rash       Chief Complaint:  Cindy Navarro is here today for follow up and management/recomendations for CAD, HTN, hypercholesterolemia. History of Present Illness: Cindy Navarro states that She does house work, & she goes shopping. She denies any chest discomfort, dyspnea on exertion with any of these activities. She denies any orthopnea/PND, or lower extremity edema. She denies any palpitations or presyncopal symptoms. REVIEW OF SYSTEMS:  As above. Patient does not complain of any fever, chills, nausea, vomiting or diarrhea. No focal, motor or neurological deficits. No changes in his/her vision, hearing, bowel or bladder habits. She is not known to have a history of thyroid problems. No recent nose bleeds. PHYSICAL EXAM:  Vitals:    05/11/22 1116   BP: 120/60   Pulse: 65   Resp: 18   Weight: 155 lb 11.2 oz (70.6 kg)   Height: 5' 2\" (1.575 m)       GENERAL:  She is alert and oriented x 3, communicates well, in no distress. NECK:  No masses, trachea is mid position. Supple, full ROM, no JVD or bruits. No palpable thyromegaly or lymphadenopathy. HEART:  Regular rate and rhythm. Normal S1 and S2. There is an S4 gallop and a I-II/VI systolic murmur. LUNGS:  Clear to auscultation bilaterally. No use of accessory muscles. Mildly decreased air movement. Symmetrical excursion. ABDOMEN: Obese. Soft, non-tender. Normal bowel sounds. EXTREMITIES:  Full ROM x 4. No bilateral lower extremity edema. Good distal pulses. EYES:  Extraocular muscles intact. PERRL. Normal lids & conjunctiva. ENT:  Nares are clear & not bleeding. Moist mucosa. Normal lips formation. No external masses   NEURO: no tremors, full ROM x 4, EOMI. SKIN:  Warm, dry and intact. Normal turgor. EKG: Sinus rhythm, 65 bpm, nl axis, nonspecific ST - T wave changes with T wave inversions. q waves in III. Assessment:   1. Coronary artery disease as outlined above. No symptoms of recurring ischemia at this time. Cardiac catheterization 3/19/2021 shows no ischemic coronary lesions. 2. Cardiomyopathy as outlined above. Clinically euvolemic and no decompensation at this time. EF improved to normal.  EF severely declined again to 25-30 %.  3-21. Echo 6/24/2021 shows significant improvement in the ejection fraction back up to 45%. 3. Moderate mitral regurgitation  4. Moderate aortic regurgitation  5. Hypertension, well controled at this time. 6. Hypercholesterolemia  7. Recurring nonsustained VT. Being followed by electrophysiology. Recommendations:  1. She is following the cholesterol with Dr. Paula Kim. 2. Echocardiogram to follow the mitral and aortic regurgitations. Thank you for allowing me to participate in your patient's care.       [address], [address] [facility]  Interventional Cardiology

## 2022-05-13 ENCOUNTER — APPOINTMENT (OUTPATIENT)
Dept: GENERAL RADIOLOGY | Age: 77
End: 2022-05-13
Payer: MEDICARE

## 2022-05-13 ENCOUNTER — HOSPITAL ENCOUNTER (EMERGENCY)
Age: 77
Discharge: HOME | End: 2022-05-13
Attending: STUDENT IN AN ORGANIZED HEALTH CARE EDUCATION/TRAINING PROGRAM
Payer: MEDICARE

## 2022-05-13 VITALS
DIASTOLIC BLOOD PRESSURE: 74 MMHG | RESPIRATION RATE: 16 BRPM | OXYGEN SATURATION: 98 % | SYSTOLIC BLOOD PRESSURE: 128 MMHG | TEMPERATURE: 97.8 F | HEART RATE: 57 BPM

## 2022-05-13 DIAGNOSIS — T78.40XA ALLERGIC REACTION, INITIAL ENCOUNTER: ICD-10-CM

## 2022-05-13 DIAGNOSIS — T63.464A WASP STING, UNDETERMINED INTENT, INITIAL ENCOUNTER: Primary | ICD-10-CM

## 2022-05-13 LAB
ALBUMIN SERPL-MCNC: 4.3 G/DL (ref 3.5–5.2)
ALP BLD-CCNC: 74 U/L (ref 35–104)
ALT SERPL-CCNC: 11 U/L (ref 0–32)
ANION GAP SERPL CALCULATED.3IONS-SCNC: 15 MMOL/L (ref 7–16)
AST SERPL-CCNC: 16 U/L (ref 0–31)
BACTERIA: ABNORMAL /HPF
BASOPHILS ABSOLUTE: 0.07 E9/L (ref 0–0.2)
BASOPHILS RELATIVE PERCENT: 0.9 % (ref 0–2)
BILIRUB SERPL-MCNC: 0.2 MG/DL (ref 0–1.2)
BILIRUBIN URINE: NEGATIVE
BLOOD, URINE: ABNORMAL
BUN BLDV-MCNC: 30 MG/DL (ref 6–23)
CALCIUM SERPL-MCNC: 9.4 MG/DL (ref 8.6–10.2)
CHLORIDE BLD-SCNC: 103 MMOL/L (ref 98–107)
CLARITY: CLEAR
CO2: 21 MMOL/L (ref 22–29)
COLOR: YELLOW
CREAT SERPL-MCNC: 1.2 MG/DL (ref 0.5–1)
EOSINOPHILS ABSOLUTE: 0.23 E9/L (ref 0.05–0.5)
EOSINOPHILS RELATIVE PERCENT: 2.9 % (ref 0–6)
GFR AFRICAN AMERICAN: 53
GFR NON-AFRICAN AMERICAN: 44 ML/MIN/1.73
GLUCOSE BLD-MCNC: 100 MG/DL (ref 74–99)
GLUCOSE URINE: >=1000 MG/DL
HCT VFR BLD CALC: 38.6 % (ref 34–48)
HEMOGLOBIN: 12.9 G/DL (ref 11.5–15.5)
IMMATURE GRANULOCYTES #: 0.05 E9/L
IMMATURE GRANULOCYTES %: 0.6 % (ref 0–5)
KETONES, URINE: NEGATIVE MG/DL
LEUKOCYTE ESTERASE, URINE: NEGATIVE
LYMPHOCYTES ABSOLUTE: 2.17 E9/L (ref 1.5–4)
LYMPHOCYTES RELATIVE PERCENT: 27.5 % (ref 20–42)
MCH RBC QN AUTO: 32.4 PG (ref 26–35)
MCHC RBC AUTO-ENTMCNC: 33.4 % (ref 32–34.5)
MCV RBC AUTO: 97 FL (ref 80–99.9)
MONOCYTES ABSOLUTE: 0.98 E9/L (ref 0.1–0.95)
MONOCYTES RELATIVE PERCENT: 12.4 % (ref 2–12)
NEUTROPHILS ABSOLUTE: 4.39 E9/L (ref 1.8–7.3)
NEUTROPHILS RELATIVE PERCENT: 55.7 % (ref 43–80)
NITRITE, URINE: NEGATIVE
PDW BLD-RTO: 12.5 FL (ref 11.5–15)
PH UA: 5 (ref 5–9)
PLATELET # BLD: 240 E9/L (ref 130–450)
PMV BLD AUTO: 10.3 FL (ref 7–12)
POTASSIUM REFLEX MAGNESIUM: 4.4 MMOL/L (ref 3.5–5)
PRO-BNP: 371 PG/ML (ref 0–450)
PROTEIN UA: NEGATIVE MG/DL
RBC # BLD: 3.98 E12/L (ref 3.5–5.5)
RBC UA: ABNORMAL /HPF (ref 0–2)
SODIUM BLD-SCNC: 139 MMOL/L (ref 132–146)
SPECIFIC GRAVITY UA: 1.01 (ref 1–1.03)
TOTAL PROTEIN: 7.1 G/DL (ref 6.4–8.3)
TROPONIN, HIGH SENSITIVITY: 21 NG/L (ref 0–9)
TROPONIN, HIGH SENSITIVITY: 24 NG/L (ref 0–9)
UROBILINOGEN, URINE: 0.2 E.U./DL
WBC # BLD: 7.9 E9/L (ref 4.5–11.5)
WBC UA: ABNORMAL /HPF (ref 0–5)

## 2022-05-13 PROCEDURE — 96374 THER/PROPH/DIAG INJ IV PUSH: CPT

## 2022-05-13 PROCEDURE — 99285 EMERGENCY DEPT VISIT HI MDM: CPT

## 2022-05-13 PROCEDURE — 84484 ASSAY OF TROPONIN QUANT: CPT

## 2022-05-13 PROCEDURE — 90714 TD VACC NO PRESV 7 YRS+ IM: CPT | Performed by: NURSE PRACTITIONER

## 2022-05-13 PROCEDURE — 90471 IMMUNIZATION ADMIN: CPT | Performed by: NURSE PRACTITIONER

## 2022-05-13 PROCEDURE — 6370000000 HC RX 637 (ALT 250 FOR IP): Performed by: NURSE PRACTITIONER

## 2022-05-13 PROCEDURE — 85025 COMPLETE CBC W/AUTO DIFF WBC: CPT

## 2022-05-13 PROCEDURE — 96375 TX/PRO/DX INJ NEW DRUG ADDON: CPT

## 2022-05-13 PROCEDURE — 71045 X-RAY EXAM CHEST 1 VIEW: CPT

## 2022-05-13 PROCEDURE — 80053 COMPREHEN METABOLIC PANEL: CPT

## 2022-05-13 PROCEDURE — 6360000002 HC RX W HCPCS: Performed by: NURSE PRACTITIONER

## 2022-05-13 PROCEDURE — 83880 ASSAY OF NATRIURETIC PEPTIDE: CPT

## 2022-05-13 PROCEDURE — 81001 URINALYSIS AUTO W/SCOPE: CPT

## 2022-05-13 PROCEDURE — 36415 COLL VENOUS BLD VENIPUNCTURE: CPT

## 2022-05-13 PROCEDURE — 93005 ELECTROCARDIOGRAM TRACING: CPT | Performed by: NURSE PRACTITIONER

## 2022-05-13 RX ORDER — DIPHENHYDRAMINE HCL 25 MG
25 CAPSULE ORAL EVERY 6 HOURS PRN
Qty: 20 CAPSULE | Refills: 0 | Status: SHIPPED | OUTPATIENT
Start: 2022-05-13 | End: 2022-05-23

## 2022-05-13 RX ORDER — DIPHENHYDRAMINE HYDROCHLORIDE 50 MG/ML
25 INJECTION INTRAMUSCULAR; INTRAVENOUS ONCE
Status: COMPLETED | OUTPATIENT
Start: 2022-05-13 | End: 2022-05-13

## 2022-05-13 RX ORDER — METHYLPREDNISOLONE SODIUM SUCCINATE 125 MG/2ML
125 INJECTION, POWDER, LYOPHILIZED, FOR SOLUTION INTRAMUSCULAR; INTRAVENOUS ONCE
Status: COMPLETED | OUTPATIENT
Start: 2022-05-13 | End: 2022-05-13

## 2022-05-13 RX ORDER — 0.9 % SODIUM CHLORIDE 0.9 %
500 INTRAVENOUS SOLUTION INTRAVENOUS ONCE
Status: DISCONTINUED | OUTPATIENT
Start: 2022-05-13 | End: 2022-05-14 | Stop reason: HOSPADM

## 2022-05-13 RX ORDER — TETANUS AND DIPHTHERIA TOXOIDS ADSORBED 2; 2 [LF]/.5ML; [LF]/.5ML
0.5 INJECTION INTRAMUSCULAR ONCE
Status: COMPLETED | OUTPATIENT
Start: 2022-05-13 | End: 2022-05-13

## 2022-05-13 RX ORDER — FAMOTIDINE 20 MG/1
20 TABLET, FILM COATED ORAL DAILY
Qty: 60 TABLET | Refills: 0 | Status: SHIPPED | OUTPATIENT
Start: 2022-05-13

## 2022-05-13 RX ORDER — FAMOTIDINE 20 MG/1
20 TABLET, FILM COATED ORAL ONCE
Status: COMPLETED | OUTPATIENT
Start: 2022-05-13 | End: 2022-05-13

## 2022-05-13 RX ORDER — METHYLPREDNISOLONE 4 MG/1
TABLET ORAL
Qty: 1 KIT | Refills: 0 | Status: SHIPPED | OUTPATIENT
Start: 2022-05-13 | End: 2022-05-19

## 2022-05-13 RX ADMIN — TETANUS AND DIPHTHERIA TOXOIDS ADSORBED 0.5 ML: 2; 2 INJECTION INTRAMUSCULAR at 18:39

## 2022-05-13 RX ADMIN — FAMOTIDINE 20 MG: 20 TABLET, FILM COATED ORAL at 18:33

## 2022-05-13 RX ADMIN — METHYLPREDNISOLONE SODIUM SUCCINATE 125 MG: 125 INJECTION, POWDER, FOR SOLUTION INTRAMUSCULAR; INTRAVENOUS at 18:30

## 2022-05-13 RX ADMIN — DIPHENHYDRAMINE HYDROCHLORIDE 25 MG: 50 INJECTION, SOLUTION INTRAMUSCULAR; INTRAVENOUS at 18:31

## 2022-05-13 RX ADMIN — Medication 500 ML: at 18:24

## 2022-05-13 ASSESSMENT — PAIN - FUNCTIONAL ASSESSMENT: PAIN_FUNCTIONAL_ASSESSMENT: 0-10

## 2022-05-13 ASSESSMENT — PAIN SCALES - GENERAL: PAINLEVEL_OUTOF10: 8

## 2022-05-13 ASSESSMENT — PAIN DESCRIPTION - ORIENTATION: ORIENTATION: RIGHT

## 2022-05-13 ASSESSMENT — PAIN DESCRIPTION - DESCRIPTORS: DESCRIPTORS: TINGLING;NUMBNESS

## 2022-05-13 ASSESSMENT — PAIN DESCRIPTION - LOCATION: LOCATION: HAND

## 2022-05-13 NOTE — ED PROVIDER NOTES
ED Attending shared visit  CC: No       [address]  Department of Emergency Medicine   ED  Encounter Note  Admit Date/RoomTime: 2022  5:38 PM  ED Room:     NAME: Hazel Schaffer  : 1945  MRN: [id number]     Chief Complaint:  Insect Bite (cleaning bathroom and was stung by yellow jacket on right 3rd finger @ 1 hour ago.)    History of Present Illness        Hazel Schaffer is a 78 y.o. old female who presents to the emergency department by private vehicle, for sudden onset of chest tightness, shortness of breath, light-headedness and tenderness to the right middle finger after known exposure to yellow jacket which began 1 hour(s) prior to arrival.  Since onset the symptoms have been persistent and mild-moderate in severity. Her symptoms are associated with itching and relieved by nothing tried . She denies any nothing additional.  States that she was cleaning her bathroom floor when she was stung by yellow jacket. Patient states that she does not know she is allergic to yellowjacket but she knows that she is allergic to bees. Patient does not carry an epi pen as she has never needed an epi pen  ROS   Pertinent positives and negatives are stated within HPI, all other systems reviewed and are negative. Past Medical History:  has a past medical history of Abnormal echocardiogram, Acute combined systolic and diastolic congestive heart failure ([address] Acute renal insufficiency, Anxiety, CAD (coronary artery disease), Carotid artery narrowing, Depression, Diabetes mellitus ([address] Drug intolerance, GERD (gastroesophageal reflux disease), Heart attack ([address] Hyperlipidemia, Hypertension, IBS (irritable bowel syndrome), Obesity, Primary osteoarthritis involving multiple joints, Sleep apnea, SOB (shortness of breath) on exertion, and UTI (urinary tract infection). Surgical History:  has a past surgical history that includes Tonsillectomy;  Percutaneous Transluminal Coronary Angio (2007); Dilation and curettage of uterus; cyst removal; Diagnostic Cardiac Cath Lab Procedure (2007); Coronary angioplasty (2007);  section; and Cardiac catheterization (2021). Social History:  reports that she quit smoking about 11 years ago. She has a 35.00 pack-year smoking history. She has never used smokeless tobacco. She reports that she does not drink alcohol and does not use drugs. Family History: family history includes Down Syndrome in her son; Emphysema in her mother; Heart Attack in her father; Heart Failure in her mother. Allergies: Sulfa antibiotics, Amoxicillin, Clavulanic acid, Levofloxacin, Omeprazole, Other, Pneumococcal vaccines, Amoxicillin-pot clavulanate, Bee venom, and Doxycycline    Physical Exam   Oxygen Saturation Interpretation: Normal.        ED Triage Vitals [22 1641]   BP Temp Temp Source Pulse Resp SpO2 Height Weight   (!) 124/71 97.7 °F (36.5 °C) Oral 74 24 98 % -- --         General Appearance/Constitutional:  Alert, development consistent with age. HEENT:  NC/NT. PERRLA. Airway patent. Neck:  Normal ROM. Supple. Respiratory:  Clear to auscultation and breath sounds equal.  CV:  Regular rate and rhythm, normal heart sounds, without pathological murmurs, ectopy, gallops, or rubs. GI:  Abdomen Soft, nontender, good bowel sounds. No firm or pulsatile mass. Back:  No costovertebral tenderness. Extremities: No tenderness or edema noted. Integument:  Normal turgor. Warm, dry, without visible rash, unless noted elsewhere. Erythema to the distal phalanx of the right middle finger. without swelling   Lymphatics: No lymphangitis or adenopathy noted. Neurological:  Oriented. Motor functions intact.      Lab / Imaging Results     (All laboratory and radiology results have been personally reviewed by myself)  Labs:  Results for orders placed or performed during the hospital encounter of 22   CBC with Auto Differential   Result Value Ref Range    WBC 7.9 4.5 - 11.5 E9/L    RBC 3.98 3.50 - 5.50 E12/L    Hemoglobin 12.9 11.5 - 15.5 g/dL    Hematocrit 38.6 34.0 - 48.0 %    MCV 97.0 80.0 - 99.9 fL    MCH 32.4 26.0 - 35.0 pg    MCHC 33.4 32.0 - 34.5 %    RDW 12.5 11.5 - 15.0 fL    Platelets 549 714 - 807 E9/L    MPV 10.3 7.0 - 12.0 fL    Neutrophils % 55.7 43.0 - 80.0 %    Immature Granulocytes % 0.6 0.0 - 5.0 %    Lymphocytes % 27.5 20.0 - 42.0 %    Monocytes % 12.4 (H) 2.0 - 12.0 %    Eosinophils % 2.9 0.0 - 6.0 %    Basophils % 0.9 0.0 - 2.0 %    Neutrophils Absolute 4.39 1.80 - 7.30 E9/L    Immature Granulocytes # 0.05 E9/L    Lymphocytes Absolute 2.17 1.50 - 4.00 E9/L    Monocytes Absolute 0.98 (H) 0.10 - 0.95 E9/L    Eosinophils Absolute 0.23 0.05 - 0.50 E9/L    Basophils Absolute 0.07 0.00 - 0.20 E9/L   Comprehensive Metabolic Panel w/ Reflex to MG   Result Value Ref Range    Sodium 139 132 - 146 mmol/L    Potassium reflex Magnesium 4.4 3.5 - 5.0 mmol/L    Chloride 103 98 - 107 mmol/L    CO2 21 (L) 22 - 29 mmol/L    Anion Gap 15 7 - 16 mmol/L    Glucose 100 (H) 74 - 99 mg/dL    BUN 30 (H) 6 - 23 mg/dL    CREATININE 1.2 (H) 0.5 - 1.0 mg/dL    GFR Non-African American 44 >=60 mL/min/1.73    GFR African American 53     Calcium 9.4 8.6 - 10.2 mg/dL    Total Protein 7.1 6.4 - 8.3 g/dL    Albumin 4.3 3.5 - 5.2 g/dL    Total Bilirubin 0.2 0.0 - 1.2 mg/dL    Alkaline Phosphatase 74 35 - 104 U/L    ALT 11 0 - 32 U/L    AST 16 0 - 31 U/L   Troponin   Result Value Ref Range    Troponin, High Sensitivity 21 (H) 0 - 9 ng/L   Urinalysis with Microscopic   Result Value Ref Range    Color, UA Yellow Straw/Yellow    Clarity, UA Clear Clear    Glucose, Ur >=1000 (A) Negative mg/dL    Bilirubin Urine Negative Negative    Ketones, Urine Negative Negative mg/dL    Specific Gravity, UA 1.010 1.005 - 1.030    Blood, Urine TRACE-INTACT Negative    pH, UA 5.0 5.0 - 9.0    Protein, UA Negative Negative mg/dL    Urobilinogen, Urine 0.2 <2.0 E.U./dL    Nitrite, emergent return, as well as the importance of follow-up. At this time had a localized reaction to a bee sting. Patient at this time not require a EpiPen also due to her cardiac history she does not require an EpiPen. Patient will be treated with medication for her symptoms including famotidine and Benadryl and prednisone. Patient educated on ice for her inflamed red finger due to the bee sting. Patient is agreeable to plan of care all questions were answered. Patient was seen and evaluated by Dr. Colette Cullen, ED attending. Patient stable for outpatient follow-up. Plan of Care/Counseling:  IGNACIO Miranda CNP reviewed today's visit with the patient in addition to providing specific details for the plan of care and counseling regarding the diagnosis and prognosis. Questions are answered at this time and are agreeable with the plan. Assessment      1. Wasp sting, undetermined intent, initial encounter    2. Allergic reaction, initial encounter      Plan   Discharged home  Patient condition is good    New Medications     Discharge Medication List as of 5/13/2022 10:43 PM      START taking these medications    Details   famotidine (PEPCID) 20 MG tablet Take 1 tablet by mouth daily, Disp-60 tablet, R-0Print      methylPREDNISolone (MEDROL, LINO,) 4 MG tablet Take by mouth., Disp-1 kit, R-0Print      diphenhydrAMINE (BENADRYL ALLERGY) 25 MG capsule Take 1 capsule by mouth every 6 hours as needed for Itching, Disp-20 capsule, R-0Print           Electronically signed by IGNACIO Miranda CNP   DD: 5/13/22  **This report was transcribed using voice recognition software. Every effort was made to ensure accuracy; however, inadvertent computerized transcription errors may be present.   END OF ED PROVIDER NOTE     IGNACIO Cazares CNP  05/13/22 3017

## 2022-05-13 NOTE — ED PROVIDER NOTES
ATTENDING PROVIDER ATTESTATION:     Cindy Navarro presented to the emergency department for evaluation of Insect Bite (cleaning bathroom and was stung by yellow jacket on right 3rd finger @ 1 hour ago.)    I have reviewed and discussed the case, including pertinent history (medical, surgical, family and social) and exam findings with the Midlevel and the Nurse assigned to Cindy Navarro. I have personally performed and/or participated in the history, exam, medical decision making, and procedures and agree with all pertinent clinical information. HPI  This is a 68-year-old female with past medical history of allergic reaction to bee stings who presents to the emergency department today after a yellowjacket sting. 2 hours prior to arrival she was stung in the right third digit. She had sudden onset pain that is throbbing. Worse when she touches the region. She has not tried any medication for this. She has noting some numbness and tingling to the region. Denies any decreased range of motion. She also noted some redness. The patient went to her pharmacist who sent her to urgent care who subsequently sent her to the emergency department. They sent her here because she was feeling dizzy or lightheaded. She describes this as presyncope but has not lost consciousness. Nothing worsens or improves it. Denies any trauma. No headache, vision change, numbness, tingling or weakness. No associated chest pain, shortness of breath, nausea, vomiting or diarrhea. No abdominal pain. Patient is right-handed at baseline. She is unsure of her last tetanus shot.   Of note, patient denies ever requiring EpiPen in the past.    ROS  A complete review of systems was performed and pertinent positives and negatives are stated within HPI, all other systems reviewed and are negative.    --------------------------------------------- PAST HISTORY ---------------------------------------------  Past Medical History:  has a past medical history of Abnormal echocardiogram, Acute combined systolic and diastolic congestive heart failure ([address] Acute renal insufficiency, Anxiety, CAD (coronary artery disease), Carotid artery narrowing, Depression, Diabetes mellitus ([address] Drug intolerance, GERD (gastroesophageal reflux disease), Heart attack ([address] Hyperlipidemia, Hypertension, IBS (irritable bowel syndrome), Obesity, Primary osteoarthritis involving multiple joints, Sleep apnea, SOB (shortness of breath) on exertion, and UTI (urinary tract infection). Past Surgical History:  has a past surgical history that includes Tonsillectomy; Percutaneous Transluminal Coronary Angio (2007); Dilation and curettage of uterus; cyst removal; Diagnostic Cardiac Cath Lab Procedure (2007); Coronary angioplasty (2007);  section; and Cardiac catheterization (2021). Social History:  reports that she quit smoking about 11 years ago. She has a 35.00 pack-year smoking history. She has never used smokeless tobacco. She reports that she does not drink alcohol and does not use drugs. Family History: family history includes Down Syndrome in her son; Emphysema in her mother; Heart Attack in her father; Heart Failure in her mother. Unless otherwise noted, family history is non contributory    The patients home medications have been reviewed. Allergies: Sulfa antibiotics, Amoxicillin, Clavulanic acid, Levofloxacin, Omeprazole, Other, Pneumococcal vaccines, Amoxicillin-pot clavulanate, Bee venom, and Doxycycline    Physical Exam  General: The patient is conversational and lying comfortably in bed. Head: Normocephalic and atraumatic. Eyes: Sclera non-icteric and no conjunctival injection  ENT: Nasal and oral membranes moist.  Uvula midline. No peritonsillar exudate or swelling. No swelling of the lip or tongue's. No stridor. Neck: Trachea midline. No JVD  Respiratory: Lungs clear to auscultation bilaterally.   Patient speaking in full sentences. Cardiovascular: Regular rate. No pedal edema. 2+ radial pulses  Gastrointestinal:  Abdomen is soft and nondistended. Bowel sounds present. There is no tenderness. There is no guarding or rebound. Musculoskeletal: No deformity. No bony tenderness. Patient able to flex and extend at the DIP and PIP joints.  strength symmetric. Able to flex and extend at the wrists and elbows. Skin: Skin warm and dry. No rashes. Minimal erythema of the right third distal phalanx. No warmth or fluctuance. No laceration  Neurologic: No gross motor deficits. No aphasia. Pupils are equal and reactive to light. Extraocular eye movements intact. Sensation intact bilaterally. Symmetric facies. Tongue protrudes midline. 5 out of 5 symmetric strength of the upper and lower extremities. Negative finger-to-nose testing. Alert and oriented. Psychiatric: Normal affect. MDM  This is a 78 y.o. female presenting to the ED for allergic reaction. On initial presentation, the patient's vital signs are interpreted as borderline hypotensive, afebrile and saturating well on room air. Based on history and physical exam, we have a broad differential.  Patient has known allergy to bee stings. She has no evidence of airway compromise at this time. No trauma and is distally neurovascularly intact. As she is feeling dizzy, this may be secondary to her reaction but we cannot exclude ACS, arrhythmia as the patient does have known history of congestive heart failure. No recent trauma. Neurological exam nonfocal.  Patient will be slowly hydrated. She will be symptomatically treated with methylprednisolone, famotidine and Benadryl. Tetanus updated. Chest x-ray, EKG and laboratory studies obtained. A 12-lead EKG was performed and interpreted by myself. The rate is 57 with sinus bradycardia and normal axis. Patient has a Q-wave in lead III. T wave inversion in V1 with biphasic T wave in V2.   The DE interval is 154, QRS interval is 74, and QTC interval is 416. No acute ST elevation or depression. This sinus bradycardia with nonspecific abnormality. Chest x-ray shows no acute process. This is interpreted by radiology. Laboratory studies show mild acidosis but no anion gap. BUN and creatinine mildly elevated. BNP normal troponin is 21 and will be repeated. LFTs normal.  No leukocytosis or anemia. Repeat troponin is 24. Urinalysis shows glucosuria and hematuria but no evidence of infection. On reevaluation, patient's blood pressure significantly improved. She no longer feels dizzy. We discussed the findings. She will be provided with prescriptions for Benadryl, famotidine and Medrol Dosepak. Is given strict return precautions. Denies any chest pain or shortness of breath. Should follow with primary care physician. I have reviewed my findings and recommendations with Karen Pryor and members of family present at the time of disposition. My findings/plan: The primary encounter diagnosis was Wasp sting, undetermined intent, initial encounter. A diagnosis of Allergic reaction, initial encounter was also pertinent to this visit.   Discharge Medication List as of 5/13/2022 10:43 PM      START taking these medications    Details   famotidine (PEPCID) 20 MG tablet Take 1 tablet by mouth daily, Disp-60 tablet, R-0Print      methylPREDNISolone (MEDROL, LINO,) 4 MG tablet Take by mouth., Disp-1 kit, R-0Print      diphenhydrAMINE (BENADRYL ALLERGY) 25 MG capsule Take 1 capsule by mouth every 6 hours as needed for Itching, Disp-20 capsule, R-0Print           MD Syed Pizano MD  05/13/22 2316

## 2022-05-13 NOTE — ED NOTES
Pt was moved to a monitor bed as soon as on became available, Both CRNP and Physcian aware. Pt to room #5 and placed onto the monitor and VS taken and recorded.   Monitor SB rate 53     Parth Dela Cruz RN  05/13/22 1921

## 2022-05-16 LAB
EKG ATRIAL RATE: 57 BPM
EKG P AXIS: 52 DEGREES
EKG P-R INTERVAL: 154 MS
EKG Q-T INTERVAL: 428 MS
EKG QRS DURATION: 74 MS
EKG QTC CALCULATION (BAZETT): 416 MS
EKG R AXIS: 15 DEGREES
EKG T AXIS: 77 DEGREES
EKG VENTRICULAR RATE: 57 BPM

## 2022-05-16 PROCEDURE — 93010 ELECTROCARDIOGRAM REPORT: CPT | Performed by: INTERNAL MEDICINE

## 2022-07-27 DIAGNOSIS — I50.22 CHRONIC HFREF (HEART FAILURE WITH REDUCED EJECTION FRACTION) (HCC): ICD-10-CM

## 2022-07-27 RX ORDER — FUROSEMIDE 40 MG/1
TABLET ORAL
Qty: 90 TABLET | Refills: 1 | OUTPATIENT
Start: 2022-07-27

## 2022-08-03 DIAGNOSIS — Z79.899 MEDICATION DOSE DECREASED: ICD-10-CM

## 2022-08-03 DIAGNOSIS — I50.22 CHRONIC SYSTOLIC HEART FAILURE (HCC): ICD-10-CM

## 2022-08-04 DIAGNOSIS — I50.22 CHRONIC HFREF (HEART FAILURE WITH REDUCED EJECTION FRACTION) (HCC): ICD-10-CM

## 2022-08-04 RX ORDER — FUROSEMIDE 20 MG/1
20 TABLET ORAL
Qty: 45 TABLET | Refills: 3 | Status: SHIPPED | OUTPATIENT
Start: 2022-08-05

## 2022-08-04 RX ORDER — FUROSEMIDE 40 MG/1
TABLET ORAL
Qty: 90 TABLET | Refills: 1 | Status: SHIPPED | OUTPATIENT
Start: 2022-08-04

## 2022-08-30 ENCOUNTER — TELEPHONE (OUTPATIENT)
Dept: CARDIOLOGY | Age: 77
End: 2022-08-30

## 2022-09-13 ENCOUNTER — HOSPITAL ENCOUNTER (OUTPATIENT)
Dept: OTHER | Age: 77
Setting detail: THERAPIES SERIES
Discharge: HOME | End: 2022-09-13
Payer: MEDICARE

## 2022-09-13 VITALS
OXYGEN SATURATION: 97 % | HEART RATE: 65 BPM | RESPIRATION RATE: 18 BRPM | SYSTOLIC BLOOD PRESSURE: 112 MMHG | BODY MASS INDEX: 29.45 KG/M2 | DIASTOLIC BLOOD PRESSURE: 56 MMHG | WEIGHT: 161 LBS

## 2022-09-13 LAB
ANION GAP SERPL CALCULATED.3IONS-SCNC: 17 MMOL/L (ref 7–16)
BUN BLDV-MCNC: 16 MG/DL (ref 6–23)
CALCIUM SERPL-MCNC: 9.2 MG/DL (ref 8.6–10.2)
CHLORIDE BLD-SCNC: 103 MMOL/L (ref 98–107)
CO2: 18 MMOL/L (ref 22–29)
CREAT SERPL-MCNC: 0.9 MG/DL (ref 0.5–1)
GFR AFRICAN AMERICAN: >60
GFR NON-AFRICAN AMERICAN: >60 ML/MIN/1.73
GLUCOSE BLD-MCNC: 166 MG/DL (ref 74–99)
POTASSIUM SERPL-SCNC: 4.2 MMOL/L (ref 3.5–5)
PRO-BNP: 463 PG/ML (ref 0–450)
SODIUM BLD-SCNC: 138 MMOL/L (ref 132–146)

## 2022-09-13 PROCEDURE — 99214 OFFICE O/P EST MOD 30 MIN: CPT

## 2022-09-13 PROCEDURE — 83880 ASSAY OF NATRIURETIC PEPTIDE: CPT

## 2022-09-13 PROCEDURE — 80048 BASIC METABOLIC PNL TOTAL CA: CPT

## 2022-09-13 PROCEDURE — 36415 COLL VENOUS BLD VENIPUNCTURE: CPT

## 2022-09-13 RX ORDER — CETIRIZINE HYDROCHLORIDE 5 MG/1
5 TABLET ORAL DAILY
COMMUNITY

## 2022-09-13 NOTE — PROGRESS NOTES
aspirin EC 81 MG EC tablet Take 81 mg by mouth daily   Historical Provider, MD   CO-ENZYME Q-10 PO Take 200 mg by mouth daily   Historical Provider, MD             Guideline directed medical:  ARNI/ACE I/ARB: Yes  Beta blocker: Yes  Aldosterone antagonist:  Yes        Physical Examination:     BP (!) 112/56   Pulse 65   Resp 18   Wt 161 lb (73 kg)   SpO2 97%   BMI 29.45 kg/m²     Assessment  Charting Type: Shift assessment    Neurological  Level of Consciousness: Alert (0)         HEENT (Head, Ears, Eyes, Nose, & Throat)  HEENT (WDL): Within Defined Limits                   Cardiac  Cardiac Rhythm: Sinus rhythm    Rhythm Interpretation  Heart Rate: 65         Gastrointestinal  Abdominal (WDL): Within Defined Limits               Peripheral Vascular  Peripheral Vascular (WDL): Within Defined Limits  Edema: None  RLE Edema: None  LLE Edema: None                   Genitourinary  Genitourinary (WDL): Within Defined Limits                             Heart Rate: 65                   LAB DATA:    BNP  No results for input(s): BNP in the last 72 hours. proBNP  No results for input(s): PROBNP in the last 72 hours. BMP  No results for input(s): NA, K, CL, CO2, BUN, CREATININE, GLUCOSE, CALCIUM in the last 72 hours. WEIGHTS:  Wt Readings from Last 3 Encounters:   09/13/22 161 lb (73 kg)   05/11/22 155 lb 11.2 oz (70.6 kg)   05/03/22 163 lb (73.9 kg)         TELEMETRY:  Cardiac Regularity: Regular  Cardiac Rhythm/Interpretation: NSR        ASSESSMENT:  Madelaine Argueta HAS A 2 POUND WEIGHT LOSS. States she feels fine. Assessment is unremarkable. PT ADMITS TAKING ORAL DIURETIC  THREE TIMES A WEEK. PATIENT GOES TO PHYSICAL THERAPY THREE TIMES A WEEK. PATIENT STATES SHE GOES OUT TO A RESTAURANT SEVERAL TIMES A WEEK.   Interventions completed this visit:  IV diuretics given: no  Lab work obtained: yes, BNP/BMP   Reviewed continue current medications that patient as prescribed answered any questions   Educated on signs and symptoms of HF  Educated on low sodium diet    PLAN:    Future Appointments   Date Time Provider Killian Cohn   9/19/2022  1:00 PM [address] [place] ECHO RM 3 [address] [address] [facility]        Given clinic phone number and aware of signs and symptoms to call with any HF change in symptoms.

## 2022-09-16 DIAGNOSIS — I50.22 CHRONIC HFREF (HEART FAILURE WITH REDUCED EJECTION FRACTION) (HCC): ICD-10-CM

## 2022-09-16 RX ORDER — METOPROLOL SUCCINATE 200 MG/1
TABLET, EXTENDED RELEASE ORAL
Qty: 90 TABLET | Refills: 1 | Status: SHIPPED | OUTPATIENT
Start: 2022-09-16

## 2022-10-05 ENCOUNTER — TELEPHONE (OUTPATIENT)
Dept: CARDIOLOGY | Age: 77
End: 2022-10-05

## 2022-10-07 ENCOUNTER — HOSPITAL ENCOUNTER (OUTPATIENT)
Dept: CARDIOLOGY | Age: 77
Discharge: HOME | End: 2022-10-07
Payer: MEDICARE

## 2022-10-07 DIAGNOSIS — I34.0 NONRHEUMATIC MITRAL VALVE REGURGITATION: ICD-10-CM

## 2022-10-07 DIAGNOSIS — I35.1 NONRHEUMATIC AORTIC VALVE INSUFFICIENCY: ICD-10-CM

## 2022-10-07 LAB
LV EF: 50 %
LVEF MODALITY: NORMAL

## 2022-10-07 PROCEDURE — 93306 TTE W/DOPPLER COMPLETE: CPT

## 2022-10-09 PROBLEM — I34.2 NONRHEUMATIC MITRAL VALVE STENOSIS: Status: ACTIVE | Noted: 2022-10-09

## 2022-10-10 ENCOUNTER — TELEPHONE (OUTPATIENT)
Dept: CARDIOLOGY CLINIC | Age: 77
End: 2022-10-10

## 2022-10-10 NOTE — TELEPHONE ENCOUNTER
----- Message from Ami Seo DO sent at 10/9/2022  3:54 PM EDT -----  Given is in the low end of normal.  There is only mild mitral stenosis and only mild aortic regurgitation on this echo. No overall significant changes. No changes in cardiac management at this time.

## 2022-10-12 RX ORDER — AMLODIPINE BESYLATE 2.5 MG/1
TABLET ORAL
Qty: 90 TABLET | Refills: 3 | Status: SHIPPED | OUTPATIENT
Start: 2022-10-12

## 2022-12-09 DIAGNOSIS — I50.22 CHRONIC SYSTOLIC HEART FAILURE (HCC): ICD-10-CM

## 2022-12-09 RX ORDER — SPIRONOLACTONE 25 MG/1
TABLET ORAL
Qty: 45 TABLET | Refills: 3 | Status: SHIPPED | OUTPATIENT
Start: 2022-12-09

## 2022-12-13 ENCOUNTER — HOSPITAL ENCOUNTER (OUTPATIENT)
Dept: OTHER | Age: 77
Setting detail: THERAPIES SERIES
Discharge: HOME | End: 2022-12-13

## 2022-12-22 ENCOUNTER — HOSPITAL ENCOUNTER (OUTPATIENT)
Dept: OTHER | Age: 77
Setting detail: THERAPIES SERIES
Discharge: HOME | End: 2022-12-22
Payer: MEDICARE

## 2022-12-22 VITALS
SYSTOLIC BLOOD PRESSURE: 100 MMHG | OXYGEN SATURATION: 95 % | HEART RATE: 69 BPM | RESPIRATION RATE: 18 BRPM | WEIGHT: 163 LBS | BODY MASS INDEX: 29.81 KG/M2 | DIASTOLIC BLOOD PRESSURE: 57 MMHG

## 2022-12-22 LAB
ANION GAP SERPL CALCULATED.3IONS-SCNC: 11 MMOL/L (ref 7–16)
BUN BLDV-MCNC: 29 MG/DL (ref 6–23)
CALCIUM SERPL-MCNC: 10.1 MG/DL (ref 8.6–10.2)
CHLORIDE BLD-SCNC: 107 MMOL/L (ref 98–107)
CO2: 19 MMOL/L (ref 22–29)
CREAT SERPL-MCNC: 1.5 MG/DL (ref 0.5–1)
GFR SERPL CREATININE-BSD FRML MDRD: 36 ML/MIN/1.73
GLUCOSE BLD-MCNC: 97 MG/DL (ref 74–99)
POTASSIUM SERPL-SCNC: 6 MMOL/L (ref 3.5–5)
PRO-BNP: 319 PG/ML (ref 0–450)
SODIUM BLD-SCNC: 137 MMOL/L (ref 132–146)

## 2022-12-22 PROCEDURE — 36415 COLL VENOUS BLD VENIPUNCTURE: CPT

## 2022-12-22 PROCEDURE — 80048 BASIC METABOLIC PNL TOTAL CA: CPT

## 2022-12-22 PROCEDURE — 99214 OFFICE O/P EST MOD 30 MIN: CPT

## 2022-12-22 PROCEDURE — 83880 ASSAY OF NATRIURETIC PEPTIDE: CPT

## 2022-12-22 NOTE — PROGRESS NOTES
[address] [facility]   1945            Referring Doctor:EBER Pelayo NP    Primary Care Physician: Dr. Rashad Branch  Cardiologist: Dr. Jean Carlos Turner  Nephrologist:         History of Present Illness:     Sunshine Rodrigues is a 78 y.o. female with a history of HFrEF, most recent EF 25-30%     On 3/2/2021. Patient Story:    She does not  have dyspnea with exertion, shortness of breath, or decline in overall functional capacity. She does not have orthopnea, PND, nocturnal cough or hemoptysis. She does not have abdominal distention or bloating, early satiety, anorexia/change in appetite. She has a good urinary response to  oral diuretic when she takes it. She does not  have  lower extremity edema. She denies lightheadedness, dizziness. She denies palpitations, syncope or near syncope. She does not complain of chest pain, pressure, discomfort. Allergies   Allergen Reactions    Sulfa Antibiotics Nausea Only    Amoxicillin Nausea Only    Clavulanic Acid Nausea Only    Levofloxacin     Omeprazole      Interferes with plavix    Other      Lipitor, Crestor, high doses of Simvastatin    Pneumococcal Vaccines     Amoxicillin-Pot Clavulanate Nausea Only     Makes her stomach hurt, doesn't want to eat. Bee Venom Rash    Doxycycline Rash         Prior to Visit Medications    Medication Sig Taking?  Authorizing Provider   spironolactone (ALDACTONE) 25 MG tablet TAKE 1/2 TABLET BY MOUTH ONCE DAILY  Desiree Mortimer, DO   amLODIPine (NORVASC) 2.5 MG tablet TAKE ONE TABLET BY MOUTH ONCE DAILY  Desiree Mortimer, DO   metoprolol succinate (TOPROL XL) 200 MG extended release tablet TAKE ONE TABLET BY MOUTH ONCE DAILY  Desiree Mortimer, DO   cetirizine (ZYRTEC) 5 MG tablet Take 5 mg by mouth daily  Historical Provider, MD   furosemide (LASIX) 20 MG tablet Take 1 tablet by mouth three times a week (MON, WED, FRI)  Desiree Mortimer, DO   furosemide (LASIX) 40 MG tablet TAKE ONE TABLET BY MOUTH ONCE DAILY  Patient not taking: Reported on 9/13/2022  Yumi Iba, DO   famotidine (PEPCID) 20 MG tablet Take 1 tablet by mouth daily  Patient not taking: No sig reported  Berle Babinski, APRN - CNP   pantoprazole (PROTONIX) 40 MG tablet TAKE ONE TABLET BY MOUTH ONCE DAILY  Historical Provider, MD   [address] 10 MG tablet TAKE ONE TABLET BY MOUTH ONCE DAILY  Malika Munoz MD   furosemide (LASIX) 20 MG tablet Take 20 mg by mouth three times a week Take on tablet on Mon, Wed, Fri  Historical Provider, MD   zinc gluconate 50 MG tablet Take 50 mg by mouth daily  Patient not taking: No sig reported  Historical Provider, MD   sacubitril-valsartan (ENTRESTO)  MG per tablet Take 1 tablet by mouth 2 times daily  IGNACIO Anderson   loratadine (CLARITIN) 10 MG tablet Take 10 mg by mouth daily  Patient not taking: No sig reported  Historical Provider, MD   atorvastatin (LIPITOR) 40 MG tablet Take 1 tablet by mouth daily  IGNACIO Marrufo CNP   apixaban starter pack (ELIQUIS) 5 MG TBPK tablet Take 1 tablet by mouth See Admin Instructions  Patient taking differently: Take 5 mg by mouth 2 times daily  Richard Dickson MD   magnesium oxide (MAG-OX) 400 (241.3 Mg) MG TABS tablet Take 1 tablet by mouth daily  Richard Dickson MD   ALPRAZolam Jim Hernandez) 2 MG tablet Take 2 mg by mouth 2 times daily as needed.    Historical Provider, MD   Cholecalciferol (VITAMIN D3) 50 MCG (2000 UT) CAPS Take 2,000 Units by mouth daily  Historical Provider, MD   azelastine (ASTELIN) 0.1 % nasal spray 2 sprays by Nasal route 2 times daily as directed  Historical Provider, MD   fluticasone (FLONASE) 50 MCG/ACT nasal spray 2 sprays by Each Nostril route daily  Historical Provider, MD   escitalopram (LEXAPRO) 10 MG tablet take 1 tablet by mouth once daily  Gabby Garg PA-C   metFORMIN (GLUCOPHAGE-XR) 500 MG extended release tablet Take 1 tablet by mouth 2 times daily (with meals)  Gabby Garg PA-C   aspirin EC 81 MG EC tablet Take 81 mg by mouth daily   Historical Provider, MD   CO-ENZYME Q-10 PO Take 200 mg by mouth daily   Historical Provider, MD             Guideline directed medical:  ARNI/ACE I/ARB: Yes  Beta blocker: Yes  Aldosterone antagonist:  Yes  jardiance      Physical Examination:     BP (!) 100/57   Pulse 69   Resp 18   Wt 163 lb (73.9 kg)   SpO2 95%   BMI 29.81 kg/m²     Assessment  Charting Type: Shift assessment              HEENT (Head, Ears, Eyes, Nose, & Throat)  HEENT (WDL): Within Defined Limits                   Cardiac  Cardiac Rhythm: Sinus rhythm    Rhythm Interpretation  Heart Rate: 69         Gastrointestinal  Abdominal (WDL): Within Defined Limits               Peripheral Vascular  Peripheral Vascular (WDL): Within Defined Limits  Edema: None  RLE Edema: None  LLE Edema: None                   Genitourinary  Genitourinary (WDL): Within Defined Limits                             Heart Rate: 69                   LAB DATA:    BNP  No results for input(s): BNP in the last 72 hours. proBNP  No results for input(s): PROBNP in the last 72 hours. BMP  No results for input(s): NA, K, CL, CO2, BUN, CREATININE, GLUCOSE, CALCIUM in the last 72 hours. WEIGHTS:  Wt Readings from Last 3 Encounters:   12/22/22 163 lb (73.9 kg)   09/13/22 161 lb (73 kg)   05/11/22 155 lb 11.2 oz (70.6 kg)         TELEMETRY:  Cardiac Regularity: Regular  Cardiac Rhythm/Interpretation: NSR        ASSESSMENT:  Madelaine Argueta HAS A 2 POUND weight gain since 9/13/22. Pt denies any discomfort, confirms will call for earlier appt. If needed.     Interventions completed this visit:  IV diuretics given: no  Lab work obtained: yes, BNP/BMP   Reviewed continue current medications that patient as prescribed answered any questions   Educated on signs and symptoms of HF  Educated on low sodium diet    PLAN:    Future Appointments   Date Time Provider Killian Cohn   3/15/2023  1:00 PM [facility] ROOM 1 [facility] Given clinic phone number and aware of signs and symptoms to call with any HF change in symptoms.

## 2022-12-23 ENCOUNTER — TELEPHONE (OUTPATIENT)
Dept: CARDIOLOGY CLINIC | Age: 77
End: 2022-12-23

## 2022-12-23 DIAGNOSIS — I50.9 CONGESTIVE HEART FAILURE, UNSPECIFIED HF CHRONICITY, UNSPECIFIED HEART FAILURE TYPE (HCC): ICD-10-CM

## 2022-12-23 DIAGNOSIS — E87.5 HIGH POTASSIUM: Primary | ICD-10-CM

## 2022-12-23 NOTE — TELEPHONE ENCOUNTER
Patient seen at the CHF Clinic. Potassium (6.0) Creatinine (1.5). Patient takes Aldactone 25 mg, Lasix 20 mg on Mon, Wed, Fri. Does not take any Potassium supplement. Please review and advise.

## 2022-12-23 NOTE — TELEPHONE ENCOUNTER
Patient notified of lab results and Dr. Kathryn Rowland recommendations. Med list amended. Order placed for BMP.

## 2022-12-27 ENCOUNTER — HOSPITAL ENCOUNTER (OUTPATIENT)
Age: 77
Discharge: HOME | End: 2022-12-27
Payer: MEDICARE

## 2022-12-27 DIAGNOSIS — I50.9 CONGESTIVE HEART FAILURE, UNSPECIFIED HF CHRONICITY, UNSPECIFIED HEART FAILURE TYPE (HCC): ICD-10-CM

## 2022-12-27 DIAGNOSIS — E87.5 HIGH POTASSIUM: ICD-10-CM

## 2022-12-27 LAB
ANION GAP SERPL CALCULATED.3IONS-SCNC: 13 MMOL/L (ref 7–16)
BUN BLDV-MCNC: 35 MG/DL (ref 6–23)
CALCIUM SERPL-MCNC: 9.5 MG/DL (ref 8.6–10.2)
CHLORIDE BLD-SCNC: 107 MMOL/L (ref 98–107)
CO2: 18 MMOL/L (ref 22–29)
CREAT SERPL-MCNC: 1.5 MG/DL (ref 0.5–1)
GFR SERPL CREATININE-BSD FRML MDRD: 36 ML/MIN/1.73
GLUCOSE BLD-MCNC: 108 MG/DL (ref 74–99)
POTASSIUM SERPL-SCNC: 5 MMOL/L (ref 3.5–5)
SODIUM BLD-SCNC: 138 MMOL/L (ref 132–146)

## 2022-12-27 PROCEDURE — 36415 COLL VENOUS BLD VENIPUNCTURE: CPT

## 2022-12-27 PROCEDURE — 80048 BASIC METABOLIC PNL TOTAL CA: CPT

## 2022-12-28 ENCOUNTER — TELEPHONE (OUTPATIENT)
Dept: CARDIOLOGY CLINIC | Age: 77
End: 2022-12-28

## 2022-12-28 DIAGNOSIS — I50.9 CONGESTIVE HEART FAILURE, UNSPECIFIED HF CHRONICITY, UNSPECIFIED HEART FAILURE TYPE (HCC): Primary | ICD-10-CM

## 2022-12-28 DIAGNOSIS — I10 ESSENTIAL HYPERTENSION: ICD-10-CM

## 2023-01-11 ENCOUNTER — HOSPITAL ENCOUNTER (OUTPATIENT)
Age: 78
Discharge: HOME | End: 2023-01-11
Payer: MEDICARE

## 2023-01-11 DIAGNOSIS — I10 ESSENTIAL HYPERTENSION: ICD-10-CM

## 2023-01-11 DIAGNOSIS — I50.9 CONGESTIVE HEART FAILURE, UNSPECIFIED HF CHRONICITY, UNSPECIFIED HEART FAILURE TYPE (HCC): ICD-10-CM

## 2023-01-11 LAB
ANION GAP SERPL CALCULATED.3IONS-SCNC: 18 MMOL/L (ref 7–16)
BUN BLDV-MCNC: 24 MG/DL (ref 6–23)
CALCIUM SERPL-MCNC: 9.2 MG/DL (ref 8.6–10.2)
CHLORIDE BLD-SCNC: 102 MMOL/L (ref 98–107)
CO2: 18 MMOL/L (ref 22–29)
CREAT SERPL-MCNC: 1.3 MG/DL (ref 0.5–1)
GFR SERPL CREATININE-BSD FRML MDRD: 43 ML/MIN/1.73
GLUCOSE BLD-MCNC: 120 MG/DL (ref 74–99)
POTASSIUM SERPL-SCNC: 5 MMOL/L (ref 3.5–5)
SODIUM BLD-SCNC: 138 MMOL/L (ref 132–146)

## 2023-01-11 PROCEDURE — 80048 BASIC METABOLIC PNL TOTAL CA: CPT

## 2023-01-11 PROCEDURE — 36415 COLL VENOUS BLD VENIPUNCTURE: CPT

## 2023-01-12 ENCOUNTER — TELEPHONE (OUTPATIENT)
Dept: CARDIOLOGY CLINIC | Age: 78
End: 2023-01-12

## 2023-01-12 NOTE — TELEPHONE ENCOUNTER
----- Message from Chelsie Tejeda DO sent at 1/12/2023  1:26 PM EST -----  Hold the next dose of Lasix only.

## 2023-03-13 DIAGNOSIS — I50.22 CHRONIC SYSTOLIC HEART FAILURE (HCC): ICD-10-CM

## 2023-03-15 ENCOUNTER — HOSPITAL ENCOUNTER (OUTPATIENT)
Dept: OTHER | Age: 78
Setting detail: THERAPIES SERIES
Discharge: HOME | End: 2023-03-15

## 2023-03-17 DIAGNOSIS — I50.22 CHRONIC HFREF (HEART FAILURE WITH REDUCED EJECTION FRACTION) (HCC): ICD-10-CM

## 2023-03-17 RX ORDER — METOPROLOL SUCCINATE 200 MG/1
TABLET, EXTENDED RELEASE ORAL
Qty: 90 TABLET | Refills: 1 | Status: SHIPPED | OUTPATIENT
Start: 2023-03-17

## 2023-03-21 ENCOUNTER — HOSPITAL ENCOUNTER (OUTPATIENT)
Dept: OTHER | Age: 78
Setting detail: THERAPIES SERIES
Discharge: HOME | End: 2023-03-21

## 2023-03-24 RX ORDER — EMPAGLIFLOZIN 10 MG/1
TABLET, FILM COATED ORAL
Qty: 30 TABLET | Refills: 11 | Status: SHIPPED | OUTPATIENT
Start: 2023-03-24

## 2023-03-28 ENCOUNTER — HOSPITAL ENCOUNTER (EMERGENCY)
Age: 78
Discharge: HOME | End: 2023-03-28
Payer: MEDICARE

## 2023-03-28 VITALS
WEIGHT: 155 LBS | RESPIRATION RATE: 16 BRPM | TEMPERATURE: 97.9 F | HEART RATE: 68 BPM | DIASTOLIC BLOOD PRESSURE: 68 MMHG | SYSTOLIC BLOOD PRESSURE: 118 MMHG | OXYGEN SATURATION: 98 % | BODY MASS INDEX: 28.35 KG/M2

## 2023-03-28 DIAGNOSIS — R19.7 DIARRHEA, UNSPECIFIED TYPE: Primary | ICD-10-CM

## 2023-03-28 LAB
ALBUMIN SERPL-MCNC: 4.2 G/DL (ref 3.5–5.2)
ALP SERPL-CCNC: 83 U/L (ref 35–104)
ALT SERPL-CCNC: 11 U/L (ref 0–32)
ANION GAP SERPL CALCULATED.3IONS-SCNC: 10 MMOL/L (ref 7–16)
AST SERPL-CCNC: 17 U/L (ref 0–31)
BACTERIA URNS QL MICRO: ABNORMAL /HPF
BASOPHILS # BLD: 0.05 E9/L (ref 0–0.2)
BASOPHILS NFR BLD: 0.7 % (ref 0–2)
BILIRUB SERPL-MCNC: 0.3 MG/DL (ref 0–1.2)
BILIRUB UR QL STRIP: NEGATIVE
BUN SERPL-MCNC: 20 MG/DL (ref 6–23)
CALCIUM SERPL-MCNC: 9.4 MG/DL (ref 8.6–10.2)
CHLORIDE SERPL-SCNC: 104 MMOL/L (ref 98–107)
CLARITY UR: ABNORMAL
CO2 SERPL-SCNC: 24 MMOL/L (ref 22–29)
COLOR UR: YELLOW
CREAT SERPL-MCNC: 1.2 MG/DL (ref 0.5–1)
EOSINOPHIL # BLD: 0.17 E9/L (ref 0.05–0.5)
EOSINOPHIL NFR BLD: 2.3 % (ref 0–6)
EPI CELLS #/AREA URNS HPF: ABNORMAL /HPF
ERYTHROCYTE [DISTWIDTH] IN BLOOD BY AUTOMATED COUNT: 14.1 FL (ref 11.5–15)
GLUCOSE SERPL-MCNC: 100 MG/DL (ref 74–99)
GLUCOSE UR STRIP-MCNC: 500 MG/DL
HCT VFR BLD AUTO: 37.2 % (ref 34–48)
HGB BLD-MCNC: 11.4 G/DL (ref 11.5–15.5)
HGB UR QL STRIP: ABNORMAL
IMM GRANULOCYTES # BLD: 0.08 E9/L
IMM GRANULOCYTES NFR BLD: 1.1 % (ref 0–5)
KETONES UR STRIP-MCNC: NEGATIVE MG/DL
LEUKOCYTE ESTERASE UR QL STRIP: ABNORMAL
LIPASE: 34 U/L (ref 13–60)
LYMPHOCYTES # BLD: 1.73 E9/L (ref 1.5–4)
LYMPHOCYTES NFR BLD: 23.7 % (ref 20–42)
MCH RBC QN AUTO: 28.9 PG (ref 26–35)
MCHC RBC AUTO-ENTMCNC: 30.6 % (ref 32–34.5)
MCV RBC AUTO: 94.2 FL (ref 80–99.9)
MONOCYTES # BLD: 0.82 E9/L (ref 0.1–0.95)
MONOCYTES NFR BLD: 11.2 % (ref 2–12)
NEUTROPHILS # BLD: 4.45 E9/L (ref 1.8–7.3)
NEUTS SEG NFR BLD: 61 % (ref 43–80)
NITRITE UR QL STRIP: NEGATIVE
PH UR STRIP: 5 [PH] (ref 5–9)
PLATELET # BLD AUTO: 256 E9/L (ref 130–450)
PMV BLD AUTO: 10.2 FL (ref 7–12)
POTASSIUM SERPL-SCNC: 5 MMOL/L (ref 3.5–5)
POTASSIUM SERPL-SCNC: 5.4 MMOL/L (ref 3.5–5)
PROT SERPL-MCNC: 6.9 G/DL (ref 6.4–8.3)
PROT UR STRIP-MCNC: NEGATIVE MG/DL
RBC # BLD AUTO: 3.95 E12/L (ref 3.5–5.5)
RBC #/AREA URNS HPF: ABNORMAL /HPF (ref 0–2)
SODIUM SERPL-SCNC: 138 MMOL/L (ref 132–146)
SP GR UR STRIP: 1.02 (ref 1–1.03)
UROBILINOGEN UR STRIP-ACNC: 0.2 E.U./DL
WBC # BLD: 7.3 E9/L (ref 4.5–11.5)
WBC #/AREA URNS HPF: ABNORMAL /HPF (ref 0–5)

## 2023-03-28 PROCEDURE — 2580000003 HC RX 258: Performed by: NURSE PRACTITIONER

## 2023-03-28 PROCEDURE — 81001 URINALYSIS AUTO W/SCOPE: CPT

## 2023-03-28 PROCEDURE — 99284 EMERGENCY DEPT VISIT MOD MDM: CPT

## 2023-03-28 PROCEDURE — 36415 COLL VENOUS BLD VENIPUNCTURE: CPT

## 2023-03-28 PROCEDURE — 80053 COMPREHEN METABOLIC PANEL: CPT

## 2023-03-28 PROCEDURE — 85025 COMPLETE CBC W/AUTO DIFF WBC: CPT

## 2023-03-28 PROCEDURE — 83690 ASSAY OF LIPASE: CPT

## 2023-03-28 PROCEDURE — 87045 FECES CULTURE AEROBIC BACT: CPT

## 2023-03-28 PROCEDURE — 84132 ASSAY OF SERUM POTASSIUM: CPT

## 2023-03-28 RX ORDER — 0.9 % SODIUM CHLORIDE 0.9 %
1000 INTRAVENOUS SOLUTION INTRAVENOUS ONCE
Status: COMPLETED | OUTPATIENT
Start: 2023-03-28 | End: 2023-03-28

## 2023-03-28 RX ADMIN — SODIUM CHLORIDE 1000 ML: 9 INJECTION, SOLUTION INTRAVENOUS at 11:48

## 2023-03-28 ASSESSMENT — PAIN DESCRIPTION - LOCATION: LOCATION: ABDOMEN

## 2023-03-28 ASSESSMENT — PAIN DESCRIPTION - FREQUENCY: FREQUENCY: CONTINUOUS

## 2023-03-28 ASSESSMENT — LIFESTYLE VARIABLES
HOW OFTEN DO YOU HAVE A DRINK CONTAINING ALCOHOL: NEVER
HOW MANY STANDARD DRINKS CONTAINING ALCOHOL DO YOU HAVE ON A TYPICAL DAY: PATIENT DOES NOT DRINK

## 2023-03-28 ASSESSMENT — PAIN DESCRIPTION - DESCRIPTORS: DESCRIPTORS: CRAMPING;DISCOMFORT

## 2023-03-28 ASSESSMENT — PAIN DESCRIPTION - PAIN TYPE: TYPE: ACUTE PAIN

## 2023-03-28 ASSESSMENT — PAIN DESCRIPTION - ONSET: ONSET: ON-GOING

## 2023-03-28 ASSESSMENT — PAIN SCALES - GENERAL: PAINLEVEL_OUTOF10: 2

## 2023-03-28 ASSESSMENT — PAIN - FUNCTIONAL ASSESSMENT: PAIN_FUNCTIONAL_ASSESSMENT: 0-10

## 2023-03-28 NOTE — ED PROVIDER NOTES
Assessment  BP: 118/68  Heart Rate: 68           PHYSICAL EXAM  1 or more Elements     ED Triage Vitals   BP Temp Temp Source Heart Rate Resp SpO2 Height Weight   03/28/23 1031 03/28/23 1031 03/28/23 1031 03/28/23 1031 03/28/23 1031 03/28/23 1031 -- 03/28/23 1045   122/67 97.9 °F (36.6 °C) Oral 50 18 95 %  155 lb (70.3 kg)                Constitutional/General: Alert and oriented x3  Head: Normocephalic and atraumatic  Eyes: PERRL, EOMI, conjunctiva normal, sclera non icteric  ENT:  Oropharynx clear, handling secretions, no trismus, no asymmetry of the posterior oropharynx or uvular edema. MMM. Neck: Supple, full ROM, no stridor, no meningeal signs  Respiratory: Lungs clear to auscultation bilaterally, no wheezes, rales, or rhonchi. Not in respiratory distress  Cardiovascular:  Regular rate. Regular rhythm. No murmurs, no gallops, no rubs. 2+ distal pulses. Equal extremity pulses. Chest: No chest wall tenderness  GI:  Abdomen Soft, Non tender, Non distended. No rebound, guarding, or rigidity. No pulsatile masses. No CVA tenderness. Negative borges's sign. No tenderness at mcburney's point  Musculoskeletal: Moves all extremities x 4. Warm and well perfused, no clubbing, no cyanosis, no edema. Capillary refill <3 seconds  Integument: skin warm and dry. No rashes.    Neurologic: GCS 15, no focal deficits, symmetric strength 5/5 in the upper and lower extremities bilaterally  Psychiatric: Normal Affect            DIAGNOSTIC RESULTS   LABS:    Labs Reviewed   CBC WITH AUTO DIFFERENTIAL - Abnormal; Notable for the following components:       Result Value    Hemoglobin 11.4 (*)     MCHC 30.6 (*)     All other components within normal limits   COMPREHENSIVE METABOLIC PANEL - Abnormal; Notable for the following components:    Potassium 5.4 (*)     Glucose 100 (*)     Creatinine 1.2 (*)     All other components within normal limits   URINALYSIS WITH MICROSCOPIC - Abnormal; Notable for the following components:    Glucose,

## 2023-03-30 LAB — BACTERIA STL CULT: NORMAL

## 2023-04-06 ENCOUNTER — HOSPITAL ENCOUNTER (OUTPATIENT)
Dept: OTHER | Age: 78
Setting detail: THERAPIES SERIES
Discharge: HOME | End: 2023-04-06
Payer: MEDICARE

## 2023-04-06 VITALS
DIASTOLIC BLOOD PRESSURE: 60 MMHG | OXYGEN SATURATION: 97 % | RESPIRATION RATE: 18 BRPM | WEIGHT: 162 LBS | BODY MASS INDEX: 29.63 KG/M2 | SYSTOLIC BLOOD PRESSURE: 121 MMHG | HEART RATE: 66 BPM

## 2023-04-06 LAB
ANION GAP SERPL CALCULATED.3IONS-SCNC: 10 MMOL/L (ref 7–16)
BNP BLD-MCNC: 289 PG/ML (ref 0–450)
BUN SERPL-MCNC: 22 MG/DL (ref 6–23)
CALCIUM SERPL-MCNC: 9.2 MG/DL (ref 8.6–10.2)
CHLORIDE SERPL-SCNC: 107 MMOL/L (ref 98–107)
CO2 SERPL-SCNC: 24 MMOL/L (ref 22–29)
CREAT SERPL-MCNC: 1 MG/DL (ref 0.5–1)
GLUCOSE SERPL-MCNC: 95 MG/DL (ref 74–99)
POTASSIUM SERPL-SCNC: 5.1 MMOL/L (ref 3.5–5)
SODIUM SERPL-SCNC: 141 MMOL/L (ref 132–146)

## 2023-04-06 PROCEDURE — 83880 ASSAY OF NATRIURETIC PEPTIDE: CPT

## 2023-04-06 PROCEDURE — 99214 OFFICE O/P EST MOD 30 MIN: CPT

## 2023-04-06 PROCEDURE — 80048 BASIC METABOLIC PNL TOTAL CA: CPT

## 2023-04-06 PROCEDURE — 36415 COLL VENOUS BLD VENIPUNCTURE: CPT

## 2023-04-06 NOTE — PLAN OF CARE
Problem: Chronic Conditions and Co-morbidities  Goal: Patient's chronic conditions and co-morbidity symptoms are monitored and maintained or improved  Outcome: Progressing     Problem: Discharge Planning  Goal: Discharge to home or other facility with appropriate resources  Outcome: Progressing No

## 2023-04-06 NOTE — PROGRESS NOTES
[address] [facility]   1945            Referring Doctor:EBER Ceelste NP    Primary Care Physician: Dr. Shania Donis  Cardiologist: Dr. Mirella Parson  Nephrologist:         History of Present Illness:     Case Garcia is a 78 y.o. female with a history of HFrEF, most recent EF 25-30%     On 3/2/2021. Patient Story:    She does not  have dyspnea with exertion, shortness of breath, or decline in overall functional capacity. She does not have orthopnea, PND, nocturnal cough or hemoptysis. She does not have abdominal distention or bloating, early satiety, anorexia/change in appetite. She has a good urinary response to  oral diuretic when she takes it, TAKES  Monday, WED. AND Friday. does not  have  lower extremity edema. She denies lightheadedness, dizziness. She denies palpitations, syncope or near syncope. She does not complain of chest pain, pressure, discomfort. Allergies   Allergen Reactions    Sulfa Antibiotics Nausea Only    Aldactone [Spironolactone] Other (See Comments)     High Potassium    Amoxicillin Nausea Only    Clavulanic Acid Nausea Only    Levofloxacin     Omeprazole      Interferes with plavix    Other      Lipitor, Crestor, high doses of Simvastatin    Pneumococcal Vaccines     Amoxicillin-Pot Clavulanate Nausea Only     Makes her stomach hurt, doesn't want to eat. Bee Venom Rash    Doxycycline Rash         Prior to Visit Medications    Medication Sig Taking?  Authorizing Provider   JARDIANCE 10 MG tablet TAKE ONE TABLET BY MOUTH ONCE DAILY  Nimesh Comment, DO   metoprolol succinate (TOPROL XL) 200 MG extended release tablet TAKE ONE TABLET BY MOUTH ONCE DAILY  Nimesh Comment, DO   amLODIPine (NORVASC) 2.5 MG tablet TAKE ONE TABLET BY MOUTH ONCE DAILY  Nimesh Comment, DO   cetirizine (ZYRTEC) 5 MG tablet Take 5 mg by mouth daily  Patient not taking: Reported on 4/6/2023  Historical Provider, MD   furosemide (LASIX) 20 MG tablet Take 1 tablet

## 2023-05-26 ENCOUNTER — OFFICE VISIT (OUTPATIENT)
Dept: CARDIOLOGY CLINIC | Age: 78
End: 2023-05-26

## 2023-05-26 VITALS
HEIGHT: 61 IN | SYSTOLIC BLOOD PRESSURE: 118 MMHG | DIASTOLIC BLOOD PRESSURE: 70 MMHG | BODY MASS INDEX: 31.57 KG/M2 | HEART RATE: 61 BPM | WEIGHT: 167.2 LBS

## 2023-05-26 DIAGNOSIS — I50.9 CONGESTIVE HEART FAILURE, UNSPECIFIED HF CHRONICITY, UNSPECIFIED HEART FAILURE TYPE (HCC): Primary | ICD-10-CM

## 2023-05-26 DIAGNOSIS — I50.22 CHRONIC HFREF (HEART FAILURE WITH REDUCED EJECTION FRACTION) (HCC): ICD-10-CM

## 2023-05-26 NOTE — PROGRESS NOTES
2 times daily (with meals) 180 tablet 1    aspirin EC 81 MG EC tablet Take 1 tablet by mouth daily      CO-ENZYME Q-10 PO Take 200 mg by mouth daily       cetirizine (ZYRTEC) 5 MG tablet Take 5 mg by mouth daily (Patient not taking: Reported on 5/26/2023)      furosemide (LASIX) 40 MG tablet TAKE ONE TABLET BY MOUTH ONCE DAILY (Patient not taking: Reported on 9/13/2022) 90 tablet 1    famotidine (PEPCID) 20 MG tablet Take 1 tablet by mouth daily (Patient not taking: Reported on 9/13/2022) 60 tablet 0    furosemide (LASIX) 20 MG tablet Take 1 tablet by mouth three times a week Take on tablet on Mon, Wed, Fri (Patient not taking: Reported on 5/26/2023)      zinc gluconate 50 MG tablet Take 50 mg by mouth daily (Patient not taking: Reported on 9/13/2022)      azelastine (ASTELIN) 0.1 % nasal spray 2 sprays by Nasal route 2 times daily as directed (Patient not taking: Reported on 5/26/2023)      fluticasone (FLONASE) 50 MCG/ACT nasal spray 2 sprays by Each Nostril route daily (Patient not taking: Reported on 5/26/2023)       No current facility-administered medications for this visit. Allergies   Allergen Reactions    Sulfa Antibiotics Nausea Only    Aldactone [Spironolactone] Other (See Comments)     High Potassium    Amoxicillin Nausea Only    Clavulanic Acid Nausea Only    Levofloxacin     Omeprazole      Interferes with plavix    Other      Lipitor, Crestor, high doses of Simvastatin    Pneumococcal Vaccines     Amoxicillin-Pot Clavulanate Nausea Only     Makes her stomach hurt, doesn't want to eat. Bee Venom Rash    Doxycycline Rash       Chief Complaint:  Jassi Calhuon is here today for follow up and management/recomendations for CAD, HTN, hypercholesterolemia. History of Present Illness: Jassi Calhoun states that She does house work, & she goes shopping. She denies any chest discomfort, dyspnea on exertion. She denies any orthopnea/PND, or lower extremity edema.   She denies any palpitations or

## 2023-07-13 ENCOUNTER — HOSPITAL ENCOUNTER (OUTPATIENT)
Dept: OTHER | Age: 78
Setting detail: THERAPIES SERIES
Discharge: HOME | End: 2023-07-13

## 2023-08-01 ENCOUNTER — HOSPITAL ENCOUNTER (OUTPATIENT)
Dept: OTHER | Age: 78
Setting detail: THERAPIES SERIES
Discharge: HOME | End: 2023-08-01
Payer: MEDICARE

## 2023-08-01 ENCOUNTER — TELEPHONE (OUTPATIENT)
Dept: OTHER | Age: 78
End: 2023-08-01

## 2023-08-01 VITALS
OXYGEN SATURATION: 97 % | BODY MASS INDEX: 32.12 KG/M2 | RESPIRATION RATE: 18 BRPM | SYSTOLIC BLOOD PRESSURE: 102 MMHG | DIASTOLIC BLOOD PRESSURE: 53 MMHG | HEART RATE: 68 BPM | WEIGHT: 170 LBS

## 2023-08-01 DIAGNOSIS — I50.32 CHRONIC HEART FAILURE WITH PRESERVED EJECTION FRACTION (HCC): Primary | ICD-10-CM

## 2023-08-01 LAB
ANION GAP SERPL CALCULATED.3IONS-SCNC: 13 MMOL/L (ref 7–16)
BNP SERPL-MCNC: 936 PG/ML (ref 0–450)
BUN SERPL-MCNC: 17 MG/DL (ref 6–23)
CALCIUM SERPL-MCNC: 8.9 MG/DL (ref 8.6–10.2)
CHLORIDE SERPL-SCNC: 106 MMOL/L (ref 98–107)
CO2 SERPL-SCNC: 19 MMOL/L (ref 22–29)
CREAT SERPL-MCNC: 1 MG/DL (ref 0.5–1)
GFR SERPL CREATININE-BSD FRML MDRD: 58 ML/MIN/1.73M2
GLUCOSE SERPL-MCNC: 105 MG/DL (ref 74–99)
POTASSIUM SERPL-SCNC: 5.3 MMOL/L (ref 3.5–5)
SODIUM SERPL-SCNC: 138 MMOL/L (ref 132–146)

## 2023-08-01 PROCEDURE — 99214 OFFICE O/P EST MOD 30 MIN: CPT

## 2023-08-01 PROCEDURE — 83880 ASSAY OF NATRIURETIC PEPTIDE: CPT

## 2023-08-01 PROCEDURE — 80048 BASIC METABOLIC PNL TOTAL CA: CPT

## 2023-08-01 ASSESSMENT — PATIENT HEALTH QUESTIONNAIRE - PHQ9
1. LITTLE INTEREST OR PLEASURE IN DOING THINGS: NOT AT ALL
2. FEELING DOWN, DEPRESSED OR HOPELESS: SEVERAL DAYS
10. IF YOU CHECKED OFF ANY PROBLEMS, HOW DIFFICULT HAVE THESE PROBLEMS MADE IT FOR YOU TO DO YOUR WORK, TAKE CARE OF THINGS AT HOME, OR GET ALONG WITH OTHER PEOPLE: NOT DIFFICULT AT ALL
SUM OF ALL RESPONSES TO PHQ9 QUESTIONS 1 & 2: 1

## 2023-08-01 NOTE — TELEPHONE ENCOUNTER
Received call from [employer] in CHF clinic  Vitals: BP (!) 102/53   Pulse 68      Will stop norvasc   Monitor potassium in diet  No salt substitutes  Check any supplements with potassium   Follow up labs in 5-7 days

## 2023-08-01 NOTE — PLAN OF CARE
Problem: Chronic Conditions and Co-morbidities  Goal: Patient's chronic conditions and co-morbidity symptoms are monitored and maintained or improved  Flowsheets (Taken 8/1/2023 [id number])  Care Plan - Patient's Chronic Conditions and Co-Morbidity Symptoms are Monitored and Maintained or Improved: Monitor and assess patient's chronic conditions and comorbid symptoms for stability, deterioration, or improvement

## 2023-08-02 ENCOUNTER — TELEPHONE (OUTPATIENT)
Dept: CARDIOLOGY CLINIC | Age: 78
End: 2023-08-02

## 2023-08-02 DIAGNOSIS — I50.22 CHRONIC HFREF (HEART FAILURE WITH REDUCED EJECTION FRACTION) (HCC): Primary | ICD-10-CM

## 2023-08-02 DIAGNOSIS — I10 ESSENTIAL HYPERTENSION: ICD-10-CM

## 2023-08-02 NOTE — TELEPHONE ENCOUNTER
Left message for patient to call the office. Restart Norvasc 2.5 mg daily in addition to Dr. Regina Nunes recommendations.

## 2023-08-02 NOTE — TELEPHONE ENCOUNTER
Patient notified of Dr. Diane Madera recommendations. Patient states she received a call yesterday with instructions from Reena Whittington NP. Telephone encounter from yesterday notes: \"Received call from [employer] in CHF clinic  Vitals: BP (!) 102/53   Pulse 68      Will stop norvasc   Monitor potassium in diet  No salt substitutes  Check any supplements with potassium   Follow up labs in 5-7 days\"    Please advise which directions patient should follow.

## 2023-08-03 RX ORDER — AMLODIPINE BESYLATE 2.5 MG/1
2.5 TABLET ORAL DAILY
COMMUNITY

## 2023-08-03 RX ORDER — HYDRALAZINE HYDROCHLORIDE 25 MG/1
25 TABLET, FILM COATED ORAL 3 TIMES DAILY
Qty: 90 TABLET | Refills: 11 | Status: SHIPPED | OUTPATIENT
Start: 2023-08-03

## 2023-08-03 RX ORDER — HYDRALAZINE HYDROCHLORIDE 25 MG/1
25 TABLET, FILM COATED ORAL 3 TIMES DAILY
COMMUNITY
End: 2023-08-03 | Stop reason: SDUPTHER

## 2023-08-03 NOTE — TELEPHONE ENCOUNTER
Patient notified of Dr. Maral Rose recommendations. Med list amended. Hydralazine e-scribed. BP/P and BMP scheduled for 8/17/23 at 1:20 p.m.

## 2023-08-17 ENCOUNTER — NURSE ONLY (OUTPATIENT)
Dept: CARDIOLOGY CLINIC | Age: 78
End: 2023-08-17

## 2023-08-17 ENCOUNTER — TELEPHONE (OUTPATIENT)
Dept: CARDIOLOGY CLINIC | Age: 78
End: 2023-08-17

## 2023-08-17 DIAGNOSIS — I50.22 CHRONIC HFREF (HEART FAILURE WITH REDUCED EJECTION FRACTION) (HCC): ICD-10-CM

## 2023-08-17 DIAGNOSIS — I10 ESSENTIAL HYPERTENSION: ICD-10-CM

## 2023-08-17 LAB
ANION GAP SERPL CALCULATED.3IONS-SCNC: 11 MMOL/L (ref 7–16)
BUN BLDV-MCNC: 33 MG/DL (ref 6–23)
CALCIUM SERPL-MCNC: 9.4 MG/DL (ref 8.6–10.2)
CHLORIDE BLD-SCNC: 104 MMOL/L (ref 98–107)
CO2: 21 MMOL/L (ref 22–29)
CREAT SERPL-MCNC: 1.2 MG/DL (ref 0.5–1)
GFR SERPL CREATININE-BSD FRML MDRD: 48 ML/MIN/1.73M2
GLUCOSE BLD-MCNC: 122 MG/DL (ref 74–99)
POTASSIUM SERPL-SCNC: 6.1 MMOL/L (ref 3.5–5)
SODIUM BLD-SCNC: 136 MMOL/L (ref 132–146)

## 2023-08-17 NOTE — PROGRESS NOTES
Patient was seen in the office today for a blood pressure check  and BMP per .     Standing BP:124/62  Standing P:82  Sitting BP:118/60  Sitting P: 75

## 2023-08-17 NOTE — TELEPHONE ENCOUNTER
Patient was seen in the office today for a blood pressure check  and BMP per . Standing BP:124/62  Standing P:82  Sitting BP:118/60  Sitting P: 75    Entresto decreased to 49/51 mg BID and Hydralazine 25 mg TID started on 8/2/23.

## 2023-08-18 ENCOUNTER — TELEPHONE (OUTPATIENT)
Dept: CARDIOLOGY CLINIC | Age: 78
End: 2023-08-18

## 2023-08-18 DIAGNOSIS — E87.5 HIGH POTASSIUM: Primary | ICD-10-CM

## 2023-08-18 DIAGNOSIS — I50.22 CHRONIC HFREF (HEART FAILURE WITH REDUCED EJECTION FRACTION) (HCC): ICD-10-CM

## 2023-08-18 NOTE — TELEPHONE ENCOUNTER
----- Message from En Lyles DO sent at 8/18/2023  4:06 PM EDT -----  Specimen is hemolyzed. Repeat a BMP. Lab to call if potassium is elevated.

## 2023-08-18 NOTE — TELEPHONE ENCOUNTER
Patient notified of lab result and Dr. Hailey Mccray recommendation. Order placed for BMP with note to call Dr. Mildred Cowan if Potassium still elevated.

## 2023-08-19 ENCOUNTER — HOSPITAL ENCOUNTER (OUTPATIENT)
Age: 78
Discharge: HOME | End: 2023-08-19
Payer: MEDICARE

## 2023-08-19 DIAGNOSIS — E87.5 HIGH POTASSIUM: ICD-10-CM

## 2023-08-19 DIAGNOSIS — I50.22 CHRONIC HFREF (HEART FAILURE WITH REDUCED EJECTION FRACTION) (HCC): ICD-10-CM

## 2023-08-19 LAB
ANION GAP SERPL CALCULATED.3IONS-SCNC: 12 MMOL/L (ref 7–16)
BUN SERPL-MCNC: 25 MG/DL (ref 6–23)
CALCIUM SERPL-MCNC: 9.4 MG/DL (ref 8.6–10.2)
CHLORIDE SERPL-SCNC: 103 MMOL/L (ref 98–107)
CO2 SERPL-SCNC: 21 MMOL/L (ref 22–29)
CREAT SERPL-MCNC: 1.3 MG/DL (ref 0.5–1)
GFR SERPL CREATININE-BSD FRML MDRD: 43 ML/MIN/1.73M2
GLUCOSE SERPL-MCNC: 81 MG/DL (ref 74–99)
POTASSIUM SERPL-SCNC: 5.9 MMOL/L (ref 3.5–5)
SODIUM SERPL-SCNC: 136 MMOL/L (ref 132–146)

## 2023-08-19 PROCEDURE — 36415 COLL VENOUS BLD VENIPUNCTURE: CPT

## 2023-08-19 PROCEDURE — 80048 BASIC METABOLIC PNL TOTAL CA: CPT

## 2023-08-21 ENCOUNTER — TELEPHONE (OUTPATIENT)
Dept: CARDIOLOGY CLINIC | Age: 78
End: 2023-08-21

## 2023-08-21 DIAGNOSIS — I50.22 CHRONIC HFREF (HEART FAILURE WITH REDUCED EJECTION FRACTION) (HCC): Primary | ICD-10-CM

## 2023-08-21 DIAGNOSIS — E87.5 HIGH POTASSIUM: ICD-10-CM

## 2023-08-21 RX ORDER — HYDRALAZINE HYDROCHLORIDE 50 MG/1
50 TABLET, FILM COATED ORAL 3 TIMES DAILY
COMMUNITY
End: 2023-08-21 | Stop reason: SDUPTHER

## 2023-08-21 RX ORDER — HYDRALAZINE HYDROCHLORIDE 50 MG/1
50 TABLET, FILM COATED ORAL 3 TIMES DAILY
Qty: 90 TABLET | Refills: 11 | Status: SHIPPED | OUTPATIENT
Start: 2023-08-21

## 2023-08-21 NOTE — TELEPHONE ENCOUNTER
----- Message from Dawn Hurt DO sent at 8/21/2023 11:57 AM EDT -----  Decrease the Entresto to 24-26 mg twice daily. Increase the hydralazine to 50 mg 3 times daily. BMP on Thursday. Pressure and heart rate check in 2 weeks.

## 2023-08-21 NOTE — TELEPHONE ENCOUNTER
Patient notified of Dr. Howard [place] recommendations. Med list amended. Entresto and Hydralazine e-scribed. Order placed for BMP. Will get BP/P reading from [address] [place] [address] on 9/1/23.

## 2023-08-22 DIAGNOSIS — Z79.899 MEDICATION DOSE DECREASED: ICD-10-CM

## 2023-08-22 DIAGNOSIS — I50.22 CHRONIC SYSTOLIC HEART FAILURE (HCC): ICD-10-CM

## 2023-08-22 RX ORDER — FUROSEMIDE 20 MG/1
TABLET ORAL
Qty: 45 TABLET | Refills: 3 | Status: SHIPPED | OUTPATIENT
Start: 2023-08-22

## 2023-08-24 ENCOUNTER — HOSPITAL ENCOUNTER (OUTPATIENT)
Age: 78
Discharge: HOME | End: 2023-08-24
Payer: MEDICARE

## 2023-08-24 ENCOUNTER — TELEPHONE (OUTPATIENT)
Dept: CARDIOLOGY CLINIC | Age: 78
End: 2023-08-24

## 2023-08-24 DIAGNOSIS — E87.5 HIGH POTASSIUM: ICD-10-CM

## 2023-08-24 DIAGNOSIS — I50.22 CHRONIC HFREF (HEART FAILURE WITH REDUCED EJECTION FRACTION) (HCC): ICD-10-CM

## 2023-08-24 LAB
ANION GAP SERPL CALCULATED.3IONS-SCNC: 12 MMOL/L (ref 7–16)
BUN SERPL-MCNC: 25 MG/DL (ref 6–23)
CALCIUM SERPL-MCNC: 9.7 MG/DL (ref 8.6–10.2)
CHLORIDE SERPL-SCNC: 105 MMOL/L (ref 98–107)
CO2 SERPL-SCNC: 25 MMOL/L (ref 22–29)
CREAT SERPL-MCNC: 1.2 MG/DL (ref 0.5–1)
GFR SERPL CREATININE-BSD FRML MDRD: 46 ML/MIN/1.73M2
GLUCOSE SERPL-MCNC: 85 MG/DL (ref 74–99)
POTASSIUM SERPL-SCNC: 5.7 MMOL/L (ref 3.5–5)
SODIUM SERPL-SCNC: 142 MMOL/L (ref 132–146)

## 2023-08-24 PROCEDURE — 36415 COLL VENOUS BLD VENIPUNCTURE: CPT

## 2023-08-24 PROCEDURE — 80048 BASIC METABOLIC PNL TOTAL CA: CPT

## 2023-08-24 NOTE — TELEPHONE ENCOUNTER
Called patient to remind her of need for repeat BMP. She will try to go today, if not she will go tomorrow. Patient is also on Vascepa and PCP is wondering if this could also be the cause of the increase in Potassium.

## 2023-08-25 ENCOUNTER — TELEPHONE (OUTPATIENT)
Dept: CARDIOLOGY CLINIC | Age: 78
End: 2023-08-25

## 2023-08-28 NOTE — TELEPHONE ENCOUNTER
Potassium is still high. I do not think that it is from the Cipro. It may be from the [facility]. I would cut the Entresto in half twice a day. Repeat BMP on Friday. Blood pressure and heart rate check in 2 weeks.

## 2023-08-29 RX ORDER — SACUBITRIL AND VALSARTAN 24; 26 MG/1; MG/1
0.5 TABLET, FILM COATED ORAL 2 TIMES DAILY
COMMUNITY

## 2023-09-01 ENCOUNTER — TELEPHONE (OUTPATIENT)
Dept: CARDIOLOGY CLINIC | Age: 78
End: 2023-09-01

## 2023-09-01 ENCOUNTER — HOSPITAL ENCOUNTER (OUTPATIENT)
Dept: OTHER | Age: 78
Setting detail: THERAPIES SERIES
Discharge: HOME | End: 2023-09-01
Payer: MEDICARE

## 2023-09-01 VITALS
SYSTOLIC BLOOD PRESSURE: 108 MMHG | DIASTOLIC BLOOD PRESSURE: 5 MMHG | WEIGHT: 168 LBS | RESPIRATION RATE: 18 BRPM | HEART RATE: 68 BPM | BODY MASS INDEX: 31.74 KG/M2 | OXYGEN SATURATION: 96 %

## 2023-09-01 LAB
ANION GAP SERPL CALCULATED.3IONS-SCNC: 13 MMOL/L (ref 7–16)
BNP SERPL-MCNC: 704 PG/ML (ref 0–450)
BUN SERPL-MCNC: 30 MG/DL (ref 6–23)
CALCIUM SERPL-MCNC: 9.5 MG/DL (ref 8.6–10.2)
CHLORIDE SERPL-SCNC: 102 MMOL/L (ref 98–107)
CO2 SERPL-SCNC: 22 MMOL/L (ref 22–29)
CREAT SERPL-MCNC: 1.2 MG/DL (ref 0.5–1)
GFR SERPL CREATININE-BSD FRML MDRD: 45 ML/MIN/1.73M2
GLUCOSE SERPL-MCNC: 87 MG/DL (ref 74–99)
POTASSIUM SERPL-SCNC: 5.4 MMOL/L (ref 3.5–5)
SODIUM SERPL-SCNC: 137 MMOL/L (ref 132–146)

## 2023-09-01 PROCEDURE — 80048 BASIC METABOLIC PNL TOTAL CA: CPT

## 2023-09-01 PROCEDURE — 99214 OFFICE O/P EST MOD 30 MIN: CPT

## 2023-09-01 PROCEDURE — 83880 ASSAY OF NATRIURETIC PEPTIDE: CPT

## 2023-09-01 NOTE — PLAN OF CARE
Problem: Chronic Conditions and Co-morbidities  Goal: Patient's chronic conditions and co-morbidity symptoms are monitored and maintained or improved  Flowsheets (Taken 9/1/2023 8081)  Care Plan - Patient's Chronic Conditions and Co-Morbidity Symptoms are Monitored and Maintained or Improved: Monitor and assess patient's chronic conditions and comorbid symptoms for stability, deterioration, or improvement

## 2023-09-01 NOTE — PROGRESS NOTES
Pt instructed potassium level was 5.4. Pt instructed to follow low potassium diet, and follow handout given from CHF clinic with food to avoid which are high in potassium. Also instructed pt. Dr. Tiki Man was informed of potassium level and follow and further instructions from him. Pt verbalized understanding.

## 2023-09-01 NOTE — PROGRESS NOTES
Congestive Heart Failure [address]       Referring Provider: Janine Sullivan NP  Primary Care Physician: IGNACIO Larson - CNP   Cardiologist: DR. David Etienne  Nephrologist: N/A      HISTORY OF PRESENT ILLNESS:     Latrell Cash is a 70 y.o. (1945) female with a history of HFimpEF, most recent EF: ON 3/2/21 WAS 25-30%  Lab Results   Component Value Date    LVEF 50 10/07/2022         She presents to the CHF clinic for ongoing evaluation and monitoring of heart failure. In the CHF clinic today she denies any adverse symptoms except:  [] Dizziness or lightheadedness   [] Syncope or near syncope  [] Recent Fall  [] Shortness of breath at rest   [] Dyspnea with exertion  [] Decline in functional capacity (unable to perform activities they had previously been able to do)  [] Fatigue   [] Orthopnea  [] PND  [] Nocturnal cough  [] Hemoptysis  [] Chest pain, pressure, or discomfort  [] Palpitations  [] Abdominal distention  [] Abdominal bloating  [] Early satiety  [] Blood in stool   [] Diarrhea  [] Constipation  [] Nausea/Vomiting  [] Decreased urinary response to oral diuretic   [] Scrotal swelling   [] Lower extremity edema  [] Used PRN doses of oral diuretic   [] Weight gain    Wt Readings from Last 3 Encounters:   09/01/23 168 lb (76.2 kg)   08/01/23 170 lb (77.1 kg)   05/26/23 167 lb 3.2 oz (75.8 kg)           SOCIAL HISTORY:  [x] Denies tobacco, alcohol or illicit drug abuse  [] Tobacco use:  [] ETOH use:  [] Illicit drug use:        MEDICATIONS:    Allergies   Allergen Reactions    Sulfa Antibiotics Nausea Only    Aldactone [Spironolactone] Other (See Comments)     High Potassium    Amoxicillin Nausea Only    Clavulanic Acid Nausea Only    Levofloxacin     Omeprazole      Interferes with plavix    Other      Lipitor, Crestor, high doses of Simvastatin    Pneumococcal Vaccines     Amoxicillin-Pot Clavulanate Nausea Only     Makes her stomach hurt, doesn't want to eat.

## 2023-09-01 NOTE — TELEPHONE ENCOUNTER
Fabienne Aleman, DO Val Schaumann  Please make sure that she is not taking any potassium or anything the potassium is not or any salt substitutes that may have potassium.

## 2023-09-01 NOTE — TELEPHONE ENCOUNTER
DRAKE Rock Cap, DO  Pt seen in [address] [place] [address], did NOT receive IV diuretic. Potassium level 5.4. Please advise pt of any new ordes.

## 2023-09-11 DIAGNOSIS — I50.22 CHRONIC HFREF (HEART FAILURE WITH REDUCED EJECTION FRACTION) (HCC): ICD-10-CM

## 2023-09-11 RX ORDER — METOPROLOL SUCCINATE 200 MG/1
TABLET, EXTENDED RELEASE ORAL
Qty: 90 TABLET | Refills: 3 | Status: SHIPPED | OUTPATIENT
Start: 2023-09-11

## 2023-09-13 ENCOUNTER — HOSPITAL ENCOUNTER (EMERGENCY)
Age: 78
Discharge: HOME | End: 2023-09-13
Payer: MEDICARE

## 2023-09-13 ENCOUNTER — TELEPHONE (OUTPATIENT)
Dept: CARDIOLOGY CLINIC | Age: 78
End: 2023-09-13

## 2023-09-13 ENCOUNTER — NURSE ONLY (OUTPATIENT)
Dept: CARDIOLOGY CLINIC | Age: 78
End: 2023-09-13

## 2023-09-13 VITALS
HEART RATE: 81 BPM | WEIGHT: 165 LBS | TEMPERATURE: 98 F | HEIGHT: 62 IN | SYSTOLIC BLOOD PRESSURE: 116 MMHG | BODY MASS INDEX: 30.36 KG/M2 | RESPIRATION RATE: 14 BRPM | OXYGEN SATURATION: 100 % | DIASTOLIC BLOOD PRESSURE: 67 MMHG

## 2023-09-13 DIAGNOSIS — M54.50 ACUTE EXACERBATION OF CHRONIC LOW BACK PAIN: Primary | ICD-10-CM

## 2023-09-13 DIAGNOSIS — G89.29 ACUTE EXACERBATION OF CHRONIC LOW BACK PAIN: Primary | ICD-10-CM

## 2023-09-13 LAB
BILIRUB UR QL STRIP: NEGATIVE
CLARITY UR: CLEAR
COLOR UR: YELLOW
COMMENT: ABNORMAL
GLUCOSE UR STRIP-MCNC: 500 MG/DL
HGB UR QL STRIP.AUTO: NEGATIVE
KETONES UR STRIP-MCNC: ABNORMAL MG/DL
LEUKOCYTE ESTERASE UR QL STRIP: NEGATIVE
NITRITE UR QL STRIP: NEGATIVE
PH UR STRIP: 5 [PH]
PROT UR STRIP-MCNC: NEGATIVE MG/DL
SP GR UR STRIP: 1.02
UROBILINOGEN UR STRIP-ACNC: 0.2 EU/DL

## 2023-09-13 PROCEDURE — 81003 URINALYSIS AUTO W/O SCOPE: CPT

## 2023-09-13 PROCEDURE — 6370000000 HC RX 637 (ALT 250 FOR IP): Performed by: NURSE PRACTITIONER

## 2023-09-13 PROCEDURE — 96372 THER/PROPH/DIAG INJ SC/IM: CPT

## 2023-09-13 PROCEDURE — 99284 EMERGENCY DEPT VISIT MOD MDM: CPT

## 2023-09-13 PROCEDURE — 6360000002 HC RX W HCPCS: Performed by: NURSE PRACTITIONER

## 2023-09-13 RX ORDER — LIDOCAINE 50 MG/G
1 PATCH TOPICAL DAILY
Qty: 10 PATCH | Refills: 0 | Status: SHIPPED | OUTPATIENT
Start: 2023-09-13 | End: 2023-09-23

## 2023-09-13 RX ORDER — KETOROLAC TROMETHAMINE 30 MG/ML
15 INJECTION, SOLUTION INTRAMUSCULAR; INTRAVENOUS ONCE
Status: DISCONTINUED | OUTPATIENT
Start: 2023-09-13 | End: 2023-09-13

## 2023-09-13 RX ORDER — DEXAMETHASONE SODIUM PHOSPHATE 10 MG/ML
8 INJECTION INTRAMUSCULAR; INTRAVENOUS ONCE
Status: COMPLETED | OUTPATIENT
Start: 2023-09-13 | End: 2023-09-13

## 2023-09-13 RX ORDER — ACETAMINOPHEN 500 MG
500 TABLET ORAL ONCE
Status: COMPLETED | OUTPATIENT
Start: 2023-09-13 | End: 2023-09-13

## 2023-09-13 RX ADMIN — ACETAMINOPHEN 500 MG: 500 TABLET ORAL at 19:41

## 2023-09-13 RX ADMIN — DEXAMETHASONE SODIUM PHOSPHATE 8 MG: 10 INJECTION INTRAMUSCULAR; INTRAVENOUS at 20:57

## 2023-09-13 ASSESSMENT — PAIN DESCRIPTION - DESCRIPTORS: DESCRIPTORS: DISCOMFORT

## 2023-09-13 ASSESSMENT — PAIN SCALES - GENERAL: PAINLEVEL_OUTOF10: 9

## 2023-09-13 ASSESSMENT — PAIN DESCRIPTION - ORIENTATION: ORIENTATION: MID

## 2023-09-13 ASSESSMENT — PAIN DESCRIPTION - LOCATION: LOCATION: BACK

## 2023-09-13 ASSESSMENT — PAIN - FUNCTIONAL ASSESSMENT: PAIN_FUNCTIONAL_ASSESSMENT: ACTIVITIES ARE NOT PREVENTED

## 2023-09-13 NOTE — TELEPHONE ENCOUNTER
Patient was seen in the office today for a blood pressure check per Dr. Jori Cevallos     Standing BP:114/60  Standing P:95  Sitting BP:110/60  Sitting P: 80    Entresto decreased to 24-26 mg one-half tablet BID on 8/29/23.

## 2023-09-13 NOTE — ED NOTES
Department of Emergency Medicine  FIRST PROVIDER TRIAGE NOTE             Independent MLP           9/13/23  2:47 PM EDT    Date of Encounter: 9/13/23   MRN: [id number]      HPI: Aury Turner is a 70 y.o. female who presents to the ED for Back Pain (Cant get into pain management until October )   Patient presents with chronic back pain. Patient has history of scoliosis. Patient denies saddle paraesthesia, loss of bowel or bladder control. ROS: Negative for cp or sob. PE: Gen Appearance/Constitutional: alert     Initial Plan of Care: All treatment areas with department are currently occupied. Plan to order/Initiate the following while awaiting opening in ED.   Initiate Treatment-Testing, Proceed toTreatment Area When Bed Available for ED Attending/MLP to Continue Care    Electronically signed by IGNACIO Zapien CNP   DD: 9/13/23      IGNACIO Zapien CNP  09/13/23 [id number]

## 2023-09-13 NOTE — PROGRESS NOTES
Patient was seen in the office today for a blood pressure check per Dr. Roberto Veronica    Standing BP:114/60  Standing P:95  Sitting BP:110/60  Sitting P: 80

## 2023-10-10 RX ORDER — AMLODIPINE BESYLATE 2.5 MG/1
2.5 TABLET ORAL DAILY
Qty: 90 TABLET | Refills: 3 | Status: SHIPPED | OUTPATIENT
Start: 2023-10-10

## 2023-10-27 ENCOUNTER — TELEPHONE (OUTPATIENT)
Dept: CARDIOLOGY CLINIC | Age: 78
End: 2023-10-27

## 2023-10-27 NOTE — TELEPHONE ENCOUNTER
Patient called stating pain management physician would like her to take Gabapentin for (3) weeks. She wants to make sure this is okay.

## 2023-12-15 ENCOUNTER — HOSPITAL ENCOUNTER (INPATIENT)
Age: 78
LOS: 4 days | Discharge: NURSING, REHAB OR LONG-TERM CARE | DRG: 378 | End: 2023-12-19
Attending: EMERGENCY MEDICINE | Admitting: INTERNAL MEDICINE
Payer: MEDICARE

## 2023-12-15 DIAGNOSIS — K92.2 GASTROINTESTINAL HEMORRHAGE, UNSPECIFIED GASTROINTESTINAL HEMORRHAGE TYPE: ICD-10-CM

## 2023-12-15 DIAGNOSIS — K62.5 RECTAL BLEEDING: ICD-10-CM

## 2023-12-15 DIAGNOSIS — D64.9 ANEMIA, UNSPECIFIED TYPE: Primary | ICD-10-CM

## 2023-12-15 LAB
ALBUMIN SERPL-MCNC: 3.7 G/DL (ref 3.5–5.2)
ALP SERPL-CCNC: 56 U/L (ref 35–104)
ALT SERPL-CCNC: 16 U/L (ref 0–32)
ANION GAP SERPL CALCULATED.3IONS-SCNC: 10 MMOL/L (ref 7–16)
AST SERPL-CCNC: 17 U/L (ref 0–31)
BACTERIA URNS QL MICRO: ABNORMAL
BASOPHILS # BLD: 0 K/UL (ref 0–0.2)
BASOPHILS NFR BLD: 0 % (ref 0–2)
BILIRUB SERPL-MCNC: <0.2 MG/DL (ref 0–1.2)
BILIRUB UR QL STRIP: NEGATIVE
BUN SERPL-MCNC: 13 MG/DL (ref 6–23)
CALCIUM SERPL-MCNC: 8.5 MG/DL (ref 8.6–10.2)
CHLORIDE SERPL-SCNC: 107 MMOL/L (ref 98–107)
CLARITY UR: CLEAR
CO2 SERPL-SCNC: 24 MMOL/L (ref 22–29)
COLOR UR: YELLOW
CREAT SERPL-MCNC: 1 MG/DL (ref 0.5–1)
EKG ATRIAL RATE: 78 BPM
EKG P AXIS: 4 DEGREES
EKG P-R INTERVAL: 80 MS
EKG Q-T INTERVAL: 404 MS
EKG QRS DURATION: 70 MS
EKG QTC CALCULATION (BAZETT): 460 MS
EKG R AXIS: 13 DEGREES
EKG T AXIS: 60 DEGREES
EKG VENTRICULAR RATE: 78 BPM
EOSINOPHIL # BLD: 0.17 K/UL (ref 0.05–0.5)
EOSINOPHILS RELATIVE PERCENT: 2 % (ref 0–6)
ERYTHROCYTE [DISTWIDTH] IN BLOOD BY AUTOMATED COUNT: 16.7 % (ref 11.5–15)
GFR SERPL CREATININE-BSD FRML MDRD: 58 ML/MIN/1.73M2
GLUCOSE SERPL-MCNC: 103 MG/DL (ref 74–99)
GLUCOSE UR STRIP-MCNC: >=1000 MG/DL
HCT VFR BLD AUTO: 23 % (ref 34–48)
HCT VFR BLD AUTO: 26.7 % (ref 34–48)
HGB BLD-MCNC: 6.4 G/DL (ref 11.5–15.5)
HGB BLD-MCNC: 7.4 G/DL (ref 11.5–15.5)
HGB UR QL STRIP.AUTO: NEGATIVE
IMM GRANULOCYTES # BLD AUTO: 0.38 K/UL (ref 0–0.58)
IMM GRANULOCYTES NFR BLD: 3 % (ref 0–5)
INFLUENZA A BY PCR: NOT DETECTED
INFLUENZA B BY PCR: NOT DETECTED
KETONES UR STRIP-MCNC: NEGATIVE MG/DL
LEUKOCYTE ESTERASE UR QL STRIP: ABNORMAL
LYMPHOCYTES NFR BLD: 1.37 K/UL (ref 1.5–4)
LYMPHOCYTES RELATIVE PERCENT: 12 % (ref 20–42)
MCH RBC QN AUTO: 22.4 PG (ref 26–35)
MCHC RBC AUTO-ENTMCNC: 27.8 G/DL (ref 32–34.5)
MCV RBC AUTO: 80.4 FL (ref 80–99.9)
MONOCYTES NFR BLD: 1.63 K/UL (ref 0.1–0.95)
MONOCYTES NFR BLD: 14 % (ref 2–12)
NEUTROPHILS NFR BLD: 70 % (ref 43–80)
NEUTS SEG NFR BLD: 8.12 K/UL (ref 1.8–7.3)
NITRITE UR QL STRIP: POSITIVE
PH UR STRIP: 7 [PH] (ref 5–9)
PLATELET # BLD AUTO: 349 K/UL (ref 130–450)
PMV BLD AUTO: 9.6 FL (ref 7–12)
POTASSIUM SERPL-SCNC: 4.8 MMOL/L (ref 3.5–5)
PROT SERPL-MCNC: 5.8 G/DL (ref 6.4–8.3)
PROT UR STRIP-MCNC: NEGATIVE MG/DL
RBC # BLD AUTO: 2.86 M/UL (ref 3.5–5.5)
RBC # BLD: ABNORMAL 10*6/UL
RBC #/AREA URNS HPF: ABNORMAL /HPF
SARS-COV-2 RDRP RESP QL NAA+PROBE: NOT DETECTED
SODIUM SERPL-SCNC: 141 MMOL/L (ref 132–146)
SP GR UR STRIP: 1.01 (ref 1–1.03)
SPECIMEN DESCRIPTION: NORMAL
TROPONIN I SERPL HS-MCNC: 14 NG/L (ref 0–9)
UROBILINOGEN UR STRIP-ACNC: 0.2 EU/DL (ref 0–1)
WBC #/AREA URNS HPF: ABNORMAL /HPF
WBC OTHER # BLD: 11.7 K/UL (ref 4.5–11.5)

## 2023-12-15 PROCEDURE — 85014 HEMATOCRIT: CPT

## 2023-12-15 PROCEDURE — 85018 HEMOGLOBIN: CPT

## 2023-12-15 PROCEDURE — 87635 SARS-COV-2 COVID-19 AMP PRB: CPT

## 2023-12-15 PROCEDURE — 85025 COMPLETE CBC W/AUTO DIFF WBC: CPT

## 2023-12-15 PROCEDURE — 96374 THER/PROPH/DIAG INJ IV PUSH: CPT

## 2023-12-15 PROCEDURE — 84484 ASSAY OF TROPONIN QUANT: CPT

## 2023-12-15 PROCEDURE — 6360000002 HC RX W HCPCS: Performed by: EMERGENCY MEDICINE

## 2023-12-15 PROCEDURE — C9113 INJ PANTOPRAZOLE SODIUM, VIA: HCPCS | Performed by: EMERGENCY MEDICINE

## 2023-12-15 PROCEDURE — 99285 EMERGENCY DEPT VISIT HI MDM: CPT

## 2023-12-15 PROCEDURE — 93005 ELECTROCARDIOGRAM TRACING: CPT | Performed by: EMERGENCY MEDICINE

## 2023-12-15 PROCEDURE — 87502 INFLUENZA DNA AMP PROBE: CPT

## 2023-12-15 PROCEDURE — 80053 COMPREHEN METABOLIC PANEL: CPT

## 2023-12-15 PROCEDURE — 86900 BLOOD TYPING SEROLOGIC ABO: CPT

## 2023-12-15 PROCEDURE — P9016 RBC LEUKOCYTES REDUCED: HCPCS

## 2023-12-15 PROCEDURE — 30233N1 TRANSFUSION OF NONAUTOLOGOUS RED BLOOD CELLS INTO PERIPHERAL VEIN, PERCUTANEOUS APPROACH: ICD-10-PCS | Performed by: SURGERY

## 2023-12-15 PROCEDURE — 81001 URINALYSIS AUTO W/SCOPE: CPT

## 2023-12-15 PROCEDURE — 86850 RBC ANTIBODY SCREEN: CPT

## 2023-12-15 PROCEDURE — 86901 BLOOD TYPING SEROLOGIC RH(D): CPT

## 2023-12-15 PROCEDURE — 2060000000 HC ICU INTERMEDIATE R&B

## 2023-12-15 PROCEDURE — 86923 COMPATIBILITY TEST ELECTRIC: CPT

## 2023-12-15 PROCEDURE — 93010 ELECTROCARDIOGRAM REPORT: CPT | Performed by: INTERNAL MEDICINE

## 2023-12-15 PROCEDURE — 2580000003 HC RX 258: Performed by: FAMILY MEDICINE

## 2023-12-15 RX ORDER — AMLODIPINE BESYLATE 5 MG/1
2.5 TABLET ORAL DAILY
Status: CANCELLED | OUTPATIENT
Start: 2023-12-15

## 2023-12-15 RX ORDER — PANTOPRAZOLE SODIUM 40 MG/10ML
80 INJECTION, POWDER, LYOPHILIZED, FOR SOLUTION INTRAVENOUS ONCE
Status: COMPLETED | OUTPATIENT
Start: 2023-12-15 | End: 2023-12-15

## 2023-12-15 RX ORDER — SODIUM CHLORIDE 9 MG/ML
INJECTION, SOLUTION INTRAVENOUS CONTINUOUS
Status: DISCONTINUED | OUTPATIENT
Start: 2023-12-15 | End: 2023-12-17

## 2023-12-15 RX ORDER — SODIUM CHLORIDE 0.9 % (FLUSH) 0.9 %
5-40 SYRINGE (ML) INJECTION PRN
Status: CANCELLED | OUTPATIENT
Start: 2023-12-15

## 2023-12-15 RX ORDER — SODIUM CHLORIDE 0.9 % (FLUSH) 0.9 %
5-40 SYRINGE (ML) INJECTION EVERY 12 HOURS SCHEDULED
Status: CANCELLED | OUTPATIENT
Start: 2023-12-15

## 2023-12-15 RX ORDER — SODIUM CHLORIDE 0.9 % (FLUSH) 0.9 %
5-40 SYRINGE (ML) INJECTION EVERY 12 HOURS SCHEDULED
Status: DISCONTINUED | OUTPATIENT
Start: 2023-12-15 | End: 2023-12-19 | Stop reason: HOSPADM

## 2023-12-15 RX ORDER — ESCITALOPRAM OXALATE 10 MG/1
10 TABLET ORAL DAILY
Status: CANCELLED | OUTPATIENT
Start: 2023-12-15

## 2023-12-15 RX ORDER — METOPROLOL SUCCINATE 100 MG/1
200 TABLET, EXTENDED RELEASE ORAL DAILY
Status: CANCELLED | OUTPATIENT
Start: 2023-12-15

## 2023-12-15 RX ORDER — ESCITALOPRAM OXALATE 10 MG/1
10 TABLET ORAL EVERY MORNING
COMMUNITY

## 2023-12-15 RX ORDER — ONDANSETRON 4 MG/1
4 TABLET, ORALLY DISINTEGRATING ORAL EVERY 8 HOURS PRN
Status: DISCONTINUED | OUTPATIENT
Start: 2023-12-15 | End: 2023-12-19 | Stop reason: HOSPADM

## 2023-12-15 RX ORDER — UBIDECARENONE 200 MG
200 CAPSULE ORAL EVERY MORNING
COMMUNITY

## 2023-12-15 RX ORDER — SODIUM CHLORIDE 9 MG/ML
INJECTION, SOLUTION INTRAVENOUS PRN
Status: CANCELLED | OUTPATIENT
Start: 2023-12-15

## 2023-12-15 RX ORDER — CETIRIZINE HYDROCHLORIDE 10 MG/1
10 TABLET ORAL DAILY
Status: CANCELLED | OUTPATIENT
Start: 2023-12-15

## 2023-12-15 RX ORDER — SODIUM CHLORIDE 9 MG/ML
INJECTION, SOLUTION INTRAVENOUS PRN
Status: DISCONTINUED | OUTPATIENT
Start: 2023-12-15 | End: 2023-12-19 | Stop reason: HOSPADM

## 2023-12-15 RX ORDER — SODIUM CHLORIDE 0.9 % (FLUSH) 0.9 %
5-40 SYRINGE (ML) INJECTION PRN
Status: DISCONTINUED | OUTPATIENT
Start: 2023-12-15 | End: 2023-12-19 | Stop reason: HOSPADM

## 2023-12-15 RX ORDER — ACETAMINOPHEN 650 MG/1
650 SUPPOSITORY RECTAL EVERY 6 HOURS PRN
Status: DISCONTINUED | OUTPATIENT
Start: 2023-12-15 | End: 2023-12-19 | Stop reason: HOSPADM

## 2023-12-15 RX ORDER — ONDANSETRON 2 MG/ML
4 INJECTION INTRAMUSCULAR; INTRAVENOUS EVERY 6 HOURS PRN
Status: DISCONTINUED | OUTPATIENT
Start: 2023-12-15 | End: 2023-12-19 | Stop reason: HOSPADM

## 2023-12-15 RX ORDER — SODIUM CHLORIDE 9 MG/ML
INJECTION, SOLUTION INTRAVENOUS CONTINUOUS
Status: CANCELLED | OUTPATIENT
Start: 2023-12-15

## 2023-12-15 RX ORDER — ACETAMINOPHEN 325 MG/1
650 TABLET ORAL EVERY 6 HOURS PRN
Status: DISCONTINUED | OUTPATIENT
Start: 2023-12-15 | End: 2023-12-19 | Stop reason: HOSPADM

## 2023-12-15 RX ORDER — AMLODIPINE BESYLATE 2.5 MG/1
2.5 TABLET ORAL NIGHTLY
COMMUNITY

## 2023-12-15 RX ORDER — ONDANSETRON 2 MG/ML
4 INJECTION INTRAMUSCULAR; INTRAVENOUS EVERY 6 HOURS PRN
Status: CANCELLED | OUTPATIENT
Start: 2023-12-15

## 2023-12-15 RX ORDER — METOPROLOL SUCCINATE 200 MG/1
200 TABLET, EXTENDED RELEASE ORAL EVERY MORNING
COMMUNITY

## 2023-12-15 RX ORDER — ACETAMINOPHEN 650 MG/1
650 SUPPOSITORY RECTAL EVERY 6 HOURS PRN
Status: CANCELLED | OUTPATIENT
Start: 2023-12-15

## 2023-12-15 RX ORDER — LANOLIN ALCOHOL/MO/W.PET/CERES
400 CREAM (GRAM) TOPICAL EVERY MORNING
COMMUNITY

## 2023-12-15 RX ORDER — HYDRALAZINE HYDROCHLORIDE 50 MG/1
50 TABLET, FILM COATED ORAL 2 TIMES DAILY
COMMUNITY

## 2023-12-15 RX ORDER — ATORVASTATIN CALCIUM 40 MG/1
40 TABLET, FILM COATED ORAL DAILY
Status: CANCELLED | OUTPATIENT
Start: 2023-12-15

## 2023-12-15 RX ORDER — METFORMIN HYDROCHLORIDE 500 MG/1
500 TABLET, EXTENDED RELEASE ORAL 2 TIMES DAILY WITH MEALS
Status: CANCELLED | OUTPATIENT
Start: 2023-12-15

## 2023-12-15 RX ORDER — ACETAMINOPHEN 325 MG/1
650 TABLET ORAL EVERY 6 HOURS PRN
Status: CANCELLED | OUTPATIENT
Start: 2023-12-15

## 2023-12-15 RX ORDER — ALPRAZOLAM 1 MG/1
2 TABLET ORAL 2 TIMES DAILY PRN
Status: CANCELLED | OUTPATIENT
Start: 2023-12-15

## 2023-12-15 RX ORDER — HYDRALAZINE HYDROCHLORIDE 25 MG/1
50 TABLET, FILM COATED ORAL 3 TIMES DAILY
Status: CANCELLED | OUTPATIENT
Start: 2023-12-15

## 2023-12-15 RX ORDER — ATORVASTATIN CALCIUM 40 MG/1
40 TABLET, FILM COATED ORAL NIGHTLY
COMMUNITY

## 2023-12-15 RX ORDER — FUROSEMIDE 20 MG/1
20 TABLET ORAL
COMMUNITY

## 2023-12-15 RX ORDER — FUROSEMIDE 40 MG/1
20 TABLET ORAL DAILY
Status: CANCELLED | OUTPATIENT
Start: 2023-12-15

## 2023-12-15 RX ORDER — ONDANSETRON 4 MG/1
4 TABLET, ORALLY DISINTEGRATING ORAL EVERY 8 HOURS PRN
Status: CANCELLED | OUTPATIENT
Start: 2023-12-15

## 2023-12-15 RX ORDER — PANTOPRAZOLE SODIUM 40 MG/1
40 TABLET, DELAYED RELEASE ORAL DAILY
Status: CANCELLED | OUTPATIENT
Start: 2023-12-15

## 2023-12-15 RX ADMIN — PANTOPRAZOLE SODIUM 80 MG: 40 INJECTION, POWDER, FOR SOLUTION INTRAVENOUS at 13:35

## 2023-12-15 RX ADMIN — SODIUM CHLORIDE: 9 INJECTION, SOLUTION INTRAVENOUS at 20:34

## 2023-12-15 RX ADMIN — SODIUM CHLORIDE, PRESERVATIVE FREE 10 ML: 5 INJECTION INTRAVENOUS at 20:33

## 2023-12-15 NOTE — PROGRESS NOTES
Database initiated. Patient is A&O from home alone. States she uses no assistive devices and is RA at baseline. She is very weak and having a hard time walking.

## 2023-12-15 NOTE — PROGRESS NOTES
4 Eyes Skin Assessment     NAME:  Vangie Gill  YOB: 1945  MEDICAL RECORD NUMBER:  [id number]    The patient is being assessed for  Admission    I agree that at least one RN has performed a thorough Head to Toe Skin Assessment on the patient. ALL assessment sites listed below have been assessed. Areas assessed by both nurses:    Head, Face, Ears, Shoulders, Back, Chest, Arms, Elbows, Hands, Sacrum. Buttock, Coccyx, Ischium, Legs. Feet and Heels, and Under Medical Devices         Does the Patient have a Wound?  No noted wound(s)       South Prevention initiated by RN: No  Wound Care Orders initiated by RN: No    Pressure Injury (Stage 3,4, Unstageable, DTI, NWPT, and Complex wounds) if present, place Wound referral order by RN under : No    New Ostomies, if present place, Ostomy referral order under : No     Nurse 1 eSignature: Electronically signed by Carlton Cloud RN on 12/15/23 at 5:53 PM EST    **SHARE this note so that the co-signing nurse can place an eSignature**    Nurse 2 eSignature: Electronically signed by Elida Resendez RN on 12/15/23 at 5:54 PM EST

## 2023-12-15 NOTE — ED PROVIDER NOTES
HPI:  12/15/23, Time: 1:34 PM ROBERTO Junior Presmelissa is a 70 y.o. female presenting to the ED for generalized weakness and fatigue beginning a few weeks ago. Patient states that she had a positive COVID-19 test a few weeks ago. She denies chest pain, shortness of breath, syncope, abdominal pain, nausea, vomiting, or diarrhea. She states that she does have some bright red blood in her stool which occasionally can be severe at times. She has not had heavy bleeding for a few days now. She states she attributed her rectal bleeding to hemorrhoids. She is on Eliquis for her heart. No prior history of GI bleed. No recent EGD or colonoscopy.    --------------------------------------------- PAST HISTORY ---------------------------------------------  Past Medical History:  has a past medical history of Abnormal echocardiogram, Acute combined systolic and diastolic congestive heart failure ([address]), Acute renal insufficiency, Anxiety, CAD (coronary artery disease), Carotid artery narrowing, Depression, Diabetes mellitus ([address]), Drug intolerance, GERD (gastroesophageal reflux disease), Heart attack ([address]), Hyperlipidemia, Hypertension, IBS (irritable bowel syndrome), Obesity, Primary osteoarthritis involving multiple joints, Sleep apnea, SOB (shortness of breath) on exertion, and UTI (urinary tract infection). Past Surgical History:  has a past surgical history that includes Tonsillectomy; Percutaneous Transluminal Coronary Angio (2007); Dilation and curettage of uterus; cyst removal; Diagnostic Cardiac Cath Lab Procedure (2007); Coronary angioplasty (2007);  section; and Cardiac catheterization (2021). Social History:  reports that she quit smoking about 13 years ago. She has a 35.00 pack-year smoking history. She has never used smokeless tobacco. She reports that she does not drink alcohol and does not use drugs.     Family History: family history includes Down Syndrome in her son;

## 2023-12-16 ENCOUNTER — ANESTHESIA EVENT (OUTPATIENT)
Dept: OPERATING ROOM | Age: 78
End: 2023-12-16
Payer: MEDICARE

## 2023-12-16 LAB
ALBUMIN SERPL-MCNC: 3.7 G/DL (ref 3.5–5.2)
ALP SERPL-CCNC: 64 U/L (ref 35–104)
ALT SERPL-CCNC: 10 U/L (ref 0–32)
ANION GAP SERPL CALCULATED.3IONS-SCNC: 14 MMOL/L (ref 7–16)
AST SERPL-CCNC: 17 U/L (ref 0–31)
BILIRUB SERPL-MCNC: 0.3 MG/DL (ref 0–1.2)
BUN SERPL-MCNC: 10 MG/DL (ref 6–23)
CALCIUM SERPL-MCNC: 8.6 MG/DL (ref 8.6–10.2)
CHLORIDE SERPL-SCNC: 103 MMOL/L (ref 98–107)
CO2 SERPL-SCNC: 20 MMOL/L (ref 22–29)
CREAT SERPL-MCNC: 1 MG/DL (ref 0.5–1)
GFR SERPL CREATININE-BSD FRML MDRD: 59 ML/MIN/1.73M2
GLUCOSE SERPL-MCNC: 92 MG/DL (ref 74–99)
HCT VFR BLD AUTO: 25.7 % (ref 34–48)
HCT VFR BLD AUTO: 25.9 % (ref 34–48)
HCT VFR BLD AUTO: 28 % (ref 34–48)
HGB BLD-MCNC: 7.4 G/DL (ref 11.5–15.5)
HGB BLD-MCNC: 7.4 G/DL (ref 11.5–15.5)
HGB BLD-MCNC: 7.9 G/DL (ref 11.5–15.5)
INR PPP: 1.1
IRON SATN MFR SERPL: 11 % (ref 15–50)
IRON SERPL-MCNC: 47 UG/DL (ref 37–145)
PARTIAL THROMBOPLASTIN TIME: 21.9 SEC (ref 24.5–35.1)
POTASSIUM SERPL-SCNC: 5 MMOL/L (ref 3.5–5)
PROT SERPL-MCNC: 6.2 G/DL (ref 6.4–8.3)
PROTHROMBIN TIME: 12.7 SEC (ref 9.3–12.4)
SODIUM SERPL-SCNC: 137 MMOL/L (ref 132–146)
TIBC SERPL-MCNC: 411 UG/DL (ref 250–450)

## 2023-12-16 PROCEDURE — 6360000002 HC RX W HCPCS: Performed by: FAMILY MEDICINE

## 2023-12-16 PROCEDURE — 80053 COMPREHEN METABOLIC PANEL: CPT

## 2023-12-16 PROCEDURE — 83540 ASSAY OF IRON: CPT

## 2023-12-16 PROCEDURE — 85610 PROTHROMBIN TIME: CPT

## 2023-12-16 PROCEDURE — 6370000000 HC RX 637 (ALT 250 FOR IP): Performed by: SURGERY

## 2023-12-16 PROCEDURE — A4216 STERILE WATER/SALINE, 10 ML: HCPCS | Performed by: FAMILY MEDICINE

## 2023-12-16 PROCEDURE — 2580000003 HC RX 258: Performed by: FAMILY MEDICINE

## 2023-12-16 PROCEDURE — 85730 THROMBOPLASTIN TIME PARTIAL: CPT

## 2023-12-16 PROCEDURE — C9113 INJ PANTOPRAZOLE SODIUM, VIA: HCPCS | Performed by: FAMILY MEDICINE

## 2023-12-16 PROCEDURE — 2060000000 HC ICU INTERMEDIATE R&B

## 2023-12-16 PROCEDURE — 85014 HEMATOCRIT: CPT

## 2023-12-16 PROCEDURE — 2580000003 HC RX 258: Performed by: SURGERY

## 2023-12-16 PROCEDURE — 83550 IRON BINDING TEST: CPT

## 2023-12-16 PROCEDURE — 85018 HEMOGLOBIN: CPT

## 2023-12-16 RX ORDER — ALPRAZOLAM 1 MG/1
2 TABLET ORAL 2 TIMES DAILY
Status: DISCONTINUED | OUTPATIENT
Start: 2023-12-16 | End: 2023-12-19 | Stop reason: HOSPADM

## 2023-12-16 RX ORDER — METHYLPREDNISOLONE SODIUM SUCCINATE 125 MG/2ML
125 INJECTION, POWDER, LYOPHILIZED, FOR SOLUTION INTRAMUSCULAR; INTRAVENOUS ONCE
Status: COMPLETED | OUTPATIENT
Start: 2023-12-16 | End: 2023-12-16

## 2023-12-16 RX ORDER — POLYETHYLENE GLYCOL 3350 17 G/17G
119 POWDER, FOR SOLUTION ORAL EVERY 4 HOURS
Status: COMPLETED | OUTPATIENT
Start: 2023-12-16 | End: 2023-12-16

## 2023-12-16 RX ORDER — HYDROXYZINE HYDROCHLORIDE 50 MG/ML
25 INJECTION, SOLUTION INTRAMUSCULAR ONCE
Status: COMPLETED | OUTPATIENT
Start: 2023-12-16 | End: 2023-12-16

## 2023-12-16 RX ORDER — BISACODYL 5 MG/1
10 TABLET, DELAYED RELEASE ORAL EVERY 4 HOURS
Status: COMPLETED | OUTPATIENT
Start: 2023-12-16 | End: 2023-12-16

## 2023-12-16 RX ADMIN — HYDROXYZINE HYDROCHLORIDE 25 MG: 50 INJECTION, SOLUTION INTRAMUSCULAR at 16:28

## 2023-12-16 RX ADMIN — ALPRAZOLAM 2 MG: 1 TABLET ORAL at 08:05

## 2023-12-16 RX ADMIN — BISACODYL 10 MG: 5 TABLET, COATED ORAL at 13:13

## 2023-12-16 RX ADMIN — SODIUM CHLORIDE: 9 INJECTION, SOLUTION INTRAVENOUS at 08:07

## 2023-12-16 RX ADMIN — SODIUM CHLORIDE, PRESERVATIVE FREE 10 ML: 5 INJECTION INTRAVENOUS at 17:18

## 2023-12-16 RX ADMIN — POLYETHYLENE GLYCOL 3350 119 G: 17 POWDER, FOR SOLUTION ORAL at 13:26

## 2023-12-16 RX ADMIN — SODIUM CHLORIDE, PRESERVATIVE FREE 10 ML: 5 INJECTION INTRAVENOUS at 21:17

## 2023-12-16 RX ADMIN — BISACODYL 10 MG: 5 TABLET, COATED ORAL at 16:29

## 2023-12-16 RX ADMIN — METHYLPREDNISOLONE SODIUM SUCCINATE 125 MG: 125 INJECTION INTRAMUSCULAR; INTRAVENOUS at 16:28

## 2023-12-16 RX ADMIN — PANTOPRAZOLE SODIUM 40 MG: 40 INJECTION, POWDER, FOR SOLUTION INTRAVENOUS at 08:05

## 2023-12-16 RX ADMIN — POLYETHYLENE GLYCOL 3350 119 G: 17 POWDER, FOR SOLUTION ORAL at 16:29

## 2023-12-16 RX ADMIN — ALPRAZOLAM 2 MG: 1 TABLET ORAL at 21:17

## 2023-12-16 NOTE — CONSULTS
Surgery Consult    Reason for consult: Dyspnea/weakness    HPI  70 y.o. female who came to the hospital because of dyspnea, weakness and some tongue swelling. She denies any abdominal pain. She has blood in her stool which she attributes to hemorrhoids. No previous EGD that she can remember. Her colonoscopy was \"many years ago. \"      Past Medical History:   Diagnosis Date    Abnormal echocardiogram     Acute combined systolic and diastolic congestive heart failure ([address]) 03/09/2021    TTE 3/2/21 25-30% EF    Acute renal insufficiency 02/28/2021    Anxiety     CAD (coronary artery disease)     normal adenisone stress test    Carotid artery narrowing     <40% bilaterally    Depression     Diabetes mellitus ([address])     type II    Drug intolerance     Lipitor, Crestor, high doses of Simvastatin    GERD (gastroesophageal reflux disease)     Heart attack ([address])     Hyperlipidemia     Hypertension     IBS (irritable bowel syndrome)     Obesity     Primary osteoarthritis involving multiple joints 01/18/2017    Sleep apnea 03/10/2021    SOB (shortness of breath) on exertion     UTI (urinary tract infection)        Past Surgical History:   Procedure Laterality Date    CARDIAC CATHETERIZATION  03/19/2021    Dr Christina Mcgowan      x 1    CORONARY ANGIOPLASTY  11/25/2007    CYST REMOVAL      ganglionic cyst on finger    DIAGNOSTIC CARDIAC CATH LAB PROCEDURE  11/25/2007    DILATION AND CURETTAGE OF UTERUS      X 2    PTCA  11/25/2007    TONSILLECTOMY         Medications Prior to Admission:    Prior to Admission medications    Medication Sig Start Date End Date Taking?  Authorizing Provider   amLODIPine (NORVASC) 2.5 MG tablet Take 1 tablet by mouth nightly   Yes Sophia Barkley MD   apixaban (ELIQUIS) 5 MG TABS tablet Take 1 tablet by mouth 2 times daily   Yes Sophia Barkley MD   atorvastatin (LIPITOR) 40 MG tablet Take 1 tablet by mouth nightly   Yes Sophia Barkley MD   Co-Enzyme Q10 200 MG CAPS Take

## 2023-12-16 NOTE — H&P
[facility]  History and Physical      CHIEF COMPLAINT:    Chief Complaint   Patient presents with    Positive For Covid-19     + covid via home test two weeks ago - the only symptoms she reports is weakness/fatigue, states she had a PCP appt scheduled Friday but she was too weak to go - family at bedside states this isn't true    Fatigue     tongue pain/burning for two weeks; + covid via home test    SNF placement per family; lives alone, confusion at times    Medication Reaction     Patient states her doctor gave her Paxlovid and \"some other stuff\" to take for covid - her report of what she took and when changed multiple times during interview; family states she gets confused at times and they're not sure she's taking her meds correctly, interested in placement        Patient of IGNACIO Anaya CNP presents with:  GI bleed    History of Present Illness:   Patient is a 82-year-old female with a past medical history of DM, anxiety, seasonal allergies, GERD, HFrEF, and history of pulmonary emboli in March 2021 where she was started on Eliquis at that time who presents to the emergency room for fatigue and unable to care for self at home. Patient tested positive for COVID on a home test a few weeks ago and has been unable to really care for self at home since. Labs on arrival revealed an H&H of 6.4/23.0, leukocytosis of 11.7 however other labs are relatively unremarkable. She was given a unit of RBC is currently admitted to intermediate telemetry and for further workup and evaluation with general surgery. REVIEW OF SYSTEMS:  Pertinent negatives are above in HPI. 10 point ROS otherwise negative.       Past Medical History:   Diagnosis Date    Abnormal echocardiogram     Acute combined systolic and diastolic congestive heart failure ([address]) 03/09/2021    TTE 3/2/21 25-30% EF    Acute renal insufficiency 02/28/2021    Anxiety     CAD (coronary artery disease)     normal adenisone stress test

## 2023-12-17 ENCOUNTER — ANESTHESIA (OUTPATIENT)
Dept: OPERATING ROOM | Age: 78
End: 2023-12-17
Payer: MEDICARE

## 2023-12-17 LAB
HCT VFR BLD AUTO: 26.4 % (ref 34–48)
HCT VFR BLD AUTO: 27 % (ref 34–48)
HCT VFR BLD AUTO: 27.2 % (ref 34–48)
HGB BLD-MCNC: 7.6 G/DL (ref 11.5–15.5)
HGB BLD-MCNC: 7.7 G/DL (ref 11.5–15.5)
HGB BLD-MCNC: 7.8 G/DL (ref 11.5–15.5)

## 2023-12-17 PROCEDURE — 3600007501: Performed by: SURGERY

## 2023-12-17 PROCEDURE — 2060000000 HC ICU INTERMEDIATE R&B

## 2023-12-17 PROCEDURE — 6370000000 HC RX 637 (ALT 250 FOR IP): Performed by: SURGERY

## 2023-12-17 PROCEDURE — 2580000003 HC RX 258: Performed by: ANESTHESIOLOGY

## 2023-12-17 PROCEDURE — 3700000001 HC ADD 15 MINUTES (ANESTHESIA): Performed by: SURGERY

## 2023-12-17 PROCEDURE — 36415 COLL VENOUS BLD VENIPUNCTURE: CPT

## 2023-12-17 PROCEDURE — 0DJD8ZZ INSPECTION OF LOWER INTESTINAL TRACT, VIA NATURAL OR ARTIFICIAL OPENING ENDOSCOPIC: ICD-10-PCS | Performed by: SURGERY

## 2023-12-17 PROCEDURE — 7100000010 HC PHASE II RECOVERY - FIRST 15 MIN: Performed by: SURGERY

## 2023-12-17 PROCEDURE — 6370000000 HC RX 637 (ALT 250 FOR IP): Performed by: FAMILY MEDICINE

## 2023-12-17 PROCEDURE — C9113 INJ PANTOPRAZOLE SODIUM, VIA: HCPCS | Performed by: SURGERY

## 2023-12-17 PROCEDURE — A4216 STERILE WATER/SALINE, 10 ML: HCPCS | Performed by: SURGERY

## 2023-12-17 PROCEDURE — 6360000002 HC RX W HCPCS: Performed by: NURSE ANESTHETIST, CERTIFIED REGISTERED

## 2023-12-17 PROCEDURE — 2580000003 HC RX 258: Performed by: SURGERY

## 2023-12-17 PROCEDURE — 2580000003 HC RX 258: Performed by: NURSE ANESTHETIST, CERTIFIED REGISTERED

## 2023-12-17 PROCEDURE — 7100000011 HC PHASE II RECOVERY - ADDTL 15 MIN: Performed by: SURGERY

## 2023-12-17 PROCEDURE — 3700000000 HC ANESTHESIA ATTENDED CARE: Performed by: SURGERY

## 2023-12-17 PROCEDURE — 0DB78ZX EXCISION OF STOMACH, PYLORUS, VIA NATURAL OR ARTIFICIAL OPENING ENDOSCOPIC, DIAGNOSTIC: ICD-10-PCS | Performed by: SURGERY

## 2023-12-17 PROCEDURE — 3600007511: Performed by: SURGERY

## 2023-12-17 PROCEDURE — 2709999900 HC NON-CHARGEABLE SUPPLY: Performed by: SURGERY

## 2023-12-17 PROCEDURE — 6360000002 HC RX W HCPCS: Performed by: SURGERY

## 2023-12-17 PROCEDURE — 2500000003 HC RX 250 WO HCPCS: Performed by: NURSE ANESTHETIST, CERTIFIED REGISTERED

## 2023-12-17 PROCEDURE — 88305 TISSUE EXAM BY PATHOLOGIST: CPT

## 2023-12-17 RX ORDER — SODIUM CHLORIDE 9 MG/ML
INJECTION, SOLUTION INTRAVENOUS CONTINUOUS PRN
Status: DISCONTINUED | OUTPATIENT
Start: 2023-12-17 | End: 2023-12-17 | Stop reason: SDUPTHER

## 2023-12-17 RX ORDER — MIDAZOLAM HYDROCHLORIDE 2 MG/2ML
2 INJECTION, SOLUTION INTRAMUSCULAR; INTRAVENOUS
Status: DISCONTINUED | OUTPATIENT
Start: 2023-12-17 | End: 2023-12-18 | Stop reason: ALTCHOICE

## 2023-12-17 RX ORDER — PROPOFOL 10 MG/ML
INJECTION, EMULSION INTRAVENOUS PRN
Status: DISCONTINUED | OUTPATIENT
Start: 2023-12-17 | End: 2023-12-17 | Stop reason: SDUPTHER

## 2023-12-17 RX ORDER — HYDRALAZINE HYDROCHLORIDE 20 MG/ML
10 INJECTION INTRAMUSCULAR; INTRAVENOUS
Status: DISCONTINUED | OUTPATIENT
Start: 2023-12-17 | End: 2023-12-18 | Stop reason: ALTCHOICE

## 2023-12-17 RX ORDER — AZITHROMYCIN 250 MG/1
500 TABLET, FILM COATED ORAL DAILY
Status: COMPLETED | OUTPATIENT
Start: 2023-12-17 | End: 2023-12-19

## 2023-12-17 RX ORDER — SODIUM CHLORIDE 9 MG/ML
INJECTION, SOLUTION INTRAVENOUS PRN
Status: DISCONTINUED | OUTPATIENT
Start: 2023-12-17 | End: 2023-12-19 | Stop reason: HOSPADM

## 2023-12-17 RX ORDER — ONDANSETRON 2 MG/ML
4 INJECTION INTRAMUSCULAR; INTRAVENOUS
Status: DISCONTINUED | OUTPATIENT
Start: 2023-12-17 | End: 2023-12-18 | Stop reason: ALTCHOICE

## 2023-12-17 RX ORDER — SODIUM CHLORIDE 0.9 % (FLUSH) 0.9 %
5-40 SYRINGE (ML) INJECTION EVERY 12 HOURS SCHEDULED
Status: DISCONTINUED | OUTPATIENT
Start: 2023-12-17 | End: 2023-12-19 | Stop reason: HOSPADM

## 2023-12-17 RX ORDER — SODIUM CHLORIDE 0.9 % (FLUSH) 0.9 %
5-40 SYRINGE (ML) INJECTION PRN
Status: DISCONTINUED | OUTPATIENT
Start: 2023-12-17 | End: 2023-12-19 | Stop reason: HOSPADM

## 2023-12-17 RX ORDER — IPRATROPIUM BROMIDE AND ALBUTEROL SULFATE 2.5; .5 MG/3ML; MG/3ML
1 SOLUTION RESPIRATORY (INHALATION)
Status: DISPENSED | OUTPATIENT
Start: 2023-12-17 | End: 2023-12-18

## 2023-12-17 RX ORDER — LIDOCAINE HYDROCHLORIDE 10 MG/ML
INJECTION, SOLUTION EPIDURAL; INFILTRATION; INTRACAUDAL; PERINEURAL PRN
Status: DISCONTINUED | OUTPATIENT
Start: 2023-12-17 | End: 2023-12-17 | Stop reason: SDUPTHER

## 2023-12-17 RX ORDER — LABETALOL HYDROCHLORIDE 5 MG/ML
10 INJECTION, SOLUTION INTRAVENOUS
Status: DISCONTINUED | OUTPATIENT
Start: 2023-12-17 | End: 2023-12-18 | Stop reason: ALTCHOICE

## 2023-12-17 RX ADMIN — LIDOCAINE HYDROCHLORIDE 20 MG: 10 INJECTION, SOLUTION EPIDURAL; INFILTRATION; INTRACAUDAL; PERINEURAL at 08:08

## 2023-12-17 RX ADMIN — PANTOPRAZOLE SODIUM 40 MG: 40 INJECTION, POWDER, FOR SOLUTION INTRAVENOUS at 09:37

## 2023-12-17 RX ADMIN — CARBAMIDE PEROXIDE 6.5% 5 DROP: 6.5 LIQUID AURICULAR (OTIC) at 21:54

## 2023-12-17 RX ADMIN — SODIUM CHLORIDE, PRESERVATIVE FREE 10 ML: 5 INJECTION INTRAVENOUS at 09:37

## 2023-12-17 RX ADMIN — SODIUM CHLORIDE: 9 INJECTION, SOLUTION INTRAVENOUS at 02:49

## 2023-12-17 RX ADMIN — SODIUM CHLORIDE, PRESERVATIVE FREE 10 ML: 5 INJECTION INTRAVENOUS at 21:20

## 2023-12-17 RX ADMIN — PROPOFOL 50 MG: 10 INJECTION, EMULSION INTRAVENOUS at 08:12

## 2023-12-17 RX ADMIN — PROPOFOL 50 MG: 10 INJECTION, EMULSION INTRAVENOUS at 08:08

## 2023-12-17 RX ADMIN — PROPOFOL 50 MG: 10 INJECTION, EMULSION INTRAVENOUS at 08:16

## 2023-12-17 RX ADMIN — ALPRAZOLAM 2 MG: 1 TABLET ORAL at 09:37

## 2023-12-17 RX ADMIN — NYSTATIN 500000 UNITS: 100000 SUSPENSION ORAL at 16:37

## 2023-12-17 RX ADMIN — NYSTATIN 500000 UNITS: 100000 SUSPENSION ORAL at 21:20

## 2023-12-17 RX ADMIN — ALPRAZOLAM 2 MG: 1 TABLET ORAL at 21:20

## 2023-12-17 RX ADMIN — AZITHROMYCIN 500 MG: 250 TABLET, FILM COATED ORAL at 16:37

## 2023-12-17 RX ADMIN — NYSTATIN 500000 UNITS: 100000 SUSPENSION ORAL at 12:08

## 2023-12-17 RX ADMIN — PROPOFOL 50 MG: 10 INJECTION, EMULSION INTRAVENOUS at 08:20

## 2023-12-17 RX ADMIN — SODIUM CHLORIDE: 9 INJECTION, SOLUTION INTRAVENOUS at 08:02

## 2023-12-17 NOTE — OP NOTE
Nitza Goetz  YOB: 1945  [id number]    Pre-operative Diagnosis: Anemia/rectal bleeding    Post-operative Diagnosis: Mild gastritis, esophageal candidiasis, diverticulosis, small nonbleeding AVM of the right colon    Procedure: EGD with biopsies, colonoscopy     Anesthesia: LMAC    Surgeon: Wilma Espinoza MD    Assistant: None    Complications: none    Specimens: Antrum    EBL: none    Procedure:  Pt was taken to the endoscopy suite and placed on the endoscopy table in a left lateral decubitus position. LMAC anesthesia was administered and a bite block was inserted. A lubricated gastroscope was inserted into the oropharynx and advanced into the esophagus. The esophagus was inspected throughout its length. There were no varices. There were plaques consistent with candidiasis. The GE junction was sharp at 42 cm. The stomach was entered and insufflated. The antrum was mildly inflamed. Biopsies were taken for H Pylori. The pylorus was intubated. The first and second portions of the duodenum were normal.  The scope was pulled back into the antrum and retroflexed. The angle of the stomach was normal.  The proximal greater and lesser curves were normal.  The fundus was normal.  At the GE junction, there was no hiatal hernia present. The stomach was deflated and the scope was withdrawn and removed. The patient tolerated the procedure well. Next, a digital rectal examination revealed no gross blood, normal tone, no mass was palpated. There were enlarged external hemorrhoids present. A lubricated colonoscope was inserted and advanced to the cecum without difficulty. The ileocecal valve and appendiceal orifice were identified and photographed. Prep was good. Cecum, ascending colon, hepatic flexure, transverse colon, splenic flexure, descending colon, sigmoid colon were all extensively inspected. There was diverticular disease throughout the colon.   A small AVM was seen in the proximal right colon without stigmata of recent bleeding. The proximal rectum was normal.  There were enlarged internal hemorrhoids. The colon was deflated. The scope was removed. The patient tolerated the procedure well. Impression: Candidiasis, mild gastritis, hemorrhoids and diverticular disease    Plan: Resume diet. Antifungal therapy. Okay for discharge from surgical standpoint.       Paola Toth MD, MD 12/17/2023, 8:25 AM

## 2023-12-17 NOTE — PLAN OF CARE
Problem: ABCDS Injury Assessment  Goal: Absence of physical injury  Outcome: Progressing     Problem: Discharge Planning  Goal: Discharge to home or other facility with appropriate resources  Outcome: Progressing     Problem: Safety - Adult  Goal: Free from fall injury  Outcome: Progressing     Problem: Chronic Conditions and Co-morbidities  Goal: Patient's chronic conditions and co-morbidity symptoms are monitored and maintained or improved  Outcome: Progressing     Problem: Pain  Goal: Verbalizes/displays adequate comfort level or baseline comfort level  Outcome: Progressing

## 2023-12-17 NOTE — PROGRESS NOTES
Nursing Transfer Note    Data:  Summary of patients progress: Dr Felton Late EGD, colon  Reason for transfer: next level of care    Action:  Explained reason for transfer to Patient. Report given to: RN, using RN Handoff Navigator. Mode of transportation: bed    Response:  RN Recommendations: diet?

## 2023-12-18 LAB
ALBUMIN SERPL-MCNC: 3.4 G/DL (ref 3.5–5.2)
ALP SERPL-CCNC: 50 U/L (ref 35–104)
ALT SERPL-CCNC: 8 U/L (ref 0–32)
ANION GAP SERPL CALCULATED.3IONS-SCNC: 12 MMOL/L (ref 7–16)
AST SERPL-CCNC: 15 U/L (ref 0–31)
BILIRUB SERPL-MCNC: 0.2 MG/DL (ref 0–1.2)
BUN SERPL-MCNC: 15 MG/DL (ref 6–23)
CALCIUM SERPL-MCNC: 8.6 MG/DL (ref 8.6–10.2)
CHLORIDE SERPL-SCNC: 106 MMOL/L (ref 98–107)
CO2 SERPL-SCNC: 21 MMOL/L (ref 22–29)
CREAT SERPL-MCNC: 1 MG/DL (ref 0.5–1)
GFR SERPL CREATININE-BSD FRML MDRD: 56 ML/MIN/1.73M2
GLUCOSE SERPL-MCNC: 111 MG/DL (ref 74–99)
HCT VFR BLD AUTO: 23.8 % (ref 34–48)
HCT VFR BLD AUTO: 31.1 % (ref 34–48)
HGB BLD-MCNC: 6.8 G/DL (ref 11.5–15.5)
HGB BLD-MCNC: 9.3 G/DL (ref 11.5–15.5)
POTASSIUM SERPL-SCNC: 3.8 MMOL/L (ref 3.5–5)
PROT SERPL-MCNC: 5.7 G/DL (ref 6.4–8.3)
SODIUM SERPL-SCNC: 139 MMOL/L (ref 132–146)

## 2023-12-18 PROCEDURE — 2060000000 HC ICU INTERMEDIATE R&B

## 2023-12-18 PROCEDURE — 97165 OT EVAL LOW COMPLEX 30 MIN: CPT

## 2023-12-18 PROCEDURE — 36415 COLL VENOUS BLD VENIPUNCTURE: CPT

## 2023-12-18 PROCEDURE — 85018 HEMOGLOBIN: CPT

## 2023-12-18 PROCEDURE — 6360000002 HC RX W HCPCS: Performed by: FAMILY MEDICINE

## 2023-12-18 PROCEDURE — 80053 COMPREHEN METABOLIC PANEL: CPT

## 2023-12-18 PROCEDURE — P9016 RBC LEUKOCYTES REDUCED: HCPCS

## 2023-12-18 PROCEDURE — 36430 TRANSFUSION BLD/BLD COMPNT: CPT

## 2023-12-18 PROCEDURE — 2580000003 HC RX 258: Performed by: SURGERY

## 2023-12-18 PROCEDURE — A4216 STERILE WATER/SALINE, 10 ML: HCPCS | Performed by: STUDENT IN AN ORGANIZED HEALTH CARE EDUCATION/TRAINING PROGRAM

## 2023-12-18 PROCEDURE — 2580000003 HC RX 258: Performed by: ANESTHESIOLOGY

## 2023-12-18 PROCEDURE — 97161 PT EVAL LOW COMPLEX 20 MIN: CPT

## 2023-12-18 PROCEDURE — 85014 HEMATOCRIT: CPT

## 2023-12-18 PROCEDURE — 6370000000 HC RX 637 (ALT 250 FOR IP): Performed by: SURGERY

## 2023-12-18 PROCEDURE — 2580000003 HC RX 258: Performed by: STUDENT IN AN ORGANIZED HEALTH CARE EDUCATION/TRAINING PROGRAM

## 2023-12-18 PROCEDURE — 6360000002 HC RX W HCPCS: Performed by: STUDENT IN AN ORGANIZED HEALTH CARE EDUCATION/TRAINING PROGRAM

## 2023-12-18 PROCEDURE — C9113 INJ PANTOPRAZOLE SODIUM, VIA: HCPCS | Performed by: STUDENT IN AN ORGANIZED HEALTH CARE EDUCATION/TRAINING PROGRAM

## 2023-12-18 PROCEDURE — 6370000000 HC RX 637 (ALT 250 FOR IP): Performed by: FAMILY MEDICINE

## 2023-12-18 RX ORDER — PANTOPRAZOLE SODIUM 40 MG/1
40 TABLET, DELAYED RELEASE ORAL 2 TIMES DAILY
Qty: 60 TABLET | Refills: 0 | Status: SHIPPED | OUTPATIENT
Start: 2023-12-18 | End: 2024-01-17

## 2023-12-18 RX ORDER — HYDRALAZINE HYDROCHLORIDE 20 MG/ML
10 INJECTION INTRAMUSCULAR; INTRAVENOUS ONCE
Status: COMPLETED | OUTPATIENT
Start: 2023-12-18 | End: 2023-12-18

## 2023-12-18 RX ORDER — SODIUM CHLORIDE 9 MG/ML
INJECTION, SOLUTION INTRAVENOUS PRN
Status: DISCONTINUED | OUTPATIENT
Start: 2023-12-18 | End: 2023-12-19 | Stop reason: HOSPADM

## 2023-12-18 RX ADMIN — ACETAMINOPHEN 650 MG: 325 TABLET ORAL at 23:40

## 2023-12-18 RX ADMIN — AZITHROMYCIN 500 MG: 250 TABLET, FILM COATED ORAL at 09:12

## 2023-12-18 RX ADMIN — NYSTATIN 500000 UNITS: 100000 SUSPENSION ORAL at 19:45

## 2023-12-18 RX ADMIN — ALPRAZOLAM 2 MG: 1 TABLET ORAL at 09:12

## 2023-12-18 RX ADMIN — SODIUM CHLORIDE, PRESERVATIVE FREE 40 MG: 5 INJECTION INTRAVENOUS at 19:46

## 2023-12-18 RX ADMIN — NYSTATIN 500000 UNITS: 100000 SUSPENSION ORAL at 13:02

## 2023-12-18 RX ADMIN — SODIUM CHLORIDE, PRESERVATIVE FREE 10 ML: 5 INJECTION INTRAVENOUS at 19:45

## 2023-12-18 RX ADMIN — SODIUM CHLORIDE, PRESERVATIVE FREE 40 MG: 5 INJECTION INTRAVENOUS at 09:13

## 2023-12-18 RX ADMIN — ACETAMINOPHEN 650 MG: 325 TABLET ORAL at 15:36

## 2023-12-18 RX ADMIN — CARBAMIDE PEROXIDE 6.5% 5 DROP: 6.5 LIQUID AURICULAR (OTIC) at 19:46

## 2023-12-18 RX ADMIN — NYSTATIN 500000 UNITS: 100000 SUSPENSION ORAL at 17:16

## 2023-12-18 RX ADMIN — SODIUM CHLORIDE, PRESERVATIVE FREE 10 ML: 5 INJECTION INTRAVENOUS at 09:13

## 2023-12-18 RX ADMIN — HYDRALAZINE HYDROCHLORIDE 10 MG: 20 INJECTION, SOLUTION INTRAMUSCULAR; INTRAVENOUS at 09:14

## 2023-12-18 RX ADMIN — CARBAMIDE PEROXIDE 6.5% 5 DROP: 6.5 LIQUID AURICULAR (OTIC) at 09:13

## 2023-12-18 RX ADMIN — SODIUM CHLORIDE, PRESERVATIVE FREE 10 ML: 5 INJECTION INTRAVENOUS at 19:46

## 2023-12-18 RX ADMIN — ALPRAZOLAM 2 MG: 1 TABLET ORAL at 19:45

## 2023-12-18 RX ADMIN — NYSTATIN 500000 UNITS: 100000 SUSPENSION ORAL at 09:13

## 2023-12-18 NOTE — CARE COORDINATION
CASE MANAGEMENT. ... Chart reviewed. Met with patient, her sister Adrianna Rangel, brother in law Karel Ling and nephew Wilian Rowe at the bedside. Mira Bustos voices being independent from home alone. States she has cane/walker which she should use, but doesn't. One floor plan with no steps to enter. Has history with [facility], but cannot recall agency. No history with [facility], but is interested if she qualifies. List of choices provided. Referrals called to [place] with [facility] will review and to [facility] with [facility]wari-no answer. Left vm. Other choices are [facility] and [facility]. PT/OT on consult and was requested to see. In the meantime, Mrs Luz Hernandes had upper/lower scopes yesterday. No active bleeding noted. H/H 6.8/23.8 this am. 1 unit PRBC ordered to be given. She is tolerating regular diet. On iv protonix bid. Will cont to follow along. OF note. Rufus Hillmanarlin Calderon Patient is estranged from her  and does not have relationship with her son. Her other son has [place] B[place] Syndrome. Patient wants nephew, Wilian Rowe, as her POA. ACP papers for Living Will/Healthcare POA provided and patient/family will complete. Notary requested to see. Update. .. [facility] will not give a definitive determination, but will follow. Update. .. No beds available at [address]. Referral called to [facility] with [address] She will review patient for both [facility] and [facility]. Called [facility]-no female beds. Update. ... [address] accepted patient. Referral to [facility] canceled. Rosibel Scales notified and is agreeable. NEED n 17/forms and HENS completed.

## 2023-12-18 NOTE — PROGRESS NOTES
12/17/2023  --had egd and colonoscopy today with findings of Candidiasis, mild gastritis, hemorrhoids and diverticular disease   --started on nystatin swish and swallow  --ppi therapy  --monitor hgb and transfuse prn  --wean oxygen post procedurally as able  --advance diet per gen surg to regular  --azithromycin for ear infection potentially, can't visualize because of EAC occlusion 2/2 cerumen impaction, will start debrox gtt  --need d/c plan d/t her weakness would likely need/benefit from placement    12/18/2023  --had egd and colonoscopy yesterday with findings of Candidiasis, mild gastritis, hemorrhoids and diverticular disease; per gen surg, she is ok to d/c  --diet advanced per surgery  --on nystatin swish and swallow  --ppi therapy  --monitor hgb and transfuse prn  --wean oxygen post procedurally as able  --advance diet per gen surg to regular  --azithromycin for ear infection potentially, can't visualize because of EAC occlusion 2/2 cerumen impaction, started debrox gtt  --need d/c plan d/t her weakness would likely need/benefit from placement  --needed transfusion prbcs today, will remain here until 24 hours hgb stable  --prn management of hypertension  --awaiting pt/ot evals    Medication for other comorbidities continue as appropriate dose adjustment as necessary. DVT prophylaxis PCD's  PT OT  Discharge planning        Code status: Full  Requires Inpatient level of care    I have spent a total time of 25 minutes of this patient encounter reviewing chart, labs, radiological reports coordinating care with interdisciplinary teams, face to face encounter with patient, providing counseling/education to patient/family, and formulating plan.        Chemo Avila DO  10:34 AM  12/18/2023 Detail Level: Simple Introduction Text (Please End With A Colon): The following procedure was deferred: Instructions (Optional): Arms and face Procedure To Be Performed At Next Visit: Liquid nitrogen

## 2023-12-18 NOTE — PROGRESS NOTES
OCCUPATIONAL THERAPY INITIAL EVALUATION    [address]   [address] [facility], [address] [place] [address]        MTFT:                                                  Patient Name: Paz Cabrera    MRN: [id number]    : 1945    Room: [address] [place] [address]     Evaluating OT: Willy Ford OTR/L #[id number]    Referring Provider:  Dexter Trevizo DO     Specific Provider Orders/Date:  OT Eval and Treat, 23     Diagnosis:   1. Anemia, unspecified type    2. Rectal bleeding    3.  Gastrointestinal hemorrhage, unspecified gastrointestinal hemorrhage type         Surgery: None       Pertinent Medical History: CAD, IBD, anxiety, DM      Precautions:  Fall Risk, alarm      Assessment of current deficits    [x] Functional mobility  [x]ADLs  [x] Strength               []Cognition    [x] Functional transfers   [x] IADLs         [x] Safety Awareness   [x]Endurance    [] Fine Coordination              [x] Balance      [] Vision/perception   []Sensation     []Gross Motor Coordination  [] ROM  [] Delirium                   [] Motor Control     OT PLAN OF CARE   OT POC based on physician orders, patient diagnosis and results of clinical assessment    Frequency/Duration 1-3 days/wk for 2 weeks PRN     Specific OT Treatment Interventions to include:   * Instruction/training on adapted ADL techniques and AE recommendations to increase functional independence within precautions       * Training on energy conservation strategies, correct breathing pattern and techniques to improve independence/tolerance for self-care routine  * Functional transfer/mobility training/DME recommendations for increased independence, safety, and fall prevention  * Patient/Family education to increase follow through with safety techniques and functional independence  * Recommendation of environmental modifications for increased safety with functional transfers/mobility and ADLs  * Therapeutic exercise to Therapeutic Ex [id number]       Orthotic Management [id number]       Manual [id number]     Neuro Re-Ed [id number]       Non-Billable Time        Evaluation Time additionally includes thorough review of current medical information, gathering information on past medical history/social history and prior level of function, interpretation of standardized testing/informal observation of tasks, assessment of data and development of plan of care and goals.         Evaluating OT: Ayla Stacy OTR/L #[id number]

## 2023-12-18 NOTE — ACP (ADVANCE CARE PLANNING)
Advance Care Planning   Healthcare Decision Maker:    Primary Decision Maker: Palak Gutierrez - Brother/Sister - [phone]    Today we documented desired Decision Maker(s), who is (are) NOT Legal Next of [address] [place] [address]. ACP documents are required for decision maker authority. Patient is , but is estranged from her . She has a son with downs syndrome and another son, with whom she does not have a relationship with. She is requesting her nephew, Saritha James, to be her POA [phone]. ACP documents were provided to be completed.

## 2023-12-18 NOTE — PLAN OF CARE
Problem: ABCDS Injury Assessment  Goal: Absence of physical injury  Outcome: Progressing     Problem: Discharge Planning  Goal: Discharge to home or other facility with appropriate resources  Outcome: Progressing     Problem: Safety - Adult  Goal: Free from fall injury  Outcome: Progressing     Problem: Chronic Conditions and Co-morbidities  Goal: Patient's chronic conditions and co-morbidity symptoms are monitored and maintained or improved  Outcome: Progressing  Flowsheets (Taken 12/17/2023 2115)  Care Plan - Patient's Chronic Conditions and Co-Morbidity Symptoms are Monitored and Maintained or Improved: Monitor and assess patient's chronic conditions and comorbid symptoms for stability, deterioration, or improvement     Problem: Pain  Goal: Verbalizes/displays adequate comfort level or baseline comfort level  Outcome: Progressing

## 2023-12-18 NOTE — PROGRESS NOTES
Physical Therapy  Facility/Department: [address] INTERMEDIATE 1  Physical Therapy Initial Assessment    Name: Vonnie Cohen  : 1945  MRN: [id number]  Date of Service: 2023        Patient Diagnosis(es): The primary encounter diagnosis was Anemia, unspecified type. Diagnoses of Rectal bleeding and Gastrointestinal hemorrhage, unspecified gastrointestinal hemorrhage type were also pertinent to this visit. Past Medical History:  has a past medical history of Abnormal echocardiogram, Acute combined systolic and diastolic congestive heart failure ([address]), Acute renal insufficiency, Anxiety, CAD (coronary artery disease), Carotid artery narrowing, Depression, Diabetes mellitus ([address]), Drug intolerance, GERD (gastroesophageal reflux disease), Heart attack ([address]), Hyperlipidemia, Hypertension, IBS (irritable bowel syndrome), Obesity, Primary osteoarthritis involving multiple joints, Sleep apnea, SOB (shortness of breath) on exertion, and UTI (urinary tract infection). Past Surgical History:  has a past surgical history that includes Tonsillectomy; Percutaneous Transluminal Coronary Angio (2007); Dilation and curettage of uterus; cyst removal; Diagnostic Cardiac Cath Lab Procedure (2007); Coronary angioplasty (2007);  section; Cardiac catheterization (2021); Upper gastrointestinal endoscopy (N/A, 2023); and Colonoscopy (N/A, 2023). Evaluating Therapist: Rachna Lopes PT      Room #:  5630/2274-V  Diagnosis:  Rectal bleeding [K62.5]  GI bleed [K92.2]  Anemia, unspecified type [D64.9]  PMHx/PSHx:  heart failure, DM, CAD  Precautions:  falls, alarm      Social:  Pt lives with alone in a 1 floor plan no steps to enter. .  Prior to admission independent without device. Has cane/ww but does not use them     Initial Evaluation  Date: 23 Treatment      Short Term/ Long Term   Goals   Was pt agreeable to Eval/treatment? yes     Does pt have pain?  No c/o pain     Bed Mobility

## 2023-12-18 NOTE — PLAN OF CARE
Problem: ABCDS Injury Assessment  Goal: Absence of physical injury  12/18/2023 0954 by David Anderson RN  Outcome: Progressing    Problem: Discharge Planning  Goal: Discharge to home or other facility with appropriate resources  12/18/2023 0954 by David Anderson RN  Outcome: Progressing

## 2023-12-19 VITALS
HEART RATE: 102 BPM | DIASTOLIC BLOOD PRESSURE: 72 MMHG | HEIGHT: 61 IN | WEIGHT: 177.7 LBS | OXYGEN SATURATION: 95 % | TEMPERATURE: 98.2 F | SYSTOLIC BLOOD PRESSURE: 124 MMHG | BODY MASS INDEX: 33.55 KG/M2 | RESPIRATION RATE: 18 BRPM

## 2023-12-19 LAB
ABO/RH: NORMAL
ALBUMIN SERPL-MCNC: 3.4 G/DL (ref 3.5–5.2)
ALP SERPL-CCNC: 53 U/L (ref 35–104)
ALT SERPL-CCNC: 20 U/L (ref 0–32)
ANION GAP SERPL CALCULATED.3IONS-SCNC: 13 MMOL/L (ref 7–16)
ANTIBODY SCREEN: NEGATIVE
ARM BAND NUMBER: NORMAL
AST SERPL-CCNC: 40 U/L (ref 0–31)
BILIRUB SERPL-MCNC: 0.2 MG/DL (ref 0–1.2)
BLOOD BANK BLOOD PRODUCT EXPIRATION DATE: NORMAL
BLOOD BANK BLOOD PRODUCT EXPIRATION DATE: NORMAL
BLOOD BANK DISPENSE STATUS: NORMAL
BLOOD BANK DISPENSE STATUS: NORMAL
BLOOD BANK ISBT PRODUCT BLOOD TYPE: 5100
BLOOD BANK ISBT PRODUCT BLOOD TYPE: 5100
BLOOD BANK PRODUCT CODE: NORMAL
BLOOD BANK PRODUCT CODE: NORMAL
BLOOD BANK SAMPLE EXPIRATION: NORMAL
BLOOD BANK UNIT TYPE AND RH: NORMAL
BLOOD BANK UNIT TYPE AND RH: NORMAL
BPU ID: NORMAL
BPU ID: NORMAL
BUN SERPL-MCNC: 15 MG/DL (ref 6–23)
CALCIUM SERPL-MCNC: 8.4 MG/DL (ref 8.6–10.2)
CHLORIDE SERPL-SCNC: 108 MMOL/L (ref 98–107)
CO2 SERPL-SCNC: 19 MMOL/L (ref 22–29)
COMPONENT: NORMAL
COMPONENT: NORMAL
CREAT SERPL-MCNC: 0.9 MG/DL (ref 0.5–1)
CROSSMATCH RESULT: NORMAL
CROSSMATCH RESULT: NORMAL
GFR SERPL CREATININE-BSD FRML MDRD: >60 ML/MIN/1.73M2
GLUCOSE BLD-MCNC: 125 MG/DL (ref 74–99)
GLUCOSE BLD-MCNC: 125 MG/DL (ref 74–99)
GLUCOSE SERPL-MCNC: 104 MG/DL (ref 74–99)
HBA1C MFR BLD: 5.7 % (ref 4–5.6)
HCT VFR BLD AUTO: 30.1 % (ref 34–48)
HGB BLD-MCNC: 8.9 G/DL (ref 11.5–15.5)
POTASSIUM SERPL-SCNC: 3.7 MMOL/L (ref 3.5–5)
PROT SERPL-MCNC: 5.5 G/DL (ref 6.4–8.3)
SODIUM SERPL-SCNC: 140 MMOL/L (ref 132–146)
TRANSFUSION STATUS: NORMAL
TRANSFUSION STATUS: NORMAL
UNIT DIVISION: 0
UNIT DIVISION: 0
UNIT ISSUE DATE/TIME: NORMAL
UNIT ISSUE DATE/TIME: NORMAL

## 2023-12-19 PROCEDURE — 6370000000 HC RX 637 (ALT 250 FOR IP): Performed by: FAMILY MEDICINE

## 2023-12-19 PROCEDURE — 6360000002 HC RX W HCPCS

## 2023-12-19 PROCEDURE — 97535 SELF CARE MNGMENT TRAINING: CPT

## 2023-12-19 PROCEDURE — 6370000000 HC RX 637 (ALT 250 FOR IP): Performed by: NURSE PRACTITIONER

## 2023-12-19 PROCEDURE — 6360000002 HC RX W HCPCS: Performed by: STUDENT IN AN ORGANIZED HEALTH CARE EDUCATION/TRAINING PROGRAM

## 2023-12-19 PROCEDURE — 85018 HEMOGLOBIN: CPT

## 2023-12-19 PROCEDURE — 6360000002 HC RX W HCPCS: Performed by: SURGERY

## 2023-12-19 PROCEDURE — 83036 HEMOGLOBIN GLYCOSYLATED A1C: CPT

## 2023-12-19 PROCEDURE — 85014 HEMATOCRIT: CPT

## 2023-12-19 PROCEDURE — C9113 INJ PANTOPRAZOLE SODIUM, VIA: HCPCS | Performed by: STUDENT IN AN ORGANIZED HEALTH CARE EDUCATION/TRAINING PROGRAM

## 2023-12-19 PROCEDURE — 80053 COMPREHEN METABOLIC PANEL: CPT

## 2023-12-19 PROCEDURE — 97530 THERAPEUTIC ACTIVITIES: CPT

## 2023-12-19 PROCEDURE — 2580000003 HC RX 258: Performed by: STUDENT IN AN ORGANIZED HEALTH CARE EDUCATION/TRAINING PROGRAM

## 2023-12-19 PROCEDURE — 6370000000 HC RX 637 (ALT 250 FOR IP): Performed by: SURGERY

## 2023-12-19 PROCEDURE — 2580000003 HC RX 258: Performed by: ANESTHESIOLOGY

## 2023-12-19 PROCEDURE — 82962 GLUCOSE BLOOD TEST: CPT

## 2023-12-19 RX ORDER — FUROSEMIDE 20 MG/1
20 TABLET ORAL
Status: DISCONTINUED | OUTPATIENT
Start: 2023-12-20 | End: 2023-12-19 | Stop reason: HOSPADM

## 2023-12-19 RX ORDER — ATORVASTATIN CALCIUM 40 MG/1
40 TABLET, FILM COATED ORAL NIGHTLY
Status: DISCONTINUED | OUTPATIENT
Start: 2023-12-19 | End: 2023-12-19 | Stop reason: HOSPADM

## 2023-12-19 RX ORDER — DEXTROSE MONOHYDRATE 100 MG/ML
INJECTION, SOLUTION INTRAVENOUS CONTINUOUS PRN
Status: DISCONTINUED | OUTPATIENT
Start: 2023-12-19 | End: 2023-12-19 | Stop reason: HOSPADM

## 2023-12-19 RX ORDER — HYDRALAZINE HYDROCHLORIDE 50 MG/1
50 TABLET, FILM COATED ORAL 2 TIMES DAILY
Status: DISCONTINUED | OUTPATIENT
Start: 2023-12-19 | End: 2023-12-19 | Stop reason: HOSPADM

## 2023-12-19 RX ORDER — AMLODIPINE BESYLATE 2.5 MG/1
2.5 TABLET ORAL NIGHTLY
Status: DISCONTINUED | OUTPATIENT
Start: 2023-12-19 | End: 2023-12-19 | Stop reason: HOSPADM

## 2023-12-19 RX ORDER — INSULIN LISPRO 100 [IU]/ML
0-4 INJECTION, SOLUTION INTRAVENOUS; SUBCUTANEOUS
Status: DISCONTINUED | OUTPATIENT
Start: 2023-12-19 | End: 2023-12-19 | Stop reason: HOSPADM

## 2023-12-19 RX ORDER — ESCITALOPRAM OXALATE 10 MG/1
10 TABLET ORAL EVERY MORNING
Status: DISCONTINUED | OUTPATIENT
Start: 2023-12-19 | End: 2023-12-19 | Stop reason: HOSPADM

## 2023-12-19 RX ORDER — HYDRALAZINE HYDROCHLORIDE 20 MG/ML
10 INJECTION INTRAMUSCULAR; INTRAVENOUS ONCE
Status: COMPLETED | OUTPATIENT
Start: 2023-12-19 | End: 2023-12-19

## 2023-12-19 RX ORDER — ASPIRIN 81 MG/1
81 TABLET ORAL EVERY MORNING
Status: DISCONTINUED | OUTPATIENT
Start: 2023-12-19 | End: 2023-12-19 | Stop reason: HOSPADM

## 2023-12-19 RX ORDER — METOPROLOL SUCCINATE 100 MG/1
200 TABLET, EXTENDED RELEASE ORAL EVERY MORNING
Status: DISCONTINUED | OUTPATIENT
Start: 2023-12-19 | End: 2023-12-19 | Stop reason: HOSPADM

## 2023-12-19 RX ORDER — INSULIN LISPRO 100 [IU]/ML
0-4 INJECTION, SOLUTION INTRAVENOUS; SUBCUTANEOUS NIGHTLY
Status: DISCONTINUED | OUTPATIENT
Start: 2023-12-19 | End: 2023-12-19 | Stop reason: HOSPADM

## 2023-12-19 RX ADMIN — ESCITALOPRAM OXALATE 10 MG: 10 TABLET ORAL at 11:44

## 2023-12-19 RX ADMIN — NYSTATIN 500000 UNITS: 100000 SUSPENSION ORAL at 11:59

## 2023-12-19 RX ADMIN — HYDRALAZINE HYDROCHLORIDE 50 MG: 50 TABLET, FILM COATED ORAL at 11:44

## 2023-12-19 RX ADMIN — SODIUM CHLORIDE, PRESERVATIVE FREE 10 ML: 5 INJECTION INTRAVENOUS at 08:06

## 2023-12-19 RX ADMIN — NYSTATIN 500000 UNITS: 100000 SUSPENSION ORAL at 16:18

## 2023-12-19 RX ADMIN — NYSTATIN 500000 UNITS: 100000 SUSPENSION ORAL at 08:04

## 2023-12-19 RX ADMIN — CARBAMIDE PEROXIDE 6.5% 5 DROP: 6.5 LIQUID AURICULAR (OTIC) at 08:03

## 2023-12-19 RX ADMIN — METOPROLOL SUCCINATE 200 MG: 100 TABLET, EXTENDED RELEASE ORAL at 11:59

## 2023-12-19 RX ADMIN — ONDANSETRON 4 MG: 2 INJECTION INTRAMUSCULAR; INTRAVENOUS at 08:03

## 2023-12-19 RX ADMIN — SODIUM CHLORIDE, PRESERVATIVE FREE 40 MG: 5 INJECTION INTRAVENOUS at 08:03

## 2023-12-19 RX ADMIN — ALPRAZOLAM 2 MG: 1 TABLET ORAL at 08:03

## 2023-12-19 RX ADMIN — SACUBITRIL AND VALSARTAN 0.5 TABLET: 24; 26 TABLET, FILM COATED ORAL at 13:09

## 2023-12-19 RX ADMIN — HYDRALAZINE HYDROCHLORIDE 10 MG: 20 INJECTION, SOLUTION INTRAMUSCULAR; INTRAVENOUS at 03:45

## 2023-12-19 RX ADMIN — AZITHROMYCIN 500 MG: 250 TABLET, FILM COATED ORAL at 08:03

## 2023-12-19 NOTE — CARE COORDINATION
CASE MANAGEMENT. ... Notified by nursing that patient will dc today-as per conversation with Dr Inna Stein. Benito Vitale with [facility] notified and arranged wc transport for 5pm. Nursing updated and will inform patient Transport forms in chart.

## 2023-12-19 NOTE — CARE COORDINATION
CASE MANAGEMENT. ... Anticipate discharge today. Await pcp input Confirmed with patient/sister that plan is [facility]. N 16 in epic. Wheelchair Forms/HENS in chart. Will follow.

## 2023-12-19 NOTE — DISCHARGE SUMMARY
[facility]   Discharge summary   Patient ID:  Vernon President  [id number]  70 y.o.  1945    Admit date: 12/15/2023    Discharge date and time: 12/19/2023    Admission Diagnoses:   Patient Active Problem List   Diagnosis    CAD (coronary artery disease)    Abnormal echocardiogram    Hypertension    Hyperlipidemia    Anxiety    Carotid artery narrowing    Drug intolerance    Type 2 diabetes mellitus (HCC)    GERD (gastroesophageal reflux disease)    Mixed hyperlipidemia    Primary osteoarthritis involving multiple joints    Ataxia    Vertigo    Acute renal insufficiency    Acute cystitis without hematuria    Ataxic gait    Acute on chronic systolic heart failure (HCC)    Acute combined systolic and diastolic congestive heart failure (HCC)    Hypomagnesemia    Valvular heart disease    Abnormal liver enzymes    Hypertensive urgency    Sleep apnea    Acute respiratory failure with hypoxia (HCC)    Bilateral pulmonary embolism (HCC)    Congestive heart failure (HCC)    Benign neoplasm of choroid    Combined form of senile cataract    Nonrheumatic mitral valve stenosis    GI bleed       Discharge Diagnoses: GIB    Consults: general surgery    Procedures: EGD and C-scope    Hospital Course:  Patient is a 70-year-old female admitted to [facility] for  GI bleed  -Monitor labs  -H&H 6.4/23.0 > RBC transfusion > 7.9/28.0  -General surgery following  -Prep today for EGD and colonoscopy tomorrow  -Hold blood thinners  -Continue IVF NS at 75  -Continue IV PPI     Tongue burning and swelling  --no thrush on exam  --1x dose solumedrol  --1x dose hydroxyzine to see if this helps  --likely some lingering effect from covid     12/17/2023  --had egd and colonoscopy today with findings of Candidiasis, mild gastritis, hemorrhoids and diverticular disease   --started on nystatin swish and swallow  --ppi therapy  --monitor hgb and transfuse prn  --wean oxygen post procedurally as able  --advance diet per gen surg to

## 2023-12-19 NOTE — PLAN OF CARE
Problem: ABCDS Injury Assessment  Goal: Absence of physical injury  12/18/2023 2131 by Walker Payment, RN  Outcome: Progressing     Problem: Discharge Planning  Goal: Discharge to home or other facility with appropriate resources  12/18/2023 2131 by Walker Payment, RN  Outcome: Progressing     Problem: Safety - Adult  Goal: Free from fall injury  12/18/2023 2131 by Walker Payment, RN  Outcome: Progressing     Problem: Chronic Conditions and Co-morbidities  Goal: Patient's chronic conditions and co-morbidity symptoms are monitored and maintained or improved  Outcome: Progressing     Problem: Pain  Goal: Verbalizes/displays adequate comfort level or baseline comfort level  Outcome: Progressing

## 2023-12-19 NOTE — PLAN OF CARE
Problem: ABCDS Injury Assessment  Goal: Absence of physical injury  12/19/2023 1019 by Dafne Lopez RN  Outcome: Progressing     Problem: Discharge Planning  Goal: Discharge to home or other facility with appropriate resources  12/19/2023 1019 by Dafne Lopze RN  Outcome: Progressing

## 2023-12-19 NOTE — PROGRESS NOTES
Occupational Therapy  OT BEDSIDE TREATMENT NOTE      Date:2023  Patient Name: Aury Turner  MRN: [id number]  : 1945  Room: [address] [place] [address]        Evaluating OT: Karla Puga OTR/L #[id number]     Referring Provider:  Meet Henao DO      Specific Provider Orders/Date:  OT Eval and Treat, 23      Diagnosis:   1. Anemia, unspecified type    2. Rectal bleeding    3.  Gastrointestinal hemorrhage, unspecified gastrointestinal hemorrhage type         Surgery: None        Pertinent Medical History: CAD, IBD, anxiety, DM      Precautions:  Fall Risk       Assessment of current deficits    [x] Functional mobility            [x]ADLs           [x] Strength                   []Cognition    [x] Functional transfers          [x] IADLs          [x] Safety Awareness   [x]Endurance    [] Fine Coordination              [x] Balance      [] Vision/perception    []Sensation      []Gross Motor Coordination  [] ROM           [] Delirium                   [] Motor Control      OT PLAN OF CARE   OT POC based on physician orders, patient diagnosis and results of clinical assessment     Frequency/Duration 1-3 days/wk for 2 weeks PRN      Specific OT Treatment Interventions to include:   * Instruction/training on adapted ADL techniques and AE recommendations to increase functional independence within precautions       * Training on energy conservation strategies, correct breathing pattern and techniques to improve independence/tolerance for self-care routine  * Functional transfer/mobility training/DME recommendations for increased independence, safety, and fall prevention  * Patient/Family education to increase follow through with safety techniques and functional independence  * Recommendation of environmental modifications for increased safety with functional transfers/mobility and ADLs  * Therapeutic exercise to improve motor endurance, ROM, and functional strength for ADLs/functional transfers  * Therapeutic activities to

## 2023-12-19 NOTE — DISCHARGE INSTR - COC
Continuity of Care Form    Patient Name: Adam Ellis   :  1945  MRN:  [id number]    Admit date:  12/15/2023  Discharge date:  2023    Code Status Order: Full Code   Advance Directives:   [address] Documentation       Date/Time Healthcare Directive Type of Healthcare Directive Copy in [address] Agent's Name Healthcare Agent's Phone Number    [phone] No, patient does not have an advance directive for healthcare treatment -- -- -- -- --            Admitting Physician:  Lilia Jackson MD  PCP: Inge Campbell, APRN - CNP    Discharging Nurse: Lisa Turcios RN  [facility] Unit/Room#: 4644/2204-E  Discharging Unit Phone Number: [phone]    Emergency Contact:   Extended Emergency Contact Information  Primary Emergency Contact: Nataliya Gauthier  Address: [address] [place] [address] [address] [facility] [place]           [place], [address] [facility] of [address] [facility] Phone: [phone]  Mobile Phone: [phone]  Relation: Brother/Sister  Preferred language: English   needed? No  Secondary Emergency Contact: Mitul Lagos \"Pascual\"  Address: [address] [place] [address],[address]           [place], [address] [place] [address] of [address] [facility] Phone: [phone]  Mobile Phone: [phone]  Relation: Brother/Sister  Hard of hearing? Yes  Low vision? Yes  Preferred language: English   needed?  No    Past Surgical History:  Past Surgical History:   Procedure Laterality Date    CARDIAC CATHETERIZATION  2021    Dr Jessica Vale      x 1    COLONOSCOPY N/A 2023    COLONOSCOPY DIAGNOSTIC performed by Smita Gonzalez MD at [address] [place] [address]  2007    CYST REMOVAL      ganglionic cyst on finger    DIAGNOSTIC CARDIAC CATH LAB PROCEDURE  2007    DILATION AND CURETTAGE OF UTERUS      X 2    PTCA  2007    TONSILLECTOMY      UPPER GASTROINTESTINAL ENDOSCOPY N/A 2023    EGD Agency   Name: [facility]  Address: [address] [place] [address]  Phone: [phone]  Fax: [phone]    Dialysis Facility (if applicable)   Name:  Address:  Dialysis Schedule:  Phone:  Fax:    / signature: Electronically signed by Luis Horton RN on 12/19/23 at 11:25 AM EST    PHYSICIAN SECTION    Prognosis: {Prognosis:6020417912}    Condition at Discharge: [address] Patient Condition:310903297}    Rehab Potential (if transferring to Rehab): {Prognosis:4963736481}    Recommended Labs or Other Treatments After Discharge: ***    Physician Certification: I certify the above information and transfer of Tammie Coker  is necessary for the continuing treatment of the diagnosis listed and that she requires {Admit to Appropriate Level of Care:01769} for {GREATER/LESS:632298662} 30 days.      Update Admission H&P: {CHP DME Changes in XNDTA:068300429}    PHYSICIAN SIGNATURE:  {Esignature:530001718}

## 2023-12-21 LAB — SURGICAL PATHOLOGY REPORT: NORMAL

## 2023-12-29 ENCOUNTER — HOSPITAL ENCOUNTER (OUTPATIENT)
Dept: OTHER | Age: 78
Setting detail: THERAPIES SERIES
Discharge: HOME | End: 2023-12-29

## 2023-12-29 NOTE — PLAN OF CARE
[facility] called and left VM stating patient is refusing to come today. I called and left a VM asking that they call back to reschedule.

## 2024-04-12 DIAGNOSIS — I50.22 CHRONIC SYSTOLIC (CONGESTIVE) HEART FAILURE (HCC): ICD-10-CM

## 2024-04-12 RX ORDER — EMPAGLIFLOZIN 10 MG/1
10 TABLET, FILM COATED ORAL DAILY
Qty: 30 TABLET | Refills: 11 | Status: SHIPPED | OUTPATIENT
Start: 2024-04-12

## 2024-05-20 RX ORDER — SACUBITRIL AND VALSARTAN 24; 26 MG/1; MG/1
0.5 TABLET, FILM COATED ORAL 2 TIMES DAILY
Qty: 30 TABLET | Refills: 11 | Status: SHIPPED | OUTPATIENT
Start: 2024-05-20

## 2024-06-13 ENCOUNTER — APPOINTMENT (OUTPATIENT)
Dept: GENERAL RADIOLOGY | Age: 79
End: 2024-06-13
Payer: MEDICARE

## 2024-06-13 ENCOUNTER — HOSPITAL ENCOUNTER (EMERGENCY)
Age: 79
Discharge: HOME | End: 2024-06-13
Attending: STUDENT IN AN ORGANIZED HEALTH CARE EDUCATION/TRAINING PROGRAM
Payer: MEDICARE

## 2024-06-13 ENCOUNTER — APPOINTMENT (OUTPATIENT)
Dept: CT IMAGING | Age: 79
End: 2024-06-13
Payer: MEDICARE

## 2024-06-13 VITALS
BODY MASS INDEX: 28.52 KG/M2 | RESPIRATION RATE: 16 BRPM | OXYGEN SATURATION: 98 % | SYSTOLIC BLOOD PRESSURE: 115 MMHG | HEIGHT: 62 IN | WEIGHT: 155 LBS | HEART RATE: 66 BPM | DIASTOLIC BLOOD PRESSURE: 59 MMHG | TEMPERATURE: 96.9 F

## 2024-06-13 DIAGNOSIS — A49.9 UTI (URINARY TRACT INFECTION), BACTERIAL: Primary | ICD-10-CM

## 2024-06-13 DIAGNOSIS — E83.42 HYPOMAGNESEMIA: ICD-10-CM

## 2024-06-13 DIAGNOSIS — R42 LIGHTHEADEDNESS: ICD-10-CM

## 2024-06-13 DIAGNOSIS — R53.83 FATIGUE, UNSPECIFIED TYPE: ICD-10-CM

## 2024-06-13 DIAGNOSIS — N39.0 UTI (URINARY TRACT INFECTION), BACTERIAL: Primary | ICD-10-CM

## 2024-06-13 LAB
ALBUMIN SERPL-MCNC: 4.7 G/DL (ref 3.5–5.2)
ALP SERPL-CCNC: 63 U/L (ref 35–104)
ALT SERPL-CCNC: 8 U/L (ref 0–32)
ANION GAP SERPL CALCULATED.3IONS-SCNC: 11 MMOL/L (ref 7–16)
AST SERPL-CCNC: 14 U/L (ref 0–31)
BACTERIA URNS QL MICRO: ABNORMAL
BASOPHILS # BLD: 0.07 K/UL (ref 0–0.2)
BASOPHILS NFR BLD: 1 % (ref 0–2)
BILIRUB SERPL-MCNC: 0.3 MG/DL (ref 0–1.2)
BILIRUB UR QL STRIP: NEGATIVE
BUN SERPL-MCNC: 22 MG/DL (ref 6–23)
CALCIUM SERPL-MCNC: 9.7 MG/DL (ref 8.6–10.2)
CHLORIDE SERPL-SCNC: 99 MMOL/L (ref 98–107)
CLARITY UR: CLEAR
CO2 SERPL-SCNC: 26 MMOL/L (ref 22–29)
COLOR UR: YELLOW
CREAT SERPL-MCNC: 1.1 MG/DL (ref 0.5–1)
EOSINOPHIL # BLD: 0.23 K/UL (ref 0.05–0.5)
EOSINOPHILS RELATIVE PERCENT: 2 % (ref 0–6)
ERYTHROCYTE [DISTWIDTH] IN BLOOD BY AUTOMATED COUNT: 13.3 % (ref 11.5–15)
GFR, ESTIMATED: 54 ML/MIN/1.73M2
GLUCOSE SERPL-MCNC: 89 MG/DL (ref 74–99)
GLUCOSE UR STRIP-MCNC: 500 MG/DL
HCT VFR BLD AUTO: 45.6 % (ref 34–48)
HGB BLD-MCNC: 13.9 G/DL (ref 11.5–15.5)
HGB UR QL STRIP.AUTO: NEGATIVE
IMM GRANULOCYTES # BLD AUTO: 0.13 K/UL (ref 0–0.58)
IMM GRANULOCYTES NFR BLD: 1 % (ref 0–5)
KETONES UR STRIP-MCNC: ABNORMAL MG/DL
LEUKOCYTE ESTERASE UR QL STRIP: ABNORMAL
LYMPHOCYTES NFR BLD: 2.01 K/UL (ref 1.5–4)
LYMPHOCYTES RELATIVE PERCENT: 16 % (ref 20–42)
MAGNESIUM SERPL-MCNC: 1.5 MG/DL (ref 1.6–2.6)
MCH RBC QN AUTO: 31 PG (ref 26–35)
MCHC RBC AUTO-ENTMCNC: 30.5 G/DL (ref 32–34.5)
MCV RBC AUTO: 101.6 FL (ref 80–99.9)
MONOCYTES NFR BLD: 1 K/UL (ref 0.1–0.95)
MONOCYTES NFR BLD: 8 % (ref 2–12)
NEUTROPHILS NFR BLD: 73 % (ref 43–80)
NEUTS SEG NFR BLD: 9.33 K/UL (ref 1.8–7.3)
NITRITE UR QL STRIP: POSITIVE
PH UR STRIP: 5.5 [PH] (ref 5–9)
PLATELET # BLD AUTO: 256 K/UL (ref 130–450)
PMV BLD AUTO: 10.3 FL (ref 7–12)
POTASSIUM SERPL-SCNC: 4.5 MMOL/L (ref 3.5–5)
PROT SERPL-MCNC: 7.4 G/DL (ref 6.4–8.3)
PROT UR STRIP-MCNC: NEGATIVE MG/DL
RBC # BLD AUTO: 4.49 M/UL (ref 3.5–5.5)
RBC #/AREA URNS HPF: ABNORMAL /HPF
SODIUM SERPL-SCNC: 136 MMOL/L (ref 132–146)
SP GR UR STRIP: 1.02 (ref 1–1.03)
TROPONIN I SERPL HS-MCNC: 14 NG/L (ref 0–9)
TROPONIN I SERPL HS-MCNC: 16 NG/L (ref 0–9)
UROBILINOGEN UR STRIP-ACNC: 0.2 EU/DL (ref 0–1)
WBC #/AREA URNS HPF: ABNORMAL /HPF
WBC OTHER # BLD: 12.8 K/UL (ref 4.5–11.5)

## 2024-06-13 PROCEDURE — 2580000003 HC RX 258: Performed by: STUDENT IN AN ORGANIZED HEALTH CARE EDUCATION/TRAINING PROGRAM

## 2024-06-13 PROCEDURE — 6360000002 HC RX W HCPCS: Performed by: STUDENT IN AN ORGANIZED HEALTH CARE EDUCATION/TRAINING PROGRAM

## 2024-06-13 PROCEDURE — 93005 ELECTROCARDIOGRAM TRACING: CPT | Performed by: STUDENT IN AN ORGANIZED HEALTH CARE EDUCATION/TRAINING PROGRAM

## 2024-06-13 PROCEDURE — 80053 COMPREHEN METABOLIC PANEL: CPT

## 2024-06-13 PROCEDURE — 81001 URINALYSIS AUTO W/SCOPE: CPT

## 2024-06-13 PROCEDURE — 87086 URINE CULTURE/COLONY COUNT: CPT

## 2024-06-13 PROCEDURE — 71046 X-RAY EXAM CHEST 2 VIEWS: CPT

## 2024-06-13 PROCEDURE — 83735 ASSAY OF MAGNESIUM: CPT

## 2024-06-13 PROCEDURE — 99285 EMERGENCY DEPT VISIT HI MDM: CPT

## 2024-06-13 PROCEDURE — 70450 CT HEAD/BRAIN W/O DYE: CPT

## 2024-06-13 PROCEDURE — 96374 THER/PROPH/DIAG INJ IV PUSH: CPT

## 2024-06-13 PROCEDURE — 84484 ASSAY OF TROPONIN QUANT: CPT

## 2024-06-13 PROCEDURE — 6370000000 HC RX 637 (ALT 250 FOR IP): Performed by: STUDENT IN AN ORGANIZED HEALTH CARE EDUCATION/TRAINING PROGRAM

## 2024-06-13 PROCEDURE — 85025 COMPLETE CBC W/AUTO DIFF WBC: CPT

## 2024-06-13 RX ORDER — 0.9 % SODIUM CHLORIDE 0.9 %
1000 INTRAVENOUS SOLUTION INTRAVENOUS ONCE
Status: COMPLETED | OUTPATIENT
Start: 2024-06-13 | End: 2024-06-13

## 2024-06-13 RX ORDER — LANOLIN ALCOHOL/MO/W.PET/CERES
400 CREAM (GRAM) TOPICAL ONCE
Status: DISCONTINUED | OUTPATIENT
Start: 2024-06-13 | End: 2024-06-14 | Stop reason: HOSPADM

## 2024-06-13 RX ORDER — MECLIZINE HCL 12.5 MG/1
25 TABLET ORAL ONCE
Status: COMPLETED | OUTPATIENT
Start: 2024-06-13 | End: 2024-06-13

## 2024-06-13 RX ORDER — CEFDINIR 300 MG/1
300 CAPSULE ORAL 2 TIMES DAILY
Qty: 20 CAPSULE | Refills: 0 | Status: SHIPPED | OUTPATIENT
Start: 2024-06-13 | End: 2024-06-23

## 2024-06-13 RX ORDER — MECLIZINE HYDROCHLORIDE 25 MG/1
25 TABLET ORAL EVERY 8 HOURS PRN
Qty: 15 TABLET | Refills: 0 | Status: SHIPPED | OUTPATIENT
Start: 2024-06-13

## 2024-06-13 RX ADMIN — MECLIZINE 25 MG: 12.5 TABLET ORAL at 16:54

## 2024-06-13 RX ADMIN — SODIUM CHLORIDE 1000 ML: 9 INJECTION, SOLUTION INTRAVENOUS at 16:55

## 2024-06-13 RX ADMIN — CEFTRIAXONE SODIUM 2000 MG: 2 INJECTION, POWDER, FOR SOLUTION INTRAMUSCULAR; INTRAVENOUS at 20:42

## 2024-06-13 ASSESSMENT — LIFESTYLE VARIABLES
HOW MANY STANDARD DRINKS CONTAINING ALCOHOL DO YOU HAVE ON A TYPICAL DAY: PATIENT DOES NOT DRINK
HOW OFTEN DO YOU HAVE A DRINK CONTAINING ALCOHOL: NEVER

## 2024-06-13 NOTE — DISCHARGE INSTRUCTIONS
Please return to the ER for any new or worsening symptoms including but not limited to Fever, Chest pain or difficulty breathing, or difficulty urinating/decreased urination  If prescribed, please be sure to  your prescriptions from the pharmacy  Please follow-up with Primary care provider as instructed

## 2024-06-13 NOTE — ED PROVIDER NOTES
multiple joints (01/18/2017), Sleep apnea (03/10/2021), SOB (shortness of breath) on exertion, and UTI (urinary tract infection).     EMERGENCY DEPARTMENT COURSE    Vitals:    Vitals:    06/13/24 1328 06/13/24 1329   BP: (!) 115/59    Pulse: 66    Resp: 16    Temp: 96.9 °F (36.1 °C)    TempSrc: Oral    SpO2: 98%    Weight:  70.3 kg (155 lb)   Height:  1.562 m (5' 1.5\")       Patient was given the following medications:  Medications   sodium chloride 0.9 % bolus 1,000 mL (0 mLs IntraVENous Stopped 6/13/24 2202)   meclizine (ANTIVERT) tablet 25 mg (25 mg Oral Given 6/13/24 1654)   cefTRIAXone (ROCEPHIN) 2,000 mg in sterile water 20 mL IV syringe (2,000 mg IntraVENous Given 6/13/24 2042)           Is this patient to be included in the SEP-1 Core Measure due to severe sepsis or septic shock?   No Exclusion criteria - the patient is NOT to be included for SEP-1 Core Measure due to: 2+ SIRS criteria are not met        Medical Decision Making/Differential Diagnosis:    CC/HPI Summary, Social Determinants of health, Records Reviewed, DDx, testing done/not done, ED Course, Reassessment, disposition considerations/shared decision making with patient, consults, disposition:      x      [facility]    ED Course as of 06/15/24 0153   Sat Rogerio 15, 2024   0152 EKG reviewed and interpreted by me, at 1633:    Sinus bradycardia, normal axis, no STEMI, nonspecific ST/T wave abnormalities throughout, rate 54, QTc 455; compared to previous EKG from 12/15/2023, ventricular rate is now decreased; otherwise no other significant changes are noted. [VG]   0152 Troponin, High Sensitivity(!): 14  Troponins are 16 and 14, delta -2, and stable compared to previous. [VG]   0152 Magnesium(!): 1.5 [VG]   0152 Nitrite, Urine(!): POSITIVE [VG]   0152 Leukocyte Esterase, Urine(!): TRACE [VG]   0152 WBC, UA: 0 TO 5 [VG]   0152 RBC, UA: 0 TO 2 [VG]   0152 Bacteria, UA(!): 1+ [VG]   0152 Sodium: 136 [VG]   0152 Potassium: 4.5 [VG]   0152 Chloride: 99 [VG]   0152  regarding the diagnosis and prognosis.  Questions are answered at this time and they are agreeable with the plan.    CONSULTS:   None       Please see ED course for any additional MDM documentation.       CRITICAL CARE TIME (.cct)     0 minutes            I am the Primary Clinician of Record.    FINAL IMPRESSION      1. UTI (urinary tract infection), bacterial    2. Lightheadedness    3. Fatigue, unspecified type    4. Hypomagnesemia          DISPOSITION/PLAN     DISPOSITION Decision To Discharge 06/13/2024 08:22:38 PM    Disposition: Discharge to home  Patient condition is stable      PATIENT REFERRED TO:  Yinka Manuel, APRN - CNP  [address] [address]  [phone]    Call in 1 day  For follow-up appointment.    [facility] Emergency Department  [address] [address]  [phone]  Go to   As needed, If symptoms worsen      DISCHARGE MEDICATIONS:  Discharge Medication List as of 6/13/2024  8:57 PM        START taking these medications    Details   cefdinir (OMNICEF) 300 MG capsule Take 1 capsule by mouth 2 times daily for 10 days, Disp-20 capsule, R-0Normal      meclizine (ANTIVERT) 25 MG tablet Take 1 tablet by mouth every 8 hours as needed for Dizziness, Disp-15 tablet, R-0Normal             DISCONTINUED MEDICATIONS:  Discharge Medication List as of 6/13/2024  8:57 PM                    Maria Teresa Palafox D.O.     Emergency Medicine      6/15/2024 1:53 AM      NOTE: This report was transcribed using voice recognition software. Every effort was made to ensure accuracy; however, inadvertent computerized transcription errors may be present           Maria Teresa Palafox DO  06/15/24 0153

## 2024-06-14 LAB
EKG ATRIAL RATE: 54 BPM
EKG P AXIS: 46 DEGREES
EKG P-R INTERVAL: 124 MS
EKG Q-T INTERVAL: 480 MS
EKG QRS DURATION: 74 MS
EKG QTC CALCULATION (BAZETT): 455 MS
EKG R AXIS: 2 DEGREES
EKG T AXIS: 68 DEGREES
EKG VENTRICULAR RATE: 54 BPM

## 2024-06-14 PROCEDURE — 93010 ELECTROCARDIOGRAM REPORT: CPT | Performed by: INTERNAL MEDICINE

## 2024-06-15 LAB
MICROORGANISM SPEC CULT: ABNORMAL
SERVICE CMNT-IMP: ABNORMAL
SPECIMEN DESCRIPTION: ABNORMAL

## 2024-06-15 ASSESSMENT — ENCOUNTER SYMPTOMS
SHORTNESS OF BREATH: 0
NAUSEA: 0
VOMITING: 0
COUGH: 0
DIARRHEA: 0
ABDOMINAL PAIN: 0
CONSTIPATION: 0

## 2024-07-25 ENCOUNTER — OFFICE VISIT (OUTPATIENT)
Dept: CARDIOLOGY CLINIC | Age: 79
End: 2024-07-25
Payer: MEDICARE

## 2024-07-25 VITALS
SYSTOLIC BLOOD PRESSURE: 115 MMHG | WEIGHT: 162.4 LBS | DIASTOLIC BLOOD PRESSURE: 60 MMHG | HEART RATE: 76 BPM | HEIGHT: 60 IN | RESPIRATION RATE: 16 BRPM | BODY MASS INDEX: 31.88 KG/M2

## 2024-07-25 DIAGNOSIS — I50.22 CHRONIC SYSTOLIC (CONGESTIVE) HEART FAILURE (HCC): Primary | ICD-10-CM

## 2024-07-25 PROCEDURE — 1090F PRES/ABSN URINE INCON ASSESS: CPT | Performed by: INTERNAL MEDICINE

## 2024-07-25 PROCEDURE — 1036F TOBACCO NON-USER: CPT | Performed by: INTERNAL MEDICINE

## 2024-07-25 PROCEDURE — G8417 CALC BMI ABV UP PARAM F/U: HCPCS | Performed by: INTERNAL MEDICINE

## 2024-07-25 PROCEDURE — G8400 PT W/DXA NO RESULTS DOC: HCPCS | Performed by: INTERNAL MEDICINE

## 2024-07-25 PROCEDURE — 1123F ACP DISCUSS/DSCN MKR DOCD: CPT | Performed by: INTERNAL MEDICINE

## 2024-07-25 PROCEDURE — 3078F DIAST BP <80 MM HG: CPT | Performed by: INTERNAL MEDICINE

## 2024-07-25 PROCEDURE — 3074F SYST BP LT 130 MM HG: CPT | Performed by: INTERNAL MEDICINE

## 2024-07-25 PROCEDURE — 93000 ELECTROCARDIOGRAM COMPLETE: CPT | Performed by: INTERNAL MEDICINE

## 2024-07-25 PROCEDURE — G8427 DOCREV CUR MEDS BY ELIG CLIN: HCPCS | Performed by: INTERNAL MEDICINE

## 2024-07-25 PROCEDURE — 99214 OFFICE O/P EST MOD 30 MIN: CPT | Performed by: INTERNAL MEDICINE

## 2024-07-25 RX ORDER — ICOSAPENT ETHYL 1000 MG/1
CAPSULE ORAL
COMMUNITY

## 2024-07-25 RX ORDER — GABAPENTIN 300 MG/1
300 CAPSULE ORAL 3 TIMES DAILY
COMMUNITY
Start: 2023-10-27 | End: 2024-07-25 | Stop reason: ALTCHOICE

## 2024-07-25 RX ORDER — FERROUS SULFATE 325(65) MG
325 TABLET ORAL
COMMUNITY
Start: 2024-06-28

## 2024-07-25 NOTE — PROGRESS NOTES
Madelaine Argueta  1945  Date of Service: 7/25/2024    Patient Active Problem List    Diagnosis Date Noted    Nonrheumatic mitral valve stenosis 10/09/2022     Priority: Medium     Overview Note:     Mild      GI bleed 12/15/2023    Hypomagnesemia 03/10/2021    Valvular heart disease 03/10/2021    Abnormal liver enzymes 03/10/2021    Hypertensive urgency 03/10/2021    Sleep apnea 03/10/2021    Acute respiratory failure with hypoxia (HCC) 03/10/2021    Bilateral pulmonary embolism (HCC) 03/10/2021    Congestive heart failure (HCC)     Acute on chronic systolic heart failure (HCC) 03/09/2021    Acute combined systolic and diastolic congestive heart failure (HCC) 03/09/2021    Ataxic gait     Vertigo 02/28/2021    Acute renal insufficiency 02/28/2021    Acute cystitis without hematuria 02/28/2021    Ataxia 02/27/2021    Mixed hyperlipidemia 01/18/2017    Primary osteoarthritis involving multiple joints 01/18/2017    Type 2 diabetes mellitus (HCC) 04/25/2016    GERD (gastroesophageal reflux disease) 04/25/2016    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    PTCA and stenting RCA (11/25/07):  bare metal stent 2.5 x 24 mm and 2.5 x 12 mm stents placed.  Cardiac catheterization (11/25/07): Left main normal; LAD 40% prox; Cx minimal luminal irregularities; % mid; LV gram not performed.   Acute inferior wall myocardial infarction (11/25/07).      Abnormal echocardiogram      Overview Note:     11/26/07: Mild inferior hypokinesis, EF > 60%, Stage I DD, trace MR, trace TR, normal RVSP.      Hypertension     Hyperlipidemia     Anxiety     Carotid artery narrowing      Overview Note:     <40% bilaterally      Drug intolerance      Overview Note:     Lipitor, Crestor, high doses of Simvastatin

## 2024-08-27 RX ORDER — SACUBITRIL AND VALSARTAN 24; 26 MG/1; MG/1
0.5 TABLET, FILM COATED ORAL 2 TIMES DAILY
Qty: 30 TABLET | Refills: 11 | Status: SHIPPED | OUTPATIENT
Start: 2024-08-27

## 2024-08-27 RX ORDER — HYDRALAZINE HYDROCHLORIDE 50 MG/1
50 TABLET, FILM COATED ORAL 2 TIMES DAILY
Qty: 60 TABLET | Refills: 11 | Status: SHIPPED | OUTPATIENT
Start: 2024-08-27

## 2024-09-12 NOTE — TELEPHONE ENCOUNTER
Patient called in and stated she has a court date on July 14th, she needs letter stating ok to drive and no restrictions and ok from EP point of view.   Please advise Abdomen soft/No distension/No tenderness

## 2024-09-23 DIAGNOSIS — I50.22 CHRONIC SYSTOLIC (CONGESTIVE) HEART FAILURE (HCC): ICD-10-CM

## 2024-09-23 RX ORDER — METOPROLOL SUCCINATE 200 MG/1
TABLET, EXTENDED RELEASE ORAL
Qty: 90 TABLET | Refills: 3 | Status: SHIPPED | OUTPATIENT
Start: 2024-09-23

## 2024-10-16 RX ORDER — AMLODIPINE BESYLATE 2.5 MG/1
TABLET ORAL
Qty: 90 TABLET | Refills: 3 | Status: SHIPPED | OUTPATIENT
Start: 2024-10-16

## 2024-11-10 ENCOUNTER — APPOINTMENT (OUTPATIENT)
Dept: GENERAL RADIOLOGY | Age: 79
DRG: 065 | End: 2024-11-10
Payer: MEDICARE

## 2024-11-10 ENCOUNTER — APPOINTMENT (OUTPATIENT)
Dept: CT IMAGING | Age: 79
DRG: 065 | End: 2024-11-10
Payer: MEDICARE

## 2024-11-10 ENCOUNTER — HOSPITAL ENCOUNTER (INPATIENT)
Age: 79
LOS: 1 days | Discharge: HOME HEALTH | DRG: 065 | End: 2024-11-12
Attending: EMERGENCY MEDICINE | Admitting: INTERNAL MEDICINE
Payer: MEDICARE

## 2024-11-10 DIAGNOSIS — I67.1 ANEURYSM OF POSTERIOR CEREBRAL ARTERY: ICD-10-CM

## 2024-11-10 DIAGNOSIS — I63.9 CEREBROVASCULAR ACCIDENT (CVA), UNSPECIFIED MECHANISM (HCC): ICD-10-CM

## 2024-11-10 DIAGNOSIS — R42 VERTIGO: ICD-10-CM

## 2024-11-10 DIAGNOSIS — R42 DIZZINESS: Primary | ICD-10-CM

## 2024-11-10 LAB
ALBUMIN SERPL-MCNC: 3.9 G/DL (ref 3.5–5.2)
ALP SERPL-CCNC: 54 U/L (ref 35–104)
ALT SERPL-CCNC: 11 U/L (ref 0–32)
ANION GAP SERPL CALCULATED.3IONS-SCNC: 12 MMOL/L (ref 7–16)
AST SERPL-CCNC: 14 U/L (ref 0–31)
BACTERIA URNS QL MICRO: ABNORMAL
BASOPHILS # BLD: 0.05 K/UL (ref 0–0.2)
BASOPHILS NFR BLD: 1 % (ref 0–2)
BILIRUB SERPL-MCNC: 0.3 MG/DL (ref 0–1.2)
BILIRUB UR QL STRIP: NEGATIVE
BNP SERPL-MCNC: 760 PG/ML (ref 0–450)
BUN SERPL-MCNC: 18 MG/DL (ref 6–23)
CALCIUM SERPL-MCNC: 9 MG/DL (ref 8.6–10.2)
CHLORIDE SERPL-SCNC: 104 MMOL/L (ref 98–107)
CLARITY UR: CLEAR
CO2 SERPL-SCNC: 23 MMOL/L (ref 22–29)
COLOR UR: YELLOW
CREAT SERPL-MCNC: 1 MG/DL (ref 0.5–1)
EOSINOPHIL # BLD: 0.16 K/UL (ref 0.05–0.5)
EOSINOPHILS RELATIVE PERCENT: 2 % (ref 0–6)
ERYTHROCYTE [DISTWIDTH] IN BLOOD BY AUTOMATED COUNT: 12.7 % (ref 11.5–15)
GFR, ESTIMATED: 62 ML/MIN/1.73M2
GLUCOSE BLD-MCNC: 108 MG/DL (ref 74–99)
GLUCOSE SERPL-MCNC: 131 MG/DL (ref 74–99)
GLUCOSE UR STRIP-MCNC: >=1000 MG/DL
HCT VFR BLD AUTO: 40.8 % (ref 34–48)
HGB BLD-MCNC: 12.8 G/DL (ref 11.5–15.5)
HGB UR QL STRIP.AUTO: NEGATIVE
IMM GRANULOCYTES # BLD AUTO: 0.1 K/UL (ref 0–0.58)
IMM GRANULOCYTES NFR BLD: 1 % (ref 0–5)
INFLUENZA A BY PCR: NOT DETECTED
INFLUENZA B BY PCR: NOT DETECTED
KETONES UR STRIP-MCNC: NEGATIVE MG/DL
LEUKOCYTE ESTERASE UR QL STRIP: NEGATIVE
LYMPHOCYTES NFR BLD: 1.37 K/UL (ref 1.5–4)
LYMPHOCYTES RELATIVE PERCENT: 19 % (ref 20–42)
MCH RBC QN AUTO: 32.2 PG (ref 26–35)
MCHC RBC AUTO-ENTMCNC: 31.4 G/DL (ref 32–34.5)
MCV RBC AUTO: 102.5 FL (ref 80–99.9)
MONOCYTES NFR BLD: 0.7 K/UL (ref 0.1–0.95)
MONOCYTES NFR BLD: 10 % (ref 2–12)
NEUTROPHILS NFR BLD: 67 % (ref 43–80)
NEUTS SEG NFR BLD: 4.73 K/UL (ref 1.8–7.3)
NITRITE UR QL STRIP: POSITIVE
PH UR STRIP: 5.5 [PH] (ref 5–9)
PLATELET # BLD AUTO: 212 K/UL (ref 130–450)
PMV BLD AUTO: 9.7 FL (ref 7–12)
POTASSIUM SERPL-SCNC: 3.9 MMOL/L (ref 3.5–5)
PROT SERPL-MCNC: 5.9 G/DL (ref 6.4–8.3)
PROT UR STRIP-MCNC: ABNORMAL MG/DL
RBC # BLD AUTO: 3.98 M/UL (ref 3.5–5.5)
RBC #/AREA URNS HPF: ABNORMAL /HPF
SARS-COV-2 RDRP RESP QL NAA+PROBE: NOT DETECTED
SODIUM SERPL-SCNC: 139 MMOL/L (ref 132–146)
SP GR UR STRIP: 1.02 (ref 1–1.03)
SPECIMEN DESCRIPTION: NORMAL
TROPONIN I SERPL HS-MCNC: 18 NG/L (ref 0–9)
TROPONIN I SERPL HS-MCNC: 20 NG/L (ref 0–9)
UROBILINOGEN UR STRIP-ACNC: 0.2 EU/DL (ref 0–1)
WBC #/AREA URNS HPF: ABNORMAL /HPF
WBC OTHER # BLD: 7.1 K/UL (ref 4.5–11.5)

## 2024-11-10 PROCEDURE — 87502 INFLUENZA DNA AMP PROBE: CPT

## 2024-11-10 PROCEDURE — 87635 SARS-COV-2 COVID-19 AMP PRB: CPT

## 2024-11-10 PROCEDURE — 84484 ASSAY OF TROPONIN QUANT: CPT

## 2024-11-10 PROCEDURE — 82962 GLUCOSE BLOOD TEST: CPT

## 2024-11-10 PROCEDURE — 70496 CT ANGIOGRAPHY HEAD: CPT

## 2024-11-10 PROCEDURE — 80053 COMPREHEN METABOLIC PANEL: CPT

## 2024-11-10 PROCEDURE — 93005 ELECTROCARDIOGRAM TRACING: CPT

## 2024-11-10 PROCEDURE — 2580000003 HC RX 258: Performed by: EMERGENCY MEDICINE

## 2024-11-10 PROCEDURE — 6360000002 HC RX W HCPCS: Performed by: EMERGENCY MEDICINE

## 2024-11-10 PROCEDURE — 85025 COMPLETE CBC W/AUTO DIFF WBC: CPT

## 2024-11-10 PROCEDURE — 81001 URINALYSIS AUTO W/SCOPE: CPT

## 2024-11-10 PROCEDURE — 70450 CT HEAD/BRAIN W/O DYE: CPT

## 2024-11-10 PROCEDURE — 96374 THER/PROPH/DIAG INJ IV PUSH: CPT

## 2024-11-10 PROCEDURE — 83880 ASSAY OF NATRIURETIC PEPTIDE: CPT

## 2024-11-10 PROCEDURE — 99285 EMERGENCY DEPT VISIT HI MDM: CPT

## 2024-11-10 PROCEDURE — 70498 CT ANGIOGRAPHY NECK: CPT

## 2024-11-10 PROCEDURE — 6370000000 HC RX 637 (ALT 250 FOR IP)

## 2024-11-10 PROCEDURE — 6360000004 HC RX CONTRAST MEDICATION: Performed by: RADIOLOGY

## 2024-11-10 PROCEDURE — 0042T CT BRAIN PERFUSION: CPT

## 2024-11-10 PROCEDURE — 71045 X-RAY EXAM CHEST 1 VIEW: CPT

## 2024-11-10 RX ORDER — MECLIZINE HCL 12.5 MG 12.5 MG/1
25 TABLET ORAL ONCE
Status: COMPLETED | OUTPATIENT
Start: 2024-11-10 | End: 2024-11-10

## 2024-11-10 RX ORDER — DIAZEPAM 5 MG/1
5 TABLET ORAL ONCE
Status: COMPLETED | OUTPATIENT
Start: 2024-11-10 | End: 2024-11-10

## 2024-11-10 RX ORDER — IOPAMIDOL 755 MG/ML
75 INJECTION, SOLUTION INTRAVASCULAR
Status: COMPLETED | OUTPATIENT
Start: 2024-11-10 | End: 2024-11-10

## 2024-11-10 RX ADMIN — WATER 1000 MG: 1 INJECTION INTRAMUSCULAR; INTRAVENOUS; SUBCUTANEOUS at 22:37

## 2024-11-10 RX ADMIN — IOPAMIDOL 75 ML: 755 INJECTION, SOLUTION INTRAVENOUS at 19:29

## 2024-11-10 RX ADMIN — MECLIZINE 25 MG: 12.5 TABLET ORAL at 18:49

## 2024-11-10 RX ADMIN — DIAZEPAM 5 MG: 5 TABLET ORAL at 20:53

## 2024-11-10 NOTE — ED PROVIDER NOTES
[facility] EMERGENCY DEPARTMENT  EMERGENCY DEPARTMENT ENCOUNTER        Pt Name: Madelaine Argueta  MRN: [id number]  Birthdate 1945  Date of evaluation: 11/10/2024  Provider: Mahin Moreno MD  PCP: Yinka Manuel APRN - CNP  Note Started: 5:56 PM EST 11/10/24    CHIEF COMPLAINT       Chief Complaint   Patient presents with    Dizziness     Hx of vertigo       HISTORY OF PRESENT ILLNESS: 1 or more Elements   History From: Patient    Limitations to history : None  Social Determinants : None    Madelaine Argueta is a 79 y.o. female with a history of hypertension, hyperlipidemia, coronary artery disease, GERD, vertigo, congestive heart failure, PE on anticoagulation with Eliquis presents with complaints of room spinning sensation that occurred around 2 PM today while she was cleaning the room.  Patient mentions that it was worse with her movements and did not get relieved even when she laid down.  Patient was able to ambulate with support at home which is not her baseline, she is usually independent for ambulation..    Denies any headache, slurring of speech, falls or weakness of any extremities.    Denies any falls.    Denies any fever, chills, nausea, vomiting, headache, vision changes, neck tenderness or stiffness, weakness, chest pain, palpitations, leg swelling/tenderness, sob, cough, abdominal pain, dysuria, hematuria, diarrhea, constipation, bloody stools.    Nursing Notes were all reviewed and agreed with or any disagreements were addressed in the HPI.    ROS:   Pertinent positives and negatives are stated within HPI, all other systems reviewed and are negative.      --------------------------------------------- PAST HISTORY ---------------------------------------------  Past Medical History:       Diagnosis Date    Abnormal echocardiogram     Acute combined systolic and diastolic congestive heart failure (HCC) 03/09/2021    TTE 3/2/21 25-30% EF    Acute renal insufficiency 02/28/2021

## 2024-11-11 ENCOUNTER — APPOINTMENT (OUTPATIENT)
Dept: MRI IMAGING | Age: 79
DRG: 065 | End: 2024-11-11
Payer: MEDICARE

## 2024-11-11 ENCOUNTER — APPOINTMENT (OUTPATIENT)
Age: 79
DRG: 065 | End: 2024-11-11
Attending: INTERNAL MEDICINE
Payer: MEDICARE

## 2024-11-11 PROBLEM — R42 VERTIGO: Chronic | Status: ACTIVE | Noted: 2021-02-28

## 2024-11-11 LAB
ANION GAP SERPL CALCULATED.3IONS-SCNC: 14 MMOL/L (ref 7–16)
BASOPHILS # BLD: 0.07 K/UL (ref 0–0.2)
BASOPHILS NFR BLD: 1 % (ref 0–2)
BUN SERPL-MCNC: 18 MG/DL (ref 6–23)
CALCIUM SERPL-MCNC: 9.5 MG/DL (ref 8.6–10.2)
CHLORIDE SERPL-SCNC: 105 MMOL/L (ref 98–107)
CHOLEST SERPL-MCNC: 123 MG/DL
CO2 SERPL-SCNC: 19 MMOL/L (ref 22–29)
CREAT SERPL-MCNC: 0.9 MG/DL (ref 0.5–1)
ECHO AO ASC DIAM: 3.6 CM
ECHO AO ASCENDING AORTA INDEX: 2.16 CM/M2
ECHO AO ROOT DIAM: 3.1 CM
ECHO AO ROOT INDEX: 1.86 CM/M2
ECHO AO SINUS VALSALVA DIAM: 3.3 CM
ECHO AO SINUS VALSALVA INDEX: 1.98 CM/M2
ECHO AR MAX VEL PISA: 4 M/S
ECHO AV AREA PEAK VELOCITY: 1.9 CM2
ECHO AV AREA VTI: 2.3 CM2
ECHO AV AREA/BSA PEAK VELOCITY: 1.1 CM2/M2
ECHO AV AREA/BSA VTI: 1.4 CM2/M2
ECHO AV CUSP MM: 1.7 CM
ECHO AV MEAN GRADIENT: 4 MMHG
ECHO AV MEAN VELOCITY: 0.9 M/S
ECHO AV PEAK GRADIENT: 9 MMHG
ECHO AV PEAK VELOCITY: 1.5 M/S
ECHO AV REGURGITANT PHT: 669.5 MILLISECOND
ECHO AV VELOCITY RATIO: 0.6
ECHO AV VTI: 23.8 CM
ECHO BSA: 1.73 M2
ECHO EST RA PRESSURE: 3 MMHG
ECHO LA DIAMETER INDEX: 2.22 CM/M2
ECHO LA DIAMETER: 3.7 CM
ECHO LA TO AORTIC ROOT RATIO: 1.19
ECHO LA VOL A-L A2C: 57 ML (ref 22–52)
ECHO LA VOL A-L A4C: 62 ML (ref 22–52)
ECHO LA VOL MOD A2C: 55 ML (ref 22–52)
ECHO LA VOL MOD A4C: 60 ML (ref 22–52)
ECHO LA VOLUME AREA LENGTH: 61 ML
ECHO LA VOLUME INDEX A-L A2C: 34 ML/M2 (ref 16–34)
ECHO LA VOLUME INDEX A-L A4C: 37 ML/M2 (ref 16–34)
ECHO LA VOLUME INDEX AREA LENGTH: 37 ML/M2 (ref 16–34)
ECHO LA VOLUME INDEX MOD A2C: 33 ML/M2 (ref 16–34)
ECHO LA VOLUME INDEX MOD A4C: 36 ML/M2 (ref 16–34)
ECHO LV E' LATERAL VELOCITY: 6 CM/S
ECHO LV E' SEPTAL VELOCITY: 3 CM/S
ECHO LV EDV A2C: 73 ML
ECHO LV EDV A4C: 51 ML
ECHO LV EDV BP: 61 ML (ref 56–104)
ECHO LV EDV INDEX A4C: 31 ML/M2
ECHO LV EDV INDEX BP: 37 ML/M2
ECHO LV EDV NDEX A2C: 44 ML/M2
ECHO LV EF PHYSICIAN: 56 %
ECHO LV EJECTION FRACTION A2C: 51 %
ECHO LV EJECTION FRACTION A4C: 56 %
ECHO LV EJECTION FRACTION BIPLANE: 53 % (ref 55–100)
ECHO LV ESV A2C: 36 ML
ECHO LV ESV A4C: 22 ML
ECHO LV ESV BP: 29 ML (ref 19–49)
ECHO LV ESV INDEX A2C: 22 ML/M2
ECHO LV ESV INDEX A4C: 13 ML/M2
ECHO LV ESV INDEX BP: 17 ML/M2
ECHO LV FRACTIONAL SHORTENING: 27 % (ref 28–44)
ECHO LV INTERNAL DIMENSION DIASTOLE INDEX: 2.46 CM/M2
ECHO LV INTERNAL DIMENSION DIASTOLIC: 4.1 CM (ref 3.9–5.3)
ECHO LV INTERNAL DIMENSION SYSTOLIC INDEX: 1.8 CM/M2
ECHO LV INTERNAL DIMENSION SYSTOLIC: 3 CM
ECHO LV ISOVOLUMETRIC RELAXATION TIME (IVRT): 92.3 MS
ECHO LV IVSD: 1.3 CM (ref 0.6–0.9)
ECHO LV IVSS: 1.4 CM
ECHO LV MASS 2D: 193.5 G (ref 67–162)
ECHO LV MASS INDEX 2D: 115.9 G/M2 (ref 43–95)
ECHO LV POSTERIOR WALL DIASTOLIC: 1.3 CM (ref 0.6–0.9)
ECHO LV POSTERIOR WALL SYSTOLIC: 1.6 CM
ECHO LV RELATIVE WALL THICKNESS RATIO: 0.63
ECHO LVOT AREA: 3.1 CM2
ECHO LVOT AV VTI INDEX: 0.71
ECHO LVOT DIAM: 2 CM
ECHO LVOT MEAN GRADIENT: 2 MMHG
ECHO LVOT PEAK GRADIENT: 3 MMHG
ECHO LVOT PEAK VELOCITY: 0.9 M/S
ECHO LVOT STROKE VOLUME INDEX: 31.8 ML/M2
ECHO LVOT SV: 53.1 ML
ECHO LVOT VTI: 16.9 CM
ECHO MV "A" WAVE DURATION: 96.9 MSEC
ECHO MV A VELOCITY: 1.05 M/S
ECHO MV AREA PHT: 2.6 CM2
ECHO MV AREA VTI: 2.6 CM2
ECHO MV E DECELERATION TIME (DT): 283.6 MS
ECHO MV E VELOCITY: 0.42 M/S
ECHO MV E/A RATIO: 0.4
ECHO MV E/E' LATERAL: 7
ECHO MV E/E' RATIO (AVERAGED): 10.5
ECHO MV E/E' SEPTAL: 14
ECHO MV LVOT VTI INDEX: 1.2
ECHO MV MAX VELOCITY: 1 M/S
ECHO MV MEAN GRADIENT: 1 MMHG
ECHO MV MEAN VELOCITY: 0.5 M/S
ECHO MV PEAK GRADIENT: 4 MMHG
ECHO MV PRESSURE HALF TIME (PHT): 85.6 MS
ECHO MV VTI: 20.3 CM
ECHO PV MAX VELOCITY: 0.7 M/S
ECHO PV MEAN GRADIENT: 1 MMHG
ECHO PV MEAN VELOCITY: 0.5 M/S
ECHO PV PEAK GRADIENT: 2 MMHG
ECHO PV VTI: 9.9 CM
ECHO RV INTERNAL DIMENSION: 2.7 CM
ECHO RV TAPSE: 1.8 CM (ref 1.7–?)
EKG ATRIAL RATE: 67 BPM
EKG P AXIS: 31 DEGREES
EKG P-R INTERVAL: 112 MS
EKG Q-T INTERVAL: 448 MS
EKG QRS DURATION: 80 MS
EKG QTC CALCULATION (BAZETT): 473 MS
EKG R AXIS: 2 DEGREES
EKG T AXIS: 57 DEGREES
EKG VENTRICULAR RATE: 67 BPM
EOSINOPHIL # BLD: 0.13 K/UL (ref 0.05–0.5)
EOSINOPHILS RELATIVE PERCENT: 2 % (ref 0–6)
ERYTHROCYTE [DISTWIDTH] IN BLOOD BY AUTOMATED COUNT: 12.8 % (ref 11.5–15)
GFR, ESTIMATED: 66 ML/MIN/1.73M2
GLUCOSE BLD-MCNC: 126 MG/DL (ref 74–99)
GLUCOSE SERPL-MCNC: 87 MG/DL (ref 74–99)
HBA1C MFR BLD: 5.7 % (ref 4–5.6)
HCT VFR BLD AUTO: 44.6 % (ref 34–48)
HDLC SERPL-MCNC: 44 MG/DL
HGB BLD-MCNC: 13.8 G/DL (ref 11.5–15.5)
IMM GRANULOCYTES # BLD AUTO: 0.11 K/UL (ref 0–0.58)
IMM GRANULOCYTES NFR BLD: 1 % (ref 0–5)
LDLC SERPL CALC-MCNC: 26 MG/DL
LYMPHOCYTES NFR BLD: 0.81 K/UL (ref 1.5–4)
LYMPHOCYTES RELATIVE PERCENT: 9 % (ref 20–42)
MCH RBC QN AUTO: 32.4 PG (ref 26–35)
MCHC RBC AUTO-ENTMCNC: 30.9 G/DL (ref 32–34.5)
MCV RBC AUTO: 104.7 FL (ref 80–99.9)
MONOCYTES NFR BLD: 0.73 K/UL (ref 0.1–0.95)
MONOCYTES NFR BLD: 8 % (ref 2–12)
NEUTROPHILS NFR BLD: 79 % (ref 43–80)
NEUTS SEG NFR BLD: 6.96 K/UL (ref 1.8–7.3)
PLATELET # BLD AUTO: 209 K/UL (ref 130–450)
PMV BLD AUTO: 9.7 FL (ref 7–12)
POTASSIUM SERPL-SCNC: 4.3 MMOL/L (ref 3.5–5)
RBC # BLD AUTO: 4.26 M/UL (ref 3.5–5.5)
SODIUM SERPL-SCNC: 138 MMOL/L (ref 132–146)
TRIGL SERPL-MCNC: 266 MG/DL
VLDLC SERPL CALC-MCNC: 53 MG/DL
WBC OTHER # BLD: 8.8 K/UL (ref 4.5–11.5)

## 2024-11-11 PROCEDURE — G0378 HOSPITAL OBSERVATION PER HR: HCPCS

## 2024-11-11 PROCEDURE — 36415 COLL VENOUS BLD VENIPUNCTURE: CPT

## 2024-11-11 PROCEDURE — 83036 HEMOGLOBIN GLYCOSYLATED A1C: CPT

## 2024-11-11 PROCEDURE — 6370000000 HC RX 637 (ALT 250 FOR IP): Performed by: INTERNAL MEDICINE

## 2024-11-11 PROCEDURE — 80048 BASIC METABOLIC PNL TOTAL CA: CPT

## 2024-11-11 PROCEDURE — 99233 SBSQ HOSP IP/OBS HIGH 50: CPT | Performed by: INTERNAL MEDICINE

## 2024-11-11 PROCEDURE — 97165 OT EVAL LOW COMPLEX 30 MIN: CPT

## 2024-11-11 PROCEDURE — 2580000003 HC RX 258: Performed by: HOSPITALIST

## 2024-11-11 PROCEDURE — 80061 LIPID PANEL: CPT

## 2024-11-11 PROCEDURE — C8929 TTE W OR WO FOL WCON,DOPPLER: HCPCS

## 2024-11-11 PROCEDURE — 85025 COMPLETE CBC W/AUTO DIFF WBC: CPT

## 2024-11-11 PROCEDURE — 6370000000 HC RX 637 (ALT 250 FOR IP): Performed by: HOSPITALIST

## 2024-11-11 PROCEDURE — 93010 ELECTROCARDIOGRAM REPORT: CPT | Performed by: INTERNAL MEDICINE

## 2024-11-11 PROCEDURE — 6360000004 HC RX CONTRAST MEDICATION: Performed by: INTERNAL MEDICINE

## 2024-11-11 PROCEDURE — 70551 MRI BRAIN STEM W/O DYE: CPT

## 2024-11-11 PROCEDURE — 6370000000 HC RX 637 (ALT 250 FOR IP)

## 2024-11-11 PROCEDURE — 97161 PT EVAL LOW COMPLEX 20 MIN: CPT

## 2024-11-11 PROCEDURE — 82962 GLUCOSE BLOOD TEST: CPT

## 2024-11-11 RX ORDER — ESCITALOPRAM OXALATE 10 MG/1
10 TABLET ORAL EVERY MORNING
Status: CANCELLED | OUTPATIENT
Start: 2024-11-11

## 2024-11-11 RX ORDER — METOPROLOL SUCCINATE 100 MG/1
200 TABLET, EXTENDED RELEASE ORAL DAILY
Status: DISCONTINUED | OUTPATIENT
Start: 2024-11-11 | End: 2024-11-12 | Stop reason: HOSPADM

## 2024-11-11 RX ORDER — ASPIRIN 81 MG/1
81 TABLET ORAL EVERY MORNING
Status: DISCONTINUED | OUTPATIENT
Start: 2024-11-11 | End: 2024-11-12 | Stop reason: HOSPADM

## 2024-11-11 RX ORDER — AMLODIPINE BESYLATE 2.5 MG/1
2.5 TABLET ORAL NIGHTLY
Status: DISCONTINUED | OUTPATIENT
Start: 2024-11-11 | End: 2024-11-12 | Stop reason: HOSPADM

## 2024-11-11 RX ORDER — DOCUSATE SODIUM 100 MG/1
100 CAPSULE, LIQUID FILLED ORAL DAILY
Status: DISCONTINUED | OUTPATIENT
Start: 2024-11-11 | End: 2024-11-12 | Stop reason: HOSPADM

## 2024-11-11 RX ORDER — LANOLIN ALCOHOL/MO/W.PET/CERES
3 CREAM (GRAM) TOPICAL NIGHTLY PRN
Status: DISCONTINUED | OUTPATIENT
Start: 2024-11-11 | End: 2024-11-12 | Stop reason: HOSPADM

## 2024-11-11 RX ORDER — FERROUS SULFATE 325(65) MG
325 TABLET ORAL
Status: DISCONTINUED | OUTPATIENT
Start: 2024-11-12 | End: 2024-11-12 | Stop reason: HOSPADM

## 2024-11-11 RX ORDER — ESCITALOPRAM OXALATE 10 MG/1
10 TABLET ORAL EVERY MORNING
Status: DISCONTINUED | OUTPATIENT
Start: 2024-11-11 | End: 2024-11-12 | Stop reason: HOSPADM

## 2024-11-11 RX ORDER — PANTOPRAZOLE SODIUM 40 MG/1
40 TABLET, DELAYED RELEASE ORAL
Status: DISCONTINUED | OUTPATIENT
Start: 2024-11-11 | End: 2024-11-12 | Stop reason: HOSPADM

## 2024-11-11 RX ORDER — MAGNESIUM HYDROXIDE/ALUMINUM HYDROXICE/SIMETHICONE 120; 1200; 1200 MG/30ML; MG/30ML; MG/30ML
30 SUSPENSION ORAL EVERY 6 HOURS PRN
Status: DISCONTINUED | OUTPATIENT
Start: 2024-11-11 | End: 2024-11-12 | Stop reason: HOSPADM

## 2024-11-11 RX ORDER — SODIUM CHLORIDE 9 MG/ML
INJECTION, SOLUTION INTRAVENOUS PRN
Status: DISCONTINUED | OUTPATIENT
Start: 2024-11-11 | End: 2024-11-12 | Stop reason: HOSPADM

## 2024-11-11 RX ORDER — POTASSIUM CHLORIDE 7.45 MG/ML
10 INJECTION INTRAVENOUS PRN
Status: DISCONTINUED | OUTPATIENT
Start: 2024-11-11 | End: 2024-11-11 | Stop reason: RX

## 2024-11-11 RX ORDER — ALPRAZOLAM 1 MG/1
2 TABLET ORAL 2 TIMES DAILY PRN
Status: DISCONTINUED | OUTPATIENT
Start: 2024-11-11 | End: 2024-11-12 | Stop reason: HOSPADM

## 2024-11-11 RX ORDER — SODIUM CHLORIDE 0.9 % (FLUSH) 0.9 %
5-40 SYRINGE (ML) INJECTION EVERY 12 HOURS SCHEDULED
Status: DISCONTINUED | OUTPATIENT
Start: 2024-11-11 | End: 2024-11-12 | Stop reason: HOSPADM

## 2024-11-11 RX ORDER — ALPRAZOLAM 1 MG/1
2 TABLET ORAL ONCE
Status: COMPLETED | OUTPATIENT
Start: 2024-11-11 | End: 2024-11-11

## 2024-11-11 RX ORDER — POTASSIUM CHLORIDE 1500 MG/1
40 TABLET, EXTENDED RELEASE ORAL PRN
Status: DISCONTINUED | OUTPATIENT
Start: 2024-11-11 | End: 2024-11-12 | Stop reason: HOSPADM

## 2024-11-11 RX ORDER — CLOPIDOGREL BISULFATE 75 MG/1
75 TABLET ORAL DAILY
Status: DISCONTINUED | OUTPATIENT
Start: 2024-11-11 | End: 2024-11-12 | Stop reason: HOSPADM

## 2024-11-11 RX ORDER — ATORVASTATIN CALCIUM 40 MG/1
40 TABLET, FILM COATED ORAL NIGHTLY
Status: CANCELLED | OUTPATIENT
Start: 2024-11-11

## 2024-11-11 RX ORDER — FUROSEMIDE 20 MG/1
20 TABLET ORAL
Status: DISCONTINUED | OUTPATIENT
Start: 2024-11-11 | End: 2024-11-12 | Stop reason: HOSPADM

## 2024-11-11 RX ORDER — SENNOSIDES A AND B 8.6 MG/1
1 TABLET, FILM COATED ORAL DAILY PRN
Status: DISCONTINUED | OUTPATIENT
Start: 2024-11-11 | End: 2024-11-12 | Stop reason: HOSPADM

## 2024-11-11 RX ORDER — CHOLECALCIFEROL (VITAMIN D3) 50 MCG
2000 TABLET ORAL NIGHTLY
Status: DISCONTINUED | OUTPATIENT
Start: 2024-11-11 | End: 2024-11-12 | Stop reason: HOSPADM

## 2024-11-11 RX ORDER — ATORVASTATIN CALCIUM 40 MG/1
80 TABLET, FILM COATED ORAL NIGHTLY
Status: DISCONTINUED | OUTPATIENT
Start: 2024-11-11 | End: 2024-11-12 | Stop reason: HOSPADM

## 2024-11-11 RX ORDER — SODIUM CHLORIDE 0.9 % (FLUSH) 0.9 %
5-40 SYRINGE (ML) INJECTION PRN
Status: DISCONTINUED | OUTPATIENT
Start: 2024-11-11 | End: 2024-11-12 | Stop reason: HOSPADM

## 2024-11-11 RX ORDER — ACETAMINOPHEN 325 MG/1
650 TABLET ORAL EVERY 6 HOURS PRN
Status: DISCONTINUED | OUTPATIENT
Start: 2024-11-11 | End: 2024-11-12 | Stop reason: HOSPADM

## 2024-11-11 RX ORDER — ONDANSETRON 2 MG/ML
4 INJECTION INTRAMUSCULAR; INTRAVENOUS EVERY 6 HOURS PRN
Status: DISCONTINUED | OUTPATIENT
Start: 2024-11-11 | End: 2024-11-12 | Stop reason: HOSPADM

## 2024-11-11 RX ORDER — UBIDECARENONE 200 MG
200 CAPSULE ORAL EVERY MORNING
Status: CANCELLED | OUTPATIENT
Start: 2024-11-11

## 2024-11-11 RX ORDER — ONDANSETRON 4 MG/1
4 TABLET, ORALLY DISINTEGRATING ORAL EVERY 8 HOURS PRN
Status: DISCONTINUED | OUTPATIENT
Start: 2024-11-11 | End: 2024-11-12 | Stop reason: HOSPADM

## 2024-11-11 RX ORDER — UBIDECARENONE 200 MG
200 CAPSULE ORAL EVERY MORNING
Status: DISCONTINUED | OUTPATIENT
Start: 2024-11-11 | End: 2024-11-11 | Stop reason: DRUGHIGH

## 2024-11-11 RX ORDER — HYDRALAZINE HYDROCHLORIDE 50 MG/1
50 TABLET, FILM COATED ORAL 3 TIMES DAILY
Status: DISCONTINUED | OUTPATIENT
Start: 2024-11-11 | End: 2024-11-12 | Stop reason: HOSPADM

## 2024-11-11 RX ORDER — ACETAMINOPHEN 650 MG/1
650 SUPPOSITORY RECTAL EVERY 6 HOURS PRN
Status: DISCONTINUED | OUTPATIENT
Start: 2024-11-11 | End: 2024-11-12 | Stop reason: HOSPADM

## 2024-11-11 RX ORDER — MAGNESIUM SULFATE IN WATER 40 MG/ML
2000 INJECTION, SOLUTION INTRAVENOUS PRN
Status: DISCONTINUED | OUTPATIENT
Start: 2024-11-11 | End: 2024-11-12 | Stop reason: HOSPADM

## 2024-11-11 RX ORDER — ALPRAZOLAM 1 MG/1
2 TABLET ORAL 2 TIMES DAILY PRN
Status: CANCELLED | OUTPATIENT
Start: 2024-11-11

## 2024-11-11 RX ADMIN — APIXABAN 5 MG: 5 TABLET, FILM COATED ORAL at 21:04

## 2024-11-11 RX ADMIN — PANTOPRAZOLE SODIUM 40 MG: 40 TABLET, DELAYED RELEASE ORAL at 10:55

## 2024-11-11 RX ADMIN — METOPROLOL SUCCINATE 200 MG: 100 TABLET, EXTENDED RELEASE ORAL at 10:56

## 2024-11-11 RX ADMIN — ACETAMINOPHEN 650 MG: 325 TABLET ORAL at 10:55

## 2024-11-11 RX ADMIN — CLOPIDOGREL BISULFATE 75 MG: 75 TABLET ORAL at 10:52

## 2024-11-11 RX ADMIN — ACETAMINOPHEN 650 MG: 325 TABLET ORAL at 04:20

## 2024-11-11 RX ADMIN — SODIUM CHLORIDE, PRESERVATIVE FREE 10 ML: 5 INJECTION INTRAVENOUS at 09:06

## 2024-11-11 RX ADMIN — SODIUM CHLORIDE, PRESERVATIVE FREE 10 ML: 5 INJECTION INTRAVENOUS at 21:00

## 2024-11-11 RX ADMIN — ATORVASTATIN CALCIUM 80 MG: 40 TABLET, FILM COATED ORAL at 21:04

## 2024-11-11 RX ADMIN — ALPRAZOLAM 2 MG: 1 TABLET ORAL at 06:17

## 2024-11-11 RX ADMIN — APIXABAN 5 MG: 5 TABLET, FILM COATED ORAL at 10:55

## 2024-11-11 RX ADMIN — HYDRALAZINE HYDROCHLORIDE 50 MG: 50 TABLET ORAL at 10:52

## 2024-11-11 RX ADMIN — AMLODIPINE BESYLATE 2.5 MG: 2.5 TABLET ORAL at 21:04

## 2024-11-11 RX ADMIN — HYDRALAZINE HYDROCHLORIDE 50 MG: 50 TABLET ORAL at 21:04

## 2024-11-11 RX ADMIN — DOCUSATE SODIUM 100 MG: 100 CAPSULE, LIQUID FILLED ORAL at 21:24

## 2024-11-11 RX ADMIN — ALPRAZOLAM 2 MG: 1 TABLET ORAL at 21:22

## 2024-11-11 RX ADMIN — SENNOSIDES 8.6 MG: 8.6 TABLET, COATED ORAL at 21:04

## 2024-11-11 RX ADMIN — ESCITALOPRAM OXALATE 10 MG: 10 TABLET ORAL at 10:55

## 2024-11-11 RX ADMIN — FUROSEMIDE 20 MG: 20 TABLET ORAL at 10:55

## 2024-11-11 RX ADMIN — PERFLUTREN 2 ML: 6.52 INJECTION, SUSPENSION INTRAVENOUS at 14:15

## 2024-11-11 RX ADMIN — ASPIRIN 81 MG: 81 TABLET, COATED ORAL at 10:52

## 2024-11-11 RX ADMIN — Medication 2000 UNITS: at 21:04

## 2024-11-11 ASSESSMENT — PAIN DESCRIPTION - DESCRIPTORS: DESCRIPTORS: ACHING;DISCOMFORT

## 2024-11-11 ASSESSMENT — PAIN DESCRIPTION - LOCATION
LOCATION: GENERALIZED
LOCATION: GENERALIZED

## 2024-11-11 ASSESSMENT — VISUAL ACUITY: OU: 1

## 2024-11-11 ASSESSMENT — PAIN SCALES - GENERAL
PAINLEVEL_OUTOF10: 3
PAINLEVEL_OUTOF10: 3
PAINLEVEL_OUTOF10: 0

## 2024-11-11 ASSESSMENT — PAIN - FUNCTIONAL ASSESSMENT: PAIN_FUNCTIONAL_ASSESSMENT: ACTIVITIES ARE NOT PREVENTED

## 2024-11-11 NOTE — ED NOTES
ED to Inpatient Handoff Report    Notified [address] that electronic handoff available and patient ready for transport to room 730.    Safety Risks: Risk of falls    Patient in Restraints: no    Constant Observer or Patient : no    Telemetry Monitoring Ordered: Yes          Order to transfer to unit without monitor: NO    Last MEWS:   Time completed: 2300         Vitals:    11/10/24 1747 11/10/24 1747 11/10/24 2330   BP:  (!) 113/59 (!) 144/75   Pulse:  69 71   Resp:  16    Temp:  98.8 °F (37.1 °C)    TempSrc:  Oral    SpO2:  97% 93%   Weight: 70.3 kg (155 lb)     Height: 1.524 m (5')         Opportunity for questions and clarification was provided.

## 2024-11-11 NOTE — VIRTUAL HEALTH
Non billable note    Patient at board man    PICA aneurysm unruptured   this can be followed up outpatient.    Reason for immediate treatment would be SAH which CT head reviewed and suggests that she does not have SAH  NOT COMPLAINING of HEADACHE    Can follow up OP

## 2024-11-11 NOTE — PLAN OF CARE
Problem: Chronic Conditions and Co-morbidities  Goal: Patient's chronic conditions and co-morbidity symptoms are monitored and maintained or improved  Outcome: Progressing     Problem: Safety - Adult  Goal: Free from fall injury  Outcome: Progressing     Problem: Skin/Tissue Integrity - Adult  Goal: Skin integrity remains intact  Outcome: Progressing     Problem: Musculoskeletal - Adult  Goal: Return ADL status to a safe level of function  Outcome: Progressing            Priority: High    HRP (high risk pregnancy), unspecified trimester 2019    Abnormal glucose tolerance test (GTT) during pregnancy, antepartum 2019     Overview Note:     2019 3 hour GTT and Hgb A1c ordered      Low weight gain during pregnancy in second trimester 2019    Previous  delivery, antepartum condition or complication          Lab Results:  Hospital Outpatient Visit on 2019   Component Date Value Ref Range Status    Specimen Description 2019 . VAGINAL SPECIMEN   Final    Special Requests 2019 NOT REPORTED   Final    Culture 2019 NEGATIVE FOR GROUP B STREPTOCOCCI   Final   ]    Assessment:  1. José Laguerre is a 34 y.o. female  2. IUP @ 38w5d  3. Previous  x 1 refusing TOLAC  4. Labor. Regular contractions with cervical change  Patient Active Problem List    Diagnosis Date Noted    H/O:  2018     Priority: High     Overview Note:     H/O C/S in       Frequent UTI 2018     Priority: High     Overview Note:     2018 History of frequent UTIs- previously seeing Dr Marcy Quintero. 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   2. Procedure risk and complications reviewed  3. SCIP ABX ordered  4. Thromboembolic prophylaxis ordered with EPC's BL  5.  Consent Obtained      Electronically signed by Alexei Vidal DO  on 2019 at 12:04 AM

## 2024-11-11 NOTE — CARE COORDINATION
Pt is observation for vertigo, r/o stroke w/MRI of the brain pending. Pt has a PCP and insurance. Chart review completed as this time, CM will follow for needs, await further plans. Pt has a hx of [facility] and SNF stay at [facility].   VICKI HernandezN, RN  [facility] Case Management  [phone]

## 2024-11-11 NOTE — H&P
[facility] Hospitalist Group History and Physical      ASSESSMENT:      Principal Problem:    Vertigo  Active Problems:    CAD (coronary artery disease)    Hypertension    Hyperlipidemia    Carotid artery narrowing    Type 2 diabetes mellitus (HCC)    GERD (gastroesophageal reflux disease)  Resolved Problems:    * No resolved hospital problems. *      PLAN:    Likely peripheral etiology-recent nasal congestion and left ear fullness/buzzing.  Unlikely Ménière's.  Rule out acute stroke.  CT head, CT brain for reason, CTA head and neck nonacute.  As needed meclizine  Asymptomatic bacteriuria: Given Omnicef in the ED.  No urinary tract symptoms.  Hold off  Chronic HFrEF: Not in exacerbation.  Guideline directed  type 2 diabetes: Hold metformin.  Low-dose sliding scale lives alone  Consult physical Occupational Therapy  Restart home Xanax per patient request.  Continue other home medications as able  Consultations Ordered:  IP CONSULT TO NEUROLOGY  IP CONSULT TO TELESTROKE  IP CONSULT TO INTERNAL MEDICINE    Code Status: Prior  VTE prophylaxis: Heparin  Dispo: admitted for observation and Brain MRI. Possible dicharge tomw      CHIEF COMPLAINT:  had concerns including Dizziness (Hx of vertigo).    History of Present Illness:      79 y.o. female PMH of CAD, chronic HFrEF, type 2 diabetes presented with vertigo.  She was cleaning her room around 2 PM when she started to experience vertigo.  Symptoms unimproved with sitting or lying down.  She received meclizine and Valium in the ED.  Her vertiginous symptoms are markedly improved but she feels loopy compared to Klonopin.  At present patient is doing well        REVIEW OF SYSTEMS:  A comprehensive review of systems was negative except for: what is in the HPI      PMH:  Past Medical History:   Diagnosis Date    Abnormal echocardiogram     Acute combined systolic and diastolic congestive heart failure (HCC) 03/09/2021    TTE 3/2/21 25-30% EF    Acute renal insufficiency

## 2024-11-12 VITALS
WEIGHT: 163.7 LBS | SYSTOLIC BLOOD PRESSURE: 117 MMHG | RESPIRATION RATE: 16 BRPM | HEIGHT: 60 IN | DIASTOLIC BLOOD PRESSURE: 68 MMHG | TEMPERATURE: 97.8 F | BODY MASS INDEX: 32.14 KG/M2 | OXYGEN SATURATION: 92 % | HEART RATE: 72 BPM

## 2024-11-12 PROBLEM — R42 DIZZINESS: Status: ACTIVE | Noted: 2024-11-12

## 2024-11-12 LAB
ANION GAP SERPL CALCULATED.3IONS-SCNC: 12 MMOL/L (ref 7–16)
BASOPHILS # BLD: 0 K/UL (ref 0–0.2)
BASOPHILS NFR BLD: 0 % (ref 0–2)
BUN SERPL-MCNC: 14 MG/DL (ref 6–23)
CALCIUM SERPL-MCNC: 9 MG/DL (ref 8.6–10.2)
CHLORIDE SERPL-SCNC: 102 MMOL/L (ref 98–107)
CO2 SERPL-SCNC: 24 MMOL/L (ref 22–29)
CREAT SERPL-MCNC: 1 MG/DL (ref 0.5–1)
EOSINOPHIL # BLD: 0.11 K/UL (ref 0.05–0.5)
EOSINOPHILS RELATIVE PERCENT: 2 % (ref 0–6)
ERYTHROCYTE [DISTWIDTH] IN BLOOD BY AUTOMATED COUNT: 13.1 % (ref 11.5–15)
GFR, ESTIMATED: 62 ML/MIN/1.73M2
GLUCOSE SERPL-MCNC: 201 MG/DL (ref 74–99)
HCT VFR BLD AUTO: 42.1 % (ref 34–48)
HGB BLD-MCNC: 13.3 G/DL (ref 11.5–15.5)
LYMPHOCYTES NFR BLD: 0.74 K/UL (ref 1.5–4)
LYMPHOCYTES RELATIVE PERCENT: 11 % (ref 20–42)
MCH RBC QN AUTO: 32.2 PG (ref 26–35)
MCHC RBC AUTO-ENTMCNC: 31.6 G/DL (ref 32–34.5)
MCV RBC AUTO: 101.9 FL (ref 80–99.9)
MONOCYTES NFR BLD: 0.29 K/UL (ref 0.1–0.95)
MONOCYTES NFR BLD: 4 % (ref 2–12)
MYELOCYTES ABSOLUTE COUNT: 0.11 K/UL
MYELOCYTES: 2 %
NEUTROPHILS NFR BLD: 81 % (ref 43–80)
NEUTS SEG NFR BLD: 5.25 K/UL (ref 1.8–7.3)
PLATELET # BLD AUTO: 208 K/UL (ref 130–450)
PMV BLD AUTO: 9.5 FL (ref 7–12)
POTASSIUM SERPL-SCNC: 4 MMOL/L (ref 3.5–5)
RBC # BLD AUTO: 4.13 M/UL (ref 3.5–5.5)
RBC # BLD: NORMAL 10*6/UL
SODIUM SERPL-SCNC: 138 MMOL/L (ref 132–146)
WBC OTHER # BLD: 6.5 K/UL (ref 4.5–11.5)

## 2024-11-12 PROCEDURE — 6370000000 HC RX 637 (ALT 250 FOR IP): Performed by: INTERNAL MEDICINE

## 2024-11-12 PROCEDURE — 99239 HOSP IP/OBS DSCHRG MGMT >30: CPT | Performed by: INTERNAL MEDICINE

## 2024-11-12 PROCEDURE — 80048 BASIC METABOLIC PNL TOTAL CA: CPT

## 2024-11-12 PROCEDURE — 2580000003 HC RX 258: Performed by: HOSPITALIST

## 2024-11-12 PROCEDURE — 6370000000 HC RX 637 (ALT 250 FOR IP)

## 2024-11-12 PROCEDURE — 36415 COLL VENOUS BLD VENIPUNCTURE: CPT

## 2024-11-12 PROCEDURE — 97535 SELF CARE MNGMENT TRAINING: CPT

## 2024-11-12 PROCEDURE — 2060000000 HC ICU INTERMEDIATE R&B

## 2024-11-12 PROCEDURE — 6370000000 HC RX 637 (ALT 250 FOR IP): Performed by: HOSPITALIST

## 2024-11-12 PROCEDURE — 85025 COMPLETE CBC W/AUTO DIFF WBC: CPT

## 2024-11-12 PROCEDURE — 97530 THERAPEUTIC ACTIVITIES: CPT

## 2024-11-12 RX ORDER — CLOPIDOGREL BISULFATE 75 MG/1
75 TABLET ORAL DAILY
Qty: 19 TABLET | Refills: 0 | Status: SHIPPED | OUTPATIENT
Start: 2024-11-13 | End: 2024-12-02

## 2024-11-12 RX ORDER — ATORVASTATIN CALCIUM 80 MG/1
80 TABLET, FILM COATED ORAL NIGHTLY
Qty: 30 TABLET | Refills: 1 | Status: SHIPPED | OUTPATIENT
Start: 2024-11-12 | End: 2025-01-11

## 2024-11-12 RX ADMIN — ASPIRIN 81 MG: 81 TABLET, COATED ORAL at 09:19

## 2024-11-12 RX ADMIN — ACETAMINOPHEN 650 MG: 325 TABLET ORAL at 11:59

## 2024-11-12 RX ADMIN — PANTOPRAZOLE SODIUM 40 MG: 40 TABLET, DELAYED RELEASE ORAL at 06:24

## 2024-11-12 RX ADMIN — HYDRALAZINE HYDROCHLORIDE 50 MG: 50 TABLET ORAL at 09:21

## 2024-11-12 RX ADMIN — SODIUM CHLORIDE, PRESERVATIVE FREE 10 ML: 5 INJECTION INTRAVENOUS at 09:22

## 2024-11-12 RX ADMIN — ACETAMINOPHEN 650 MG: 325 TABLET ORAL at 04:47

## 2024-11-12 RX ADMIN — APIXABAN 5 MG: 5 TABLET, FILM COATED ORAL at 09:20

## 2024-11-12 RX ADMIN — METOPROLOL SUCCINATE 200 MG: 100 TABLET, EXTENDED RELEASE ORAL at 09:18

## 2024-11-12 RX ADMIN — CLOPIDOGREL BISULFATE 75 MG: 75 TABLET ORAL at 09:19

## 2024-11-12 RX ADMIN — DOCUSATE SODIUM 100 MG: 100 CAPSULE, LIQUID FILLED ORAL at 09:20

## 2024-11-12 RX ADMIN — ALPRAZOLAM 2 MG: 1 TABLET ORAL at 09:28

## 2024-11-12 RX ADMIN — ESCITALOPRAM OXALATE 10 MG: 10 TABLET ORAL at 09:21

## 2024-11-12 RX ADMIN — FERROUS SULFATE TAB 325 MG (65 MG ELEMENTAL FE) 325 MG: 325 (65 FE) TAB at 09:18

## 2024-11-12 RX ADMIN — ACETAMINOPHEN 650 MG: 325 TABLET ORAL at 17:48

## 2024-11-12 ASSESSMENT — PAIN DESCRIPTION - DESCRIPTORS: DESCRIPTORS: DISCOMFORT

## 2024-11-12 ASSESSMENT — PAIN SCALES - GENERAL: PAINLEVEL_OUTOF10: 3

## 2024-11-12 ASSESSMENT — PAIN DESCRIPTION - LOCATION: LOCATION: GENERALIZED

## 2024-11-12 NOTE — PROGRESS NOTES
[facility] Quality Flow/Interdisciplinary Rounds Progress Note        Quality Flow Rounds held on November 11, 2024    Disciplines Attending:  Bedside Nurse, , , and Nursing Unit Leadership    Madelaine Argueta was admitted on 11/10/2024  5:28 PM    Anticipated Discharge Date:       Disposition:    South Score:  South Scale Score: 23    Readmission Risk              Risk of Unplanned Readmission:  0           Discussed patient goal for the day, patient clinical progression, and barriers to discharge.  The following Goal(s) of the Day/Commitment(s) have been identified:   discharge planning, IV ABX, MRI brain, orthos      Anthony Love RN  November 11, 2024        
  P Quality Flow/Interdisciplinary Rounds Progress Note        Quality Flow Rounds held on November 12, 2024    Disciplines Attending:  Bedside Nurse, , , and Nursing Unit Leadership    Madelaine Argueta was admitted on 11/10/2024  5:28 PM    Anticipated Discharge Date:       Disposition:    South Score:  South Scale Score: 21    Readmission Risk              Risk of Unplanned Readmission:  14           Discussed patient goal for the day, patient clinical progression, and barriers to discharge.  The following Goal(s) of the Day/Commitment(s) have been identified:   discharge planning, PT/OT, echo, stroke (plavix), neuro-surgery      Anthony Love RN  November 12, 2024        
4 Eyes Skin Assessment     NAME:  Madelaine Argueta  YOB: 1945  MEDICAL RECORD NUMBER:  [id number]    The patient is being assessed for  Admission    I agree that at least one RN has performed a thorough Head to Toe Skin Assessment on the patient. ALL assessment sites listed below have been assessed.      Areas assessed by both nurses:    Head, Face, Ears, Shoulders, Back, Chest, Arms, Elbows, Hands, Sacrum. Buttock, Coccyx, Ischium, Legs. Feet and Heels, and Under Medical Devices         Does the Patient have a Wound? No noted wound(s)       South Prevention initiated by RN: No  Wound Care Orders initiated by RN: No    Pressure Injury (Stage 3,4, Unstageable, DTI, NWPT, and Complex wounds) if present, place Wound referral order by RN under : No    New Ostomies, if present place, Ostomy referral order under : No     Nurse 1 eSignature: Electronically signed by Scot Levy RN on 11/11/24 at 2:13 AM EST    **SHARE this note so that the co-signing nurse can place an eSignature**    Nurse 2 eSignature: Electronically signed by Stephanie Pappas RN on 11/11/24 at 2:14 AM EST   
CLINICAL PHARMACY NOTE: MEDS TO BEDS    Total # of Prescriptions Filled: 2   The following medications were delivered to the patient:  Atorvastatin 80 mg   Clopidogrel 75 mg     Additional Documentation:    
Message sent to Dr Dyer for admission orders.  
Notified [place] physician via secure message that home meds need ordered    
Pharmacy Note    Madelaine Argueta was ordered Co-Enyzme Q-10 capsules.  As per the [facility] Formulary Committee Policy, herbals and certain dietary supplements will be discontinued.  The herbal or dietary supplement may be continued after discharge from the hospital.    
Spiritual Health History and Assessment/Progress Note  [facility]     Encounter, Rituals, Rites and Sacraments,  ,  ,      Name: Madelaine Argueta MRN: [id number]    Age: 79 y.o.     Sex: female   Language: English   Sikhism: Yarsani   Vertigo     Date: 11/12/2024                           Spiritual Assessment began in [facility] INTERMEDIATE        Referral/Consult From: Rounding   Encounter Overview/Reason:  Encounter, Rituals, Rites and Sacraments  Service Provided For: Patient    Maira, Belief, Meaning:   Patient is connected with a maira tradition or spiritual practice  Family/Friends No family/friends present      Importance and Influence:  Patient has no beliefs influential to healthcare decision-making identified during this visit  Family/Friends No family/friends present    Community:  Patient feels well-supported. Support system includes: Spouse/Partner  Family/Friends No family/friends present    Assessment and Plan of Care:     Patient Interventions include: Provided sacramental/Uatsdin ritual  Family/Friends Interventions include: No family/friends present    Patient Plan of Care: Spiritual Care available upon further referral  Family/Friends Plan of Care: Spiritual Care available upon further referral    Electronically signed by Chaplain Amanda on 11/12/2024 at 4:14 PM   
to person, place, month/year, and situation.   Sensation: Pt denies numbness and tingling of extremities.   Edema: Unremarkable    Patient education  Pt educated on PT role in acute care setting.    Patient response to education:   Pt verbalized understanding Pt demonstrated skill Pt requires further education in this area   Yes NA No     ASSESSMENT:    Conditions Requiring Skilled Therapeutic Intervention:    [x]Decreased strength     []Decreased ROM  [x]Decreased functional mobility  [x]Decreased balance   [x]Decreased endurance   []Decreased posture  []Decreased sensation  []Decreased coordination   []Decreased vision  []Decreased safety awareness   []Increased pain       Comments:    Pt was in bed upon room entry; agreeable to PT evaluation. Pt completed all mobility noted above. Pt ambulated short distance to hallway onto transport cart. Gait was slow and unsteady. Pt denied dizziness but reported generalized malaise. Pt was assisted onto cart. Pt was left on cart with all needs met at conclusion of session. Transport team present.    Treatment:  Patient practiced and was instructed in the following treatment:    Therapeutic activities:  Bed mobility: Pt was cued for technique during bed mobility transfers.  Transfers: Pt was cued for hand placement during sit <> stand transfers.  Ambulation: Pt was cued for WW technique when ambulating.    Pt's/family goals:  1. To return home.    Prognosis is Good for reaching above PT goals.    Patient and or family understand(s) diagnosis, prognosis, and plan of care.  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 
SBA/CGA during ADL.   Independent   Activity Tolerance No SOB observed  Patient fatigued with light activity  Fair   Pt reported fatigue.   Good  with ADL activity    Visual/  Perceptual Glasses: none by bedside           UE ROM/strength  AROM present throughout   functional use of UE's noted.  Tolerate UE therapeutic activity/exercises to increase strength/endurance for ADL/xfer activity     Comments:  Pt laying in the bed and agreeable to therapy.  States she plans to return home alone.  She requires cues for safety throughout this session.  Walking on her pants which were too long however unaware.  Recommended use of walker for safety and balance.  She completed mobility to and from bathroom and ADL.  She declined to remain in a chair.  States she sat up for breakfast.  Returned to bed.     Education/treatment:  ADL retraining with facilitation of movement to increase self care skills. Therapeutic activity to address balance and endurance for ADL and transfers.  Pt education of transfer safety, home safety, energy conservation.       Pt has made  progress towards set goals.       Time In: 1055   Time Out: 1120     Min Units   Therapeutic Ex 93130     Therapeutic Activities 22879     ADL/Self Care 08400 25 2   Orthotic Management 26791     Neuro Re-Ed 29724     Non-Billable Time     TOTAL TIMED TREATMENT           Najma JURADO/L [id number]    
arrival patient lying in bed .  At end of session, patient transported to test  with call light and phone within reach, all lines and tubes intact.     Overall patient demonstrated  decreased independence and safety during completion of ADL/functional transfer/mobility tasks.  Pt would benefit from continued skilled OT to increase safety and independence with completion of ADL/IADL tasks for functional independence and quality of life.      Rehab Potential: good for established goals     Patient / Family Goal: none stated       Patient and/or family were instructed on functional diagnosis, prognosis/goals and OT plan of care. Demonstrated good  understanding.     Eval Complexity: Low    Time In: 1326  Time Out: 1336      Min Units   OT Eval Low 97165 x  1   OT Eval Medium 82801      OT Eval High 72583      OT Re-Eval 04830       Therapeutic Ex 19487      Therapeutic Activities 81864      ADL/Self Care 94535       Orthotic Management 89651       Manual 46249     Neuro Re-Ed 96989       Non-Billable Time      OT Re eval 31296        Evaluation Time additionally includes thorough review of current medical information, gathering information on past medical history/social history and prior level of function, interpretation of standardized testing/informal observation of tasks, assessment of data and development of plan of care and goals.            Cindy Avalos  OTR/L  OT [id number]                           
by Love Keita MD on 11/11/2024 at 10:47 AM

## 2024-11-12 NOTE — ACP (ADVANCE CARE PLANNING)
Advance Care Planning   Healthcare Decision Maker:    Primary Decision Maker: Nataliya Gauthier - Brother/Sister - [phone]      Today we discussed pt wishes.

## 2024-11-12 NOTE — DISCHARGE INSTRUCTIONS
Start taking Plavix tomorrow for 19 more days, as discussed    As discussed, finish your medication packets at home this week, and starting next week begin taking the new Lipitor of 80 mg

## 2024-11-12 NOTE — PLAN OF CARE
Problem: Chronic Conditions and Co-morbidities  Goal: Patient's chronic conditions and co-morbidity symptoms are monitored and maintained or improved  11/12/2024 1017 by Zelalem Landon RN  Outcome: Progressing  11/11/2024 2220 by Cori Vitale RN  Outcome: Progressing     Problem: Safety - Adult  Goal: Free from fall injury  11/12/2024 1017 by Zelalem Landon RN  Outcome: Progressing  11/11/2024 2220 by Cori Vitale RN  Outcome: Progressing     Problem: Skin/Tissue Integrity - Adult  Goal: Skin integrity remains intact  11/12/2024 1017 by Zelalem Landon RN  Outcome: Progressing  11/11/2024 2220 by Cori Vitale RN  Outcome: Progressing  Flowsheets (Taken 11/11/2024 2013)  Skin Integrity Remains Intact: Monitor for areas of redness and/or skin breakdown  Goal: Incisions, wounds, or drain sites healing without S/S of infection  11/11/2024 2220 by Cori Vitale RN  Outcome: Progressing  Goal: Oral mucous membranes remain intact  11/11/2024 2220 by Cori Vitale RN  Outcome: Progressing

## 2024-11-12 NOTE — PLAN OF CARE
Problem: Chronic Conditions and Co-morbidities  Goal: Patient's chronic conditions and co-morbidity symptoms are monitored and maintained or improved  11/11/2024 2220 by Cori Vitale RN  Outcome: Progressing  11/11/2024 1511 by Zelalem Landon RN  Outcome: Progressing     Problem: Safety - Adult  Goal: Free from fall injury  11/11/2024 2220 by Cori Vitale RN  Outcome: Progressing  11/11/2024 1511 by Zelalem Landon RN  Outcome: Progressing     Problem: Skin/Tissue Integrity - Adult  Goal: Skin integrity remains intact  11/11/2024 2220 by Cori Vitale RN  Outcome: Progressing  Flowsheets (Taken 11/11/2024 2013)  Skin Integrity Remains Intact: Monitor for areas of redness and/or skin breakdown  11/11/2024 1511 by Zelalem Landon RN  Outcome: Progressing  Goal: Incisions, wounds, or drain sites healing without S/S of infection  Outcome: Progressing  Goal: Oral mucous membranes remain intact  Outcome: Progressing     Problem: Musculoskeletal - Adult  Goal: Return mobility to safest level of function  Outcome: Progressing  Goal: Maintain proper alignment of affected body part  Outcome: Progressing  Goal: Return ADL status to a safe level of function  11/11/2024 2220 by Cori Vitale RN  Outcome: Progressing  11/11/2024 1511 by Zelalem Landon RN  Outcome: Progressing

## 2024-11-12 NOTE — DISCHARGE SUMMARY
carotid arteries.  No stenosis involving the common carotid arteries.  Both vertebral arteries are patent without evidence of stenosis.     1. No perfusion mismatch. 2. No acute intracranial hemorrhage or edema. 3. Aneurysm measuring 5 mm tall and 3 mm wide seen along PICA/left vertebral artery junction. 4. No intracranial arterial occlusion. 5. No stenosis involving the carotid arteries or vertebral arteries.     CT Head W/O Contrast    Result Date: 11/10/2024  EXAMINATION: CT OF THE HEAD WITHOUT CONTRAST  11/10/2024 7:29 pm TECHNIQUE: CT of the head was performed without the administration of intravenous contrast. Automated exposure control, iterative reconstruction, and/or weight based adjustment of the mA/kV was utilized to reduce the radiation dose to as low as reasonably achievable. COMPARISON: None. HISTORY: ORDERING SYSTEM PROVIDED HISTORY: Dizziness TECHNOLOGIST PROVIDED HISTORY: Has a \"code stroke\" or \"stroke alert\" been called?->No Reason for exam:->Dizziness Decision Support Exception - unselect if not a suspected or confirmed emergency medical condition->Emergency Medical Condition (MA) FINDINGS: Peripheral CSF space and ventricular system appears for the age group.  Is no focal mass effect or midline shift.  There is no evidence for a sizable area of new acute/recent insult in progression to the brain parenchyma. There is an area of hypodensity in the deep right frontal lobe which more likely is the upper aspect of the right basal ganglia region just lateral to the anterior body of the corpus callosum which is an old finding seen on the study of June 13, 2024. There is a small perivascular space in the right basal ganglia region. Discrete areas of hypodensity are seen in the subcortical region of the lateral aspect of the left basal ganglia area. Presently there is no evidence for a sizable area of a new acute/recent insult in progression to the brain parenchyma. Midline structures have unremarkable

## 2024-11-12 NOTE — CARE COORDINATION
CM met w/pt to discuss therapy scores. Pt declines the need for rehab and is agreeable to [facility] and prefers [facility] Care w/referral made, await acceptance. Pt resides alone in a first floor apt, no steps to enter. She has both a cane and a walker if needed. Dc order in place.  JUAN Hernandez, RN  [facility] Case Management  [phone]      CM received confirmation that pt is accepted by [facility], SOC is 11/13.

## 2024-11-18 ENCOUNTER — TELEPHONE (OUTPATIENT)
Dept: NEUROSURGERY | Age: 79
End: 2024-11-18

## 2024-11-18 NOTE — TELEPHONE ENCOUNTER
Patient called and states that she has to much going on and can not make it to scheduled appointment on 11/21/24-she will call us back to schedule.

## 2024-11-19 ENCOUNTER — TELEPHONE (OUTPATIENT)
Dept: NEUROLOGY | Age: 79
End: 2024-11-19

## 2024-11-19 NOTE — TELEPHONE ENCOUNTER
Patient is calling to reschedule the 11/21/24  hospital follow up with  Dr Patel. Patient would like to be scheduled in [place] if possible.

## 2024-11-19 NOTE — TELEPHONE ENCOUNTER
Message relayed to patient and patient is okay with being scheduled in [place].  Please call patient to schedule

## 2024-11-21 NOTE — TELEPHONE ENCOUNTER
LINDSAYM with appt date/time there was a cancellation on 11/22    Spoke with Dr. Patel and because pt cancelled she will can be schedule soonest available and added to wait list in the event we get a sooner appt or if he opens up another clinic day.

## 2024-12-17 DIAGNOSIS — Z79.899 OTHER LONG TERM (CURRENT) DRUG THERAPY: ICD-10-CM

## 2024-12-17 DIAGNOSIS — I50.22 CHRONIC SYSTOLIC (CONGESTIVE) HEART FAILURE (HCC): ICD-10-CM

## 2024-12-17 RX ORDER — FUROSEMIDE 20 MG/1
TABLET ORAL
Qty: 45 TABLET | Refills: 3 | Status: SHIPPED | OUTPATIENT
Start: 2024-12-17

## 2024-12-22 ENCOUNTER — OFFICE VISIT (OUTPATIENT)
Dept: FAMILY MEDICINE CLINIC | Age: 79
End: 2024-12-22

## 2024-12-22 VITALS
HEIGHT: 62 IN | DIASTOLIC BLOOD PRESSURE: 78 MMHG | RESPIRATION RATE: 16 BRPM | SYSTOLIC BLOOD PRESSURE: 136 MMHG | HEART RATE: 82 BPM | OXYGEN SATURATION: 96 % | TEMPERATURE: 97.5 F | BODY MASS INDEX: 29.94 KG/M2

## 2024-12-22 DIAGNOSIS — R68.89 FLU-LIKE SYMPTOMS: Primary | ICD-10-CM

## 2024-12-22 RX ORDER — TRIAMCINOLONE ACETONIDE 40 MG/ML
40 INJECTION, SUSPENSION INTRA-ARTICULAR; INTRAMUSCULAR ONCE
Status: COMPLETED | OUTPATIENT
Start: 2024-12-22 | End: 2024-12-22

## 2024-12-22 RX ADMIN — TRIAMCINOLONE ACETONIDE 40 MG: 40 INJECTION, SUSPENSION INTRA-ARTICULAR; INTRAMUSCULAR at 12:00

## 2024-12-28 ENCOUNTER — OFFICE VISIT (OUTPATIENT)
Dept: FAMILY MEDICINE CLINIC | Age: 79
End: 2024-12-28
Payer: MEDICARE

## 2024-12-28 VITALS
HEIGHT: 62 IN | OXYGEN SATURATION: 93 % | BODY MASS INDEX: 30 KG/M2 | DIASTOLIC BLOOD PRESSURE: 72 MMHG | HEART RATE: 103 BPM | WEIGHT: 163 LBS | RESPIRATION RATE: 18 BRPM | TEMPERATURE: 97.3 F | SYSTOLIC BLOOD PRESSURE: 118 MMHG

## 2024-12-28 DIAGNOSIS — R05.1 ACUTE COUGH: Primary | ICD-10-CM

## 2024-12-28 DIAGNOSIS — R09.81 NASAL CONGESTION: ICD-10-CM

## 2024-12-28 PROCEDURE — G8484 FLU IMMUNIZE NO ADMIN: HCPCS | Performed by: FAMILY MEDICINE

## 2024-12-28 PROCEDURE — 3074F SYST BP LT 130 MM HG: CPT | Performed by: FAMILY MEDICINE

## 2024-12-28 PROCEDURE — 1159F MED LIST DOCD IN RCRD: CPT | Performed by: FAMILY MEDICINE

## 2024-12-28 PROCEDURE — G8400 PT W/DXA NO RESULTS DOC: HCPCS | Performed by: FAMILY MEDICINE

## 2024-12-28 PROCEDURE — 99213 OFFICE O/P EST LOW 20 MIN: CPT | Performed by: FAMILY MEDICINE

## 2024-12-28 PROCEDURE — 3078F DIAST BP <80 MM HG: CPT | Performed by: FAMILY MEDICINE

## 2024-12-28 PROCEDURE — 1090F PRES/ABSN URINE INCON ASSESS: CPT | Performed by: FAMILY MEDICINE

## 2024-12-28 PROCEDURE — G8427 DOCREV CUR MEDS BY ELIG CLIN: HCPCS | Performed by: FAMILY MEDICINE

## 2024-12-28 PROCEDURE — 1036F TOBACCO NON-USER: CPT | Performed by: FAMILY MEDICINE

## 2024-12-28 PROCEDURE — G8417 CALC BMI ABV UP PARAM F/U: HCPCS | Performed by: FAMILY MEDICINE

## 2024-12-28 PROCEDURE — 1123F ACP DISCUSS/DSCN MKR DOCD: CPT | Performed by: FAMILY MEDICINE

## 2024-12-28 RX ORDER — PREDNISONE 5 MG/1
TABLET ORAL
Qty: 30 TABLET | Refills: 0 | Status: SHIPPED | OUTPATIENT
Start: 2024-12-28

## 2024-12-28 RX ORDER — AZITHROMYCIN 250 MG/1
TABLET, FILM COATED ORAL
Qty: 1 PACKET | Refills: 0 | Status: SHIPPED | OUTPATIENT
Start: 2024-12-28

## 2024-12-28 ASSESSMENT — ENCOUNTER SYMPTOMS
EYES NEGATIVE: 1
GASTROINTESTINAL NEGATIVE: 1
COUGH: 1
ALLERGIC/IMMUNOLOGIC NEGATIVE: 1

## 2024-12-28 NOTE — PROGRESS NOTES
every morning   Yes Sophia Barkley MD   pantoprazole (PROTONIX) 40 MG tablet Take 1 tablet by mouth every morning 4/28/22  Yes Sophia Barkley MD   loratadine (CLARITIN) 10 MG tablet Take 1 tablet by mouth nightly   Yes Sophia Barkley MD   ALPRAZolam (XANAX) 2 MG tablet Take 1 tablet by mouth 2 times daily as needed for Anxiety or Sleep.   Yes Sophia Barkley MD   Cholecalciferol (VITAMIN D3) 50 MCG (2000 UT) CAPS Take 1 capsule by mouth nightly   Yes Sophia Barkley MD   metFORMIN (GLUCOPHAGE-XR) 500 MG extended release tablet Take 1 tablet by mouth 2 times daily (with meals) 1/25/21  Yes Aarti Yancey PA-C   clopidogrel (PLAVIX) 75 MG tablet Take 1 tablet by mouth daily for 19 doses 11/13/24 12/2/24  Love Keita MD          HPI: Patient evaluated today upper respiratory cough congestion sinus drainage.  Denies fever chills at this time.  Symptoms have been ongoing over the past couple weeks.  We are going to try a Z-Rafy and some prednisone and then recommending some Mucinex DM.  Encouraged follow-up with primary medical doctor if persistent or worsening symptoms.          Review of Systems   Constitutional: Negative.    HENT:  Positive for congestion.    Eyes: Negative.    Respiratory:  Positive for cough.    Gastrointestinal: Negative.    Endocrine: Negative.    Genitourinary: Negative.    Musculoskeletal: Negative.    Skin: Negative.    Allergic/Immunologic: Negative.    Neurological: Negative.    Hematological: Negative.    Psychiatric/Behavioral: Negative.                Current Outpatient Medications:     azithromycin (ZITHROMAX Z-RAFY) 250 MG tablet, 2 today  Then 1 qd  4 days, Disp: 1 packet, Rfl: 0    predniSONE (DELTASONE) 5 MG tablet, 4 tablets daily for 3 days then 3 tablets daily for 3 days then 2 tablets daily for 3 days then 1 tablet daily for 3 days, Disp: 30 tablet, Rfl: 0    furosemide (LASIX) 20 MG tablet, TAKE ONE TABLET BY MOUTH 3 TIMES WEEKLY (MONDAY,

## 2025-02-06 ENCOUNTER — OFFICE VISIT (OUTPATIENT)
Dept: FAMILY MEDICINE CLINIC | Age: 80
End: 2025-02-06
Payer: MEDICARE

## 2025-02-06 VITALS
HEART RATE: 76 BPM | DIASTOLIC BLOOD PRESSURE: 82 MMHG | BODY MASS INDEX: 29.07 KG/M2 | WEIGHT: 159 LBS | TEMPERATURE: 97.3 F | SYSTOLIC BLOOD PRESSURE: 134 MMHG | RESPIRATION RATE: 18 BRPM | OXYGEN SATURATION: 97 %

## 2025-02-06 DIAGNOSIS — K08.89 PAIN, DENTAL: Primary | ICD-10-CM

## 2025-02-06 PROCEDURE — G8400 PT W/DXA NO RESULTS DOC: HCPCS

## 2025-02-06 PROCEDURE — 1090F PRES/ABSN URINE INCON ASSESS: CPT

## 2025-02-06 PROCEDURE — 99213 OFFICE O/P EST LOW 20 MIN: CPT

## 2025-02-06 PROCEDURE — 1123F ACP DISCUSS/DSCN MKR DOCD: CPT

## 2025-02-06 PROCEDURE — 3075F SYST BP GE 130 - 139MM HG: CPT

## 2025-02-06 PROCEDURE — G8427 DOCREV CUR MEDS BY ELIG CLIN: HCPCS

## 2025-02-06 PROCEDURE — 96372 THER/PROPH/DIAG INJ SC/IM: CPT

## 2025-02-06 PROCEDURE — 1036F TOBACCO NON-USER: CPT

## 2025-02-06 PROCEDURE — 3079F DIAST BP 80-89 MM HG: CPT

## 2025-02-06 PROCEDURE — G8417 CALC BMI ABV UP PARAM F/U: HCPCS

## 2025-02-06 PROCEDURE — 1159F MED LIST DOCD IN RCRD: CPT

## 2025-02-06 PROCEDURE — 1160F RVW MEDS BY RX/DR IN RCRD: CPT

## 2025-02-06 RX ORDER — CLINDAMYCIN HYDROCHLORIDE 300 MG/1
300 CAPSULE ORAL EVERY 8 HOURS
Qty: 21 CAPSULE | Refills: 0 | Status: SHIPPED | OUTPATIENT
Start: 2025-02-06 | End: 2025-02-13

## 2025-02-06 RX ORDER — KETOROLAC TROMETHAMINE 30 MG/ML
15 INJECTION, SOLUTION INTRAMUSCULAR; INTRAVENOUS ONCE
Status: DISCONTINUED | OUTPATIENT
Start: 2025-02-06 | End: 2025-02-06

## 2025-02-06 RX ORDER — KETOROLAC TROMETHAMINE 15 MG/ML
15 INJECTION, SOLUTION INTRAMUSCULAR; INTRAVENOUS ONCE
Status: COMPLETED | OUTPATIENT
Start: 2025-02-06 | End: 2025-02-06

## 2025-02-06 RX ADMIN — KETOROLAC TROMETHAMINE 15 MG: 15 INJECTION, SOLUTION INTRAMUSCULAR; INTRAVENOUS at 17:14

## 2025-02-06 NOTE — PROGRESS NOTES
Chief Complaint   Dental Pain (X3 days)      History of Present Illness   Source of history provided by:  patient.    History of Present Illness  The patient is a 79-year-old female who presents for evaluation of dental pain.    She reports experiencing severe dental pain, which she attributes to an infection.  Symptoms ongoing for the past 2 to 3 days.  She has not sought dental care in the past 2 years due to the demise of her previous dentist. She has scheduled an appointment with a new dentist for tomorrow. She was advised to seek immediate medical attention at an emergency care facility. She also mentions that she is unable to drive due to a pre-existing back condition.    All other ROS negative unless otherwise stated in HPI.      ROS    Unless otherwise stated in this report or unable to obtain because of the patient's clinical or mental status as evidenced by the medical record, this patients's positive and negative responses for Review of Systems, constitutional, psych, eyes, ENT, cardiovascular, respiratory, gastrointestinal, neurological, genitourinary, musculoskeletal, integument systems and systems related to the presenting problem are either stated in the preceding or were not pertinent or were negative for the symptoms and/or complaints related to the medical problem.    Physical Exam         VS:  /82   Pulse 76   Temp 97.3 °F (36.3 °C) (Temporal)   Resp 18   Wt 72.1 kg (159 lb)   SpO2 97%   BMI 29.07 kg/m²     Constitutional:  Alert, development consistent with age.  HEENT:  NC/NT.  Airway patent.  Eyes PERRL.  Ears with normal bilateral TMs and normal bilateral canals.    Neck:  Supple. Normal ROM.  No adenopathy.    Lips:  No erythema or abrasions noted.    Mouth:  Normal tongue and moist buccal mucosa. Floor of the mouth is soft. No tenderness in the submental or submandibular space. No tongue elevation.  No drooling.  No trismus present.  No facial edema or redness noted.  Dental:

## 2025-02-11 ENCOUNTER — OFFICE VISIT (OUTPATIENT)
Dept: FAMILY MEDICINE CLINIC | Age: 80
End: 2025-02-11
Payer: MEDICARE

## 2025-02-11 VITALS
BODY MASS INDEX: 29.07 KG/M2 | HEART RATE: 60 BPM | SYSTOLIC BLOOD PRESSURE: 118 MMHG | WEIGHT: 159 LBS | DIASTOLIC BLOOD PRESSURE: 76 MMHG | TEMPERATURE: 97.7 F | OXYGEN SATURATION: 96 % | RESPIRATION RATE: 18 BRPM

## 2025-02-11 DIAGNOSIS — K08.89 PAIN, DENTAL: Primary | ICD-10-CM

## 2025-02-11 PROCEDURE — G8417 CALC BMI ABV UP PARAM F/U: HCPCS | Performed by: PHYSICIAN ASSISTANT

## 2025-02-11 PROCEDURE — 1090F PRES/ABSN URINE INCON ASSESS: CPT | Performed by: PHYSICIAN ASSISTANT

## 2025-02-11 PROCEDURE — 1123F ACP DISCUSS/DSCN MKR DOCD: CPT | Performed by: PHYSICIAN ASSISTANT

## 2025-02-11 PROCEDURE — G8427 DOCREV CUR MEDS BY ELIG CLIN: HCPCS | Performed by: PHYSICIAN ASSISTANT

## 2025-02-11 PROCEDURE — 99214 OFFICE O/P EST MOD 30 MIN: CPT | Performed by: PHYSICIAN ASSISTANT

## 2025-02-11 PROCEDURE — 3074F SYST BP LT 130 MM HG: CPT | Performed by: PHYSICIAN ASSISTANT

## 2025-02-11 PROCEDURE — 3078F DIAST BP <80 MM HG: CPT | Performed by: PHYSICIAN ASSISTANT

## 2025-02-11 PROCEDURE — 1160F RVW MEDS BY RX/DR IN RCRD: CPT | Performed by: PHYSICIAN ASSISTANT

## 2025-02-11 PROCEDURE — 1159F MED LIST DOCD IN RCRD: CPT | Performed by: PHYSICIAN ASSISTANT

## 2025-02-11 PROCEDURE — 1036F TOBACCO NON-USER: CPT | Performed by: PHYSICIAN ASSISTANT

## 2025-02-11 PROCEDURE — G8400 PT W/DXA NO RESULTS DOC: HCPCS | Performed by: PHYSICIAN ASSISTANT

## 2025-02-11 RX ORDER — CHLORHEXIDINE GLUCONATE ORAL RINSE 1.2 MG/ML
15 SOLUTION DENTAL 2 TIMES DAILY
Qty: 420 ML | Refills: 0 | Status: SHIPPED | OUTPATIENT
Start: 2025-02-11 | End: 2025-02-25

## 2025-02-11 NOTE — PROGRESS NOTES
25  Madelaine Argueta : 1945 Sex: female  Age 79 y.o.      Subjective:  Chief Complaint   Patient presents with    Oral Pain         HPI:     History of Present Illness  The patient is a 79-year-old female who presents for evaluation of post-dental extraction complications.    She underwent a tooth extraction procedure last Friday, following which she was prescribed clindamycin and an analgesic. However, she experienced severe adverse reactions to the antibiotic, prompting her to discontinue its use. She reports persistent bruising at the extraction site but has not received any specific post-procedural instructions from her dentist. She was not provided with a mouth rinse but was given gauze to manage bleeding. Additionally, she mentions that she had temporarily stopped her Eliquis medication.    Supplemental Information  She had influenza for 7 weeks.    ALLERGIES  The patient has had an allergic reaction to CLINDAMYCIN.    MEDICATIONS  Current: Eliquis            ROS:   Unless otherwise stated in this report the patient's positive and negative responses for review of systems for constitutional, eyes, ENT, cardiovascular, respiratory, gastrointestinal, neurological, , musculoskeletal, and integument systems and related systems to the presenting problem are either stated in the history of present illness or were not pertinent or were negative for the symptoms and/or complaints related to the presenting medical problem.  Positives and pertinent negatives as per HPI.  All others reviewed and are negative.      PMH:     Past Medical History:   Diagnosis Date    Abnormal echocardiogram     Acute combined systolic and diastolic congestive heart failure (HCC) 2021    TTE 3/2/21 25-30% EF    Acute renal insufficiency 2021    Anxiety     CAD (coronary artery disease)     normal adenisone stress test    Carotid artery narrowing     <40% bilaterally    Depression     Diabetes mellitus (HCC)     type

## 2025-02-26 NOTE — PROGRESS NOTES
Inpatient Cardiology RECONSULT     PATIENT IS BEING FOLLOWED FOR: Patient now agreeable to [facility]. HFrEF. Newly diagnosed cardiomyopathy    Ana Ramos is a 70 y. o. female known to Dr Briana Villanueva seen in consult 3/10/2021    PMH: HTN, HLD, CAD s/p BMS to RCA, GERD, T2DM insulin requiring, intolerance to statins, and ex smoker. Seen by Dr Murphy Blake 3/2/2021 for NSVT ( hypomagnesia/Mag 1.3)--> Toprol and Cozaar initiated. Left AMA    SE-ED 3/9/2021 at 1420 with confusion, possibly not taking medications as prescribed. She is a poor historian. BP upon arrival 186/100  Sinus tachycardia. CTA + bilateral PE's. Na 142, K+ 4.8-->4.1, Bun/Cr 19/0.9, magnesium 1.1-->1.4, p-BNP 71205, troponin 0.03 x 3, CPK 97, CKMB 1.7, Albumin 3.3, AlT 70-->56, AST 48-->36, pro-calcitonin 0.10, WBC 12.2, Hgb 11.7, Plt 233, D-dimer 2372, INR 1.5, UA negative     SUBJECTIVE: Denies SOB or CP  OBJECTIVE: No apparent distress     ROS:  Consist: Denies fevers, chills or night sweats  Heart: Denies chest pain, palpitations, lightheadedness, dizziness or syncope  Lungs: Denies SOB, cough, wheezing, orthopnea or PND  GI: Denies abdominal pain, vomiting or diarrhea    PHYSICAL EXAM:   /76   Pulse 94   Temp 97.4 °F (36.3 °C) (Temporal)   Resp 20   Ht 5' 1\" (1.549 m)   Wt 152 lb 14.4 oz (69.4 kg)   SpO2 98%   BMI 28.89 kg/m²    B/P Range last 24 hours: Systolic (05HJL), VRQ:791 , Min:134 , CFU:177    Diastolic (25NCT), PYS:38, Min:76, Max:82  24 hour BP range: 125//76  HR 90's SR, afebrile, 2 liters NC O2 saturation 94-98%  CONST: Well developed,  female who appears of stated age. Awake, alert and cooperative. No apparent distress  HEENT:   Head- Normocephalic, atraumatic   Eyes- Conjunctivae pink, anicteric  Throat- Oral mucosa pink and moist  Neck-  No stridor, trachea midline, no jugular venous distention. No bilateral carotid bruit  CHEST: Chest symmetrical and non-tender to palpation.  No accessory muscle use or intercostal retractions  RESPIRATORY:  Lung sounds - clear throughout fields   CARDIOVASCULAR:     Heart Ausculation- Regular rate and rhythm, no  mumur heard  PV: Trace lower extremity edema. No varicosities. Pedal pulses palpable, no clubbing or cyanosis   ABDOMEN: Soft, non-tender to light palpation. Bowel sounds present. No palpable masses no organomegaly; no abdominal bruit  MS: Good muscle strength and tone. No atrophy or abnormal movements. : Deferred  SKIN: Warm and dry no statis dermatitis or ulcers   NEURO / PSYCH: Oriented to person, place and time. Speech clear and appropriate. Follows all commands.  Pleasant affect       Intake/Output Summary (Last 24 hours) at 3/17/2021 0911  Last data filed at 3/17/2021 0046  Gross per 24 hour   Intake 130 ml   Output 500 ml   Net -370 ml       Weight:   Wt Readings from Last 3 Encounters:   03/15/21 152 lb 14.4 oz (69.4 kg)   03/01/21 160 lb (72.6 kg)   02/22/21 167 lb (75.8 kg)     Current Inpatient Medications:   enoxaparin  1 mg/kg Subcutaneous BID    metoprolol succinate  200 mg Oral Daily    sacubitril-valsartan  1 tablet Oral BID    furosemide  40 mg Oral Daily    magnesium oxide  400 mg Oral Daily    [Held by provider] apixaban  10 mg Oral BID    influenza virus vaccine  0.5 mL Intramuscular Prior to discharge    spironolactone  25 mg Oral Daily    vitamin D  2,000 Units Oral Daily    escitalopram  10 mg Oral Daily    cetirizine  5 mg Oral Daily    pantoprazole  40 mg Oral Daily    insulin lispro  0-10 Units Subcutaneous 4x Daily AC & HS    sodium chloride flush  10 mL Intravenous 2 times per day    atorvastatin  80 mg Oral Nightly    aspirin  81 mg Oral Daily       IV Infusions (if any):   dextrose         DIAGNOSTIC/ LABORATORY DATA:  Labs:   CBC:   Recent Labs     03/15/21  0615 03/16/21  0621   WBC 9.2 10.3   HGB 11.9 12.4   HCT 40.9 41.4    400     BMP:   Recent Labs     03/16/21  0621 03/17/21  0748    141   K 4.8 4.8 4.8   CO2 33* 31*   BUN 19 18   CREATININE 0.8 0.7   LABGLOM >60 >60   CALCIUM 9.2 9.0     Mag:   Recent Labs     03/16/21  0621 03/17/21  0748   MG 1.6 1.7     HgA1c:   Lab Results   Component Value Date    LABA1C 6.5 (H) 03/09/2021     FASTING LIPID PANEL:  Lab Results   Component Value Date    CHOL 108 03/10/2021    HDL 33 03/10/2021    LDLCALC 55 03/10/2021    TRIG 99 03/10/2021     LIVER PROFILE:  Recent Labs     03/16/21  0621 03/17/21  0748   AST 56* 45*   ALT 51* 45*   LABALBU 3.1* 2.7*     CXR 3/9/2021: CHF.  Superimposed pneumonia cannot be excluded     CT Head 3/9/2021: No acute intracranial abnormality     CTA Chest 3/10/2021: Right lower lobe segmental pulmonary embolus.  Left lower lobe subsegmental pulmonary embolus. There is nonspecific airspace disease in the right lower lobe. There is a peripheral masslike density in the left lower lobe, in the   distribution of the subsegmental embolus.  Well a mass is not excluded, this   more likely relates to small pulmonary infarct.  Suggest short-term follow-up   to confirm resolution. Bilateral small to moderate pleural effusions.     BLE Dopplers 3/10/2021: Within the visualized vessels there is no evidence for deep venous thrombosis    Telemetry: SR      TTE 2/21/2021 Dr Menjivar Estimable: Mildly dilated left ventricle. LVDD 5.5. LVMI 92 l/min/m2. EF estimated at 25-30%. NWM.  Normal left ventricle wall thickness. Stage II DD. Moderately dilated LA. Normal right ventricular size. Right ventricle global systolic function is moderately reduced . TAPSE 12 mm  Moderate MR with centrally directed jet. No hemodynamically significant aortic stenosis is present. Moderate AI. Mild to moderate TR. RVSP is 55 mmHg. Pulmonary hypertension is moderate . Trace pericardial effusion. No previous echo for comparison. ASSESSMENT:   1. Newly diagnosed CMP with severe LV systolic dysfunction (with EF 25-30% on TTE 3/2/2021) possible ischemic  2.  Known CAD Hx PCI to RCA in 2007  3. Acute HFrEF  4. Bilateral Pulmonary emboli: Eliquis held 3/16/21 and she was started on therapeutic dose Lovenox  5. Hypoxic respiratory failure  6. HTN  7. VHD with moderate MR and mild to moderate TR and moderate AI on TTE 3/2/2021  8. NSVT in context hypomagnesemia    9. Hypomagnesemia: supplemented with low normal on repeat  10. Elevated LFT's: trending down  11. Hypoalbuminemia    12. HLD with intolerance to statin's  13. T2DM insulin requiring with neuropathy   14. JAXSON not complaint with C-pap  15. Anxiety  16. GERD    PLAN:  1. Continue current cardiac medications  2. Cath ( AUC: HF-7 ) +/- PCI:  tentatively scheduled for Friday 3/19/21. Risks, benefits, and alternative therapies discussed with patient she understood and consented to proceed  3.  Will continue to follow    Electronically signed by Chintan Galan MD on 3/17/2021 at 9:11 AM self-care/home management

## 2025-03-26 DIAGNOSIS — I50.22 CHRONIC SYSTOLIC (CONGESTIVE) HEART FAILURE: ICD-10-CM

## 2025-03-26 RX ORDER — EMPAGLIFLOZIN 10 MG/1
TABLET, FILM COATED ORAL
Qty: 30 TABLET | Refills: 11 | Status: SHIPPED | OUTPATIENT
Start: 2025-03-26

## 2025-04-02 NOTE — TELEPHONE ENCOUNTER
Our phone systems are able to call out, but patients are unable to hear.    Unable to speak with Tian Dowell Walking

## 2025-05-23 ENCOUNTER — OFFICE VISIT (OUTPATIENT)
Dept: FAMILY MEDICINE CLINIC | Age: 80
End: 2025-05-23

## 2025-05-23 VITALS
WEIGHT: 155 LBS | BODY MASS INDEX: 29.27 KG/M2 | HEART RATE: 70 BPM | OXYGEN SATURATION: 95 % | DIASTOLIC BLOOD PRESSURE: 56 MMHG | SYSTOLIC BLOOD PRESSURE: 104 MMHG | RESPIRATION RATE: 16 BRPM | HEIGHT: 61 IN | TEMPERATURE: 96.9 F

## 2025-05-23 DIAGNOSIS — R52 BODY ACHES: Primary | ICD-10-CM

## 2025-05-23 DIAGNOSIS — J01.90 ACUTE BACTERIAL SINUSITIS: ICD-10-CM

## 2025-05-23 DIAGNOSIS — B96.89 ACUTE BACTERIAL SINUSITIS: ICD-10-CM

## 2025-05-23 LAB
INFLUENZA A ANTIBODY: NORMAL
INFLUENZA B ANTIBODY: NORMAL
Lab: NORMAL
PERFORMING INSTRUMENT: NORMAL
QC PASS/FAIL: NORMAL
SARS-COV-2, POC: NORMAL

## 2025-05-23 RX ORDER — DEXAMETHASONE SODIUM PHOSPHATE 10 MG/ML
10 INJECTION, SOLUTION INTRA-ARTICULAR; INTRALESIONAL; INTRAMUSCULAR; INTRAVENOUS; SOFT TISSUE ONCE
Status: COMPLETED | OUTPATIENT
Start: 2025-05-23 | End: 2025-05-23

## 2025-05-23 RX ADMIN — DEXAMETHASONE SODIUM PHOSPHATE 10 MG: 10 INJECTION, SOLUTION INTRA-ARTICULAR; INTRALESIONAL; INTRAMUSCULAR; INTRAVENOUS; SOFT TISSUE at 10:19

## 2025-05-23 ASSESSMENT — ENCOUNTER SYMPTOMS
SORE THROAT: 0
SINUS PRESSURE: 1
CONSTIPATION: 0
SINUS PAIN: 1
DIARRHEA: 0
COUGH: 0
VOMITING: 0
PHOTOPHOBIA: 0
SHORTNESS OF BREATH: 0
BACK PAIN: 0
BLOOD IN STOOL: 0
ABDOMINAL PAIN: 0
NAUSEA: 0

## 2025-05-23 NOTE — PROGRESS NOTES
Madelaine Argueta (:  1945) is a 79 y.o. female,Established patient, here for evaluation of the following chief complaint(s):  Headache (Started 2 days ago ) and Facial Pain         Assessment & Plan  Body aches  In office testing negative.  Intramuscular injection given.  Patient refused oral medications at this time.  Follow-up as needed.  Red flags discussed if these occur she is to go directly to the nearest emergency department or urgent care.    Orders:    POCT Influenza A/B    POCT COVID-19, Antigen    dexAMETHasone (DECADRON) injection 10 mg    Acute bacterial sinusitis  As above    Orders:    dexAMETHasone (DECADRON) injection 10 mg      No follow-ups on file.       Subjective   Headache    Patient presents today for several day history of worsening headache, chills, and facial pain.  No fever.  No nausea vomiting or diarrhea.  No chest pain or shortness of breath.  No known sick contacts or recent travel prior to symptoms beginning.      Review of Systems   Constitutional:  Positive for chills. Negative for fever.   HENT:  Positive for sinus pressure and sinus pain. Negative for congestion, hearing loss, nosebleeds and sore throat.    Eyes:  Negative for photophobia.   Respiratory:  Negative for cough and shortness of breath.    Cardiovascular:  Negative for chest pain, palpitations and leg swelling.   Gastrointestinal:  Negative for abdominal pain, blood in stool, constipation, diarrhea, nausea and vomiting.   Endocrine: Negative for polydipsia.   Genitourinary:  Negative for dysuria, frequency, hematuria and urgency.   Musculoskeletal:  Negative for back pain and myalgias.   Skin: Negative.    Neurological:  Positive for headaches. Negative for dizziness, tremors and weakness.   Hematological:  Does not bruise/bleed easily.   Psychiatric/Behavioral:  Negative for hallucinations and suicidal ideas.    All other systems reviewed and are negative.         Current Outpatient Medications:     Coenzyme

## 2025-07-16 RX ORDER — HYDRALAZINE HYDROCHLORIDE 50 MG/1
50 TABLET, FILM COATED ORAL 2 TIMES DAILY
Qty: 60 TABLET | Refills: 11 | Status: SHIPPED | OUTPATIENT
Start: 2025-07-16

## 2025-07-31 ENCOUNTER — OFFICE VISIT (OUTPATIENT)
Dept: CARDIOLOGY CLINIC | Age: 80
End: 2025-07-31
Payer: MEDICARE

## 2025-07-31 VITALS
SYSTOLIC BLOOD PRESSURE: 124 MMHG | WEIGHT: 155.8 LBS | HEIGHT: 61 IN | TEMPERATURE: 96.8 F | HEART RATE: 70 BPM | RESPIRATION RATE: 16 BRPM | BODY MASS INDEX: 29.42 KG/M2 | DIASTOLIC BLOOD PRESSURE: 68 MMHG | OXYGEN SATURATION: 95 %

## 2025-07-31 DIAGNOSIS — I25.83 CORONARY ARTERY DISEASE DUE TO LIPID RICH PLAQUE: ICD-10-CM

## 2025-07-31 DIAGNOSIS — I50.22 CHRONIC SYSTOLIC (CONGESTIVE) HEART FAILURE (HCC): Primary | ICD-10-CM

## 2025-07-31 DIAGNOSIS — I38 VALVULAR HEART DISEASE: ICD-10-CM

## 2025-07-31 DIAGNOSIS — I25.10 CORONARY ARTERY DISEASE DUE TO LIPID RICH PLAQUE: ICD-10-CM

## 2025-07-31 PROCEDURE — G8417 CALC BMI ABV UP PARAM F/U: HCPCS | Performed by: INTERNAL MEDICINE

## 2025-07-31 PROCEDURE — 99214 OFFICE O/P EST MOD 30 MIN: CPT | Performed by: INTERNAL MEDICINE

## 2025-07-31 PROCEDURE — G2211 COMPLEX E/M VISIT ADD ON: HCPCS | Performed by: INTERNAL MEDICINE

## 2025-07-31 PROCEDURE — 93000 ELECTROCARDIOGRAM COMPLETE: CPT | Performed by: INTERNAL MEDICINE

## 2025-07-31 PROCEDURE — 3078F DIAST BP <80 MM HG: CPT | Performed by: INTERNAL MEDICINE

## 2025-07-31 PROCEDURE — 1123F ACP DISCUSS/DSCN MKR DOCD: CPT | Performed by: INTERNAL MEDICINE

## 2025-07-31 PROCEDURE — G8400 PT W/DXA NO RESULTS DOC: HCPCS | Performed by: INTERNAL MEDICINE

## 2025-07-31 PROCEDURE — G8427 DOCREV CUR MEDS BY ELIG CLIN: HCPCS | Performed by: INTERNAL MEDICINE

## 2025-07-31 PROCEDURE — 1090F PRES/ABSN URINE INCON ASSESS: CPT | Performed by: INTERNAL MEDICINE

## 2025-07-31 PROCEDURE — 3074F SYST BP LT 130 MM HG: CPT | Performed by: INTERNAL MEDICINE

## 2025-07-31 PROCEDURE — 1036F TOBACCO NON-USER: CPT | Performed by: INTERNAL MEDICINE

## 2025-07-31 PROCEDURE — 1159F MED LIST DOCD IN RCRD: CPT | Performed by: INTERNAL MEDICINE

## 2025-07-31 RX ORDER — ICOSAPENT ETHYL 1000 MG/1
2 CAPSULE ORAL 2 TIMES DAILY
COMMUNITY
Start: 2025-07-21

## 2025-07-31 NOTE — PROGRESS NOTES
Madelaine Argueta  1945  Date of Service: 7/31/2025    Patient Active Problem List    Diagnosis Date Noted    Nonrheumatic mitral valve stenosis 10/09/2022     Priority: Medium     Overview Note:     Mild      Acute cough 12/28/2024    Nasal congestion 12/28/2024    Dizziness 11/12/2024    GI bleed 12/15/2023    Hypomagnesemia 03/10/2021    Valvular heart disease 03/10/2021    Abnormal liver enzymes 03/10/2021    Hypertensive urgency 03/10/2021    Sleep apnea 03/10/2021    Acute respiratory failure with hypoxia (HCC) 03/10/2021    Bilateral pulmonary embolism (HCC) 03/10/2021    Congestive heart failure (HCC)     Acute on chronic systolic heart failure (HCC) 03/09/2021    Acute combined systolic and diastolic congestive heart failure (HCC) 03/09/2021    Ataxic gait     Vertigo 02/28/2021    Acute renal insufficiency 02/28/2021    Acute cystitis without hematuria 02/28/2021    Ataxia 02/27/2021    Mixed hyperlipidemia 01/18/2017    Primary osteoarthritis involving multiple joints 01/18/2017    Type 2 diabetes mellitus (HCC) 04/25/2016    GERD (gastroesophageal reflux disease) 04/25/2016    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    PTCA and stenting RCA (11/25/07):  bare metal stent 2.5 x 24 mm and 2.5 x 12 mm stents placed.  Cardiac catheterization (11/25/07): Left main normal; LAD 40% prox; Cx minimal luminal irregularities; % mid; LV gram not performed.   Acute inferior wall myocardial infarction (11/25/07).      Abnormal echocardiogram      Overview Note:     11/26/07: Mild inferior hypokinesis, EF > 60%, Stage I DD, trace MR, trace TR, normal RVSP.      Hypertension     Hyperlipidemia     Anxiety     Carotid artery narrowing      Overview Note:     <40% bilaterally      Drug

## 2025-08-13 DIAGNOSIS — I50.22 CHRONIC SYSTOLIC (CONGESTIVE) HEART FAILURE (HCC): ICD-10-CM

## 2025-08-13 RX ORDER — METOPROLOL SUCCINATE 200 MG/1
TABLET, EXTENDED RELEASE ORAL
Qty: 90 TABLET | Refills: 3 | Status: SHIPPED | OUTPATIENT
Start: 2025-08-13

## (undated) DEVICE — TOWEL,OR,DSP,ST,BLUE,STD,6/PK,12PK/CS: Brand: MEDLINE

## (undated) DEVICE — MARKER,SKIN,WI/RULER AND LABELS: Brand: MEDLINE

## (undated) DEVICE — DOUBLE BASIN SET: Brand: MEDLINE INDUSTRIES, INC.

## (undated) DEVICE — GLOVE ORTHO 8   MSG9480

## (undated) DEVICE — 4-PORT MANIFOLD: Brand: NEPTUNE 2

## (undated) DEVICE — SINGLE-USE BIOPSY FORCEPS: Brand: RADIAL JAW 4